# Patient Record
Sex: MALE | Race: BLACK OR AFRICAN AMERICAN | NOT HISPANIC OR LATINO | Employment: FULL TIME | URBAN - METROPOLITAN AREA
[De-identification: names, ages, dates, MRNs, and addresses within clinical notes are randomized per-mention and may not be internally consistent; named-entity substitution may affect disease eponyms.]

---

## 2017-01-12 RX ORDER — CARISOPRODOL 350 MG/1
TABLET ORAL
Qty: 60 TABLET | Refills: 4 | OUTPATIENT
Start: 2017-01-12

## 2017-01-30 ENCOUNTER — TELEPHONE (OUTPATIENT)
Dept: FAMILY MEDICINE CLINIC | Facility: CLINIC | Age: 57
End: 2017-01-30

## 2017-01-30 RX ORDER — DEXTROMETHORPHAN HYDROBROMIDE AND PROMETHAZINE HYDROCHLORIDE 15; 6.25 MG/5ML; MG/5ML
5 SYRUP ORAL 4 TIMES DAILY PRN
Qty: 120 ML | Refills: 0 | Status: SHIPPED | OUTPATIENT
Start: 2017-01-30 | End: 2017-02-28

## 2017-01-30 RX ORDER — CEFDINIR 300 MG/1
300 CAPSULE ORAL 2 TIMES DAILY
Qty: 20 CAPSULE | Refills: 0 | Status: SHIPPED | OUTPATIENT
Start: 2017-01-30 | End: 2017-02-28

## 2017-01-30 NOTE — TELEPHONE ENCOUNTER
----- Message from Lavern Vasquez sent at 1/30/2017  8:31 AM EST -----  Contact: PATIENT  CAN HE GET SOMETHING CALLED IN FOR SINUS INFECTION, HE IS COUGHING, CONGESTED, COUGHING UP YELLOW MUCUS, SINUS PRESSURE. CAN HE GET SOMETHING FOR THE COUGH AND ANTIBIOTIC, ALSO REFILL ON IT. RAIN ON [facility]

## 2017-02-28 ENCOUNTER — TELEPHONE (OUTPATIENT)
Dept: FAMILY MEDICINE CLINIC | Facility: CLINIC | Age: 57
End: 2017-02-28

## 2017-02-28 ENCOUNTER — OFFICE VISIT (OUTPATIENT)
Dept: FAMILY MEDICINE CLINIC | Facility: CLINIC | Age: 57
End: 2017-02-28

## 2017-02-28 VITALS
SYSTOLIC BLOOD PRESSURE: 140 MMHG | BODY MASS INDEX: 39.99 KG/M2 | OXYGEN SATURATION: 97 % | TEMPERATURE: 98 F | HEIGHT: 65 IN | WEIGHT: 240 LBS | DIASTOLIC BLOOD PRESSURE: 80 MMHG | HEART RATE: 79 BPM

## 2017-02-28 DIAGNOSIS — J40 BRONCHITIS: Primary | ICD-10-CM

## 2017-02-28 DIAGNOSIS — K21.9 GASTROESOPHAGEAL REFLUX DISEASE WITHOUT ESOPHAGITIS: ICD-10-CM

## 2017-02-28 DIAGNOSIS — M50.30 DDD (DEGENERATIVE DISC DISEASE), CERVICAL: ICD-10-CM

## 2017-02-28 DIAGNOSIS — R79.89 LOW TESTOSTERONE: ICD-10-CM

## 2017-02-28 PROCEDURE — 96372 THER/PROPH/DIAG INJ SC/IM: CPT | Performed by: PHYSICIAN ASSISTANT

## 2017-02-28 PROCEDURE — 99214 OFFICE O/P EST MOD 30 MIN: CPT | Performed by: PHYSICIAN ASSISTANT

## 2017-02-28 RX ORDER — TESTOSTERONE CYPIONATE 200 MG/ML
200 INJECTION, SOLUTION INTRAMUSCULAR ONCE
Status: COMPLETED | OUTPATIENT
Start: 2017-02-28 | End: 2017-02-28

## 2017-02-28 RX ORDER — OMEPRAZOLE 40 MG/1
40 CAPSULE, DELAYED RELEASE ORAL DAILY
Qty: 30 CAPSULE | Refills: 11 | Status: SHIPPED | OUTPATIENT
Start: 2017-02-28 | End: 2017-11-17 | Stop reason: SDUPTHER

## 2017-02-28 RX ADMIN — TESTOSTERONE CYPIONATE 200 MG: 200 INJECTION, SOLUTION INTRAMUSCULAR at 10:24

## 2017-02-28 NOTE — PROGRESS NOTES
Subjective   Sebastian Liao is a 56 y.o. male    Cough   This is a new problem. The current episode started in the past 7 days. The cough is productive of brown sputum and productive of purulent sputum. Associated symptoms include ear pain, a fever, headaches, nasal congestion, postnasal drip, rhinorrhea, a sore throat, sweats and wheezing. Pertinent negatives include no chest pain, chills, rash or shortness of breath. Associated symptoms comments: Cough is described as hacking worse at night, worse when laying down. The symptoms are aggravated by lying down and cold air. He has tried a beta-agonist inhaler (Has been taking some over-the-counter Mucinex relief) for the symptoms. The treatment provided no relief.    patient also comes in for follow-up of hypogonadism, currently taking testosterone injections every 2 weeks 200 mg per mL 1 cc every 2 weeks, states he feels occasionally tired sex drive is normal comes in for blood work and refills.  no problems with medication no side effects patient is stable.    Follow-up for heartburn GERD, takes Prilosec 40 mg everyday, still has occasional breakthrough symptoms when he does not take medication, he has not been watching his diet is lost no weight.  He has late night heartburn when not taking medication or not eating smaller meals.  Medication when used properly has no symptoms.    Patient comes in follow-up chronic neck pain he has history of degenerative disc disease in his cervical spine.,  Takes Norco 3-4 times a day on occasion pain is controlled controlled with medication does not take everyday.  Patient's pain is 7 out of controlled with medication stable.        The following portions of the patient's history were reviewed and updated as appropriate: allergies, current medications, past social history and problem list    Review of Systems   Constitutional: Positive for fever. Negative for chills, fatigue and unexpected weight change.   HENT: Positive for  congestion, ear pain, postnasal drip, rhinorrhea, sinus pressure and sore throat.    Eyes: Positive for pain.   Respiratory: Positive for wheezing. Negative for cough, chest tightness and shortness of breath.    Cardiovascular: Negative for chest pain, palpitations and leg swelling.   Gastrointestinal: Negative for nausea.        Heartburn   Genitourinary: Negative.    Musculoskeletal: Positive for back pain, neck pain and neck stiffness.   Skin: Negative for color change and rash.   Neurological: Positive for headaches. Negative for dizziness, syncope and weakness.   Hematological: Negative for adenopathy.       Objective     Vitals:    02/28/17 0812   BP: 140/80   Pulse: 79   Temp: 98 °F (36.7 °C)   SpO2: 97%       Physical Exam   Constitutional: He appears well-developed and well-nourished.   HENT:   Head: Normocephalic and atraumatic.   Right Ear: Tympanic membrane and ear canal normal.   Left Ear: Tympanic membrane and ear canal normal.   Nose: Right sinus exhibits maxillary sinus tenderness. Left sinus exhibits maxillary sinus tenderness.   Mouth/Throat: Oropharynx is clear and moist. No oropharyngeal exudate.   Eyes: Pupils are equal, round, and reactive to light.   Neck: Neck supple. No JVD present.   Cardiovascular: Normal rate, regular rhythm, normal heart sounds, intact distal pulses and normal pulses.    No murmur heard.  Pulmonary/Chest: Effort normal. No stridor. No respiratory distress. He has wheezes. He has rales.   Abdominal: Soft. Bowel sounds are normal. There is no hepatosplenomegaly. There is no tenderness.   Musculoskeletal: He exhibits no edema.   Lymphadenopathy:     He has no cervical adenopathy.   Skin: Skin is warm and dry.   Nursing note and vitals reviewed.      Assessment/Plan     Diagnoses and all orders for this visit:    Bronchitis    Low testosterone  -     Testosterone Cypionate (DEPOTESTOTERONE CYPIONATE) injection 200 mg; Inject 1 mL into the shoulder, thigh, or buttocks 1 (One)  Time.  -     Testosterone, Free, Total    Gastroesophageal reflux disease without esophagitis  -     omeprazole (PRILOSEC) 40 MG capsule; Take 1 capsule by mouth Daily.    DDD (degenerative disc disease), cervical     #1 Levaquin 500 milligrams 1 by mouth everyday dispense 10    Tussionex 1 teaspoon every 12 hours dispense 120 ML's    OTC Mucinex    #2 refill testosterone 200 mg/per mL 1 cc every 2 weeks IM    Check total testosterone, free testosterone    #3 refill Prilosec 40 mg 1 by mouth everyday dispense 30 with 11 refills    Patient education discussed, smaller meals not eating late at night patient encouraged to lose weight    #4 refill [place] 10/325 take when necessary pain for neck up to 4 times a day, patient was encouraged not to take Norco walk taking the cough syrup he understands risk and will not.  Spoke with Dr. Doan  also agreed to treatment plan

## 2017-02-28 NOTE — TELEPHONE ENCOUNTER
Clarification with the pharmacy. Strength given for testosterone injections as 200 mg and to separate the patient Norco and cough medicine due to him being sick.

## 2017-03-20 ENCOUNTER — TELEPHONE (OUTPATIENT)
Dept: FAMILY MEDICINE CLINIC | Facility: CLINIC | Age: 57
End: 2017-03-20

## 2017-03-20 NOTE — TELEPHONE ENCOUNTER
----- Message from Vilma Lora sent at 3/20/2017 10:08 AM EDT -----  Contact: PT.  PT. SAW MIGUEL LAST.  MEDICATION WAS NEEDING TO BE CALLED IN FOR: SOMA (GENERIC WOULD BE OKED PER PT.), 350 MG., TAKEN 2/DAY.  #60 W/REFILLS. RX=RAIN/[facility].  PT. CAN BE REACHED @ [facility] #.

## 2017-03-23 DIAGNOSIS — K58.9 IRRITABLE BOWEL SYNDROME WITHOUT DIARRHEA: ICD-10-CM

## 2017-03-23 RX ORDER — DICYCLOMINE HCL 20 MG
TABLET ORAL
Qty: 90 TABLET | Refills: 2 | Status: SHIPPED | OUTPATIENT
Start: 2017-03-23 | End: 2017-12-15 | Stop reason: SDUPTHER

## 2017-04-25 ENCOUNTER — OFFICE VISIT (OUTPATIENT)
Dept: ORTHOPEDIC SURGERY | Facility: CLINIC | Age: 57
End: 2017-04-25

## 2017-04-25 VITALS
SYSTOLIC BLOOD PRESSURE: 174 MMHG | HEIGHT: 64 IN | WEIGHT: 228 LBS | DIASTOLIC BLOOD PRESSURE: 98 MMHG | HEART RATE: 84 BPM | BODY MASS INDEX: 38.93 KG/M2

## 2017-04-25 DIAGNOSIS — Z96.641 STATUS POST TOTAL REPLACEMENT OF RIGHT HIP: Primary | ICD-10-CM

## 2017-04-25 DIAGNOSIS — E66.9 OBESITY (BMI 30-39.9): ICD-10-CM

## 2017-04-25 DIAGNOSIS — M16.12 PRIMARY OSTEOARTHRITIS OF LEFT HIP: ICD-10-CM

## 2017-04-25 PROCEDURE — 99203 OFFICE O/P NEW LOW 30 MIN: CPT | Performed by: ORTHOPAEDIC SURGERY

## 2017-04-25 NOTE — PROGRESS NOTES
Orthopaedic Clinic Note: Hip New Patient    Chief Complaint   Patient presents with   • Right Hip - Follow-up     Right TKA 1-26-16 Dr. Schwartz        HPI    Sebastian Liao is a 57 y.o. male who presents with left hip pain for 5 year(s). He originally had bilateral hip arthritis but underwent right total hip arthroplasty in January 2016.  He is done extremely well from that hip replacement and has no complaints.  In regards to his left hip, his pain is localized to groin and is a 5/10 on the pain scale. The pain is worse with walking, climbing stairs and standing for long periods; resting and sitting make it better. Previous treatments have included: NSAIDS.  Despite his ongoing left groin pain, he states he has not yet were ready to undergo left total hip replacement.  He does have an extremely high pain tolerance and continues to work through the pain.  His right groin pain is completely gone since the hip replacement.  He denies any fevers, chills, constitutional symptoms.     Past Medical History:   Diagnosis Date   • Acute bronchitis    • Cervicalgia    • Colon cancer     pt reported on intake forms   • Edema    • HTN (hypertension)    • Hyperlipidemia    • IBS (irritable bowel syndrome)    • Low testosterone    • Lung cancer     pt reported on intake forms      Past Surgical History:   Procedure Laterality Date   • CERVICAL FUSION     • HIP SURGERY Right 01/26/2016    Dr. Schwartz   • NECK SURGERY      cervical spine fusion      Social History     Social History   • Marital status:      Spouse name: N/A   • Number of children: N/A   • Years of education: N/A     Occupational History   • Not on file.     Social History Main Topics   • Smoking status: Former Smoker     Packs/day: 1.00     Years: 10.00     Types: Cigarettes   • Smokeless tobacco: Never Used   • Alcohol use Yes      Comment: occasional   • Drug use: No   • Sexual activity: Defer     Other Topics Concern   • Not on file     Social History  "Narrative    Left hand dominant, , Exercises daily.       Current Outpatient Prescriptions on File Prior to Visit   Medication Sig Dispense Refill   • albuterol (PROVENTIL HFA;VENTOLIN HFA) 108 (90 BASE) MCG/ACT inhaler Inhale 2 puffs Every 4 (Four) Hours As Needed for wheezing. 3.7 g 1   • amLODIPine (NORVASC) 5 MG tablet Take 1 tablet by mouth daily. 30 tablet 11   • carisoprodol (SOMA) 350 MG tablet TAKE ONE TABLET BY MOUTH TWICE A DAY 60 tablet 4   • dicyclomine (BENTYL) 20 MG tablet TAKE ONE TABLET BY MOUTH EVERY 6 HOURS 90 tablet 2   • omeprazole (PRILOSEC) 40 MG capsule Take 1 capsule by mouth Daily. 30 capsule 11   • rosuvastatin (CRESTOR) 10 MG tablet Take 1 tablet by mouth daily. 30 tablet 11   • hydrochlorothiazide (HYDRODIURIL) 25 MG tablet TAKE ONE TABLET BY MOUTH DAILY 30 tablet 4     No current facility-administered medications on file prior to visit.       No Known Allergies     Review of Systems   Constitutional: Negative.    HENT: Negative.    Eyes: Negative.    Respiratory: Negative.    Cardiovascular: Negative.    Gastrointestinal: Positive for abdominal pain and nausea.        Reflux/heartburn   Endocrine: Negative.    Genitourinary: Positive for urgency.   Musculoskeletal: Negative.    Skin: Positive for color change.   Allergic/Immunologic: Negative.    Neurological: Positive for numbness (tingling, burning).   Hematological: Negative.    Psychiatric/Behavioral: Negative.         The following portions of the patient's history were reviewed and updated as appropriate: allergies, current medications, past family history, past medical history, past social history, past surgical history and problem list.    Physical Exam  Blood pressure 174/98, pulse 84, height 64\" (162.6 cm), weight 228 lb (103 kg).    Body mass index is 39.14 kg/(m^2).    GENERAL APPEARANCE: awake, alert & oriented x 3, in no acute distress, well developed, well nourished, normal affect and obese  LUNGS:  breathing " nonlabored  EYES: sclera anicteric  CARDIOVASCULAR: palpable dorsalis pedis, palpable posterior tibial bilaterally. Capillary refill less than 2 seconds  EXTREMITIES: no clubbing, cyanosis  GAIT:  Trendelenberg           Right Hip Exam:  RANGE OF MOTION:   FLEXION CONTRACTURE: None   FLEXION: 110 degrees   INTERNAL ROTATION: 5 degrees at 90 degrees of flexion   EXTERNAL ROTATION: 30 degrees at 90 degrees of flexion    PAIN WITH HIP MOTION: no  PAIN WITH LOGROLL: no  STINCHFIELD TEST: negative    KNEE EXAM: full knee ROM (0-130), stable to varus/valgus stress at 0 and 30 degrees     STRENGTH:  5/5 hip adduction, abduction, flexion. 5/5 strength knee flexion, extension. 5/5 strength ankle dorsiflexion and plantarflexion.     GREATER TROCHANTER BURSAL PAIN:  no     REFLEXES:   PATELLAR 2+/4   ACHILLES 2+/4    CLONUS: negative  STRAIGHT LEG TEST:   negative    SENSATION TO LIGHT TOUCH:  DEEP PERONEAL/SUPERFICIAL PERONEAL/SURAL/SAPHENOUS/TIBIAL:  intact    EDEMA:   no  ERYTHEMA:  no  WOUNDS/INCISIONS: none, no overlying skin problems.  Surgical incisions well-healed      Left Hip Exam:   RANGE OF MOTION:   FLEXION CONTRACTURE: None  FLEXION: 110 degrees  INTERNAL ROTATION: neutral degrees at 90 degrees of flexion   EXTERNAL ROTATION: 20 degrees at 90 degrees of flexion    PAIN WITH HIP MOTION: yes  PAIN WITH LOGROLL: no  STINCHFIELD TEST: positive    KNEE EXAM: full knee ROM (0-130), stable to varus/valgus stress at 0 and 30 degrees     STRENGTH:  4/5 hip adduction, abduction, flexion. 5/5 knee flexion, extension. 5/5 ankle dorsiflexion and plantarflexion.     GREATER TROCHANTER BURSAL PAIN:  yes     REFLEXES:   PATELLAR 2+/4   ACHILLES 2+/4    CLONUS: no  STRAIGHT LEG TEST:   negative    SENSATION TO LIGHT TOUCH:  DEEP PERONEAL/SUPERFICIAL PERONEAL/SURAL/SAPHENOUS/TIBIAL:  intact    EDEMA:   no  ERYTHEMA:  no  WOUNDS/INCISIONS: no        ------------------------------------------------------------------    LEG LENGTHS:   equal    RADIOGRAPHIC FINDINGS:   AP pelvis: left: advanced, end-stage osteoarthritis with bone on bone articulation, subchondral sclerosis, and subchondral cysts, there are marginal osteophytes visualized at the femoral head-neck junction and the margins of the acetabulum:  Right: a well positioned total hip without evidence of wear, loosening, or osteoarthritis, femoral head is concentrically reduced within the acetabulum  Comparison: Todays xrays were compared to previous xrays from Feb 2016     Assessment/Plan:   Diagnosis Plan   1. Status post total replacement of right hip  XR Pelvis 1 or 2 View   2. Primary osteoarthritis of left hip     3. Obesity (BMI 30-39.9)       Patient has a well-functioning right total hip arthroplasty without complication.  I recommended continued activity as tolerated.  In regards to his left hip arthritis, I discussed with him the need for likely surgical intervention in the near future given the severity of his arthritis.  Patient states that his pain level is currently not to the point that he feels surgical intervention is required.  He stated that he would like to follow up in a year to discuss possible total hip arthroplasty at that time.  Therefore, I'll see him back in 1 year for repeat evaluation.  In the interval, I also recommended weight loss to decrease joint reaction forces.    Dao Espana MD  04/25/17  8:23 AM

## 2017-07-07 ENCOUNTER — OFFICE VISIT (OUTPATIENT)
Dept: FAMILY MEDICINE CLINIC | Facility: CLINIC | Age: 57
End: 2017-07-07

## 2017-07-07 VITALS
TEMPERATURE: 97.8 F | DIASTOLIC BLOOD PRESSURE: 88 MMHG | RESPIRATION RATE: 16 BRPM | WEIGHT: 239.4 LBS | BODY MASS INDEX: 40.87 KG/M2 | HEIGHT: 64 IN | HEART RATE: 78 BPM | OXYGEN SATURATION: 98 % | SYSTOLIC BLOOD PRESSURE: 148 MMHG

## 2017-07-07 DIAGNOSIS — J30.2 SEASONAL ALLERGIC RHINITIS, UNSPECIFIED ALLERGIC RHINITIS TRIGGER: ICD-10-CM

## 2017-07-07 DIAGNOSIS — I10 ESSENTIAL HYPERTENSION, BENIGN: ICD-10-CM

## 2017-07-07 DIAGNOSIS — M51.36 DDD (DEGENERATIVE DISC DISEASE), LUMBAR: ICD-10-CM

## 2017-07-07 DIAGNOSIS — E29.1 TESTICULAR HYPOFUNCTION: ICD-10-CM

## 2017-07-07 DIAGNOSIS — E78.49 OTHER HYPERLIPIDEMIA: Primary | ICD-10-CM

## 2017-07-07 PROCEDURE — 99214 OFFICE O/P EST MOD 30 MIN: CPT | Performed by: PHYSICIAN ASSISTANT

## 2017-07-07 PROCEDURE — 96372 THER/PROPH/DIAG INJ SC/IM: CPT | Performed by: PHYSICIAN ASSISTANT

## 2017-07-07 RX ORDER — AMLODIPINE BESYLATE 5 MG/1
5 TABLET ORAL DAILY
Qty: 30 TABLET | Refills: 11 | Status: SHIPPED | OUTPATIENT
Start: 2017-07-07 | End: 2017-11-17 | Stop reason: SDUPTHER

## 2017-07-07 RX ORDER — TESTOSTERONE CYPIONATE 200 MG/ML
200 INJECTION, SOLUTION INTRAMUSCULAR ONCE
Status: COMPLETED | OUTPATIENT
Start: 2017-07-07 | End: 2017-07-07

## 2017-07-07 RX ORDER — ROSUVASTATIN CALCIUM 10 MG/1
10 TABLET, COATED ORAL DAILY
Qty: 90 TABLET | Refills: 4 | Status: SHIPPED | OUTPATIENT
Start: 2017-07-07 | End: 2017-11-17 | Stop reason: SDUPTHER

## 2017-07-07 RX ORDER — TESTOSTERONE CYPIONATE 200 MG/ML
200 INJECTION, SOLUTION INTRAMUSCULAR
COMMUNITY
End: 2021-06-08

## 2017-07-07 RX ORDER — LORATADINE 10 MG/1
10 TABLET ORAL DAILY
Qty: 30 TABLET | Refills: 11 | Status: SHIPPED | OUTPATIENT
Start: 2017-07-07 | End: 2020-01-20 | Stop reason: SDUPTHER

## 2017-07-07 RX ADMIN — TESTOSTERONE CYPIONATE 200 MG: 200 INJECTION, SOLUTION INTRAMUSCULAR at 08:30

## 2017-07-07 NOTE — PROGRESS NOTES
Subjective   Sebastian Liao is a 57 y.o. male    History of Present Illness  Patient is 57-year-old black male comes in for follow-up of hyperlipidemia, doing well no problems or complaints takes Crestor everyday has no muscle aches no body aches    Patient comes in follow-up hypertension takes Norvasc that difficulty meds working well no problems or complaints blood pressures controlled    Patient comes in follow-up of hypogonadism needs his testosterone injection energy is good sexual drive is good no ADD    Patient complains of Raynaud's watery eyes chronic allergy symptoms needs refill on medication patient symptoms are stable    Patient has history of degenerative disc disease has problems with low back pain chronic pain in nature patient's pain radiates to both right and left lower extremities patient's pain is chronic in nature has history of degenerative disc disease, he has had joint replacement in the past to chart needs refill of his Soma and Norco.      The following portions of the patient's history were reviewed and updated as appropriate: allergies, current medications, past social history and problem list    Review of Systems   Constitutional: Negative for appetite change, diaphoresis, fatigue and unexpected weight change.   Eyes: Negative for visual disturbance.   Respiratory: Negative for cough, chest tightness and shortness of breath.    Cardiovascular: Negative for chest pain, palpitations and leg swelling.   Gastrointestinal: Negative for diarrhea, nausea and vomiting.   Endocrine: Negative for polydipsia, polyphagia and polyuria.   Musculoskeletal: Positive for arthralgias, back pain, joint swelling and neck pain.   Skin: Negative for color change and rash.   Neurological: Negative for dizziness, syncope, weakness, light-headedness, numbness and headaches.       Objective     Vitals:    07/07/17 0804   BP: 148/88   Pulse: 78   Resp: 16   Temp: 97.8 °F (36.6 °C)   SpO2: 98%       Physical Exam    Constitutional: He appears well-developed and well-nourished.   HENT:   Nose: Rhinorrhea and sinus tenderness present.   Neck: Neck supple. No JVD present. No thyromegaly present.   Cardiovascular: Normal rate, regular rhythm, normal heart sounds, intact distal pulses and normal pulses.    No murmur heard.  Pulmonary/Chest: Effort normal and breath sounds normal. No respiratory distress.   Abdominal: Soft. Bowel sounds are normal. There is no hepatosplenomegaly. There is no tenderness.   Musculoskeletal: He exhibits no edema.        Lumbar back: He exhibits decreased range of motion, tenderness and bony tenderness.   Lymphadenopathy:     He has no cervical adenopathy.   Neurological: No sensory deficit.   Skin: Skin is warm and dry. He is not diaphoretic.   Nursing note and vitals reviewed.      Assessment/Plan     Diagnoses and all orders for this visit:    Other hyperlipidemia  -     rosuvastatin (CRESTOR) 10 MG tablet; Take 1 tablet by mouth Daily.    Essential hypertension, benign  -     amLODIPine (NORVASC) 5 MG tablet; Take 1 tablet by mouth Daily.    Testicular hypofunction  -     Testosterone Cypionate (DEPOTESTOTERONE CYPIONATE) 200 MG/ML injection; Inject 200 mg into the shoulder, thigh, or buttocks Every 14 (Fourteen) Days.    Seasonal allergic rhinitis, unspecified allergic rhinitis trigger  -     loratadine (CLARITIN) 10 MG tablet; Take 1 tablet by mouth Daily.    DDD (degenerative disc disease), lumbar    #1 refill Crestor 10 mg 1 by mouth everyday dispense 90×4 refills    #2 refill Norvasc 5 mg 1 by mouth everyday dispense 90×4 refills    #3 testosterone 200 mg IM given in office    #4 refill Claritin 10 mg 1 by mouth everyday dispense 30×11 refills    Tussionex cough syrup 1 teaspoon every 12 hours 120 ML's    #5 Soma 350 mg 1 by mouth 3 times a day dispense 90 when necessary back spasm    Norco 10/325 one by mouth 3 times a day to 4 times a day spelled 120 when necessary pain

## 2017-11-17 ENCOUNTER — OFFICE VISIT (OUTPATIENT)
Dept: FAMILY MEDICINE CLINIC | Facility: CLINIC | Age: 57
End: 2017-11-17

## 2017-11-17 ENCOUNTER — APPOINTMENT (OUTPATIENT)
Dept: LAB | Facility: HOSPITAL | Age: 57
End: 2017-11-17

## 2017-11-17 VITALS
HEIGHT: 64 IN | HEART RATE: 78 BPM | SYSTOLIC BLOOD PRESSURE: 142 MMHG | DIASTOLIC BLOOD PRESSURE: 82 MMHG | WEIGHT: 243.2 LBS | OXYGEN SATURATION: 97 % | BODY MASS INDEX: 41.52 KG/M2 | RESPIRATION RATE: 16 BRPM | TEMPERATURE: 97.5 F

## 2017-11-17 DIAGNOSIS — Z12.5 SPECIAL SCREENING FOR MALIGNANT NEOPLASM OF PROSTATE: ICD-10-CM

## 2017-11-17 DIAGNOSIS — Z00.00 ROUTINE GENERAL MEDICAL EXAMINATION AT HEALTH CARE FACILITY: Primary | ICD-10-CM

## 2017-11-17 LAB
ALBUMIN SERPL-MCNC: 4.3 G/DL (ref 3.2–4.8)
ALBUMIN/GLOB SERPL: 1.3 G/DL (ref 1.5–2.5)
ALP SERPL-CCNC: 68 U/L (ref 25–100)
ALT SERPL W P-5'-P-CCNC: 23 U/L (ref 7–40)
ANION GAP SERPL CALCULATED.3IONS-SCNC: 8 MMOL/L (ref 3–11)
ARTICHOKE IGE QN: 101 MG/DL (ref 0–130)
AST SERPL-CCNC: 46 U/L (ref 0–33)
BASOPHILS # BLD AUTO: 0.05 10*3/MM3 (ref 0–0.2)
BASOPHILS NFR BLD AUTO: 0.7 % (ref 0–1)
BILIRUB BLD-MCNC: NEGATIVE MG/DL
BILIRUB SERPL-MCNC: 0.5 MG/DL (ref 0.3–1.2)
BUN BLD-MCNC: 12 MG/DL (ref 9–23)
BUN/CREAT SERPL: 10.9 (ref 7–25)
CALCIUM SPEC-SCNC: 9.2 MG/DL (ref 8.7–10.4)
CHLORIDE SERPL-SCNC: 99 MMOL/L (ref 99–109)
CHOLEST SERPL-MCNC: 149 MG/DL (ref 0–200)
CLARITY, POC: CLEAR
CO2 SERPL-SCNC: 32 MMOL/L (ref 20–31)
COLOR UR: YELLOW
CREAT BLD-MCNC: 1.1 MG/DL (ref 0.6–1.3)
DEPRECATED RDW RBC AUTO: 45.5 FL (ref 37–54)
EOSINOPHIL # BLD AUTO: 0.45 10*3/MM3 (ref 0–0.3)
EOSINOPHIL NFR BLD AUTO: 6.2 % (ref 0–3)
ERYTHROCYTE [DISTWIDTH] IN BLOOD BY AUTOMATED COUNT: 13.8 % (ref 11.3–14.5)
GFR SERPL CREATININE-BSD FRML MDRD: 84 ML/MIN/1.73
GLOBULIN UR ELPH-MCNC: 3.2 GM/DL
GLUCOSE BLD-MCNC: 92 MG/DL (ref 70–100)
GLUCOSE UR STRIP-MCNC: NEGATIVE MG/DL
HCT VFR BLD AUTO: 50.3 % (ref 38.9–50.9)
HDLC SERPL-MCNC: 42 MG/DL (ref 40–60)
HGB BLD-MCNC: 16.7 G/DL (ref 13.1–17.5)
IMM GRANULOCYTES # BLD: 0.02 10*3/MM3 (ref 0–0.03)
IMM GRANULOCYTES NFR BLD: 0.3 % (ref 0–0.6)
KETONES UR QL: NEGATIVE
LEUKOCYTE EST, POC: NEGATIVE
LYMPHOCYTES # BLD AUTO: 2.44 10*3/MM3 (ref 0.6–4.8)
LYMPHOCYTES NFR BLD AUTO: 33.4 % (ref 24–44)
MCH RBC QN AUTO: 29.6 PG (ref 27–31)
MCHC RBC AUTO-ENTMCNC: 33.2 G/DL (ref 32–36)
MCV RBC AUTO: 89 FL (ref 80–99)
MONOCYTES # BLD AUTO: 0.87 10*3/MM3 (ref 0–1)
MONOCYTES NFR BLD AUTO: 11.9 % (ref 0–12)
NEUTROPHILS # BLD AUTO: 3.47 10*3/MM3 (ref 1.5–8.3)
NEUTROPHILS NFR BLD AUTO: 47.5 % (ref 41–71)
NITRITE UR-MCNC: NEGATIVE MG/ML
PH UR: 7 [PH] (ref 5–8)
PLATELET # BLD AUTO: 327 10*3/MM3 (ref 150–450)
PMV BLD AUTO: 10.5 FL (ref 6–12)
POTASSIUM BLD-SCNC: 4.3 MMOL/L (ref 3.5–5.5)
PROT SERPL-MCNC: 7.5 G/DL (ref 5.7–8.2)
PROT UR STRIP-MCNC: ABNORMAL MG/DL
PSA SERPL-MCNC: 0.71 NG/ML (ref 0–4)
RBC # BLD AUTO: 5.65 10*6/MM3 (ref 4.2–5.76)
RBC # UR STRIP: NEGATIVE /UL
SODIUM BLD-SCNC: 139 MMOL/L (ref 132–146)
SP GR UR: 1.01 (ref 1–1.03)
TRIGL SERPL-MCNC: 75 MG/DL (ref 0–150)
TSH SERPL DL<=0.05 MIU/L-ACNC: 2.37 MIU/ML (ref 0.35–5.35)
UROBILINOGEN UR QL: NORMAL
WBC NRBC COR # BLD: 7.3 10*3/MM3 (ref 3.5–10.8)

## 2017-11-17 PROCEDURE — 93000 ELECTROCARDIOGRAM COMPLETE: CPT | Performed by: PHYSICIAN ASSISTANT

## 2017-11-17 PROCEDURE — 80061 LIPID PANEL: CPT | Performed by: PHYSICIAN ASSISTANT

## 2017-11-17 PROCEDURE — 99396 PREV VISIT EST AGE 40-64: CPT | Performed by: PHYSICIAN ASSISTANT

## 2017-11-17 PROCEDURE — 84443 ASSAY THYROID STIM HORMONE: CPT | Performed by: PHYSICIAN ASSISTANT

## 2017-11-17 PROCEDURE — 84402 ASSAY OF FREE TESTOSTERONE: CPT | Performed by: PHYSICIAN ASSISTANT

## 2017-11-17 PROCEDURE — 85025 COMPLETE CBC W/AUTO DIFF WBC: CPT | Performed by: PHYSICIAN ASSISTANT

## 2017-11-17 PROCEDURE — 81003 URINALYSIS AUTO W/O SCOPE: CPT | Performed by: PHYSICIAN ASSISTANT

## 2017-11-17 PROCEDURE — 80053 COMPREHEN METABOLIC PANEL: CPT | Performed by: PHYSICIAN ASSISTANT

## 2017-11-17 PROCEDURE — 84153 ASSAY OF PSA TOTAL: CPT | Performed by: PHYSICIAN ASSISTANT

## 2017-11-17 PROCEDURE — 36415 COLL VENOUS BLD VENIPUNCTURE: CPT | Performed by: PHYSICIAN ASSISTANT

## 2017-11-17 PROCEDURE — 84403 ASSAY OF TOTAL TESTOSTERONE: CPT | Performed by: PHYSICIAN ASSISTANT

## 2017-11-17 RX ORDER — ROSUVASTATIN CALCIUM 10 MG/1
10 TABLET, COATED ORAL DAILY
Qty: 90 TABLET | Refills: 4 | Status: SHIPPED | OUTPATIENT
Start: 2017-11-17 | End: 2018-11-19 | Stop reason: SDUPTHER

## 2017-11-17 RX ORDER — AMLODIPINE BESYLATE 5 MG/1
5 TABLET ORAL DAILY
Qty: 30 TABLET | Refills: 11 | Status: SHIPPED | OUTPATIENT
Start: 2017-11-17 | End: 2018-03-06 | Stop reason: SDUPTHER

## 2017-11-17 RX ORDER — OMEPRAZOLE 40 MG/1
40 CAPSULE, DELAYED RELEASE ORAL DAILY
Qty: 30 CAPSULE | Refills: 11 | Status: SHIPPED | OUTPATIENT
Start: 2017-11-17 | End: 2018-03-06 | Stop reason: SDUPTHER

## 2017-11-17 NOTE — PROGRESS NOTES
Subjective   Sebastian Liao is a 57 y.o. male    History of Present Illness  Patient 57-year-old black male who comes in preventive medical examination.  No problems or complaints      The following portions of the patient's history were reviewed and updated as appropriate: allergies, current medications, past social history and problem list    Review of Systems   Constitutional: Negative for fatigue and unexpected weight change.   Respiratory: Negative for cough, chest tightness and shortness of breath.    Cardiovascular: Negative for chest pain, palpitations and leg swelling.   Gastrointestinal: Negative for nausea.   Skin: Negative for color change and rash.   Neurological: Negative for dizziness, syncope, weakness and headaches.       Objective     Vitals:    11/17/17 0931   BP: 142/82   Pulse: 78   Resp: 16   Temp: 97.5 °F (36.4 °C)   SpO2: 97%       Physical Exam   Constitutional: He is oriented to person, place, and time. He appears well-developed and well-nourished.   HENT:   Head: Normocephalic and atraumatic.   Right Ear: External ear normal.   Left Ear: External ear normal.   Nose: Nose normal.   Mouth/Throat: Oropharynx is clear and moist.   Eyes: Conjunctivae and EOM are normal. Pupils are equal, round, and reactive to light.   Neck: Normal range of motion. Neck supple. No JVD present. No thyromegaly present.   Cardiovascular: Normal rate, regular rhythm, normal heart sounds and intact distal pulses.    No murmur heard.  Pulmonary/Chest: Effort normal and breath sounds normal.   Abdominal: Soft. Bowel sounds are normal. He exhibits no mass. There is no tenderness.   Genitourinary: Rectum normal, prostate normal and penis normal.   Musculoskeletal: Normal range of motion. He exhibits no edema.   Lymphadenopathy:     He has no cervical adenopathy.   Neurological: He is alert and oriented to person, place, and time. He has normal reflexes. No cranial nerve deficit or sensory deficit.   Skin: Skin is warm  and dry. He is not diaphoretic.   Psychiatric: He has a normal mood and affect.   Nursing note and vitals reviewed.    ECG 12 Lead  Date/Time: 11/17/2017 10:19 AM  Performed by: JOHANN ONEAL  Authorized by: JOHANN ONEAL   Rhythm: sinus rhythm  Rate: normal  ST Segments: ST segments normal  T Waves: T waves normal  QRS axis: normal  Clinical impression: normal ECG  Comments: NSR            Assessment/Plan     Diagnoses and all orders for this visit:    Routine general medical examination at health care facility  -     POCT urinalysis dipstick, automated  -     Comprehensive Metabolic Panel  -     CBC & Differential  -     TSH  -     Lipid Panel  -     rosuvastatin (CRESTOR) 10 MG tablet; Take 1 tablet by mouth Daily.  -     amLODIPine (NORVASC) 5 MG tablet; Take 1 tablet by mouth Daily.  -     omeprazole (PRILOSEC) 40 MG capsule; Take 1 capsule by mouth Daily.    Special screening for malignant neoplasm of prostate  -     PSA    Preventive medicine discussed, diet, exercise healthy living discussed.  discussed low-fat low calorie diet, exercise 3-5 times per week for 30 minutes

## 2017-11-19 LAB
TESTOST FREE SERPL-MCNC: 4.3 PG/ML (ref 7.2–24)
TESTOST SERPL-MCNC: 149 NG/DL (ref 264–916)

## 2017-12-15 DIAGNOSIS — K58.9 IRRITABLE BOWEL SYNDROME WITHOUT DIARRHEA: ICD-10-CM

## 2017-12-15 RX ORDER — DICYCLOMINE HCL 20 MG
TABLET ORAL
Qty: 90 TABLET | Refills: 1 | Status: SHIPPED | OUTPATIENT
Start: 2017-12-15 | End: 2018-08-12 | Stop reason: SDUPTHER

## 2018-03-06 ENCOUNTER — OFFICE VISIT (OUTPATIENT)
Dept: FAMILY MEDICINE CLINIC | Facility: CLINIC | Age: 58
End: 2018-03-06

## 2018-03-06 VITALS
BODY MASS INDEX: 41.86 KG/M2 | OXYGEN SATURATION: 98 % | DIASTOLIC BLOOD PRESSURE: 84 MMHG | HEIGHT: 64 IN | TEMPERATURE: 97.7 F | RESPIRATION RATE: 16 BRPM | HEART RATE: 82 BPM | WEIGHT: 245.2 LBS | SYSTOLIC BLOOD PRESSURE: 138 MMHG

## 2018-03-06 DIAGNOSIS — M25.552 HIP PAIN, BILATERAL: ICD-10-CM

## 2018-03-06 DIAGNOSIS — R21 RASH: ICD-10-CM

## 2018-03-06 DIAGNOSIS — K21.9 GASTROESOPHAGEAL REFLUX DISEASE WITHOUT ESOPHAGITIS: ICD-10-CM

## 2018-03-06 DIAGNOSIS — I10 ESSENTIAL HYPERTENSION, BENIGN: Primary | ICD-10-CM

## 2018-03-06 DIAGNOSIS — M25.551 HIP PAIN, BILATERAL: ICD-10-CM

## 2018-03-06 DIAGNOSIS — R79.89 LOW TESTOSTERONE: ICD-10-CM

## 2018-03-06 PROCEDURE — 99214 OFFICE O/P EST MOD 30 MIN: CPT | Performed by: PHYSICIAN ASSISTANT

## 2018-03-06 PROCEDURE — 96372 THER/PROPH/DIAG INJ SC/IM: CPT | Performed by: PHYSICIAN ASSISTANT

## 2018-03-06 RX ORDER — PREDNISONE 20 MG/1
20 TABLET ORAL 2 TIMES DAILY
Qty: 28 TABLET | Refills: 0 | Status: SHIPPED | OUTPATIENT
Start: 2018-03-06 | End: 2018-11-19

## 2018-03-06 RX ORDER — CYCLOBENZAPRINE HCL 10 MG
10 TABLET ORAL 2 TIMES DAILY PRN
Qty: 60 TABLET | Refills: 1 | Status: SHIPPED | OUTPATIENT
Start: 2018-03-06 | End: 2019-11-20 | Stop reason: SDUPTHER

## 2018-03-06 RX ORDER — AMLODIPINE BESYLATE 5 MG/1
5 TABLET ORAL DAILY
Qty: 90 TABLET | Refills: 4 | Status: SHIPPED | OUTPATIENT
Start: 2018-03-06 | End: 2018-11-19 | Stop reason: SDUPTHER

## 2018-03-06 RX ORDER — OMEPRAZOLE 40 MG/1
CAPSULE, DELAYED RELEASE ORAL
Qty: 30 CAPSULE | Refills: 11 | Status: SHIPPED | OUTPATIENT
Start: 2018-03-06 | End: 2018-03-06 | Stop reason: SDUPTHER

## 2018-03-06 RX ORDER — TESTOSTERONE CYPIONATE 200 MG/ML
200 INJECTION, SOLUTION INTRAMUSCULAR ONCE
Status: COMPLETED | OUTPATIENT
Start: 2018-03-06 | End: 2018-03-06

## 2018-03-06 RX ORDER — OMEPRAZOLE 40 MG/1
40 CAPSULE, DELAYED RELEASE ORAL DAILY
Qty: 90 CAPSULE | Refills: 3 | Status: SHIPPED | OUTPATIENT
Start: 2018-03-06 | End: 2019-03-29 | Stop reason: SDUPTHER

## 2018-03-06 RX ADMIN — TESTOSTERONE CYPIONATE 200 MG: 200 INJECTION, SOLUTION INTRAMUSCULAR at 09:33

## 2018-03-06 NOTE — PROGRESS NOTES
Subjective   Sebastian Liao is a 57 y.o. male  Hypertension (RF amlodipine #90) and Rash (Located on chest for several weeks. No itching/pain. Also requesting prescription to treat jock itch. )      History of Present Illness  She is a 57-year-old white male comes in complaining of hypertension needs refill of Norvasc doing well no problems or complaints medication    Patient comes in for follow-up of GERD symptoms are controlled needs refill on medication.  No breakthrough symptoms at night.  Meds working well    Patient plans rash on chest arms rash about 3 days ago does not itch but is not red raised.  Rash is not painful    Patient claims of bilateral hip pain patient's pain with sitting standing walking patient's pain is severe 9 out of he has a history of degenerative joint disease with replacement.      The following portions of the patient's history were reviewed and updated as appropriate: allergies, current medications, past social history and problem list    Review of Systems   Constitutional: Negative for appetite change, diaphoresis, fatigue and unexpected weight change.   Eyes: Negative for visual disturbance.   Respiratory: Negative for cough, chest tightness and shortness of breath.    Cardiovascular: Negative for chest pain, palpitations and leg swelling.   Gastrointestinal: Negative for diarrhea, nausea and vomiting.   Endocrine: Negative for polydipsia, polyphagia and polyuria.   Skin: Negative for color change and rash.   Neurological: Negative for dizziness, syncope, weakness, light-headedness, numbness and headaches.       Objective     Vitals:    03/06/18 0857   BP: 138/84   Pulse: 82   Resp: 16   Temp: 97.7 °F (36.5 °C)   SpO2: 98%       Physical Exam   Constitutional: He appears well-developed and well-nourished.   Neck: Neck supple. No JVD present. No thyromegaly present.   Cardiovascular: Normal rate, regular rhythm, normal heart sounds, intact distal pulses and normal pulses.    No  murmur heard.  Pulmonary/Chest: Effort normal and breath sounds normal. No respiratory distress.   Abdominal: Soft. Bowel sounds are normal. There is no hepatosplenomegaly. There is no tenderness.   Musculoskeletal: He exhibits no edema.   Lymphadenopathy:     He has no cervical adenopathy.   Neurological: No sensory deficit.   Skin: Skin is warm and dry. He is not diaphoretic.   Nursing note and vitals reviewed.      Assessment/Plan     Diagnoses and all orders for this visit:    Essential hypertension, benign  -     amLODIPine (NORVASC) 5 MG tablet; Take 1 tablet by mouth Daily.    Gastroesophageal reflux disease without esophagitis  -     omeprazole (priLOSEC) 40 MG capsule; Take 1 capsule by mouth Daily.    Rash  -     predniSONE (DELTASONE) 20 MG tablet; Take 1 tablet by mouth 2 (Two) Times a Day.  -     cyclobenzaprine (FLEXERIL) 10 MG tablet; Take 1 tablet by mouth 2 (Two) Times a Day As Needed for Muscle Spasms.    Hip pain, bilateral     Norco 10/325 1 by mouth 4 times a day dispense 120

## 2018-08-12 DIAGNOSIS — K58.9 IRRITABLE BOWEL SYNDROME WITHOUT DIARRHEA: ICD-10-CM

## 2018-08-14 RX ORDER — DICYCLOMINE HCL 20 MG
TABLET ORAL
Qty: 90 TABLET | Refills: 0 | Status: SHIPPED | OUTPATIENT
Start: 2018-08-14 | End: 2019-02-25 | Stop reason: SDUPTHER

## 2018-11-19 ENCOUNTER — OFFICE VISIT (OUTPATIENT)
Dept: FAMILY MEDICINE CLINIC | Facility: CLINIC | Age: 58
End: 2018-11-19

## 2018-11-19 ENCOUNTER — LAB (OUTPATIENT)
Dept: LAB | Facility: HOSPITAL | Age: 58
End: 2018-11-19

## 2018-11-19 VITALS
TEMPERATURE: 97.9 F | WEIGHT: 243.6 LBS | HEART RATE: 78 BPM | HEIGHT: 64 IN | RESPIRATION RATE: 18 BRPM | OXYGEN SATURATION: 98 % | DIASTOLIC BLOOD PRESSURE: 88 MMHG | BODY MASS INDEX: 41.59 KG/M2 | SYSTOLIC BLOOD PRESSURE: 142 MMHG

## 2018-11-19 DIAGNOSIS — Z00.00 ROUTINE GENERAL MEDICAL EXAMINATION AT A HEALTH CARE FACILITY: ICD-10-CM

## 2018-11-19 DIAGNOSIS — Z12.5 SPECIAL SCREENING FOR MALIGNANT NEOPLASM OF PROSTATE: ICD-10-CM

## 2018-11-19 DIAGNOSIS — Z23 IMMUNIZATION DUE: Primary | ICD-10-CM

## 2018-11-19 LAB
ALBUMIN SERPL-MCNC: 4.5 G/DL (ref 3.2–4.8)
ALBUMIN/GLOB SERPL: 1.6 G/DL (ref 1.5–2.5)
ALP SERPL-CCNC: 74 U/L (ref 25–100)
ALT SERPL W P-5'-P-CCNC: 26 U/L (ref 7–40)
ANION GAP SERPL CALCULATED.3IONS-SCNC: 5 MMOL/L (ref 3–11)
ARTICHOKE IGE QN: 112 MG/DL (ref 0–130)
AST SERPL-CCNC: 34 U/L (ref 0–33)
BASOPHILS # BLD AUTO: 0.03 10*3/MM3 (ref 0–0.2)
BASOPHILS NFR BLD AUTO: 0.4 % (ref 0–1)
BILIRUB BLD-MCNC: NEGATIVE MG/DL
BILIRUB SERPL-MCNC: 0.5 MG/DL (ref 0.3–1.2)
BUN BLD-MCNC: 11 MG/DL (ref 9–23)
BUN/CREAT SERPL: 9.7 (ref 7–25)
CALCIUM SPEC-SCNC: 9.5 MG/DL (ref 8.7–10.4)
CHLORIDE SERPL-SCNC: 102 MMOL/L (ref 99–109)
CHOLEST SERPL-MCNC: 164 MG/DL (ref 0–200)
CLARITY, POC: CLEAR
CO2 SERPL-SCNC: 31 MMOL/L (ref 20–31)
COLOR UR: YELLOW
CREAT BLD-MCNC: 1.13 MG/DL (ref 0.6–1.3)
DEPRECATED RDW RBC AUTO: 44.9 FL (ref 37–54)
EOSINOPHIL # BLD AUTO: 0.53 10*3/MM3 (ref 0–0.3)
EOSINOPHIL NFR BLD AUTO: 6.6 % (ref 0–3)
ERYTHROCYTE [DISTWIDTH] IN BLOOD BY AUTOMATED COUNT: 13.8 % (ref 11.3–14.5)
GFR SERPL CREATININE-BSD FRML MDRD: 81 ML/MIN/1.73
GLOBULIN UR ELPH-MCNC: 2.9 GM/DL
GLUCOSE BLD-MCNC: 131 MG/DL (ref 70–100)
GLUCOSE UR STRIP-MCNC: NEGATIVE MG/DL
HBA1C MFR BLD: 9.4 % (ref 4.8–5.6)
HCT VFR BLD AUTO: 49.1 % (ref 38.9–50.9)
HDLC SERPL-MCNC: 42 MG/DL (ref 40–60)
HGB BLD-MCNC: 16 G/DL (ref 13.1–17.5)
IMM GRANULOCYTES # BLD: 0.01 10*3/MM3 (ref 0–0.03)
IMM GRANULOCYTES NFR BLD: 0.1 % (ref 0–0.6)
KETONES UR QL: NEGATIVE
LEUKOCYTE EST, POC: NEGATIVE
LYMPHOCYTES # BLD AUTO: 2.59 10*3/MM3 (ref 0.6–4.8)
LYMPHOCYTES NFR BLD AUTO: 32.4 % (ref 24–44)
MCH RBC QN AUTO: 29 PG (ref 27–31)
MCHC RBC AUTO-ENTMCNC: 32.6 G/DL (ref 32–36)
MCV RBC AUTO: 88.9 FL (ref 80–99)
MONOCYTES # BLD AUTO: 0.72 10*3/MM3 (ref 0–1)
MONOCYTES NFR BLD AUTO: 9 % (ref 0–12)
NEUTROPHILS # BLD AUTO: 4.13 10*3/MM3 (ref 1.5–8.3)
NEUTROPHILS NFR BLD AUTO: 51.6 % (ref 41–71)
NITRITE UR-MCNC: NEGATIVE MG/ML
PH UR: 6.5 [PH] (ref 5–8)
PLATELET # BLD AUTO: 325 10*3/MM3 (ref 150–450)
PMV BLD AUTO: 10.5 FL (ref 6–12)
POTASSIUM BLD-SCNC: 4.1 MMOL/L (ref 3.5–5.5)
PROT SERPL-MCNC: 7.4 G/DL (ref 5.7–8.2)
PROT UR STRIP-MCNC: ABNORMAL MG/DL
PSA SERPL-MCNC: 0.82 NG/ML (ref 0–4)
RBC # BLD AUTO: 5.52 10*6/MM3 (ref 4.2–5.76)
RBC # UR STRIP: NEGATIVE /UL
SODIUM BLD-SCNC: 138 MMOL/L (ref 132–146)
SP GR UR: 1.01 (ref 1–1.03)
TRIGL SERPL-MCNC: 79 MG/DL (ref 0–150)
TSH SERPL DL<=0.05 MIU/L-ACNC: 1.4 MIU/ML (ref 0.35–5.35)
UROBILINOGEN UR QL: NORMAL
WBC NRBC COR # BLD: 8 10*3/MM3 (ref 3.5–10.8)

## 2018-11-19 PROCEDURE — 82043 UR ALBUMIN QUANTITATIVE: CPT

## 2018-11-19 PROCEDURE — 81003 URINALYSIS AUTO W/O SCOPE: CPT | Performed by: PHYSICIAN ASSISTANT

## 2018-11-19 PROCEDURE — 80061 LIPID PANEL: CPT

## 2018-11-19 PROCEDURE — 83036 HEMOGLOBIN GLYCOSYLATED A1C: CPT

## 2018-11-19 PROCEDURE — 90471 IMMUNIZATION ADMIN: CPT | Performed by: PHYSICIAN ASSISTANT

## 2018-11-19 PROCEDURE — 36415 COLL VENOUS BLD VENIPUNCTURE: CPT

## 2018-11-19 PROCEDURE — 90632 HEPA VACCINE ADULT IM: CPT | Performed by: PHYSICIAN ASSISTANT

## 2018-11-19 PROCEDURE — 85025 COMPLETE CBC W/AUTO DIFF WBC: CPT

## 2018-11-19 PROCEDURE — 84443 ASSAY THYROID STIM HORMONE: CPT

## 2018-11-19 PROCEDURE — 93000 ELECTROCARDIOGRAM COMPLETE: CPT | Performed by: PHYSICIAN ASSISTANT

## 2018-11-19 PROCEDURE — 80053 COMPREHEN METABOLIC PANEL: CPT

## 2018-11-19 PROCEDURE — G0103 PSA SCREENING: HCPCS

## 2018-11-19 PROCEDURE — 99396 PREV VISIT EST AGE 40-64: CPT | Performed by: PHYSICIAN ASSISTANT

## 2018-11-19 RX ORDER — OLOPATADINE HYDROCHLORIDE 1 MG/ML
1 SOLUTION/ DROPS OPHTHALMIC 2 TIMES DAILY
COMMUNITY
Start: 2018-09-23

## 2018-11-19 RX ORDER — ROSUVASTATIN CALCIUM 10 MG/1
10 TABLET, COATED ORAL DAILY
Qty: 90 TABLET | Refills: 4 | Status: SHIPPED | OUTPATIENT
Start: 2018-11-19 | End: 2019-11-21 | Stop reason: SDUPTHER

## 2018-11-19 RX ORDER — AMLODIPINE BESYLATE 5 MG/1
5 TABLET ORAL DAILY
Qty: 90 TABLET | Refills: 4 | Status: SHIPPED | OUTPATIENT
Start: 2018-11-19 | End: 2019-05-20 | Stop reason: SDUPTHER

## 2018-11-19 NOTE — PROGRESS NOTES
Subjective   Sebastian Liao is a 58 y.o. male  Annual Exam (Pt is fasting. Already had flu/pneumonia vaccine but req HepA. RF Crestor, dicyclomine, cyclobenazaprine and amlodipine 90-day supplies on all. )      History of Present Illness  Patient is a pleasant 58-year-old black male comes in for preventive medical examination doing well no new problems or complaints he would like referral to urology for erectile dysfunction        The following portions of the patient's history were reviewed and updated as appropriate: allergies, current medications, past social history and problem list    Review of Systems   Constitutional: Negative.    HENT: Negative.    Eyes: Negative.    Respiratory: Negative.    Cardiovascular: Negative.    Gastrointestinal: Negative.    Endocrine: Negative.    Genitourinary: Negative.    Musculoskeletal: Negative.    Skin: Negative.    Allergic/Immunologic: Negative.    Neurological: Negative.    Hematological: Negative.    Psychiatric/Behavioral: Negative.    All other systems reviewed and are negative.      Objective     Vitals:    11/19/18 0928   BP: 142/88   Pulse: 78   Resp: 18   Temp: 97.9 °F (36.6 °C)   SpO2: 98%       Physical Exam   Constitutional: He is oriented to person, place, and time. He appears well-developed and well-nourished.   HENT:   Head: Normocephalic and atraumatic.   Right Ear: External ear normal.   Left Ear: External ear normal.   Nose: Nose normal.   Mouth/Throat: Oropharynx is clear and moist.   Eyes: Conjunctivae and EOM are normal. Pupils are equal, round, and reactive to light.   Neck: Normal range of motion. Neck supple. No thyromegaly present.   Cardiovascular: Normal rate, regular rhythm, normal heart sounds and intact distal pulses.   No murmur heard.  Pulmonary/Chest: Effort normal and breath sounds normal.   Abdominal: Soft. Bowel sounds are normal. He exhibits no mass. There is no tenderness.   Genitourinary: Rectum normal, prostate normal and penis  normal.   Musculoskeletal: Normal range of motion. He exhibits no edema.   Neurological: He is alert and oriented to person, place, and time. He has normal reflexes. No cranial nerve deficit.   Skin: Skin is warm and dry.   Psychiatric: He has a normal mood and affect.     ECG 12 Lead  Date/Time: 11/19/2018 10:12 AM  Performed by: Fransico Ngo PA  Authorized by: Fransico Ngo PA   Comparison: not compared with previous ECG   Rate: normal  Conduction: conduction normal  ST Segments: ST segments normal  QRS axis: normal  Clinical impression: normal ECG  Comments: Normal EKG            Assessment/Plan     Diagnoses and all orders for this visit:    Routine general medical examination at a health care facility  -     olopatadine (PATANOL) 0.1 % ophthalmic solution;   -     POCT urinalysis dipstick, automated  -     CBC & Differential; Future  -     Cancel: Comprehensive Metabolic Panel; Future  -     TSH; Future  -     Cancel: Lipid Panel; Future  -     amLODIPine (NORVASC) 5 MG tablet; Take 1 tablet by mouth Daily.  -     rosuvastatin (CRESTOR) 10 MG tablet; Take 1 tablet by mouth Daily.  -     Comprehensive Metabolic Panel; Future  -     Hemoglobin A1c; Future  -     Lipid Panel; Future  -     MicroAlbumin, Urine, Random - Urine, Clean Catch; Future    Special screening for malignant neoplasm of prostate  -     PSA Screen; Future    Other orders  -     ECG 12 Lead    Medicine discussed, diet, exercise, healthy living discussed importance of losing weight, importance of following a low-carb low calorie diet was started on a strict low-carb diet give him a weight loss goal 10% of his body weight.

## 2018-11-20 LAB — MICROALBUMIN UR-MCNC: 87.2 UG/ML

## 2018-12-17 ENCOUNTER — TELEPHONE (OUTPATIENT)
Dept: FAMILY MEDICINE CLINIC | Facility: CLINIC | Age: 58
End: 2018-12-17

## 2018-12-17 NOTE — TELEPHONE ENCOUNTER
----- Message from Vilma Lora sent at 12/17/2018  8:23 AM EST -----  Contact: PT.  PT. SEE'S MIGUEL.  PT. IS WANTING THE RESULTS OF HIS COMPLETE PHY & LAB WORK; MAILED TO HOME ADDRESS, FROM DATE OF November 19TH, 2018.  PT. CAN BE REACHED @ [facility] #.

## 2019-02-25 DIAGNOSIS — K58.9 IRRITABLE BOWEL SYNDROME WITHOUT DIARRHEA: ICD-10-CM

## 2019-02-25 RX ORDER — DICYCLOMINE HCL 20 MG
TABLET ORAL
Qty: 28 TABLET | Refills: 0 | Status: SHIPPED | OUTPATIENT
Start: 2019-02-25 | End: 2019-03-01 | Stop reason: SDUPTHER

## 2019-03-01 DIAGNOSIS — K58.9 IRRITABLE BOWEL SYNDROME WITHOUT DIARRHEA: ICD-10-CM

## 2019-03-01 RX ORDER — DICYCLOMINE HCL 20 MG
TABLET ORAL
Qty: 90 TABLET | Refills: 0 | Status: SHIPPED | OUTPATIENT
Start: 2019-03-01 | End: 2019-11-20 | Stop reason: SDUPTHER

## 2019-03-29 DIAGNOSIS — K21.9 GASTROESOPHAGEAL REFLUX DISEASE WITHOUT ESOPHAGITIS: ICD-10-CM

## 2019-03-29 RX ORDER — OMEPRAZOLE 40 MG/1
CAPSULE, DELAYED RELEASE ORAL
Qty: 90 CAPSULE | Refills: 3 | Status: SHIPPED | OUTPATIENT
Start: 2019-03-29 | End: 2020-06-22

## 2019-04-07 DIAGNOSIS — I10 ESSENTIAL HYPERTENSION, BENIGN: ICD-10-CM

## 2019-04-08 RX ORDER — AMLODIPINE BESYLATE 5 MG/1
TABLET ORAL
Qty: 30 TABLET | Refills: 10 | Status: SHIPPED | OUTPATIENT
Start: 2019-04-08 | End: 2020-03-16

## 2019-05-20 ENCOUNTER — OFFICE VISIT (OUTPATIENT)
Dept: FAMILY MEDICINE CLINIC | Facility: CLINIC | Age: 59
End: 2019-05-20

## 2019-05-20 VITALS
DIASTOLIC BLOOD PRESSURE: 84 MMHG | HEIGHT: 64 IN | RESPIRATION RATE: 16 BRPM | BODY MASS INDEX: 41.96 KG/M2 | SYSTOLIC BLOOD PRESSURE: 136 MMHG | WEIGHT: 245.8 LBS | HEART RATE: 74 BPM | OXYGEN SATURATION: 98 %

## 2019-05-20 DIAGNOSIS — E29.1 TESTICULAR HYPOFUNCTION: ICD-10-CM

## 2019-05-20 DIAGNOSIS — Z23 IMMUNIZATION DUE: ICD-10-CM

## 2019-05-20 DIAGNOSIS — K58.9 IRRITABLE BOWEL SYNDROME WITHOUT DIARRHEA: Primary | ICD-10-CM

## 2019-05-20 DIAGNOSIS — M50.30 DDD (DEGENERATIVE DISC DISEASE), CERVICAL: ICD-10-CM

## 2019-05-20 PROCEDURE — 90632 HEPA VACCINE ADULT IM: CPT | Performed by: PHYSICIAN ASSISTANT

## 2019-05-20 PROCEDURE — 99213 OFFICE O/P EST LOW 20 MIN: CPT | Performed by: PHYSICIAN ASSISTANT

## 2019-05-20 PROCEDURE — 90471 IMMUNIZATION ADMIN: CPT | Performed by: PHYSICIAN ASSISTANT

## 2019-05-21 NOTE — PROGRESS NOTES
Subjective   Sebastian Liao is a 59 y.o. male  Hypogonadism (RF testosterone-here for injection today); Irritable Bowel Syndrome (RF Bentyl 90-day supply); and Immunizations      History of Present Illness  Patient is a pleasant 59-year-old white male who comes in with multiple complaints needs refills of testosterone and get his injection today also has a history of irritable bowel syndrome needs refill Bentyl which is working well is currently stable and needs immunizations updated he is also has chronic degenerative disease needs refill of Norco 10/325  The following portions of the patient's history were reviewed and updated as appropriate: allergies, current medications, past social history and problem list    Review of Systems   Constitutional: Negative for appetite change, diaphoresis, fatigue and unexpected weight change.   Eyes: Negative for visual disturbance.   Respiratory: Negative for cough, chest tightness and shortness of breath.    Cardiovascular: Negative for chest pain, palpitations and leg swelling.   Gastrointestinal: Negative for diarrhea, nausea and vomiting.   Endocrine: Negative for polydipsia, polyphagia and polyuria.   Musculoskeletal: Positive for back pain and neck pain.   Skin: Negative for color change and rash.   Neurological: Negative for dizziness, syncope, weakness, light-headedness, numbness and headaches.       Objective     Vitals:    05/20/19 0854   BP: 136/84   Pulse: 74   Resp: 16   SpO2: 98%       Physical Exam   Constitutional: He appears well-developed and well-nourished.   Neck: Neck supple. No JVD present. No thyromegaly present.   Cardiovascular: Normal rate, regular rhythm, normal heart sounds, intact distal pulses and normal pulses.   No murmur heard.  Pulmonary/Chest: Effort normal and breath sounds normal. No respiratory distress.   Abdominal: Soft. Bowel sounds are normal. There is no hepatosplenomegaly. There is no tenderness.   Musculoskeletal: He exhibits no  edema.        Cervical back: He exhibits decreased range of motion, tenderness and bony tenderness.   Lymphadenopathy:     He has no cervical adenopathy.   Neurological: No sensory deficit.   Skin: Skin is warm and dry. He is not diaphoretic.   Nursing note and vitals reviewed.      Assessment/Plan     Diagnoses and all orders for this visit:    Irritable bowel syndrome without diarrhea  -     dicyclomine (BENTYL) 20 MG tablet; Take 1 tablet by mouth Every 6 (Six) Hours.    Immunization due  -     Hepatitis A Vaccine Adult IM    DDD (degenerative disc disease), cervical    Testicular hypofunction    Norco 10/325 1 p.o. 3 times daily spent 120      Refill testosterone injections 200 mg/cc 1 cc every 2 weeks     follow-up as needed

## 2019-05-22 RX ORDER — DICYCLOMINE HCL 20 MG
20 TABLET ORAL EVERY 6 HOURS
Qty: 90 TABLET | Refills: 3 | Status: SHIPPED | OUTPATIENT
Start: 2019-05-22 | End: 2020-06-12 | Stop reason: SDUPTHER

## 2019-05-23 NOTE — PROGRESS NOTES
I have reviewed the notes, assessments, and/or procedures performed by Farshad Ngo PA-C, I concur with his documentation of Sebastian Liao.

## 2019-06-28 RX ORDER — CARISOPRODOL 350 MG/1
350 TABLET ORAL 2 TIMES DAILY
Qty: 60 TABLET | Refills: 4 | Status: SHIPPED | OUTPATIENT
Start: 2019-06-28 | End: 2020-10-12 | Stop reason: HOSPADM

## 2019-06-28 RX ORDER — CARISOPRODOL 350 MG/1
TABLET ORAL
Qty: 60 TABLET | Refills: 4 | Status: SHIPPED | OUTPATIENT
Start: 2019-06-28 | End: 2019-06-28 | Stop reason: SDUPTHER

## 2019-11-20 ENCOUNTER — LAB (OUTPATIENT)
Dept: LAB | Facility: HOSPITAL | Age: 59
End: 2019-11-20

## 2019-11-20 ENCOUNTER — OFFICE VISIT (OUTPATIENT)
Dept: FAMILY MEDICINE CLINIC | Facility: CLINIC | Age: 59
End: 2019-11-20

## 2019-11-20 VITALS
RESPIRATION RATE: 16 BRPM | OXYGEN SATURATION: 98 % | TEMPERATURE: 97.5 F | WEIGHT: 237.8 LBS | HEIGHT: 64 IN | HEART RATE: 86 BPM | DIASTOLIC BLOOD PRESSURE: 100 MMHG | BODY MASS INDEX: 40.6 KG/M2 | SYSTOLIC BLOOD PRESSURE: 148 MMHG

## 2019-11-20 DIAGNOSIS — Z12.5 SPECIAL SCREENING FOR MALIGNANT NEOPLASM OF PROSTATE: ICD-10-CM

## 2019-11-20 DIAGNOSIS — Z00.00 ROUTINE GENERAL MEDICAL EXAMINATION AT A HEALTH CARE FACILITY: Primary | ICD-10-CM

## 2019-11-20 DIAGNOSIS — Z00.00 ROUTINE GENERAL MEDICAL EXAMINATION AT A HEALTH CARE FACILITY: ICD-10-CM

## 2019-11-20 LAB
ALBUMIN SERPL-MCNC: 4.5 G/DL (ref 3.5–5.2)
ALBUMIN UR-MCNC: 19.2 MG/DL
ALBUMIN/GLOB SERPL: 1.2 G/DL
ALP SERPL-CCNC: 90 U/L (ref 39–117)
ALT SERPL W P-5'-P-CCNC: 24 U/L (ref 1–41)
ANION GAP SERPL CALCULATED.3IONS-SCNC: 13.2 MMOL/L (ref 5–15)
AST SERPL-CCNC: 36 U/L (ref 1–40)
BASOPHILS # BLD AUTO: 0.06 10*3/MM3 (ref 0–0.2)
BASOPHILS NFR BLD AUTO: 0.9 % (ref 0–1.5)
BILIRUB BLD-MCNC: NEGATIVE MG/DL
BILIRUB SERPL-MCNC: 0.4 MG/DL (ref 0.2–1.2)
BUN BLD-MCNC: 12 MG/DL (ref 6–20)
BUN/CREAT SERPL: 11.1 (ref 7–25)
CALCIUM SPEC-SCNC: 9.7 MG/DL (ref 8.6–10.5)
CHLORIDE SERPL-SCNC: 101 MMOL/L (ref 98–107)
CHOLEST SERPL-MCNC: 159 MG/DL (ref 0–200)
CLARITY, POC: CLEAR
CO2 SERPL-SCNC: 30.8 MMOL/L (ref 22–29)
COLOR UR: YELLOW
CREAT BLD-MCNC: 1.08 MG/DL (ref 0.76–1.27)
DEPRECATED RDW RBC AUTO: 40.6 FL (ref 37–54)
EOSINOPHIL # BLD AUTO: 0.47 10*3/MM3 (ref 0–0.4)
EOSINOPHIL NFR BLD AUTO: 6.7 % (ref 0.3–6.2)
ERYTHROCYTE [DISTWIDTH] IN BLOOD BY AUTOMATED COUNT: 13.1 % (ref 12.3–15.4)
GFR SERPL CREATININE-BSD FRML MDRD: 85 ML/MIN/1.73
GLOBULIN UR ELPH-MCNC: 3.9 GM/DL
GLUCOSE BLD-MCNC: 276 MG/DL (ref 65–99)
GLUCOSE UR STRIP-MCNC: ABNORMAL MG/DL
HBA1C MFR BLD: 13.62 % (ref 4.8–5.6)
HCT VFR BLD AUTO: 46.9 % (ref 37.5–51)
HDLC SERPL-MCNC: 44 MG/DL (ref 40–60)
HGB BLD-MCNC: 16 G/DL (ref 13–17.7)
IMM GRANULOCYTES # BLD AUTO: 0.03 10*3/MM3 (ref 0–0.05)
IMM GRANULOCYTES NFR BLD AUTO: 0.4 % (ref 0–0.5)
KETONES UR QL: NEGATIVE
LDLC SERPL CALC-MCNC: 95 MG/DL (ref 0–100)
LDLC/HDLC SERPL: 2.16 {RATIO}
LEUKOCYTE EST, POC: NEGATIVE
LYMPHOCYTES # BLD AUTO: 2.2 10*3/MM3 (ref 0.7–3.1)
LYMPHOCYTES NFR BLD AUTO: 31.5 % (ref 19.6–45.3)
MCH RBC QN AUTO: 29.4 PG (ref 26.6–33)
MCHC RBC AUTO-ENTMCNC: 34.1 G/DL (ref 31.5–35.7)
MCV RBC AUTO: 86.2 FL (ref 79–97)
MONOCYTES # BLD AUTO: 0.8 10*3/MM3 (ref 0.1–0.9)
MONOCYTES NFR BLD AUTO: 11.4 % (ref 5–12)
NEUTROPHILS # BLD AUTO: 3.43 10*3/MM3 (ref 1.7–7)
NEUTROPHILS NFR BLD AUTO: 49.1 % (ref 42.7–76)
NITRITE UR-MCNC: NEGATIVE MG/ML
NRBC BLD AUTO-RTO: 0 /100 WBC (ref 0–0.2)
PH UR: 6 [PH] (ref 5–8)
PLATELET # BLD AUTO: 334 10*3/MM3 (ref 140–450)
PMV BLD AUTO: 10.5 FL (ref 6–12)
POTASSIUM BLD-SCNC: 4.3 MMOL/L (ref 3.5–5.2)
PROT SERPL-MCNC: 8.4 G/DL (ref 6–8.5)
PROT UR STRIP-MCNC: ABNORMAL MG/DL
PSA SERPL-MCNC: 0.94 NG/ML (ref 0–4)
RBC # BLD AUTO: 5.44 10*6/MM3 (ref 4.14–5.8)
RBC # UR STRIP: NEGATIVE /UL
SODIUM BLD-SCNC: 145 MMOL/L (ref 136–145)
SP GR UR: 1.03 (ref 1–1.03)
TRIGL SERPL-MCNC: 99 MG/DL (ref 0–150)
TSH SERPL DL<=0.05 MIU/L-ACNC: 1.23 UIU/ML (ref 0.27–4.2)
UROBILINOGEN UR QL: NORMAL
VLDLC SERPL-MCNC: 19.8 MG/DL (ref 5–40)
WBC NRBC COR # BLD: 6.99 10*3/MM3 (ref 3.4–10.8)

## 2019-11-20 PROCEDURE — 80053 COMPREHEN METABOLIC PANEL: CPT

## 2019-11-20 PROCEDURE — 84443 ASSAY THYROID STIM HORMONE: CPT

## 2019-11-20 PROCEDURE — G0103 PSA SCREENING: HCPCS

## 2019-11-20 PROCEDURE — 81003 URINALYSIS AUTO W/O SCOPE: CPT | Performed by: PHYSICIAN ASSISTANT

## 2019-11-20 PROCEDURE — 83036 HEMOGLOBIN GLYCOSYLATED A1C: CPT

## 2019-11-20 PROCEDURE — 85025 COMPLETE CBC W/AUTO DIFF WBC: CPT

## 2019-11-20 PROCEDURE — 80061 LIPID PANEL: CPT

## 2019-11-20 PROCEDURE — 82043 UR ALBUMIN QUANTITATIVE: CPT

## 2019-11-20 PROCEDURE — 93000 ELECTROCARDIOGRAM COMPLETE: CPT | Performed by: PHYSICIAN ASSISTANT

## 2019-11-20 PROCEDURE — 99396 PREV VISIT EST AGE 40-64: CPT | Performed by: PHYSICIAN ASSISTANT

## 2019-11-20 PROCEDURE — 36415 COLL VENOUS BLD VENIPUNCTURE: CPT

## 2019-11-20 RX ORDER — CYCLOBENZAPRINE HCL 10 MG
10 TABLET ORAL 2 TIMES DAILY PRN
Qty: 90 TABLET | Refills: 3 | Status: SHIPPED | OUTPATIENT
Start: 2019-11-20 | End: 2022-12-08 | Stop reason: SDUPTHER

## 2019-11-20 RX ORDER — DICYCLOMINE HCL 20 MG
20 TABLET ORAL EVERY 6 HOURS
Qty: 90 TABLET | Refills: 0 | Status: SHIPPED | OUTPATIENT
Start: 2019-11-20 | End: 2020-03-23 | Stop reason: SDUPTHER

## 2019-11-20 RX ORDER — PROMETHAZINE HYDROCHLORIDE 25 MG/1
25 TABLET ORAL EVERY 6 HOURS PRN
Qty: 60 TABLET | Refills: 1 | Status: SHIPPED | OUTPATIENT
Start: 2019-11-20 | End: 2020-05-11

## 2019-11-20 NOTE — PROGRESS NOTES
Subjective   Sebastian Liao is a 59 y.o. male  Annual Exam (Fasting for labs. UTD on colonoscopy. )      History of Present Illness  Patient is a pleasant 59-year-old black male who comes in for preventive medical examination denies any problems or complaints he does have a history of chronic low back pain along with hypogonadism needs refill of all pain medication and testosterone he is been off his testosterone for over 3 months  The following portions of the patient's history were reviewed and updated as appropriate: allergies, current medications, past social history and problem list    Review of Systems   Constitutional: Negative.    HENT: Negative.    Eyes: Negative.    Respiratory: Negative.    Cardiovascular: Negative.    Gastrointestinal: Negative.    Endocrine: Negative.    Genitourinary: Negative.    Musculoskeletal: Positive for back pain and gait problem.   Skin: Negative.    Allergic/Immunologic: Negative.    Hematological: Negative.    Psychiatric/Behavioral: Negative.    All other systems reviewed and are negative.      Objective     Vitals:    11/20/19 1006   BP: 148/100   Pulse: 86   Resp: 16   Temp: 97.5 °F (36.4 °C)   SpO2: 98%       Physical Exam   Constitutional: He is oriented to person, place, and time. He appears well-developed and well-nourished.   HENT:   Head: Normocephalic and atraumatic.   Right Ear: External ear normal.   Left Ear: External ear normal.   Nose: Nose normal.   Mouth/Throat: Oropharynx is clear and moist.   Eyes: Conjunctivae and EOM are normal. Pupils are equal, round, and reactive to light.   Neck: Normal range of motion. Neck supple. No thyromegaly present.   Cardiovascular: Normal rate, regular rhythm, normal heart sounds and intact distal pulses.   No murmur heard.  Pulmonary/Chest: Effort normal and breath sounds normal.   Abdominal: Soft. Bowel sounds are normal. He exhibits no mass. There is no tenderness.   Genitourinary: Rectum normal, prostate normal and  penis normal.   Musculoskeletal: He exhibits no edema.        Lumbar back: He exhibits decreased range of motion and tenderness.   Neurological: He is alert and oriented to person, place, and time. He has normal reflexes. No cranial nerve deficit.   Skin: Skin is warm and dry.   Psychiatric: He has a normal mood and affect.       ECG 12 Lead  Date/Time: 11/20/2019 1:12 PM  Performed by: Fransico Ngo PA  Authorized by: Fransico Ngo PA   Comparison: not compared with previous ECG   Previous ECG: no previous ECG available  Rhythm: sinus rhythm  ST Segments: ST segments normal  QRS axis: normal  Other findings: non-specific ST-T wave changes    Clinical impression: non-specific ECG          Assessment/Plan     Diagnoses and all orders for this visit:    Routine general medical examination at a health care facility  -     POCT urinalysis dipstick, automated  -     cyclobenzaprine (FLEXERIL) 10 MG tablet; Take 1 tablet by mouth 2 (Two) Times a Day As Needed for Muscle Spasms.  -     dicyclomine (BENTYL) 20 MG tablet; Take 1 tablet by mouth Every 6 (Six) Hours.  -     promethazine (PHENERGAN) 25 MG tablet; Take 1 tablet by mouth Every 6 (Six) Hours As Needed for Nausea or Vomiting.  -     Lipid Panel; Future  -     CBC & Differential; Future  -     Comprehensive Metabolic Panel; Future  -     TSH; Future  -     Hemoglobin A1c; Future  -     MicroAlbumin, Urine, Random - Urine, Clean Catch; Future  -     ECG 12 Lead    Special screening for malignant neoplasm of prostate  -     PSA Screen; Future    Other orders  -     Cancel: Incision & Drainage    Preventive medicine discussed, diet, exercise, healthy living discussed discussed importance of losing weight exercise refill Norco 10/325 4 times a day dispense 120, refill testosterone 2 mg every 2 weeks recheck testosterone levels in 4 to 6 weeks

## 2019-11-21 DIAGNOSIS — Z00.00 ROUTINE GENERAL MEDICAL EXAMINATION AT A HEALTH CARE FACILITY: ICD-10-CM

## 2019-11-21 RX ORDER — ROSUVASTATIN CALCIUM 10 MG/1
TABLET, COATED ORAL
Qty: 90 TABLET | Refills: 3 | Status: SHIPPED | OUTPATIENT
Start: 2019-11-21 | End: 2020-03-23 | Stop reason: SDUPTHER

## 2019-12-04 ENCOUNTER — OFFICE VISIT (OUTPATIENT)
Dept: FAMILY MEDICINE CLINIC | Facility: CLINIC | Age: 59
End: 2019-12-04

## 2019-12-04 VITALS
RESPIRATION RATE: 18 BRPM | BODY MASS INDEX: 40.26 KG/M2 | OXYGEN SATURATION: 96 % | DIASTOLIC BLOOD PRESSURE: 86 MMHG | SYSTOLIC BLOOD PRESSURE: 136 MMHG | HEART RATE: 91 BPM | TEMPERATURE: 98.4 F | WEIGHT: 235.8 LBS | HEIGHT: 64 IN

## 2019-12-04 DIAGNOSIS — J02.9 PHARYNGITIS, UNSPECIFIED ETIOLOGY: ICD-10-CM

## 2019-12-04 DIAGNOSIS — E11.65 TYPE 2 DIABETES MELLITUS WITH HYPERGLYCEMIA, WITHOUT LONG-TERM CURRENT USE OF INSULIN (HCC): Primary | ICD-10-CM

## 2019-12-04 PROCEDURE — 99214 OFFICE O/P EST MOD 30 MIN: CPT | Performed by: PHYSICIAN ASSISTANT

## 2019-12-04 RX ORDER — GLYBURIDE-METFORMIN HYDROCHLORIDE 2.5; 5 MG/1; MG/1
1 TABLET ORAL
Qty: 60 TABLET | Refills: 11 | Status: SHIPPED | OUTPATIENT
Start: 2019-12-04 | End: 2020-03-23

## 2019-12-04 RX ORDER — CEFDINIR 300 MG/1
300 CAPSULE ORAL 2 TIMES DAILY
Qty: 20 CAPSULE | Refills: 1 | Status: SHIPPED | OUTPATIENT
Start: 2019-12-04 | End: 2020-03-23

## 2019-12-04 NOTE — PROGRESS NOTES
Subjective   Sebastian Liao is a 59 y.o. male  Diabetes (F/U on labs) and Cough (Sore throat, Lt ear pain x8 days. No current tx.)      History of Present Illness  Patient is a pleasant 59-year-old white male who comes in complaining of type 2 diabetes uncontrolled also has a mild sore throat nonproductive cough.    Patient's A1c is over 12 patient has been having some fatigue no energy being urinating a lot blood sugar is elevated over 200 patient is no shortness of breath no chest pain    Patient complains of 8-day onset of sore throat pain with swallowing no fever no chills pain with swelling pain in both ears nasal congestion nasal pressure  The following portions of the patient's history were reviewed and updated as appropriate: allergies, current medications, past social history and problem list    Review of Systems   Constitutional: Negative for appetite change, diaphoresis, fatigue and unexpected weight change.   Eyes: Negative for visual disturbance.   Respiratory: Negative for cough, chest tightness and shortness of breath.    Cardiovascular: Negative for chest pain, palpitations and leg swelling.   Gastrointestinal: Negative for diarrhea, nausea and vomiting.   Endocrine: Negative for polydipsia, polyphagia and polyuria.   Skin: Negative for color change and rash.   Neurological: Negative for dizziness, syncope, weakness, light-headedness, numbness and headaches.       Objective     Vitals:    12/04/19 1006   BP: 136/86   Pulse: 91   Resp: 18   Temp: 98.4 °F (36.9 °C)   SpO2: 96%       Physical Exam   Constitutional: He appears well-developed and well-nourished.   Neck: Neck supple. No JVD present. No thyromegaly present.   Cardiovascular: Normal rate, regular rhythm, normal heart sounds, intact distal pulses and normal pulses.   No murmur heard.  Pulmonary/Chest: Effort normal and breath sounds normal. No respiratory distress.   Abdominal: Soft. Bowel sounds are normal. There is no hepatosplenomegaly.  There is no tenderness.   Musculoskeletal: He exhibits no edema.   Lymphadenopathy:     He has no cervical adenopathy.   Neurological: No sensory deficit.   Skin: Skin is warm and dry. He is not diaphoretic.   Nursing note and vitals reviewed.      Assessment/Plan     Diagnoses and all orders for this visit:    Type 2 diabetes mellitus with hyperglycemia, without long-term current use of insulin (CMS/[facility])  -     glyBURIDE-metFORMIN (GLUCOVANCE) 2.5-500 MG per tablet; Take 1 tablet by mouth Daily With Breakfast.    Pharyngitis, unspecified etiology  -     cefdinir (OMNICEF) 300 MG capsule; Take 1 capsule by mouth 2 (Two) Times a Day.    #1 start Glucovance 2.5 mg/501 p.o. twice daily dispense 60    2.  Start Omnicef 3 mg 2 p.o. every day dispense 20    Long discussion with patient about exercise recheck in 3 weeks

## 2020-01-20 ENCOUNTER — OFFICE VISIT (OUTPATIENT)
Dept: FAMILY MEDICINE CLINIC | Facility: CLINIC | Age: 60
End: 2020-01-20

## 2020-01-20 VITALS
DIASTOLIC BLOOD PRESSURE: 88 MMHG | HEIGHT: 64 IN | BODY MASS INDEX: 40.87 KG/M2 | WEIGHT: 239.4 LBS | OXYGEN SATURATION: 98 % | RESPIRATION RATE: 16 BRPM | HEART RATE: 75 BPM | SYSTOLIC BLOOD PRESSURE: 142 MMHG

## 2020-01-20 DIAGNOSIS — J30.2 SEASONAL ALLERGIC RHINITIS: ICD-10-CM

## 2020-01-20 DIAGNOSIS — E11.9 TYPE 2 DIABETES MELLITUS WITHOUT COMPLICATION, WITHOUT LONG-TERM CURRENT USE OF INSULIN (HCC): Primary | ICD-10-CM

## 2020-01-20 LAB — GLUCOSE BLDC GLUCOMTR-MCNC: 146 MG/DL (ref 70–130)

## 2020-01-20 PROCEDURE — 82962 GLUCOSE BLOOD TEST: CPT | Performed by: PHYSICIAN ASSISTANT

## 2020-01-20 PROCEDURE — 99213 OFFICE O/P EST LOW 20 MIN: CPT | Performed by: PHYSICIAN ASSISTANT

## 2020-01-20 RX ORDER — LORATADINE 10 MG/1
10 TABLET ORAL DAILY
Qty: 30 TABLET | Refills: 11 | Status: SHIPPED | OUTPATIENT
Start: 2020-01-20 | End: 2020-12-30

## 2020-01-20 NOTE — PROGRESS NOTES
Subjective   Sebastian Liao is a 59 y.o. male  Diabetes (F/U. . Started Glucovance six wks ago.) and Allergies (RF Claritin)      History of Present Illness  Patient is a pleasant 59-year-old black male who comes in for follow-up type 2 diabetes random blood sugar 146 patient doing better feeling better less pain he states he feels overall better patient also comes in for follow-up of allergic rhinitis needs refill of Claritin  The following portions of the patient's history were reviewed and updated as appropriate: allergies, current medications, past social history and problem list    Review of Systems   Constitutional: Negative for appetite change, diaphoresis, fatigue and unexpected weight change.   Eyes: Negative for visual disturbance.   Respiratory: Negative for cough, chest tightness and shortness of breath.    Cardiovascular: Negative for chest pain, palpitations and leg swelling.   Gastrointestinal: Negative for diarrhea, nausea and vomiting.   Endocrine: Negative for polydipsia, polyphagia and polyuria.   Skin: Negative for color change and rash.   Neurological: Negative for dizziness, syncope, weakness, light-headedness, numbness and headaches.       Objective     Vitals:    01/20/20 0903   BP: 142/88   Pulse: 75   Resp: 16   SpO2: 98%       Physical Exam   Constitutional: He appears well-developed and well-nourished.   Neck: Neck supple. No JVD present. No thyromegaly present.   Cardiovascular: Normal rate, regular rhythm, normal heart sounds, intact distal pulses and normal pulses.   No murmur heard.  Pulmonary/Chest: Effort normal and breath sounds normal. No respiratory distress.   Abdominal: Soft. Bowel sounds are normal. There is no hepatosplenomegaly. There is no tenderness.   Musculoskeletal: He exhibits no edema.   Lymphadenopathy:     He has no cervical adenopathy.   Neurological: No sensory deficit.   Skin: Skin is warm and dry. He is not diaphoretic.   Nursing note and vitals  reviewed.      Assessment/Plan     Diagnoses and all orders for this visit:    Type 2 diabetes mellitus without complication, without long-term current use of insulin (CMS/[facility])  -     POCT Glucose  -     Hemoglobin A1c; Future  -     Comprehensive Metabolic Panel; Future    Seasonal allergic rhinitis  -     loratadine (CLARITIN) 10 MG tablet; Take 1 tablet by mouth Daily.    Follow-up as needed

## 2020-03-15 DIAGNOSIS — I10 ESSENTIAL HYPERTENSION, BENIGN: ICD-10-CM

## 2020-03-16 RX ORDER — AMLODIPINE BESYLATE 5 MG/1
TABLET ORAL
Qty: 30 TABLET | Refills: 9 | Status: ON HOLD | OUTPATIENT
Start: 2020-03-16 | End: 2020-10-12 | Stop reason: SDUPTHER

## 2020-03-18 ENCOUNTER — LAB (OUTPATIENT)
Dept: LAB | Facility: HOSPITAL | Age: 60
End: 2020-03-18

## 2020-03-18 DIAGNOSIS — E11.9 TYPE 2 DIABETES MELLITUS WITHOUT COMPLICATION, WITHOUT LONG-TERM CURRENT USE OF INSULIN (HCC): ICD-10-CM

## 2020-03-18 LAB
ALBUMIN SERPL-MCNC: 4.3 G/DL (ref 3.5–5.2)
ALBUMIN/GLOB SERPL: 1.2 G/DL
ALP SERPL-CCNC: 64 U/L (ref 39–117)
ALT SERPL W P-5'-P-CCNC: 20 U/L (ref 1–41)
ANION GAP SERPL CALCULATED.3IONS-SCNC: 10.1 MMOL/L (ref 5–15)
AST SERPL-CCNC: 29 U/L (ref 1–40)
BILIRUB SERPL-MCNC: 0.3 MG/DL (ref 0.2–1.2)
BUN BLD-MCNC: 13 MG/DL (ref 6–20)
BUN/CREAT SERPL: 10.7 (ref 7–25)
CALCIUM SPEC-SCNC: 9.3 MG/DL (ref 8.6–10.5)
CHLORIDE SERPL-SCNC: 98 MMOL/L (ref 98–107)
CO2 SERPL-SCNC: 31.9 MMOL/L (ref 22–29)
CREAT BLD-MCNC: 1.21 MG/DL (ref 0.76–1.27)
GFR SERPL CREATININE-BSD FRML MDRD: 75 ML/MIN/1.73
GLOBULIN UR ELPH-MCNC: 3.6 GM/DL
GLUCOSE BLD-MCNC: 114 MG/DL (ref 65–99)
HBA1C MFR BLD: 9.3 % (ref 4.8–5.6)
POTASSIUM BLD-SCNC: 4.1 MMOL/L (ref 3.5–5.2)
PROT SERPL-MCNC: 7.9 G/DL (ref 6–8.5)
SODIUM BLD-SCNC: 140 MMOL/L (ref 136–145)

## 2020-03-18 PROCEDURE — 83036 HEMOGLOBIN GLYCOSYLATED A1C: CPT

## 2020-03-18 PROCEDURE — 36415 COLL VENOUS BLD VENIPUNCTURE: CPT

## 2020-03-18 PROCEDURE — 80053 COMPREHEN METABOLIC PANEL: CPT

## 2020-03-23 ENCOUNTER — OFFICE VISIT (OUTPATIENT)
Dept: FAMILY MEDICINE CLINIC | Facility: CLINIC | Age: 60
End: 2020-03-23

## 2020-03-23 VITALS
WEIGHT: 246.4 LBS | HEIGHT: 64 IN | TEMPERATURE: 97.7 F | BODY MASS INDEX: 42.07 KG/M2 | DIASTOLIC BLOOD PRESSURE: 90 MMHG | RESPIRATION RATE: 16 BRPM | OXYGEN SATURATION: 98 % | SYSTOLIC BLOOD PRESSURE: 138 MMHG | HEART RATE: 86 BPM

## 2020-03-23 DIAGNOSIS — E11.9 TYPE 2 DIABETES MELLITUS WITHOUT COMPLICATION, WITHOUT LONG-TERM CURRENT USE OF INSULIN (HCC): Primary | ICD-10-CM

## 2020-03-23 DIAGNOSIS — J01.00 ACUTE NON-RECURRENT MAXILLARY SINUSITIS: ICD-10-CM

## 2020-03-23 DIAGNOSIS — K58.9 IRRITABLE BOWEL SYNDROME WITHOUT DIARRHEA: ICD-10-CM

## 2020-03-23 LAB — GLUCOSE BLDC GLUCOMTR-MCNC: 147 MG/DL (ref 70–130)

## 2020-03-23 PROCEDURE — 99214 OFFICE O/P EST MOD 30 MIN: CPT | Performed by: PHYSICIAN ASSISTANT

## 2020-03-23 PROCEDURE — 82962 GLUCOSE BLOOD TEST: CPT | Performed by: PHYSICIAN ASSISTANT

## 2020-03-23 RX ORDER — CEFDINIR 300 MG/1
300 CAPSULE ORAL 2 TIMES DAILY
Qty: 20 CAPSULE | Refills: 0 | Status: SHIPPED | OUTPATIENT
Start: 2020-03-23 | End: 2020-05-21

## 2020-03-23 RX ORDER — GLYBURIDE-METFORMIN HYDROCHLORIDE 5; 500 MG/1; MG/1
1 TABLET ORAL
Qty: 90 TABLET | Refills: 3 | Status: SHIPPED | OUTPATIENT
Start: 2020-03-23 | End: 2020-07-21 | Stop reason: SDUPTHER

## 2020-03-23 RX ORDER — ROSUVASTATIN CALCIUM 10 MG/1
10 TABLET, COATED ORAL DAILY
Qty: 90 TABLET | Refills: 3 | Status: SHIPPED | OUTPATIENT
Start: 2020-03-23 | End: 2021-03-26 | Stop reason: SDUPTHER

## 2020-03-23 RX ORDER — DICYCLOMINE HCL 20 MG
20 TABLET ORAL EVERY 6 HOURS
Qty: 90 TABLET | Refills: 0 | Status: SHIPPED | OUTPATIENT
Start: 2020-03-23 | End: 2020-06-12

## 2020-03-23 NOTE — PROGRESS NOTES
Subjective   Sebastian Liao is a 59 y.o. male  Diabetes (F/U on labs. .)      History of Present Illness  Patient is a 57-year-old black male who comes in complaining of multiple complaints first follow-up on diabetes his blood sugar is 147 is been watching his diet and exercise lost a couple pounds he states he is trying to keep his blood sugar down but exercise but still slightly elevated he is taking Glucovance 2.5 500 mg 1 a day    Patient claims sinus pressure sinus congestion blowing green-yellow drainage from nose mild sore throat cough symptoms been present x3 days no fever no chills    Patient needs refill IBS medication symptoms are currently controlled no problems or complaints  The following portions of the patient's history were reviewed and updated as appropriate: allergies, current medications, past social history and problem list    Review of Systems   Constitutional: Negative for appetite change, chills, diaphoresis, fatigue, fever and unexpected weight change.   HENT: Positive for congestion, ear pain, postnasal drip, rhinorrhea and sinus pressure. Negative for sore throat.    Eyes: Positive for discharge and itching. Negative for pain and visual disturbance.   Respiratory: Negative for cough, chest tightness and shortness of breath.    Cardiovascular: Negative for chest pain, palpitations and leg swelling.   Gastrointestinal: Negative.  Negative for diarrhea, nausea and vomiting.   Endocrine: Negative for polydipsia, polyphagia and polyuria.   Genitourinary: Negative.    Musculoskeletal: Negative for myalgias.   Skin: Negative for color change and rash.   Neurological: Negative for dizziness, syncope, weakness, light-headedness, numbness and headaches.   Hematological: Negative for adenopathy.   Psychiatric/Behavioral: Positive for sleep disturbance.       Objective     Vitals:    03/23/20 0856   BP: 138/90   Pulse: 86   Resp: 16   Temp: 97.7 °F (36.5 °C)   SpO2: 98%       Physical Exam    Constitutional: He appears well-developed and well-nourished.   HENT:   Head: Normocephalic and atraumatic.   Right Ear: Tympanic membrane and ear canal normal.   Left Ear: Tympanic membrane and ear canal normal.   Nose: Mucosal edema, rhinorrhea and sinus tenderness present. Right sinus exhibits maxillary sinus tenderness and frontal sinus tenderness. Left sinus exhibits maxillary sinus tenderness and frontal sinus tenderness.   Mouth/Throat: Oropharynx is clear and moist. No oropharyngeal exudate.   Eyes: Pupils are equal, round, and reactive to light.   Neck: Neck supple. No JVD present. No thyromegaly present.   Cardiovascular: Normal rate, regular rhythm, normal heart sounds, intact distal pulses and normal pulses.   No murmur heard.  Pulmonary/Chest: Effort normal and breath sounds normal. No respiratory distress.   Abdominal: Soft. Bowel sounds are normal. There is no hepatosplenomegaly. There is no tenderness.   Musculoskeletal: He exhibits no edema.   Lymphadenopathy:     He has no cervical adenopathy.   Neurological: No sensory deficit.   Skin: Skin is warm and dry. He is not diaphoretic.   Nursing note and vitals reviewed.      Assessment/Plan     Sebastian was seen today for diabetes.    Diagnoses and all orders for this visit:    Type 2 diabetes mellitus without complication, without long-term current use of insulin (CMS/[facility])  -     POCT Glucose  -     glyBURIDE-metFORMIN (Glucovance) 5-500 MG per tablet; Take 1 tablet by mouth Daily With Breakfast.  -     cefdinir (OMNICEF) 300 MG capsule; Take 1 capsule by mouth 2 (Two) Times a Day.  -     rosuvastatin (CRESTOR) 10 MG tablet; Take 1 tablet by mouth Daily.    Acute non-recurrent maxillary sinusitis  -     cefdinir (OMNICEF) 300 MG capsule; Take 1 capsule by mouth 2 (Two) Times a Day.    Irritable bowel syndrome without diarrhea  -     dicyclomine (BENTYL) 20 MG tablet; Take 1 tablet by mouth Every 6 (Six) Hours.    Follow-up as needed

## 2020-05-11 DIAGNOSIS — Z00.00 ROUTINE GENERAL MEDICAL EXAMINATION AT A HEALTH CARE FACILITY: ICD-10-CM

## 2020-05-11 RX ORDER — PROMETHAZINE HYDROCHLORIDE 25 MG/1
TABLET ORAL
Qty: 60 TABLET | Refills: 0 | Status: SHIPPED | OUTPATIENT
Start: 2020-05-11 | End: 2020-12-21

## 2020-05-21 ENCOUNTER — OFFICE VISIT (OUTPATIENT)
Dept: FAMILY MEDICINE CLINIC | Facility: CLINIC | Age: 60
End: 2020-05-21

## 2020-05-21 VITALS
TEMPERATURE: 98 F | RESPIRATION RATE: 16 BRPM | HEIGHT: 64 IN | BODY MASS INDEX: 41.86 KG/M2 | SYSTOLIC BLOOD PRESSURE: 136 MMHG | OXYGEN SATURATION: 98 % | DIASTOLIC BLOOD PRESSURE: 84 MMHG | HEART RATE: 86 BPM | WEIGHT: 245.2 LBS

## 2020-05-21 DIAGNOSIS — M50.30 DDD (DEGENERATIVE DISC DISEASE), CERVICAL: ICD-10-CM

## 2020-05-21 DIAGNOSIS — B07.9 VIRAL WARTS, UNSPECIFIED TYPE: ICD-10-CM

## 2020-05-21 DIAGNOSIS — E11.65 CONTROLLED TYPE 2 DIABETES MELLITUS WITH HYPERGLYCEMIA, WITHOUT LONG-TERM CURRENT USE OF INSULIN (HCC): Primary | ICD-10-CM

## 2020-05-21 LAB
GLUCOSE BLDC GLUCOMTR-MCNC: 127 MG/DL (ref 70–130)
HBA1C MFR BLD: 7.8 %

## 2020-05-21 PROCEDURE — 82962 GLUCOSE BLOOD TEST: CPT | Performed by: PHYSICIAN ASSISTANT

## 2020-05-21 PROCEDURE — 99213 OFFICE O/P EST LOW 20 MIN: CPT | Performed by: PHYSICIAN ASSISTANT

## 2020-05-21 PROCEDURE — 17110 DESTRUCTION B9 LES UP TO 14: CPT | Performed by: PHYSICIAN ASSISTANT

## 2020-05-21 PROCEDURE — 83036 HEMOGLOBIN GLYCOSYLATED A1C: CPT | Performed by: PHYSICIAN ASSISTANT

## 2020-05-21 NOTE — PROGRESS NOTES
Subjective   Sebastian Liao is a 60 y.o. male  Wart (Located on Lt ring finger x6 months, previously frozen off but returned) and Diabetes      History of Present Illness     Patient is a pleasant 60-year-old black male who comes in complaining of multiple problems first problem is a new wart on his left ring finger x6 months wart is gotten bigger in size shape    Patient comes in for follow-up of type 2 diabetes his A1c today is 7.8 that is down from 9.8 in January patient feels better watch his diet and exercise meds are working no shortness of breath no chest pain meds working    Patient has chronic low back pain neck pain consistent with degenerative disc disease needs refill of pain medicine takes as needed takes Norco 10 as needed describes pain sharp stabbing 9 out of 10  The following portions of the patient's history were reviewed and updated as appropriate: allergies, current medications, past social history and problem list    Review of Systems   Constitutional: Negative for appetite change, diaphoresis, fatigue and unexpected weight change.   Eyes: Negative for visual disturbance.   Respiratory: Negative for cough, chest tightness and shortness of breath.    Cardiovascular: Negative for chest pain, palpitations and leg swelling.   Gastrointestinal: Negative for diarrhea, nausea and vomiting.   Endocrine: Negative for polydipsia, polyphagia and polyuria.   Musculoskeletal: Positive for arthralgias, back pain and neck pain.   Skin: Negative for color change and rash.   Neurological: Negative for dizziness, syncope, weakness, light-headedness, numbness and headaches.       Objective     Vitals:    05/21/20 0850   BP: 136/84   Pulse: 86   Resp: 16   Temp: 98 °F (36.7 °C)   SpO2: 98%       Physical Exam   Constitutional: He appears well-developed and well-nourished.   Neck: Neck supple. No JVD present. No thyromegaly present.   Cardiovascular: Normal rate, regular rhythm, normal heart sounds, intact distal  pulses and normal pulses.   No murmur heard.  Pulmonary/Chest: Effort normal and breath sounds normal. No respiratory distress.   Abdominal: Soft. Bowel sounds are normal. There is no hepatosplenomegaly. There is no tenderness.   Musculoskeletal: He exhibits no edema.        Lumbar back: He exhibits decreased range of motion and tenderness.   Lymphadenopathy:     He has no cervical adenopathy.   Neurological: No sensory deficit.   Skin: Skin is warm and dry. He is not diaphoretic.   Nursing note and vitals reviewed.      Assessment/Plan     Sebastian was seen today for wart and diabetes.    Diagnoses and all orders for this visit:    Controlled type 2 diabetes mellitus with hyperglycemia, without long-term current use of insulin (CMS/[facility])  -     POC Glycosylated Hemoglobin (Hb A1C)  -     POCT Glucose    Viral warts, unspecified type    DDD (degenerative disc disease), cervical    #1 check labs continue current dose    2.  Wart treated with liquid nitrogen patient tolerated procedure well    3.  Norco 10/325 1 p.o. 4 times daily dispense 90    Range of motion exercises given

## 2020-06-12 DIAGNOSIS — K58.9 IRRITABLE BOWEL SYNDROME WITHOUT DIARRHEA: ICD-10-CM

## 2020-06-12 RX ORDER — DICYCLOMINE HCL 20 MG
TABLET ORAL
Qty: 90 TABLET | Refills: 1 | Status: ON HOLD | OUTPATIENT
Start: 2020-06-12 | End: 2020-10-12 | Stop reason: SDUPTHER

## 2020-06-21 DIAGNOSIS — K21.9 GASTROESOPHAGEAL REFLUX DISEASE WITHOUT ESOPHAGITIS: ICD-10-CM

## 2020-06-22 RX ORDER — OMEPRAZOLE 40 MG/1
CAPSULE, DELAYED RELEASE ORAL
Qty: 90 CAPSULE | Refills: 2 | Status: SHIPPED | OUTPATIENT
Start: 2020-06-22 | End: 2020-06-29

## 2020-06-27 DIAGNOSIS — K21.9 GASTROESOPHAGEAL REFLUX DISEASE WITHOUT ESOPHAGITIS: ICD-10-CM

## 2020-06-29 RX ORDER — OMEPRAZOLE 40 MG/1
40 CAPSULE, DELAYED RELEASE ORAL DAILY
Qty: 90 CAPSULE | Refills: 3 | Status: SHIPPED | OUTPATIENT
Start: 2020-06-29 | End: 2021-07-06

## 2020-07-21 ENCOUNTER — OFFICE VISIT (OUTPATIENT)
Dept: FAMILY MEDICINE CLINIC | Facility: CLINIC | Age: 60
End: 2020-07-21

## 2020-07-21 VITALS
BODY MASS INDEX: 42.14 KG/M2 | RESPIRATION RATE: 16 BRPM | HEART RATE: 79 BPM | SYSTOLIC BLOOD PRESSURE: 138 MMHG | TEMPERATURE: 97.1 F | DIASTOLIC BLOOD PRESSURE: 82 MMHG | OXYGEN SATURATION: 98 % | WEIGHT: 246.8 LBS | HEIGHT: 64 IN

## 2020-07-21 DIAGNOSIS — M25.551 CHRONIC RIGHT HIP PAIN: ICD-10-CM

## 2020-07-21 DIAGNOSIS — E11.65 UNCONTROLLED TYPE 2 DIABETES MELLITUS WITH HYPERGLYCEMIA (HCC): Primary | ICD-10-CM

## 2020-07-21 DIAGNOSIS — G89.29 CHRONIC RIGHT HIP PAIN: ICD-10-CM

## 2020-07-21 DIAGNOSIS — E29.1 HYPOGONADISM IN MALE: ICD-10-CM

## 2020-07-21 LAB — GLUCOSE BLDC GLUCOMTR-MCNC: 173 MG/DL (ref 70–130)

## 2020-07-21 PROCEDURE — 99214 OFFICE O/P EST MOD 30 MIN: CPT | Performed by: PHYSICIAN ASSISTANT

## 2020-07-21 PROCEDURE — 82962 GLUCOSE BLOOD TEST: CPT | Performed by: PHYSICIAN ASSISTANT

## 2020-07-21 RX ORDER — GLYBURIDE-METFORMIN HYDROCHLORIDE 5; 500 MG/1; MG/1
1 TABLET ORAL 2 TIMES DAILY WITH MEALS
Qty: 180 TABLET | Refills: 3 | Status: SHIPPED | OUTPATIENT
Start: 2020-07-21 | End: 2021-01-26

## 2020-07-21 NOTE — PROGRESS NOTES
Subjective   Sebastian Liao is a 60 y.o. male  Diabetes (FU. ) and Hypogonadism (Req urology referral)    Hip pain right, history of osteoarthritis  History of Present Illness  Patient is a pleasant 60-year-old white male who comes in for follow-up of type II uncontrolled diabetes his A1c today is 9.2 that is up from 7.8 patient states he is been eating more is gained about 5 pounds he has been trying to exercise but not much relief.    Patient comes in for follow-up of hypogonadism and erectile dysfunction would like a referral to urology states symptoms are getting worse like to see a specialist having trouble getting keeping her erection even with testosterone replacement    Patient complains of chronic right hip pain has history of degenerative joint disease in his right hip is having quite a bit of pain sitting standing he is not been told that he needs hip replacements will have cleared off during the COVID outbreak  The following portions of the patient's history were reviewed and updated as appropriate: allergies, current medications, past social history and problem list    Review of Systems   Constitutional: Negative for appetite change, diaphoresis, fatigue and unexpected weight change.   Eyes: Negative for visual disturbance.   Respiratory: Negative for cough, chest tightness and shortness of breath.    Cardiovascular: Negative for chest pain, palpitations and leg swelling.   Gastrointestinal: Negative for diarrhea, nausea and vomiting.   Endocrine: Negative for polydipsia, polyphagia and polyuria.   Musculoskeletal:        Right hip pain   Skin: Negative for color change and rash.   Neurological: Negative for dizziness, syncope, weakness, light-headedness, numbness and headaches.       Objective     Vitals:    07/21/20 0859   BP: 138/82   Pulse: 79   Resp: 16   Temp: 97.1 °F (36.2 °C)   SpO2: 98%       Physical Exam   Constitutional: He appears well-developed and well-nourished.   Neck: No JVD  present.   Cardiovascular: Normal rate, regular rhythm, normal heart sounds, intact distal pulses and normal pulses.   No murmur heard.  Pulmonary/Chest: Effort normal and breath sounds normal. No respiratory distress.   Abdominal: Soft. Bowel sounds are normal. There is no hepatosplenomegaly. There is no tenderness.   Musculoskeletal: He exhibits no edema.        Right hip: He exhibits decreased range of motion, decreased strength and tenderness.   Skin: Skin is warm and dry.   Nursing note and vitals reviewed.      Assessment/Plan     Sebastian was seen today for diabetes and hypogonadism.    Diagnoses and all orders for this visit:    Uncontrolled type 2 diabetes mellitus with hyperglycemia (CMS/[facility])  -     POCT Glucose  -     glyBURIDE-metFORMIN (Glucovance) 5-500 MG per tablet; Take 1 tablet by mouth 2 (Two) Times a Day With Meals.    Hypogonadism in male  -     Ambulatory Referral to Urology    Chronic right hip pain    #1 increase Glucovance 5/500 twice daily dispense 180 with 3 refills  Discussed importance of low-carb low calorie diet  2.  Refer to urology    3.  [place] 10/325 1 p.o. 4 times daily dispense 120    Follow-up in 3 months    Follow-up if no better

## 2020-08-20 ENCOUNTER — TELEPHONE (OUTPATIENT)
Dept: FAMILY MEDICINE CLINIC | Facility: CLINIC | Age: 60
End: 2020-08-20

## 2020-08-20 RX ORDER — AMOXICILLIN AND CLAVULANATE POTASSIUM 875; 125 MG/1; MG/1
1 TABLET, FILM COATED ORAL 2 TIMES DAILY
Qty: 20 TABLET | Refills: 0 | Status: SHIPPED | OUTPATIENT
Start: 2020-08-20 | End: 2020-09-21

## 2020-08-20 NOTE — TELEPHONE ENCOUNTER
Provider: Farshad  Caller: Self  Relationship to Patient: Self  Pharm[facility]: [facility]  Phone Number: [phone]  Reason for Call: Headache, pressure  When was the patient last seen: 7/21/20  When did it start: 7-10 days ago  Where is it located: Head  Characteristics of symptom/severity:   Timing- Is it constant or intermittent: Constant  What makes it worse:   What makes it better:   What therapies/medications have you tried: Claritin, Mucinex DM    He wanted to see if he could get something called in.

## 2020-09-03 RX ORDER — AMOXICILLIN AND CLAVULANATE POTASSIUM 875; 125 MG/1; MG/1
TABLET, FILM COATED ORAL
Qty: 20 TABLET | Refills: 0 | OUTPATIENT
Start: 2020-09-03

## 2020-09-18 RX ORDER — CARISOPRODOL 350 MG/1
TABLET ORAL
Qty: 60 TABLET | Refills: 3 | OUTPATIENT
Start: 2020-09-18

## 2020-09-21 ENCOUNTER — OFFICE VISIT (OUTPATIENT)
Dept: FAMILY MEDICINE CLINIC | Facility: CLINIC | Age: 60
End: 2020-09-21

## 2020-09-21 VITALS
RESPIRATION RATE: 14 BRPM | DIASTOLIC BLOOD PRESSURE: 82 MMHG | HEIGHT: 64 IN | WEIGHT: 252.6 LBS | OXYGEN SATURATION: 98 % | HEART RATE: 77 BPM | TEMPERATURE: 97.5 F | SYSTOLIC BLOOD PRESSURE: 144 MMHG | BODY MASS INDEX: 43.13 KG/M2

## 2020-09-21 DIAGNOSIS — E11.9 TYPE 2 DIABETES MELLITUS WITHOUT COMPLICATION, WITHOUT LONG-TERM CURRENT USE OF INSULIN (HCC): Primary | ICD-10-CM

## 2020-09-21 DIAGNOSIS — M25.551 HIP PAIN, RIGHT: ICD-10-CM

## 2020-09-21 LAB
GLUCOSE BLDC GLUCOMTR-MCNC: 103 MG/DL (ref 70–130)
HBA1C MFR BLD: 8.1 %

## 2020-09-21 PROCEDURE — 83036 HEMOGLOBIN GLYCOSYLATED A1C: CPT | Performed by: PHYSICIAN ASSISTANT

## 2020-09-21 PROCEDURE — 99213 OFFICE O/P EST LOW 20 MIN: CPT | Performed by: PHYSICIAN ASSISTANT

## 2020-09-21 PROCEDURE — 82962 GLUCOSE BLOOD TEST: CPT | Performed by: PHYSICIAN ASSISTANT

## 2020-09-21 RX ORDER — HYDROCODONE BITARTRATE AND ACETAMINOPHEN 10; 325 MG/1; MG/1
1 TABLET ORAL 4 TIMES DAILY
COMMUNITY
Start: 2020-07-21 | End: 2021-02-19 | Stop reason: SDUPTHER

## 2020-09-21 RX ORDER — CEFDINIR 300 MG/1
300 CAPSULE ORAL 2 TIMES DAILY
Qty: 20 CAPSULE | Refills: 0 | Status: SHIPPED | OUTPATIENT
Start: 2020-09-21 | End: 2020-10-12 | Stop reason: HOSPADM

## 2020-09-21 RX ORDER — SILDENAFIL 100 MG/1
TABLET, FILM COATED ORAL
COMMUNITY
Start: 2020-09-08 | End: 2021-03-26 | Stop reason: SDUPTHER

## 2020-09-21 NOTE — PROGRESS NOTES
Subjective   Sebastian Liao is a 60 y.o. male  Diabetes ( A1C 8.1%) and Hip Pain (Lt hip. Req RF for [place] 10/325 QID)      History of Present Illness  60-year-old black male who comes in for chronic back and hip pain needs refill of [place] 10/325 4 times a day patient needs refill doing well    Patient comes in for type 2 diabetes A1c was 8.1 today and was 7.8 on his last visit he states is not been exercise much as he usually does be due to covid  restrictions has been try to watch diet and exercise last couple couple weeks he returns in November for full physical at that time he states he should have his blood sugar down  The following portions of the patient's history were reviewed and updated as appropriate: allergies, current medications, past social history and problem list    Review of Systems   Constitutional: Negative for appetite change, diaphoresis, fatigue and unexpected weight change.   Eyes: Negative for visual disturbance.   Respiratory: Negative for chest tightness and shortness of breath.    Cardiovascular: Negative for chest pain, palpitations and leg swelling.   Gastrointestinal: Negative for diarrhea, nausea and vomiting.   Endocrine: Negative for polydipsia, polyphagia and polyuria.   Musculoskeletal: Positive for back pain.   Skin: Negative for color change.   Neurological: Negative for dizziness, weakness, light-headedness and numbness.       Objective     Vitals:    09/21/20 0933   BP: 144/82   Pulse: 77   Resp: 14   Temp: 97.5 °F (36.4 °C)   SpO2: 98%       Physical Exam  Vitals signs and nursing note reviewed.   Constitutional:       Appearance: He is well-developed. He is not diaphoretic.   Neck:      Musculoskeletal: Neck supple.      Thyroid: No thyromegaly.      Vascular: No JVD.   Cardiovascular:      Rate and Rhythm: Normal rate and regular rhythm.      Pulses: Normal pulses.      Heart sounds: Normal heart sounds. No murmur.   Pulmonary:      Effort: Pulmonary effort is  normal. No respiratory distress.      Breath sounds: Normal breath sounds.   Abdominal:      General: Bowel sounds are normal.      Palpations: Abdomen is soft.      Tenderness: There is no abdominal tenderness.   Lymphadenopathy:      Cervical: No cervical adenopathy.   Skin:     General: Skin is warm and dry.   Neurological:      Sensory: No sensory deficit.         Assessment/Plan     Sebastian was seen today for diabetes and hip pain.    Diagnoses and all orders for this visit:    Type 2 diabetes mellitus without complication, without long-term current use of insulin (CMS/[facility])  -     POC Glycosylated Hemoglobin (Hb A1C)  -     POCT Glucose    Hip pain, right    Other orders  -     cefdinir (OMNICEF) 300 MG capsule; Take 1 capsule by mouth 2 (Two) Times a Day.    [place] 10/325 1 p.o. 4 times daily dispense 120    Discussed diabetes control discussed with patient diet and exercise and will recheck his blood sugar in 3- 6 months

## 2020-10-08 ENCOUNTER — APPOINTMENT (OUTPATIENT)
Dept: CT IMAGING | Facility: HOSPITAL | Age: 60
End: 2020-10-08

## 2020-10-08 ENCOUNTER — HOSPITAL ENCOUNTER (INPATIENT)
Facility: HOSPITAL | Age: 60
LOS: 4 days | Discharge: HOME | End: 2020-10-12
Attending: EMERGENCY MEDICINE | Admitting: INTERNAL MEDICINE

## 2020-10-08 ENCOUNTER — TELEPHONE (OUTPATIENT)
Dept: FAMILY MEDICINE CLINIC | Facility: CLINIC | Age: 60
End: 2020-10-08

## 2020-10-08 ENCOUNTER — APPOINTMENT (OUTPATIENT)
Dept: GENERAL RADIOLOGY | Facility: HOSPITAL | Age: 60
End: 2020-10-08

## 2020-10-08 DIAGNOSIS — R42 LIGHTHEADEDNESS: ICD-10-CM

## 2020-10-08 DIAGNOSIS — R10.84 GENERALIZED ABDOMINAL PAIN: ICD-10-CM

## 2020-10-08 DIAGNOSIS — U07.1 PNEUMONIA DUE TO COVID-19 VIRUS: Primary | ICD-10-CM

## 2020-10-08 DIAGNOSIS — I10 ESSENTIAL HYPERTENSION, BENIGN: ICD-10-CM

## 2020-10-08 DIAGNOSIS — J12.82 PNEUMONIA DUE TO COVID-19 VIRUS: Primary | ICD-10-CM

## 2020-10-08 DIAGNOSIS — R55 NEAR SYNCOPE: ICD-10-CM

## 2020-10-08 DIAGNOSIS — K58.9 IRRITABLE BOWEL SYNDROME WITHOUT DIARRHEA: ICD-10-CM

## 2020-10-08 PROBLEM — J18.9 BILATERAL PNEUMONIA: Status: ACTIVE | Noted: 2020-10-08

## 2020-10-08 LAB
ALBUMIN SERPL-MCNC: 4.3 G/DL (ref 3.5–5.2)
ALBUMIN/GLOB SERPL: 1.3 G/DL
ALP SERPL-CCNC: 57 U/L (ref 39–117)
ALT SERPL W P-5'-P-CCNC: 20 U/L (ref 1–41)
ANION GAP SERPL CALCULATED.3IONS-SCNC: 9 MMOL/L (ref 5–15)
AST SERPL-CCNC: 49 U/L (ref 1–40)
B PARAPERT DNA SPEC QL NAA+PROBE: NOT DETECTED
B PERT DNA SPEC QL NAA+PROBE: NOT DETECTED
BACTERIA UR QL AUTO: NORMAL /HPF
BASOPHILS # BLD AUTO: 0.02 10*3/MM3 (ref 0–0.2)
BASOPHILS NFR BLD AUTO: 0.4 % (ref 0–1.5)
BILIRUB SERPL-MCNC: 0.4 MG/DL (ref 0–1.2)
BILIRUB UR QL STRIP: NEGATIVE
BUN SERPL-MCNC: 13 MG/DL (ref 6–20)
BUN/CREAT SERPL: 10.2 (ref 7–25)
C PNEUM DNA NPH QL NAA+NON-PROBE: NOT DETECTED
CALCIUM SPEC-SCNC: 8.9 MG/DL (ref 8.6–10.5)
CHLORIDE SERPL-SCNC: 97 MMOL/L (ref 98–107)
CLARITY UR: CLEAR
CO2 SERPL-SCNC: 31 MMOL/L (ref 22–29)
COLOR UR: YELLOW
CREAT SERPL-MCNC: 1.27 MG/DL (ref 0.76–1.27)
CRP SERPL-MCNC: 3.44 MG/DL (ref 0–0.5)
DEPRECATED RDW RBC AUTO: 44.5 FL (ref 37–54)
EOSINOPHIL # BLD AUTO: 0.02 10*3/MM3 (ref 0–0.4)
EOSINOPHIL NFR BLD AUTO: 0.4 % (ref 0.3–6.2)
ERYTHROCYTE [DISTWIDTH] IN BLOOD BY AUTOMATED COUNT: 13.6 % (ref 12.3–15.4)
FERRITIN SERPL-MCNC: 494.6 NG/ML (ref 30–400)
FLUAV H1 2009 PAND RNA NPH QL NAA+PROBE: NOT DETECTED
FLUAV H1 HA GENE NPH QL NAA+PROBE: NOT DETECTED
FLUAV H3 RNA NPH QL NAA+PROBE: NOT DETECTED
FLUAV SUBTYP SPEC NAA+PROBE: NOT DETECTED
FLUBV RNA ISLT QL NAA+PROBE: NOT DETECTED
GFR SERPL CREATININE-BSD FRML MDRD: 71 ML/MIN/1.73
GLOBULIN UR ELPH-MCNC: 3.4 GM/DL
GLUCOSE SERPL-MCNC: 116 MG/DL (ref 65–99)
GLUCOSE UR STRIP-MCNC: NEGATIVE MG/DL
HADV DNA SPEC NAA+PROBE: NOT DETECTED
HCOV 229E RNA SPEC QL NAA+PROBE: NOT DETECTED
HCOV HKU1 RNA SPEC QL NAA+PROBE: NOT DETECTED
HCOV NL63 RNA SPEC QL NAA+PROBE: NOT DETECTED
HCOV OC43 RNA SPEC QL NAA+PROBE: NOT DETECTED
HCT VFR BLD AUTO: 46.4 % (ref 37.5–51)
HGB BLD-MCNC: 14.7 G/DL (ref 13–17.7)
HGB UR QL STRIP.AUTO: NEGATIVE
HMPV RNA NPH QL NAA+NON-PROBE: NOT DETECTED
HOLD SPECIMEN: NORMAL
HOLD SPECIMEN: NORMAL
HPIV1 RNA SPEC QL NAA+PROBE: NOT DETECTED
HPIV2 RNA SPEC QL NAA+PROBE: NOT DETECTED
HPIV3 RNA NPH QL NAA+PROBE: NOT DETECTED
HPIV4 P GENE NPH QL NAA+PROBE: NOT DETECTED
HYALINE CASTS UR QL AUTO: NORMAL /LPF
IMM GRANULOCYTES # BLD AUTO: 0.01 10*3/MM3 (ref 0–0.05)
IMM GRANULOCYTES NFR BLD AUTO: 0.2 % (ref 0–0.5)
KETONES UR QL STRIP: NEGATIVE
LDH SERPL-CCNC: 365 U/L (ref 135–225)
LEUKOCYTE ESTERASE UR QL STRIP.AUTO: NEGATIVE
LYMPHOCYTES # BLD AUTO: 1.43 10*3/MM3 (ref 0.7–3.1)
LYMPHOCYTES NFR BLD AUTO: 27.7 % (ref 19.6–45.3)
M PNEUMO IGG SER IA-ACNC: NOT DETECTED
MAGNESIUM SERPL-MCNC: 2.2 MG/DL (ref 1.6–2.6)
MCH RBC QN AUTO: 28.3 PG (ref 26.6–33)
MCHC RBC AUTO-ENTMCNC: 31.7 G/DL (ref 31.5–35.7)
MCV RBC AUTO: 89.4 FL (ref 79–97)
MONOCYTES # BLD AUTO: 0.57 10*3/MM3 (ref 0.1–0.9)
MONOCYTES NFR BLD AUTO: 11 % (ref 5–12)
NEUTROPHILS NFR BLD AUTO: 3.12 10*3/MM3 (ref 1.7–7)
NEUTROPHILS NFR BLD AUTO: 60.3 % (ref 42.7–76)
NITRITE UR QL STRIP: NEGATIVE
NRBC BLD AUTO-RTO: 0 /100 WBC (ref 0–0.2)
PH UR STRIP.AUTO: 6 [PH] (ref 5–8)
PLATELET # BLD AUTO: 259 10*3/MM3 (ref 140–450)
PMV BLD AUTO: 10.1 FL (ref 6–12)
POTASSIUM SERPL-SCNC: 4.1 MMOL/L (ref 3.5–5.2)
PROT SERPL-MCNC: 7.7 G/DL (ref 6–8.5)
PROT UR QL STRIP: ABNORMAL
RBC # BLD AUTO: 5.19 10*6/MM3 (ref 4.14–5.8)
RBC # UR: NORMAL /HPF
REF LAB TEST METHOD: NORMAL
RHINOVIRUS RNA SPEC NAA+PROBE: NOT DETECTED
RSV RNA NPH QL NAA+NON-PROBE: NOT DETECTED
SARS-COV-2 RNA NPH QL NAA+NON-PROBE: DETECTED
SODIUM SERPL-SCNC: 137 MMOL/L (ref 136–145)
SP GR UR STRIP: 1.02 (ref 1–1.03)
SQUAMOUS #/AREA URNS HPF: NORMAL /HPF
TROPONIN T SERPL-MCNC: <0.01 NG/ML (ref 0–0.03)
UROBILINOGEN UR QL STRIP: ABNORMAL
WBC # BLD AUTO: 5.17 10*3/MM3 (ref 3.4–10.8)
WBC UR QL AUTO: NORMAL /HPF
WHOLE BLOOD HOLD SPECIMEN: NORMAL
WHOLE BLOOD HOLD SPECIMEN: NORMAL

## 2020-10-08 PROCEDURE — 86140 C-REACTIVE PROTEIN: CPT | Performed by: INTERNAL MEDICINE

## 2020-10-08 PROCEDURE — 25010000002 AZITHROMYCIN PER 500 MG: Performed by: EMERGENCY MEDICINE

## 2020-10-08 PROCEDURE — 85025 COMPLETE CBC W/AUTO DIFF WBC: CPT

## 2020-10-08 PROCEDURE — 83615 LACTATE (LD) (LDH) ENZYME: CPT | Performed by: INTERNAL MEDICINE

## 2020-10-08 PROCEDURE — 71045 X-RAY EXAM CHEST 1 VIEW: CPT

## 2020-10-08 PROCEDURE — 25010000002 ONDANSETRON PER 1 MG: Performed by: EMERGENCY MEDICINE

## 2020-10-08 PROCEDURE — 0 IOPAMIDOL PER 1 ML: Performed by: EMERGENCY MEDICINE

## 2020-10-08 PROCEDURE — 80053 COMPREHEN METABOLIC PANEL: CPT

## 2020-10-08 PROCEDURE — 93005 ELECTROCARDIOGRAM TRACING: CPT | Performed by: EMERGENCY MEDICINE

## 2020-10-08 PROCEDURE — 86900 BLOOD TYPING SEROLOGIC ABO: CPT

## 2020-10-08 PROCEDURE — 0202U NFCT DS 22 TRGT SARS-COV-2: CPT | Performed by: INTERNAL MEDICINE

## 2020-10-08 PROCEDURE — 25010000002 MORPHINE PER 10 MG: Performed by: EMERGENCY MEDICINE

## 2020-10-08 PROCEDURE — 83735 ASSAY OF MAGNESIUM: CPT

## 2020-10-08 PROCEDURE — 99223 1ST HOSP IP/OBS HIGH 75: CPT | Performed by: INTERNAL MEDICINE

## 2020-10-08 PROCEDURE — 81001 URINALYSIS AUTO W/SCOPE: CPT | Performed by: EMERGENCY MEDICINE

## 2020-10-08 PROCEDURE — 71275 CT ANGIOGRAPHY CHEST: CPT

## 2020-10-08 PROCEDURE — 74174 CTA ABD&PLVS W/CONTRAST: CPT

## 2020-10-08 PROCEDURE — 82728 ASSAY OF FERRITIN: CPT | Performed by: INTERNAL MEDICINE

## 2020-10-08 PROCEDURE — 86901 BLOOD TYPING SEROLOGIC RH(D): CPT

## 2020-10-08 PROCEDURE — 99284 EMERGENCY DEPT VISIT MOD MDM: CPT

## 2020-10-08 PROCEDURE — 25010000002 CEFTRIAXONE PER 250 MG: Performed by: EMERGENCY MEDICINE

## 2020-10-08 PROCEDURE — 84484 ASSAY OF TROPONIN QUANT: CPT

## 2020-10-08 PROCEDURE — 93005 ELECTROCARDIOGRAM TRACING: CPT

## 2020-10-08 RX ORDER — SODIUM CHLORIDE 0.9 % (FLUSH) 0.9 %
10 SYRINGE (ML) INJECTION AS NEEDED
Status: DISCONTINUED | OUTPATIENT
Start: 2020-10-08 | End: 2020-10-12 | Stop reason: HOSPADM

## 2020-10-08 RX ORDER — MORPHINE SULFATE 2 MG/ML
2 INJECTION, SOLUTION INTRAMUSCULAR; INTRAVENOUS
Status: DISCONTINUED | OUTPATIENT
Start: 2020-10-08 | End: 2020-10-12 | Stop reason: HOSPADM

## 2020-10-08 RX ORDER — ONDANSETRON 2 MG/ML
4 INJECTION INTRAMUSCULAR; INTRAVENOUS ONCE
Status: COMPLETED | OUTPATIENT
Start: 2020-10-08 | End: 2020-10-08

## 2020-10-08 RX ORDER — DEXAMETHASONE SODIUM PHOSPHATE 4 MG/ML
6 INJECTION, SOLUTION INTRA-ARTICULAR; INTRALESIONAL; INTRAMUSCULAR; INTRAVENOUS; SOFT TISSUE DAILY
Status: DISCONTINUED | OUTPATIENT
Start: 2020-10-08 | End: 2020-10-09

## 2020-10-08 RX ADMIN — CEFTRIAXONE SODIUM 2 G: 2 INJECTION, POWDER, FOR SOLUTION INTRAMUSCULAR; INTRAVENOUS at 19:54

## 2020-10-08 RX ADMIN — MORPHINE SULFATE 2 MG: 2 INJECTION, SOLUTION INTRAMUSCULAR; INTRAVENOUS at 22:36

## 2020-10-08 RX ADMIN — AZITHROMYCIN 500 MG: 500 INJECTION, POWDER, LYOPHILIZED, FOR SOLUTION INTRAVENOUS at 20:58

## 2020-10-08 RX ADMIN — MORPHINE SULFATE 2 MG: 2 INJECTION, SOLUTION INTRAMUSCULAR; INTRAVENOUS at 16:18

## 2020-10-08 RX ADMIN — IOPAMIDOL 100 ML: 755 INJECTION, SOLUTION INTRAVENOUS at 16:44

## 2020-10-08 RX ADMIN — ONDANSETRON 4 MG: 2 INJECTION INTRAMUSCULAR; INTRAVENOUS at 17:32

## 2020-10-08 NOTE — ED PROVIDER NOTES
Subjective   Sebastian Liao is a 60 y.o. male who presents to the ED with c/o weakness and abdominal pain. The patient reports he has been experiencing worsening generalized weakness with severe abdominal pain beginning 3 days ago. His pain is diffuse and it is exacerbated by standing up. He complains of decreased appetite and fatigue but denies hematochezia, shortness of breath, cough, fever, and diarrhea. The patient reports his wife has tested negative for COVID-19 numerous times. He has a past medical history of lung cancer, colon cancer, hyperlipidemia, IBS, hypertension, and cervicalgia. His surgical history includes cervical fusion and colonoscopy. There are no other acute complaints at this time.      History provided by:  Patient  Abdominal Pain  Pain location:  Generalized  Pain quality: aching    Pain radiates to:  Does not radiate  Pain severity:  Severe  Onset quality:  Sudden  Duration:  3 days  Timing:  Intermittent  Progression:  Unchanged  Chronicity:  New  Context: not alcohol use, not eating, not sick contacts, not suspicious food intake and not trauma    Relieved by:  Nothing  Worsened by:  Position changes (standing up)  Ineffective treatments:  Palpation and eating  Associated symptoms: fatigue    Associated symptoms: no cough, no diarrhea, no fever, no nausea, no shortness of breath and no vomiting    Risk factors: no alcohol abuse, not elderly, has not had multiple surgeries and not obese        Review of Systems   Constitutional: Positive for appetite change and fatigue. Negative for fever.        He complains of decreased appetite.   Respiratory: Negative for cough and shortness of breath.    Gastrointestinal: Positive for abdominal distention and abdominal pain. Negative for blood in stool, diarrhea, nausea and vomiting.   Neurological: Positive for weakness and light-headedness.   All other systems reviewed and are negative.      Past Medical History:   Diagnosis Date   • Acute  bronchitis    • Cervicalgia    • Colon cancer (CMS/HCC)     pt reported on intake forms   • Edema    • HTN (hypertension)    • Hyperlipidemia    • IBS (irritable bowel syndrome)    • Low testosterone    • Lung cancer (CMS/HCC)     pt reported on intake forms       No Known Allergies    Past Surgical History:   Procedure Laterality Date   • CERVICAL FUSION     • COLONOSCOPY  07/2012    repeat in ten yrs   • HIP SURGERY Right 01/26/2016    Dr. Schwartz   • NECK SURGERY      cervical spine fusion       Family History   Problem Relation Age of Onset   • Cancer Mother         Ovarian,    • Hypertension Father    • Hypertension Sister    • Hypertension Maternal Grandmother    • Diabetes Maternal Grandmother        Social History     Socioeconomic History   • Marital status:      Spouse name: Not on file   • Number of children: Not on file   • Years of education: Not on file   • Highest education level: Not on file   Tobacco Use   • Smoking status: Former Smoker     Packs/day: 1.00     Years: 10.00     Pack years: 10.00     Types: Cigarettes   • Smokeless tobacco: Never Used   Substance and Sexual Activity   • Alcohol use: Yes     Comment: occasional   • Drug use: No   • Sexual activity: Defer   Social History Narrative    Left hand dominant, , Exercises daily.          Objective   Physical Exam  Vitals signs and nursing note reviewed.   Constitutional:       General: He is not in acute distress.     Appearance: He is well-developed.   HENT:      Head: Normocephalic and atraumatic.   Eyes:      General: No scleral icterus.     Conjunctiva/sclera: Conjunctivae normal.   Neck:      Musculoskeletal: Normal range of motion and neck supple.   Cardiovascular:      Rate and Rhythm: Normal rate and regular rhythm.      Heart sounds: Normal heart sounds. No murmur.   Pulmonary:      Effort: Pulmonary effort is normal. No respiratory distress.      Breath sounds: Normal breath sounds.   Abdominal:      General: Bowel  sounds are normal. There is distension.      Palpations: Abdomen is soft.      Tenderness: There is generalized abdominal tenderness.      Comments: Mild diffuse abdominal tenderness to palpation.  The patient's pain was much more severe upon standing up. He states he became lightheaded during this episode but his blood pressure remained elevated.   Musculoskeletal: Normal range of motion.   Skin:     General: Skin is warm and dry.   Neurological:      General: No focal deficit present.      Mental Status: He is alert and oriented to person, place, and time.      Comments: No focal neurological deficits.   Psychiatric:         Behavior: Behavior normal.         Procedures         ED Course     Recent Results (from the past 24 hour(s))   Urinalysis With Microscopic If Indicated (No Culture) - Urine, Clean Catch    Collection Time: 10/08/20  4:47 PM    Specimen: Urine, Clean Catch   Result Value Ref Range    Color, UA Yellow Yellow, Straw    Appearance, UA Clear Clear    pH, UA 6.0 5.0 - 8.0    Specific Gravity, UA 1.020 1.001 - 1.030    Glucose, UA Negative Negative    Ketones, UA Negative Negative    Bilirubin, UA Negative Negative    Blood, UA Negative Negative    Protein,  mg/dL (2+) (A) Negative    Leuk Esterase, UA Negative Negative    Nitrite, UA Negative Negative    Urobilinogen, UA 1.0 E.U./dL 0.2 - 1.0 E.U./dL   Urinalysis, Microscopic Only - Urine, Clean Catch    Collection Time: 10/08/20  4:47 PM    Specimen: Urine, Clean Catch   Result Value Ref Range    RBC, UA None Seen None Seen, 0-2 /HPF    WBC, UA 0-2 None Seen, 0-2 /HPF    Bacteria, UA None Seen None Seen, Trace /HPF    Squamous Epithelial Cells, UA None Seen None Seen, 0-2 /HPF    Hyaline Casts, UA None Seen 0 - 6 /LPF    Methodology Automated Microscopy    Respiratory Panel PCR w/COVID-19(SARS-CoV-2) [facility] In-House, NP Swab in UTM/VTM, 3-4 HR TAT - Swab, Nasopharynx    Collection Time: 10/08/20  7:02 PM    Specimen:  Nasopharynx; Swab   Result Value Ref Range    ADENOVIRUS, PCR Not Detected Not Detected    Coronavirus 229E Not Detected Not Detected    Coronavirus HKU1 Not Detected Not Detected    Coronavirus NL63 Not Detected Not Detected    Coronavirus OC43 Not Detected Not Detected    COVID19 Detected (C) Not Detected - Ref. Range    Human Metapneumovirus Not Detected Not Detected    Human Rhinovirus/Enterovirus Not Detected Not Detected    Influenza A PCR Not Detected Not Detected    Influenza A H1 Not Detected Not Detected    Influenza A H1 2009 PCR Not Detected Not Detected    Influenza A H3 Not Detected Not Detected    Influenza B PCR Not Detected Not Detected    Parainfluenza Virus 1 Not Detected Not Detected    Parainfluenza Virus 2 Not Detected Not Detected    Parainfluenza Virus 3 Not Detected Not Detected    Parainfluenza Virus 4 Not Detected Not Detected    RSV, PCR Not Detected Not Detected    Bordetella pertussis pcr Not Detected Not Detected    Bordetella parapertussis PCR Not Detected Not Detected    Chlamydophila pneumoniae PCR Not Detected Not Detected    Mycoplasma pneumo by PCR Not Detected Not Detected   POC Glucose Once    Collection Time: 10/09/20  6:54 AM    Specimen: Blood   Result Value Ref Range    Glucose 77 70 - 130 mg/dL   Comprehensive Metabolic Panel    Collection Time: 10/09/20  7:39 AM    Specimen: Blood   Result Value Ref Range    Glucose 70 65 - 99 mg/dL    BUN 13 6 - 20 mg/dL    Creatinine 1.14 0.76 - 1.27 mg/dL    Sodium 136 136 - 145 mmol/L    Potassium 4.1 3.5 - 5.2 mmol/L    Chloride 98 98 - 107 mmol/L    CO2 29.0 22.0 - 29.0 mmol/L    Calcium 8.5 (L) 8.6 - 10.5 mg/dL    Total Protein 7.3 6.0 - 8.5 g/dL    Albumin 4.00 3.50 - 5.20 g/dL    ALT (SGPT) 22 1 - 41 U/L    AST (SGOT) 51 (H) 1 - 40 U/L    Alkaline Phosphatase 51 39 - 117 U/L    Total Bilirubin 0.3 0.0 - 1.2 mg/dL    eGFR  African Amer 80 >60 mL/min/1.73    Globulin 3.3 gm/dL    A/G Ratio 1.2 g/dL    BUN/Creatinine Ratio 11.4 7.0  - 25.0    Anion Gap 9.0 5.0 - 15.0 mmol/L   Ferritin    Collection Time: 10/09/20  7:39 AM    Specimen: Blood   Result Value Ref Range    Ferritin 519.40 (H) 30.00 - 400.00 ng/mL   Lactate Dehydrogenase    Collection Time: 10/09/20  7:39 AM    Specimen: Blood   Result Value Ref Range     (H) 135 - 225 U/L   C-reactive Protein    Collection Time: 10/09/20  7:39 AM    Specimen: Blood   Result Value Ref Range    C-Reactive Protein 3.82 (H) 0.00 - 0.50 mg/dL   CBC Auto Differential    Collection Time: 10/09/20  7:39 AM    Specimen: Blood   Result Value Ref Range    WBC 4.73 3.40 - 10.80 10*3/mm3    RBC 4.93 4.14 - 5.80 10*6/mm3    Hemoglobin 14.2 13.0 - 17.7 g/dL    Hematocrit 45.1 37.5 - 51.0 %    MCV 91.5 79.0 - 97.0 fL    MCH 28.8 26.6 - 33.0 pg    MCHC 31.5 31.5 - 35.7 g/dL    RDW 13.6 12.3 - 15.4 %    RDW-SD 46.0 37.0 - 54.0 fl    MPV 10.5 6.0 - 12.0 fL    Platelets 260 140 - 450 10*3/mm3    Neutrophil % 56.1 42.7 - 76.0 %    Lymphocyte % 29.0 19.6 - 45.3 %    Monocyte % 13.1 (H) 5.0 - 12.0 %    Eosinophil % 0.8 0.3 - 6.2 %    Basophil % 0.4 0.0 - 1.5 %    Immature Grans % 0.6 (H) 0.0 - 0.5 %    Neutrophils, Absolute 2.65 1.70 - 7.00 10*3/mm3    Lymphocytes, Absolute 1.37 0.70 - 3.10 10*3/mm3    Monocytes, Absolute 0.62 0.10 - 0.90 10*3/mm3    Eosinophils, Absolute 0.04 0.00 - 0.40 10*3/mm3    Basophils, Absolute 0.02 0.00 - 0.20 10*3/mm3    Immature Grans, Absolute 0.03 0.00 - 0.05 10*3/mm3    nRBC 0.0 0.0 - 0.2 /100 WBC   Type & Screen    Collection Time: 10/09/20  7:39 AM    Specimen: Blood   Result Value Ref Range    ABO Type A     RH type Positive     Antibody Screen Negative     T&S Expiration Date 10/12/2020 11:59:59 PM    Hepatic Function Panel    Collection Time: 10/09/20  7:39 AM    Specimen: Blood   Result Value Ref Range    Total Protein 7.3 6.0 - 8.5 g/dL    Albumin 4.00 3.50 - 5.20 g/dL    ALT (SGPT) 22 1 - 41 U/L    AST (SGOT) 51 (H) 1 - 40 U/L    Alkaline Phosphatase 51 39 - 117 U/L    Total  Bilirubin 0.3 0.0 - 1.2 mg/dL    Bilirubin, Direct <0.2 0.0 - 0.3 mg/dL    Bilirubin, Indirect     POC Glucose Once    Collection Time: 10/09/20 12:04 PM    Specimen: Blood   Result Value Ref Range    Glucose 163 (H) 70 - 130 mg/dL     Note: In addition to lab results from this visit, the labs listed above may include labs taken at another facility or during a different encounter within the last 24 hours. Please correlate lab times with ED admission and discharge times for further clarification of the services performed during this visit.    CT Angiogram Chest   Final Result   1. Multifocal groundglass disease in the lungs, suspicious for Covid 19   pneumonia or other similar atypical pneumonia. Separate pattern of   peribronchial thickening and interstitial disease in the left lower lobe   resembles a bronchitis.       2. Trace linear filling defect in a left lower lobe pulmonary artery,   thought to either be a 1 mm chronic embolus or simply venous mixing   artifact. No evidence of significant pulmonary embolic disease.               ANGIOGRAPHIC CT SCAN OF THE ABDOMEN AND PELVIS: There is no evidence of   significant aortic or iliac stenosis. No aneurysm is seen. There is   extensive fatty liver change approaching the appearance of FELIX. No   hepatic lesions are identified. No significant abnormalities are seen of   the gallbladder, spleen, pancreas, adrenal glands, or kidneys except for   small right lower pole renal cyst. No upper abdominal free air, ascites,   adenopathy or acute inflammatory change is seen. Large and small bowel   loops are normal in caliber and normal in appearance.       Regarding the lower abdomen and pelvis, there are scattered sigmoid   diverticula without evidence of diverticulitis. No mass or inflammatory   change is seen. There is a right inguinal hernia containing fat and also   an edge of the bladder, which is elongated and extends into the hernia   defect for several centimeters.  There is minimal if any associated   edema. Incidental note is made of a right hip prosthesis. Bony   structures appear grossly intact.       IMPRESSION:    1. Right inguinal hernia containing a portion of the bladder. Minimal if   any associated edema.       2. Extensive fatty liver change.       3. Sigmoid diverticulosis without evidence of diverticulitis.       DICTATED:   10/08/2020   EDITED/ls :   10/08/2020            This report was finalized on 10/9/2020 7:26 AM by Dr. Conner Coon MD.          CT Angiogram Abdomen Pelvis   Final Result   1. Multifocal groundglass disease in the lungs, suspicious for Covid 19   pneumonia or other similar atypical pneumonia. Separate pattern of   peribronchial thickening and interstitial disease in the left lower lobe   resembles a bronchitis.       2. Trace linear filling defect in a left lower lobe pulmonary artery,   thought to either be a 1 mm chronic embolus or simply venous mixing   artifact. No evidence of significant pulmonary embolic disease.               ANGIOGRAPHIC CT SCAN OF THE ABDOMEN AND PELVIS: There is no evidence of   significant aortic or iliac stenosis. No aneurysm is seen. There is   extensive fatty liver change approaching the appearance of FELIX. No   hepatic lesions are identified. No significant abnormalities are seen of   the gallbladder, spleen, pancreas, adrenal glands, or kidneys except for   small right lower pole renal cyst. No upper abdominal free air, ascites,   adenopathy or acute inflammatory change is seen. Large and small bowel   loops are normal in caliber and normal in appearance.       Regarding the lower abdomen and pelvis, there are scattered sigmoid   diverticula without evidence of diverticulitis. No mass or inflammatory   change is seen. There is a right inguinal hernia containing fat and also   an edge of the bladder, which is elongated and extends into the hernia   defect for several centimeters. There is minimal if any associated    edema. Incidental note is made of a right hip prosthesis. Bony   structures appear grossly intact.       IMPRESSION:    1. Right inguinal hernia containing a portion of the bladder. Minimal if   any associated edema.       2. Extensive fatty liver change.       3. Sigmoid diverticulosis without evidence of diverticulitis.       DICTATED:   10/08/2020   EDITED/ls :   10/08/2020            This report was finalized on 10/9/2020 7:26 AM by Dr. Conner Coon MD.          XR Chest 1 View   Final Result   Increased central pulmonary vascularity without focal   consolidation or effusion.       D:  10/08/2020   E:  10/08/2020       This report was finalized on 10/8/2020 4:57 PM by Dr. Alexey Moscoso.            Vitals:    10/09/20 0900 10/09/20 0942 10/09/20 1100 10/09/20 1300   BP:  143/88     BP Location:       Patient Position:       Pulse: 94 97 103 97   Resp:       Temp:       TempSrc:       SpO2:       Weight:       Height:         Medications   sodium chloride 0.9 % flush 10 mL (has no administration in time range)   morphine injection 2 mg (2 mg Intravenous Given 10/8/20 2236)   albuterol sulfate HFA (PROVENTIL HFA;VENTOLIN HFA;PROAIR HFA) inhaler 2 puff (has no administration in time range)   amLODIPine (NORVASC) tablet 5 mg (5 mg Oral Given 10/9/20 0942)   cyclobenzaprine (FLEXERIL) tablet 10 mg (has no administration in time range)   HYDROcodone-acetaminophen (NORCO)  MG per tablet 1 tablet (has no administration in time range)   pantoprazole (PROTONIX) EC tablet 40 mg (40 mg Oral Given 10/9/20 0534)   rosuvastatin (CRESTOR) tablet 10 mg (10 mg Oral Given 10/9/20 0942)   dextrose (GLUTOSE) oral gel 15 g (has no administration in time range)   dextrose (D50W) 25 g/ 50mL Intravenous Solution 25 g (has no administration in time range)   glucagon (human recombinant) (GLUCAGEN DIAGNOSTIC) injection 1 mg (has no administration in time range)   sodium chloride 0.9 % flush 10 mL (10 mL Intravenous Given 10/9/20 0123)    sodium chloride 0.9 % flush 10 mL (has no administration in time range)   insulin lispro (humaLOG) injection 0-7 Units (2 Units Subcutaneous Given 10/9/20 1205)   acetaminophen (TYLENOL) tablet 650 mg (has no administration in time range)   ondansetron (ZOFRAN) injection 4 mg (4 mg Intravenous Given 10/9/20 0123)   famotidine (PEPCID) tablet 20 mg (20 mg Oral Given 10/9/20 1205)   enoxaparin (LOVENOX) syringe 40 mg (40 mg Subcutaneous Given 10/9/20 0942)   dexamethasone (DECADRON) tablet 6 mg (6 mg Oral Given 10/9/20 0942)   INV GS-5734 remdesivir 200 mg in sodium chloride 0.9 % 250 mL IVPB (powder vial) (200 mg Intravenous New Bag 10/9/20 1206)     Followed by   INV GS-5734 remdesivir 100 mg in sodium chloride 0.9 % 250 mL IVPB (powder vial) (has no administration in time range)   Pharmacy Consult - Remdesivir (has no administration in time range)   iopamidol (ISOVUE-370) 76 % injection 100 mL (100 mL Intravenous Given 10/8/20 1644)   ondansetron (ZOFRAN) injection 4 mg (4 mg Intravenous Given 10/8/20 1732)   AZITHROMYCIN 500 MG/250 ML 0.9% NS IVPB (vial-mate) (0 mg Intravenous Stopped 10/8/20 2230)   cefTRIAXone (ROCEPHIN) 2 g/100 mL 0.9% NS IVPB (MBP) (0 g Intravenous Stopped 10/8/20 2058)   calcium carbonate EX (TUMS EX) chewable tablet 750 mg (750 mg Oral Given 10/9/20 0123)     ECG/EMG Results (last 24 hours)     Procedure Component Value Units Date/Time    ECG 12 Lead [[id number]] Collected: 10/08/20 1432     Updated: 10/08/20 1433        ECG 12 Lead                                                    MDM    Final diagnoses:   Pneumonia due to COVID-19 virus   Generalized abdominal pain   Lightheadedness   Near syncope       Documentation assistance provided by kandace Mascorro.  Information recorded by the scribe was done at my direction and has been verified and validated by me.     Luis Mascorro  10/08/20 [id number]       Parmjit Hartley DO  10/09/20 1523

## 2020-10-08 NOTE — TELEPHONE ENCOUNTER
Caller: Sebastian Liao    Relationship: Self    Best call back number: [phone]    What medication are you requesting: ANTI-BIOTIC FOR SINUS INFECTION    What are your current symptoms: CONGESTION, GREEN COLORED MUCUS, PRESSURE    How long have you been experiencing symptoms: A FEW DAYS    Have you had these symptoms before:    [x] Yes  [] No    Have you been treated for these symptoms before:   [x] Yes  [] No    If a prescription is needed, what is your preferred pharmacy and phone number: [facility] [address] [facility] [phone] [facility] [phone]      Additional notes:

## 2020-10-09 ENCOUNTER — EPISODE CHANGES (OUTPATIENT)
Dept: CASE MANAGEMENT | Facility: OTHER | Age: 60
End: 2020-10-09

## 2020-10-09 LAB
ABO GROUP BLD: NORMAL
ABO GROUP BLD: NORMAL
ALBUMIN SERPL-MCNC: 4 G/DL (ref 3.5–5.2)
ALBUMIN SERPL-MCNC: 4 G/DL (ref 3.5–5.2)
ALBUMIN/GLOB SERPL: 1.2 G/DL
ALP SERPL-CCNC: 51 U/L (ref 39–117)
ALP SERPL-CCNC: 51 U/L (ref 39–117)
ALT SERPL W P-5'-P-CCNC: 22 U/L (ref 1–41)
ALT SERPL W P-5'-P-CCNC: 22 U/L (ref 1–41)
ANION GAP SERPL CALCULATED.3IONS-SCNC: 9 MMOL/L (ref 5–15)
AST SERPL-CCNC: 51 U/L (ref 1–40)
AST SERPL-CCNC: 51 U/L (ref 1–40)
BASOPHILS # BLD AUTO: 0.02 10*3/MM3 (ref 0–0.2)
BASOPHILS NFR BLD AUTO: 0.4 % (ref 0–1.5)
BILIRUB CONJ SERPL-MCNC: <0.2 MG/DL (ref 0–0.3)
BILIRUB INDIRECT SERPL-MCNC: ABNORMAL MG/DL
BILIRUB SERPL-MCNC: 0.3 MG/DL (ref 0–1.2)
BILIRUB SERPL-MCNC: 0.3 MG/DL (ref 0–1.2)
BLD GP AB SCN SERPL QL: NEGATIVE
BUN SERPL-MCNC: 13 MG/DL (ref 6–20)
BUN/CREAT SERPL: 11.4 (ref 7–25)
CALCIUM SPEC-SCNC: 8.5 MG/DL (ref 8.6–10.5)
CHLORIDE SERPL-SCNC: 98 MMOL/L (ref 98–107)
CO2 SERPL-SCNC: 29 MMOL/L (ref 22–29)
CREAT SERPL-MCNC: 1.14 MG/DL (ref 0.76–1.27)
CRP SERPL-MCNC: 3.82 MG/DL (ref 0–0.5)
DEPRECATED RDW RBC AUTO: 46 FL (ref 37–54)
EOSINOPHIL # BLD AUTO: 0.04 10*3/MM3 (ref 0–0.4)
EOSINOPHIL NFR BLD AUTO: 0.8 % (ref 0.3–6.2)
ERYTHROCYTE [DISTWIDTH] IN BLOOD BY AUTOMATED COUNT: 13.6 % (ref 12.3–15.4)
FERRITIN SERPL-MCNC: 519.4 NG/ML (ref 30–400)
GFR SERPL CREATININE-BSD FRML MDRD: 80 ML/MIN/1.73
GLOBULIN UR ELPH-MCNC: 3.3 GM/DL
GLUCOSE BLDC GLUCOMTR-MCNC: 163 MG/DL (ref 70–130)
GLUCOSE BLDC GLUCOMTR-MCNC: 175 MG/DL (ref 70–130)
GLUCOSE BLDC GLUCOMTR-MCNC: 182 MG/DL (ref 70–130)
GLUCOSE BLDC GLUCOMTR-MCNC: 77 MG/DL (ref 70–130)
GLUCOSE SERPL-MCNC: 70 MG/DL (ref 65–99)
HCT VFR BLD AUTO: 45.1 % (ref 37.5–51)
HGB BLD-MCNC: 14.2 G/DL (ref 13–17.7)
IMM GRANULOCYTES # BLD AUTO: 0.03 10*3/MM3 (ref 0–0.05)
IMM GRANULOCYTES NFR BLD AUTO: 0.6 % (ref 0–0.5)
LDH SERPL-CCNC: 316 U/L (ref 135–225)
LYMPHOCYTES # BLD AUTO: 1.37 10*3/MM3 (ref 0.7–3.1)
LYMPHOCYTES NFR BLD AUTO: 29 % (ref 19.6–45.3)
MCH RBC QN AUTO: 28.8 PG (ref 26.6–33)
MCHC RBC AUTO-ENTMCNC: 31.5 G/DL (ref 31.5–35.7)
MCV RBC AUTO: 91.5 FL (ref 79–97)
MONOCYTES # BLD AUTO: 0.62 10*3/MM3 (ref 0.1–0.9)
MONOCYTES NFR BLD AUTO: 13.1 % (ref 5–12)
NEUTROPHILS NFR BLD AUTO: 2.65 10*3/MM3 (ref 1.7–7)
NEUTROPHILS NFR BLD AUTO: 56.1 % (ref 42.7–76)
NRBC BLD AUTO-RTO: 0 /100 WBC (ref 0–0.2)
PLATELET # BLD AUTO: 260 10*3/MM3 (ref 140–450)
PMV BLD AUTO: 10.5 FL (ref 6–12)
POTASSIUM SERPL-SCNC: 4.1 MMOL/L (ref 3.5–5.2)
PROT SERPL-MCNC: 7.3 G/DL (ref 6–8.5)
PROT SERPL-MCNC: 7.3 G/DL (ref 6–8.5)
RBC # BLD AUTO: 4.93 10*6/MM3 (ref 4.14–5.8)
RH BLD: POSITIVE
RH BLD: POSITIVE
SODIUM SERPL-SCNC: 136 MMOL/L (ref 136–145)
T&S EXPIRATION DATE: NORMAL
WBC # BLD AUTO: 4.73 10*3/MM3 (ref 3.4–10.8)

## 2020-10-09 PROCEDURE — 82728 ASSAY OF FERRITIN: CPT | Performed by: INTERNAL MEDICINE

## 2020-10-09 PROCEDURE — 80076 HEPATIC FUNCTION PANEL: CPT | Performed by: INTERNAL MEDICINE

## 2020-10-09 PROCEDURE — 83615 LACTATE (LD) (LDH) ENZYME: CPT | Performed by: INTERNAL MEDICINE

## 2020-10-09 PROCEDURE — 63710000001 DEXAMETHASONE PER 0.25 MG: Performed by: INTERNAL MEDICINE

## 2020-10-09 PROCEDURE — 82962 GLUCOSE BLOOD TEST: CPT

## 2020-10-09 PROCEDURE — 25010000002 ONDANSETRON PER 1 MG: Performed by: INTERNAL MEDICINE

## 2020-10-09 PROCEDURE — XW033E5 INTRODUCTION OF REMDESIVIR ANTI-INFECTIVE INTO PERIPHERAL VEIN, PERCUTANEOUS APPROACH, NEW TECHNOLOGY GROUP 5: ICD-10-PCS | Performed by: INTERNAL MEDICINE

## 2020-10-09 PROCEDURE — 86900 BLOOD TYPING SEROLOGIC ABO: CPT | Performed by: INTERNAL MEDICINE

## 2020-10-09 PROCEDURE — 86140 C-REACTIVE PROTEIN: CPT | Performed by: INTERNAL MEDICINE

## 2020-10-09 PROCEDURE — 86927 PLASMA FRESH FROZEN: CPT

## 2020-10-09 PROCEDURE — 63710000001 INSULIN LISPRO (HUMAN) PER 5 UNITS: Performed by: INTERNAL MEDICINE

## 2020-10-09 PROCEDURE — 85025 COMPLETE CBC W/AUTO DIFF WBC: CPT | Performed by: INTERNAL MEDICINE

## 2020-10-09 PROCEDURE — 86901 BLOOD TYPING SEROLOGIC RH(D): CPT | Performed by: INTERNAL MEDICINE

## 2020-10-09 PROCEDURE — 86850 RBC ANTIBODY SCREEN: CPT | Performed by: INTERNAL MEDICINE

## 2020-10-09 PROCEDURE — 25010000002 ENOXAPARIN PER 10 MG: Performed by: INTERNAL MEDICINE

## 2020-10-09 PROCEDURE — 99233 SBSQ HOSP IP/OBS HIGH 50: CPT | Performed by: INTERNAL MEDICINE

## 2020-10-09 PROCEDURE — 80053 COMPREHEN METABOLIC PANEL: CPT | Performed by: INTERNAL MEDICINE

## 2020-10-09 PROCEDURE — XW13325 TRANSFUSION OF CONVALESCENT PLASMA (NONAUTOLOGOUS) INTO PERIPHERAL VEIN, PERCUTANEOUS APPROACH, NEW TECHNOLOGY GROUP 5: ICD-10-PCS | Performed by: INTERNAL MEDICINE

## 2020-10-09 RX ORDER — ACETAMINOPHEN 325 MG/1
650 TABLET ORAL EVERY 4 HOURS PRN
Status: DISCONTINUED | OUTPATIENT
Start: 2020-10-09 | End: 2020-10-12 | Stop reason: HOSPADM

## 2020-10-09 RX ORDER — PANTOPRAZOLE SODIUM 40 MG/1
40 TABLET, DELAYED RELEASE ORAL EVERY MORNING
Status: DISCONTINUED | OUTPATIENT
Start: 2020-10-09 | End: 2020-10-12 | Stop reason: HOSPADM

## 2020-10-09 RX ORDER — AMLODIPINE BESYLATE 5 MG/1
5 TABLET ORAL DAILY
Status: DISCONTINUED | OUTPATIENT
Start: 2020-10-09 | End: 2020-10-11

## 2020-10-09 RX ORDER — FAMOTIDINE 20 MG/1
20 TABLET, FILM COATED ORAL 2 TIMES DAILY
Status: DISCONTINUED | OUTPATIENT
Start: 2020-10-09 | End: 2020-10-12 | Stop reason: HOSPADM

## 2020-10-09 RX ORDER — ONDANSETRON 2 MG/ML
4 INJECTION INTRAMUSCULAR; INTRAVENOUS EVERY 6 HOURS PRN
Status: DISCONTINUED | OUTPATIENT
Start: 2020-10-09 | End: 2020-10-12 | Stop reason: HOSPADM

## 2020-10-09 RX ORDER — NICOTINE POLACRILEX 4 MG
15 LOZENGE BUCCAL
Status: DISCONTINUED | OUTPATIENT
Start: 2020-10-09 | End: 2020-10-12 | Stop reason: HOSPADM

## 2020-10-09 RX ORDER — HYDROCODONE BITARTRATE AND ACETAMINOPHEN 10; 325 MG/1; MG/1
1 TABLET ORAL EVERY 4 HOURS PRN
Status: DISCONTINUED | OUTPATIENT
Start: 2020-10-09 | End: 2020-10-12 | Stop reason: HOSPADM

## 2020-10-09 RX ORDER — ROSUVASTATIN CALCIUM 10 MG/1
10 TABLET, COATED ORAL DAILY
Status: DISCONTINUED | OUTPATIENT
Start: 2020-10-09 | End: 2020-10-12 | Stop reason: HOSPADM

## 2020-10-09 RX ORDER — DEXAMETHASONE 4 MG/1
6 TABLET ORAL
Status: DISCONTINUED | OUTPATIENT
Start: 2020-10-09 | End: 2020-10-12 | Stop reason: HOSPADM

## 2020-10-09 RX ORDER — DEXTROSE MONOHYDRATE 25 G/50ML
25 INJECTION, SOLUTION INTRAVENOUS
Status: DISCONTINUED | OUTPATIENT
Start: 2020-10-09 | End: 2020-10-12 | Stop reason: HOSPADM

## 2020-10-09 RX ORDER — CALCIUM CARBONATE 750 MG/1
750 TABLET, CHEWABLE ORAL ONCE
Status: COMPLETED | OUTPATIENT
Start: 2020-10-09 | End: 2020-10-09

## 2020-10-09 RX ORDER — SODIUM CHLORIDE 0.9 % (FLUSH) 0.9 %
10 SYRINGE (ML) INJECTION AS NEEDED
Status: DISCONTINUED | OUTPATIENT
Start: 2020-10-09 | End: 2020-10-12 | Stop reason: HOSPADM

## 2020-10-09 RX ORDER — SODIUM CHLORIDE 0.9 % (FLUSH) 0.9 %
10 SYRINGE (ML) INJECTION EVERY 12 HOURS SCHEDULED
Status: DISCONTINUED | OUTPATIENT
Start: 2020-10-09 | End: 2020-10-12 | Stop reason: HOSPADM

## 2020-10-09 RX ORDER — ALBUTEROL SULFATE 90 UG/1
2 AEROSOL, METERED RESPIRATORY (INHALATION) EVERY 4 HOURS PRN
Status: DISCONTINUED | OUTPATIENT
Start: 2020-10-09 | End: 2020-10-12 | Stop reason: HOSPADM

## 2020-10-09 RX ORDER — CYCLOBENZAPRINE HCL 10 MG
10 TABLET ORAL 2 TIMES DAILY PRN
Status: DISCONTINUED | OUTPATIENT
Start: 2020-10-09 | End: 2020-10-12 | Stop reason: HOSPADM

## 2020-10-09 RX ADMIN — REMDESIVIR 200 MG: 100 INJECTION, POWDER, LYOPHILIZED, FOR SOLUTION INTRAVENOUS at 12:06

## 2020-10-09 RX ADMIN — AMLODIPINE BESYLATE 5 MG: 5 TABLET ORAL at 09:42

## 2020-10-09 RX ADMIN — FAMOTIDINE 20 MG: 20 TABLET, FILM COATED ORAL at 19:42

## 2020-10-09 RX ADMIN — SODIUM CHLORIDE, PRESERVATIVE FREE 10 ML: 5 INJECTION INTRAVENOUS at 01:23

## 2020-10-09 RX ADMIN — ROSUVASTATIN CALCIUM 10 MG: 10 TABLET, COATED ORAL at 09:42

## 2020-10-09 RX ADMIN — ENOXAPARIN SODIUM 40 MG: 40 INJECTION SUBCUTANEOUS at 09:42

## 2020-10-09 RX ADMIN — ENOXAPARIN SODIUM 40 MG: 40 INJECTION SUBCUTANEOUS at 19:42

## 2020-10-09 RX ADMIN — ONDANSETRON 4 MG: 2 INJECTION INTRAMUSCULAR; INTRAVENOUS at 01:23

## 2020-10-09 RX ADMIN — PANTOPRAZOLE SODIUM 40 MG: 40 TABLET, DELAYED RELEASE ORAL at 05:34

## 2020-10-09 RX ADMIN — CALCIUM CARBONATE 750 MG: 750 TABLET ORAL at 01:23

## 2020-10-09 RX ADMIN — SODIUM CHLORIDE, PRESERVATIVE FREE 10 ML: 5 INJECTION INTRAVENOUS at 19:45

## 2020-10-09 RX ADMIN — INSULIN LISPRO 2 UNITS: 100 INJECTION, SOLUTION INTRAVENOUS; SUBCUTANEOUS at 12:05

## 2020-10-09 RX ADMIN — INSULIN LISPRO 2 UNITS: 100 INJECTION, SOLUTION INTRAVENOUS; SUBCUTANEOUS at 17:12

## 2020-10-09 RX ADMIN — DEXAMETHASONE 6 MG: 4 TABLET ORAL at 09:42

## 2020-10-09 RX ADMIN — FAMOTIDINE 20 MG: 20 TABLET, FILM COATED ORAL at 12:05

## 2020-10-09 NOTE — PLAN OF CARE
Goal Outcome Evaluation:      Patient admitted from ED. Patient originally on room air but required 2LNC while sleeping. VS remained stable and patient reports he was feeling much better in the AM. Will continue with plan of care.

## 2020-10-09 NOTE — PROGRESS NOTES
[facility] Medicine Services  PROGRESS NOTE    Patient Name: Sebastian Liao  : 1960  MRN: [id number]    Date of Admission: 10/8/2020  Primary Care Physician: Fransico Ngo PA    Subjective   Subjective     CC: f/u covid pneumonia    HPI: Sitting up in bed eating. Having some abdominal cramping, nausea. Also noted to be dizzy with exertion this am. As he is talking to me his O2 sat is dropping into mid 80's.    Review of Systems  Gen- No fevers, chills  CV- No chest pain, palpitations    Objective   Objective     Vital Signs:   Temp:  [97.3 °F (36.3 °C)-99 °F (37.2 °C)] 99 °F (37.2 °C)  Heart Rate:  [] 97  Resp:  [17-20] 17  BP: (114-179)/() 143/88        Physical Exam:  With patient's consent, physical exam was conducted via visual telemedicine encounter with bedside portion accommodated by nursing staff due to patient's current isolation requirements in the interest of PPE conservation.    Constitutional: No acute distress, awake, alert, nontoxic, obese  HENT: NCAT, MMM, no conjunctival injection  Respiratory: Good effort, nonlabored respirations   Cardiovascular: Tachy, regular on tele  Musculoskeletal: No edema, normal muscle tone and mass for age  GI: Soft, no grimace, nondistended, obese  Psychiatric: Appropriate affect, good insight and judgement, cooperative  Neurologic: Oriented x 3, movements symmetric BUE and BLE, speech clear and fluent  Skin: No visible rashes, no jaundice seen on exposed skin    Results Reviewed:  Results from last 7 days   Lab Units 10/09/20  0739 10/08/20  1436   WBC 10*3/mm3 4.73 5.17   HEMOGLOBIN g/dL 14.2 14.7   HEMATOCRIT % 45.1 46.4   PLATELETS 10*3/mm3 260 259     Results from last 7 days   Lab Units 10/09/20  0739 10/08/20  1436   SODIUM mmol/L 136 137   POTASSIUM mmol/L 4.1 4.1   CHLORIDE mmol/L 98 97*   CO2 mmol/L 29.0 31.0*   BUN mg/dL 13 13   CREATININE mg/dL 1.14 1.27   GLUCOSE mg/dL 70 116*   CALCIUM mg/dL 8.5* 8.9    ALT (SGPT) U/L 22  22 20   AST (SGOT) U/L 51*  51* 49*   TROPONIN T ng/mL  --  <0.010     Estimated Creatinine Clearance: 81.2 mL/min (by C-G formula based on SCr of 1.14 mg/dL).    Microbiology Results Abnormal     Procedure Component Value - Date/Time    Respiratory Panel PCR w/COVID-19(SARS-CoV-2) [facility]/LASHAUN/HERSON/[facility] In-House, NP Swab in UTM/VTM, 3-4 HR TAT - Swab, Nasopharynx [[id number]]  (Abnormal) Collected: 10/08/20 1902    Lab Status: Final result Specimen: Swab from Nasopharynx Updated: 10/08/20 2028     ADENOVIRUS, PCR Not Detected     Coronavirus 229E Not Detected     Coronavirus HKU1 Not Detected     Coronavirus NL63 Not Detected     Coronavirus OC43 Not Detected     COVID19 Detected     Human Metapneumovirus Not Detected     Human Rhinovirus/Enterovirus Not Detected     Influenza A PCR Not Detected     Influenza A H1 Not Detected     Influenza A H1 2009 PCR Not Detected     Influenza A H3 Not Detected     Influenza B PCR Not Detected     Parainfluenza Virus 1 Not Detected     Parainfluenza Virus 2 Not Detected     Parainfluenza Virus 3 Not Detected     Parainfluenza Virus 4 Not Detected     RSV, PCR Not Detected     Bordetella pertussis pcr Not Detected     Bordetella parapertussis PCR Not Detected     Chlamydophila pneumoniae PCR Not Detected     Mycoplasma pneumo by PCR Not Detected    Narrative:      Fact sheet for providers: https://docs.[employer]/wp-content/uploads/OKA3588-7252-QS0.1-EUA-Provider-Fact-Sheet-3.pdf    Fact sheet for patients: https://docs.[employer]/wp-content/uploads/VYC9345-9233-VX2.1-EUA-Patient-Fact-Sheet-1.pdf        Personally reviewed CTA chest showing bilateral airspace disease. Agree with interpretation.  Imaging Results (Last 24 Hours)     Procedure Component Value Units Date/Time    CT Angiogram Chest [[id number]] Collected: 10/08/20 1739     Updated: 10/09/20 0729    Narrative:      EXAMINATION: CT ANGIOGRAM CHEST, CT ANGIOGRAM ABDOMEN/PELVIS -  10/08/2020         INDICATION: Abdominal pain.     TECHNIQUE: Spiral acquisition 3 mm post IV contrast images through the  chest, abdomen and pelvis with sagittal and coronal 2D reconstructions  and 3D VRT and MIP angiographic reconstructions.     The radiation dose reduction device was turned on for each scan per the  ALARA (As Low as Reasonably Achievable) protocol.     COMPARISON: None.     FINDINGS: History indicates generalized weakness and abdominal pain x 3  days, dyspnea, cough, fever, and diarrhea. Prior history of lung cancer  and colon cancer.     CHEST CT SCAN: There is good contrast opacification of the pulmonary  arteries. No definite pulmonary embolic disease is seen. A faint  suggestion of a tiny embolus in a left lower lobe pulmonary artery,  axial image #59 of series 5, is thin, linear and indistinct and may  represent a tiny chronic embolus, or may actually represent venous  mixing artifact. No similar findings are seen elsewhere. There is no  evidence of thoracic aortic aneurysm or dissection. No pericardial or  pleural effusion is seen.     Lung window images show multifocal groundglass disease bilaterally,  particularly in the upper lobes with fairly characteristic findings of  Covid 19 pneumonia. There is some diffuse peribronchial thickening in  the left lower lobe, which may represent a related or superimposed  bronchitis in the setting of bile bronchiectasis. No densely  consolidated lung is seen.       Impression:      1. Multifocal groundglass disease in the lungs, suspicious for Covid 19  pneumonia or other similar atypical pneumonia. Separate pattern of  peribronchial thickening and interstitial disease in the left lower lobe  resembles a bronchitis.     2. Trace linear filling defect in a left lower lobe pulmonary artery,  thought to either be a 1 mm chronic embolus or simply venous mixing  artifact. No evidence of significant pulmonary embolic disease.           ANGIOGRAPHIC CT SCAN OF THE ABDOMEN  AND PELVIS: There is no evidence of  significant aortic or iliac stenosis. No aneurysm is seen. There is  extensive fatty liver change approaching the appearance of FELIX. No  hepatic lesions are identified. No significant abnormalities are seen of  the gallbladder, spleen, pancreas, adrenal glands, or kidneys except for  small right lower pole renal cyst. No upper abdominal free air, ascites,  adenopathy or acute inflammatory change is seen. Large and small bowel  loops are normal in caliber and normal in appearance.     Regarding the lower abdomen and pelvis, there are scattered sigmoid  diverticula without evidence of diverticulitis. No mass or inflammatory  change is seen. There is a right inguinal hernia containing fat and also  an edge of the bladder, which is elongated and extends into the hernia  defect for several centimeters. There is minimal if any associated  edema. Incidental note is made of a right hip prosthesis. Bony  structures appear grossly intact.     IMPRESSION:   1. Right inguinal hernia containing a portion of the bladder. Minimal if  any associated edema.     2. Extensive fatty liver change.     3. Sigmoid diverticulosis without evidence of diverticulitis.     DICTATED:   10/08/2020  EDITED/ls :   10/08/2020         This report was finalized on 10/9/2020 7:26 AM by Dr. Conner Coon MD.       CT Angiogram Abdomen Pelvis [[id number]] Collected: 10/08/20 1739     Updated: 10/09/20 0729    Narrative:      EXAMINATION: CT ANGIOGRAM CHEST, CT ANGIOGRAM ABDOMEN/PELVIS -  10/08/2020      INDICATION: Abdominal pain.     TECHNIQUE: Spiral acquisition 3 mm post IV contrast images through the  chest, abdomen and pelvis with sagittal and coronal 2D reconstructions  and 3D VRT and MIP angiographic reconstructions.     The radiation dose reduction device was turned on for each scan per the  ALARA (As Low as Reasonably Achievable) protocol.     COMPARISON: None.     FINDINGS: History indicates generalized  weakness and abdominal pain x 3  days, dyspnea, cough, fever, and diarrhea. Prior history of lung cancer  and colon cancer.     CHEST CT SCAN: There is good contrast opacification of the pulmonary  arteries. No definite pulmonary embolic disease is seen. A faint  suggestion of a tiny embolus in a left lower lobe pulmonary artery,  axial image #59 of series 5, is thin, linear and indistinct and may  represent a tiny chronic embolus, or may actually represent venous  mixing artifact. No similar findings are seen elsewhere. There is no  evidence of thoracic aortic aneurysm or dissection. No pericardial or  pleural effusion is seen.     Lung window images show multifocal groundglass disease bilaterally,  particularly in the upper lobes with fairly characteristic findings of  Covid 19 pneumonia. There is some diffuse peribronchial thickening in  the left lower lobe, which may represent a related or superimposed  bronchitis in the setting of bile bronchiectasis. No densely  consolidated lung is seen.       Impression:      1. Multifocal groundglass disease in the lungs, suspicious for Covid 19  pneumonia or other similar atypical pneumonia. Separate pattern of  peribronchial thickening and interstitial disease in the left lower lobe  resembles a bronchitis.     2. Trace linear filling defect in a left lower lobe pulmonary artery,  thought to either be a 1 mm chronic embolus or simply venous mixing  artifact. No evidence of significant pulmonary embolic disease.           ANGIOGRAPHIC CT SCAN OF THE ABDOMEN AND PELVIS: There is no evidence of  significant aortic or iliac stenosis. No aneurysm is seen. There is  extensive fatty liver change approaching the appearance of FELIX. No  hepatic lesions are identified. No significant abnormalities are seen of  the gallbladder, spleen, pancreas, adrenal glands, or kidneys except for  small right lower pole renal cyst. No upper abdominal free air, ascites,  adenopathy or acute  inflammatory change is seen. Large and small bowel  loops are normal in caliber and normal in appearance.     Regarding the lower abdomen and pelvis, there are scattered sigmoid  diverticula without evidence of diverticulitis. No mass or inflammatory  change is seen. There is a right inguinal hernia containing fat and also  an edge of the bladder, which is elongated and extends into the hernia  defect for several centimeters. There is minimal if any associated  edema. Incidental note is made of a right hip prosthesis. Bony  structures appear grossly intact.     IMPRESSION:   1. Right inguinal hernia containing a portion of the bladder. Minimal if  any associated edema.     2. Extensive fatty liver change.     3. Sigmoid diverticulosis without evidence of diverticulitis.     DICTATED:   10/08/2020  EDITED/ls :   10/08/2020         This report was finalized on 10/9/2020 7:26 AM by Dr. Conner Coon MD.       XR Chest 1 View [[id number]] Collected: 10/08/20 1606     Updated: 10/08/20 1701    Narrative:      EXAMINATION: XR CHEST 1 VW-10/08/2020:      INDICATION: Weak/Dizzy/AMS, triage protocol.      COMPARISON: Chest x-ray 03/09/2014.     FINDINGS: Cardiac size borderline enlarged. Increased central pulmonary  vascularity and perihilar lung markings without consolidation. No  pneumothorax or pleural effusion. Degenerative changes of the spine.           Impression:      Increased central pulmonary vascularity without focal  consolidation or effusion.     D:  10/08/2020  E:  10/08/2020     This report was finalized on 10/8/2020 4:57 PM by Dr. Alexey Moscoso.                  I have reviewed the medications:  Scheduled Meds:amLODIPine, 5 mg, Oral, Daily  dexamethasone, 6 mg, Oral, Daily With Breakfast  enoxaparin, 40 mg, Subcutaneous, Q12H  famotidine, 20 mg, Oral, BID  insulin lispro, 0-7 Units, Subcutaneous, TID AC  INV [id number] remdesivir in NS IVPB, 200 mg, Intravenous, Q24H    Followed by  [START ON 10/10/2020] INV [id number]  remdesivir in NS IVPB, 100 mg, Intravenous, Q24H  pantoprazole, 40 mg, Oral, QAM  rosuvastatin, 10 mg, Oral, Daily  sodium chloride, 10 mL, Intravenous, Q12H      Continuous Infusions:Pharmacy Consult - Remdesivir,       PRN Meds:.•  acetaminophen  •  albuterol sulfate HFA  •  cyclobenzaprine  •  dextrose  •  dextrose  •  glucagon (human recombinant)  •  HYDROcodone-acetaminophen  •  Morphine  •  ondansetron  •  Pharmacy Consult - Remdesivir  •  sodium chloride  •  sodium chloride    Assessment/Plan   Assessment & Plan     Active Hospital Problems    Diagnosis  POA   • **Pneumonia due to COVID-19 virus [U07.1, J12.89]  Yes   • Bilateral pneumonia [J18.9]  Yes   • Diabetes type 2, controlled (CMS/HCC) [E11.9]  Yes   • Essential hypertension, benign [I10]  Yes   • Hyperlipidemia [E78.5]  Yes      Resolved Hospital Problems   No resolved problems to display.        Brief Hospital Course to date:  Sebastian Liao is a 60 y.o. male with PMHx significant for HTN, HLD, DM II, hx of colon cancer and lung cancer, IBS. He presented to the ED with complaints of debilitating fatigue and abdominal pain. In the ED CTA of his chest was negative for PE, however showed multifocal ground glass opacities. His respiratory panel returned positive for COVID-19. Due to being high risk and severe weakness he will be admitted for further treatment. This is my first day assessing patient's active medical issues.     Multifocal Pneumonia secondary to COVID-19  Hypoxia  - Start decadron 6mg daily upon arrival. He is hypoxic during conversation and with exertion. Start remdesivir today.  - Have also d/w Dr. Archibald, he will see patient in regard to possible need for convalescent plasma  - Continue supportive care, PRN albuterol inhaler     A/C Abdominal Pain  Inguinal Hernia:  - As demonstrated on CT abd/pelvis, right inguinal hernia with small portion of the bladder, would consider outpatient surgical referral once he has recovered from  COVID  - Tells me he has chronic abdomina pain but his nausea and pain are now worse, suspect as manifestation of COVID infection as above.  - Narcotics prn, IV zofran.     DMII:  - Glucose is well controlled at this time. Continue LDSSI.     HTN:  - Fair control this am. Continue amlodipine     GERD:  - Continue protonix     Obesity     Weakness    Remdesivir is an investigational drug, and there is no Prescribing Information. However, the FDA has authorized distribution of this investigational drug with Fact Sheets for healthcare providers, and patients and caregivers.  I have discussed the risks, benefits, side effects and experimental use of remdesivir for treatment of COVID-19 with family and he/she has agreed to proceed with therapy with remdesivir.  Remdesivir Fact Sheet has been provided to  patient.     This patient's problems and plans were partially entered by my partner and updated as appropriate by me 10/09/20.     DVT prophylaxis:  lovenox 30mg BID    CODE STATUS:   Code Status and Medical Interventions:   Ordered at: 10/08/20 8554     Level Of Support Discussed With:    Patient     Code Status:    CPR     Medical Interventions (Level of Support Prior to Arrest):    Full       Diana Strange II, DO  10/09/20

## 2020-10-09 NOTE — PLAN OF CARE
Goal Outcome Evaluation:  Plan of Care Reviewed With: patient  Progress: improving  Outcome Summary: VSS. room air. no soa reported. remdisivir and plasma given today. no s/s of reaction. will cont to monitor.

## 2020-10-09 NOTE — PROGRESS NOTES
Discharge Planning Assessment  [facility]     Patient Name: Sebastian Liao  MRN: [id number]  Today's Date: 10/9/2020    Admit Date: 10/8/2020    Discharge Needs Assessment     Row Name 10/09/20 1115       Living Environment    Lives With  spouse    Name(s) of Who Lives With Patient  Shani Liao    Current Living Arrangements  home/apartment/condo    Primary Care Provided by  self    Provides Primary Care For  no one    Family Caregiver if Needed  spouse    Family Caregiver Names  Shani Liao    Quality of Family Relationships  unable to assess    Able to Return to Prior Arrangements  yes       Resource/Environmental Concerns    Resource/Environmental Concerns  none       Transition Planning    Patient/Family Anticipates Transition to  home with family    Patient/Family Anticipated Services at Transition  none    Transportation Anticipated  family or friend will provide       Discharge Needs Assessment    Readmission Within the Last 30 Days  no previous admission in last 30 days    Equipment Currently Used at Home  none    Concerns to be Addressed  no discharge needs identified;denies needs/concerns at this time    Anticipated Changes Related to Illness  none    Equipment Needed After Discharge  none    Provided Post Acute Provider List?  N/A    N/A Provider List Comment  denies need    Patient's Choice of Community Agency(s)  [facility]    Discharge Coordination/Progress  Home        Discharge Plan     Row Name 10/09/20 1120       Plan    Plan  Home    Patient/Family in Agreement with Plan  yes    Plan Comments  Spoke with patient via telephone regarding discharge planning.  Patient is COVID positive.  Patient denies use or need for HH or DME and indicates that he had a Hip replacement 4 years ago but used [facility].  He has prescription coverage and reports he medications are affordable to him.  He inidcates that he and his wife life together in a single level house with 2 steps to access and denies concerns regarding  home safety.  No discharge needs verbalized.  CM following.  Patient plan is to discharge home via car with family to transport.    Final Discharge Disposition Code  01 - home or self-care        Continued Care and Services - Admitted Since 10/8/2020    Coordination has not been started for this encounter.       Expected Discharge Date and Time     Expected Discharge Date Expected Discharge Time    Oct 14, 2020         Demographic Summary     Row Name 10/09/20 1106       General Information    Admission Type  inpatient    Arrived From  home    Referral Source  admission list    Reason for Consult  discharge planning    Preferred Language  English     Used During This Interaction  no    General Information Comments  Fransico Ngo PA-C       Contact Information    Permission Granted to Share Info With      Contact Information Obtained for      Contact Information Comments  Shani Liao, spouse  [phone]        Functional Status     Row Name 10/09/20 1108       Functional Status    Usual Activity Tolerance  good    Current Activity Tolerance  good       Functional Status, IADL    Medications  independent    Meal Preparation  independent    Housekeeping  independent    Laundry  independent    Shopping  independent       Employment/    Employment/ Comments  [employer]        Psychosocial    No documentation.       Abuse/Neglect    No documentation.       Legal    No documentation.       Substance Abuse    No documentation.       Patient Forms    No documentation.           Venecia Catalan, RN

## 2020-10-09 NOTE — CONSULTS
INFECTIOUS DISEASE CONSULT/INITIAL HOSPITAL VISIT    Sebastian Liao  1960  [id number]    Date of Consult: 10/9/2020    Admission Date: 10/8/2020      Requesting Provider: Smita Sharp DO  Evaluating Physician: Omega Archibald MD    Reason for Consultation: COVID-19 pneumonia    History of present illness:    Patient is a 60 y.o. male employee of [employer] who is seen today for evaluation of COVID-19 pneumonia.  He initially developed weakness and fatigue approximately 2 weeks ago.  This was followed by nausea, anorexia, and malaise.  He also noted a change in his sense of taste.  He began experiencing a cough with minimal sputum production yesterday.  He presented to the emergency room and was found to have a positive COVID-19 PCR with a chest CTA revealing multifocal groundglass opacities consistent with COVID-19 pneumonia.  He was also found to have acute hypoxic respiratory failure and is requiring 2 L of oxygen.  He was started on Decadron and Remdesivir last p.m.  He has underlying type 2 diabetes mellitus along with obesity.  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  His wife is employed in a nursing home.  He indicates that his wife is being tested twice weekly for COVID-19 and that her tests have returned negative.  He states that they do not go out to eat or go to any other gatherings outside of the home.    Past Medical History:   Diagnosis Date   • Acute bronchitis    • Cervicalgia    • Colon cancer (CMS/HCC)     pt reported on intake forms   • Edema    • HTN (hypertension)    • Hyperlipidemia    • IBS (irritable bowel syndrome)    • Low testosterone    • Lung cancer (CMS/HCC)     pt reported on intake forms       Past Surgical History:   Procedure Laterality Date   • CERVICAL FUSION     • COLONOSCOPY  07/2012    repeat in ten yrs   • HIP SURGERY Right 01/26/2016    Dr. Schwartz   • NECK SURGERY      cervical spine fusion       Family History    Problem Relation Age of Onset   • Cancer Mother         Ovarian,    • Hypertension Father    • Hypertension Sister    • Hypertension Maternal Grandmother    • Diabetes Maternal Grandmother        Social History     Socioeconomic History   • Marital status:      Spouse name: Not on file   • Number of children: Not on file   • Years of education: Not on file   • Highest education level: Not on file   Tobacco Use   • Smoking status: Former Smoker     Packs/day: 1.00     Years: 10.00     Pack years: 10.00     Types: Cigarettes   • Smokeless tobacco: Never Used   Substance and Sexual Activity   • Alcohol use: Yes     Comment: occasional   • Drug use: No   • Sexual activity: Defer   Social History Narrative    Left hand dominant, , Exercises daily.        No Known Allergies      Medication:    Current Facility-Administered Medications:   •  acetaminophen (TYLENOL) tablet 650 mg, 650 mg, Oral, Q4H PRN, Smita Sharp R, DO  •  albuterol sulfate HFA (PROVENTIL HFA;VENTOLIN HFA;PROAIR HFA) inhaler 2 puff, 2 puff, Inhalation, Q4H PRN, Smita Sharp R, DO  •  amLODIPine (NORVASC) tablet 5 mg, 5 mg, Oral, Daily, Smita Sharp R, DO, 5 mg at 10/09/20 0942  •  cyclobenzaprine (FLEXERIL) tablet 10 mg, 10 mg, Oral, BID PRN, Smita Sharp R, DO  •  dexamethasone (DECADRON) tablet 6 mg, 6 mg, Oral, Daily With Breakfast, Diana Strange M II, DO, 6 mg at 10/09/20 0942  •  dextrose (D50W) 25 g/ 50mL Intravenous Solution 25 g, 25 g, Intravenous, Q15 Min PRN, Smita Sharp R, DO  •  dextrose (GLUTOSE) oral gel 15 g, 15 g, Oral, Q15 Min PRN, Smita Sharp R, DO  •  enoxaparin (LOVENOX) syringe 40 mg, 40 mg, Subcutaneous, Q12H, Diana Strange M II, DO, 40 mg at 10/09/20 0942  •  famotidine (PEPCID) tablet 20 mg, 20 mg, Oral, BID, Cristiane Day, DO, 20 mg at 10/09/20 1205  •  glucagon (human recombinant) (GLUCAGEN DIAGNOSTIC) injection 1 mg, 1 mg, Subcutaneous, Q15 Min PRN, Smita Sharp, DO  •   HYDROcodone-acetaminophen (NORCO)  MG per tablet 1 tablet, 1 tablet, Oral, Q4H PRN, Smita Sharp R, DO  •  insulin lispro (humaLOG) injection 0-7 Units, 0-7 Units, Subcutaneous, TID AC, Smita Sharp R, DO, 2 Units at 10/09/20 1205  •  INV GS-5734 remdesivir 200 mg in sodium chloride 0.9 % 250 mL IVPB (powder vial), 200 mg, Intravenous, Q24H, 200 mg at 10/09/20 1206 **FOLLOWED BY** [START ON 10/10/2020] INV [id number] remdesivir 100 mg in sodium chloride 0.9 % 250 mL IVPB (powder vial), 100 mg, Intravenous, Q24H, Diana Strange M II, DO  •  morphine injection 2 mg, 2 mg, Intravenous, Q15 Min PRN, Smita Sharp R, DO, 2 mg at 10/08/20 2236  •  ondansetron (ZOFRAN) injection 4 mg, 4 mg, Intravenous, Q6H PRN, Smita Sharp R, DO, 4 mg at 10/09/20 0123  •  pantoprazole (PROTONIX) EC tablet 40 mg, 40 mg, Oral, QAM, Smita Sharp R, DO, 40 mg at 10/09/20 0534  •  Pharmacy Consult - Remdesivir, , Does not apply, Continuous PRN, Diana Strange M II, DO  •  rosuvastatin (CRESTOR) tablet 10 mg, 10 mg, Oral, Daily, Smita Sharp R, DO, 10 mg at 10/09/20 0942  •  sodium chloride 0.9 % flush 10 mL, 10 mL, Intravenous, PRN, Smita Sharp R, DO  •  sodium chloride 0.9 % flush 10 mL, 10 mL, Intravenous, Q12H, LilaSmita masters R, DO, 10 mL at 10/09/20 0123  •  sodium chloride 0.9 % flush 10 mL, 10 mL, Intravenous, PRN, LilaTyler mastersley R, DO    Antibiotics:  Anti-Infectives (From admission, onward)    Ordered     Dose/Rate Route Frequency Start Stop    10/09/20 0953  INV GS-5734 remdesivir 100 mg in sodium chloride 0.9 % 250 mL IVPB (powder vial)     Ordering Provider: Diana Strange II, DO    100 mg  over 60 Minutes Intravenous Every 24 Hours 10/10/20 0900 10/14/20 0859    10/09/20 0953  [facility] [id number] remdesivir 200 mg in sodium chloride 0.9 % 250 mL IVPB (powder vial)     Ordering Provider: Diana Strange II, DO    200 mg  over 60 Minutes Intravenous Every 24 Hours 10/09/20 1045 10/10/20 1044    10/08/20 1953   cefTRIAXone (ROCEPHIN) 2 g/100 mL 0.9% NS IVPB (MBP)     Ordering Provider: Parmjit Hartley DO    2 g  over 30 Minutes Intravenous Once 10/08/20 1955 10/08/20 2058    10/08/20 184  AZITHROMYCIN 500 MG/250 ML 0.9% NS IVPB (vial-mate)     Ordering Provider: Parmjit Hartley DO    500 mg  over 60 Minutes Intravenous Once 10/08/20 1851 10/08/20 223            Review of Systems:  Constitutional-- He low grade subjective fevers.   HEENT-- No headache, earache or sore throat  CV-- No chest pain.  No history of valvular heart disease  Resp--he has a cough with minimal sputum production and some dyspnea.  GI-he has some nausea and anorexia but denies vomiting.  He did have some diarrhea.  He denies abdominal pain.  -- No dysuria, hematuria, or flank pain.  Denies hesitancy, urgency or flank pain.     Heme- No active bruising or bleeding;  MS-- no swelling or pain in the bones or joints of arms/legs. .  Neuro-- No acute focal weakness or numbness in the arms or legs.    Skin--No rashes or lesions      Physical Exam:   Vital Signs  Temp (24hrs), Av °F (36.7 °C), Min:97.3 °F (36.3 °C), Max:99 °F (37.2 °C)    Temp  Min: 97.3 °F (36.3 °C)  Max: 99 °F (37.2 °C)  BP  Min: 114/85  Max: 179/108  Pulse  Min: 81  Max: 105  Resp  Min: 17  Max: 20  SpO2  Min: 90 %  Max: 97 %     His exam was performed with direct visualization from outside of the room in the setting of the COVID-19 pandemic with a critical shortage of PPE    GENERAL: Awake and alert, in no acute distress.   HEENT: NO LABIAL ULCERS   NECK: Supple   LYMPH: No visible cervical adenopathy  HEART: RRR;   LUNGS: Nonlabored respiration  ABDOMEN: OBESITY WITH NO ABDOMINAL DISTENTION   EXT:  No cyanosis, clubbing or edema.   :  Without Stock catheter.  MSK: No focal joint swelling or erythema  SKIN: Warm and dry without cutaneous eruptions on Inspection/palpation.    NEURO: Oriented to PPT.  motor 5/5 bilaterally.  PSYCHIATRIC: Normal insight and judgement. Cooperative with  PE    Laboratory Data    Results from last 7 days   Lab Units 10/09/20  0739 10/08/20  1436   WBC 10*3/mm3 4.73 5.17   HEMOGLOBIN g/dL 14.2 14.7   HEMATOCRIT % 45.1 46.4   PLATELETS 10*3/mm3 260 259     Results from last 7 days   Lab Units 10/09/20  0739   SODIUM mmol/L 136   POTASSIUM mmol/L 4.1   CHLORIDE mmol/L 98   CO2 mmol/L 29.0   BUN mg/dL 13   CREATININE mg/dL 1.14   GLUCOSE mg/dL 70   CALCIUM mg/dL 8.5*     Results from last 7 days   Lab Units 10/09/20  0739   ALK PHOS U/L 51  51   BILIRUBIN mg/dL 0.3  0.3   BILIRUBIN DIRECT mg/dL <0.2   ALT (SGPT) U/L 22  22   AST (SGOT) U/L 51*  51*         Results from last 7 days   Lab Units 10/09/20  0739   CRP mg/dL 3.82*                 Estimated Creatinine Clearance: 81.2 mL/min (by C-G formula based on SCr of 1.14 mg/dL).      Microbiology:  No results found for: ACANTHNAEG, AFBCX, BPERTUSSISCX, BLOODCX  No results found for: BCIDPCR, CXREFLEX, CSFCX, CULTURETIS  No results found for: CULTURES, HSVCX, URCX  No results found for: EYECULTURE, GCCX, LABHSV  No results found for: LEGIONELLA, MRSACX, MUMPSCX, MYCOPLASCX  No results found for: NOCARDIACX, STOOLCX  No results found for: THROATCX, UNSTIMCULT, URINECX, CULTURE, VZVCULTUR  No results found for: VIRALCULTU, WOUNDCX        Radiology:  Imaging Results (Last 72 Hours)     Procedure Component Value Units Date/Time    CT Angiogram Chest [[id number]] Collected: 10/08/20 1739     Updated: 10/09/20 0729    Narrative:      EXAMINATION: CT ANGIOGRAM CHEST, CT ANGIOGRAM ABDOMEN/PELVIS -  10/08/2020      INDICATION: Abdominal pain.     TECHNIQUE: Spiral acquisition 3 mm post IV contrast images through the  chest, abdomen and pelvis with sagittal and coronal 2D reconstructions  and 3D VRT and MIP angiographic reconstructions.     The radiation dose reduction device was turned on for each scan per the  ALARA (As Low as Reasonably Achievable) protocol.     COMPARISON: None.     FINDINGS: History indicates generalized  weakness and abdominal pain x 3  days, dyspnea, cough, fever, and diarrhea. Prior history of lung cancer  and colon cancer.     CHEST CT SCAN: There is good contrast opacification of the pulmonary  arteries. No definite pulmonary embolic disease is seen. A faint  suggestion of a tiny embolus in a left lower lobe pulmonary artery,  axial image #59 of series 5, is thin, linear and indistinct and may  represent a tiny chronic embolus, or may actually represent venous  mixing artifact. No similar findings are seen elsewhere. There is no  evidence of thoracic aortic aneurysm or dissection. No pericardial or  pleural effusion is seen.     Lung window images show multifocal groundglass disease bilaterally,  particularly in the upper lobes with fairly characteristic findings of  Covid 19 pneumonia. There is some diffuse peribronchial thickening in  the left lower lobe, which may represent a related or superimposed  bronchitis in the setting of bile bronchiectasis. No densely  consolidated lung is seen.       Impression:      1. Multifocal groundglass disease in the lungs, suspicious for Covid 19  pneumonia or other similar atypical pneumonia. Separate pattern of  peribronchial thickening and interstitial disease in the left lower lobe  resembles a bronchitis.     2. Trace linear filling defect in a left lower lobe pulmonary artery,  thought to either be a 1 mm chronic embolus or simply venous mixing  artifact. No evidence of significant pulmonary embolic disease.           ANGIOGRAPHIC CT SCAN OF THE ABDOMEN AND PELVIS: There is no evidence of  significant aortic or iliac stenosis. No aneurysm is seen. There is  extensive fatty liver change approaching the appearance of FELIX. No  hepatic lesions are identified. No significant abnormalities are seen of  the gallbladder, spleen, pancreas, adrenal glands, or kidneys except for  small right lower pole renal cyst. No upper abdominal free air, ascites,  adenopathy or acute  inflammatory change is seen. Large and small bowel  loops are normal in caliber and normal in appearance.     Regarding the lower abdomen and pelvis, there are scattered sigmoid  diverticula without evidence of diverticulitis. No mass or inflammatory  change is seen. There is a right inguinal hernia containing fat and also  an edge of the bladder, which is elongated and extends into the hernia  defect for several centimeters. There is minimal if any associated  edema. Incidental note is made of a right hip prosthesis. Bony  structures appear grossly intact.     IMPRESSION:   1. Right inguinal hernia containing a portion of the bladder. Minimal if  any associated edema.     2. Extensive fatty liver change.     3. Sigmoid diverticulosis without evidence of diverticulitis.     DICTATED:   10/08/2020  EDITED/ls :   10/08/2020         This report was finalized on 10/9/2020 7:26 AM by Dr. Conner Coon MD.       CT Angiogram Abdomen Pelvis [[id number]] Collected: 10/08/20 1739     Updated: 10/09/20 0729    Narrative:      EXAMINATION: CT ANGIOGRAM CHEST, CT ANGIOGRAM ABDOMEN/PELVIS -  10/08/2020      INDICATION: Abdominal pain.     TECHNIQUE: Spiral acquisition 3 mm post IV contrast images through the  chest, abdomen and pelvis with sagittal and coronal 2D reconstructions  and 3D VRT and MIP angiographic reconstructions.     The radiation dose reduction device was turned on for each scan per the  ALARA (As Low as Reasonably Achievable) protocol.     COMPARISON: None.     FINDINGS: History indicates generalized weakness and abdominal pain x 3  days, dyspnea, cough, fever, and diarrhea. Prior history of lung cancer  and colon cancer.     CHEST CT SCAN: There is good contrast opacification of the pulmonary  arteries. No definite pulmonary embolic disease is seen. A faint  suggestion of a tiny embolus in a left lower lobe pulmonary artery,  axial image #59 of series 5, is thin, linear and indistinct and may  represent a tiny  chronic embolus, or may actually represent venous  mixing artifact. No similar findings are seen elsewhere. There is no  evidence of thoracic aortic aneurysm or dissection. No pericardial or  pleural effusion is seen.     Lung window images show multifocal groundglass disease bilaterally,  particularly in the upper lobes with fairly characteristic findings of  Covid 19 pneumonia. There is some diffuse peribronchial thickening in  the left lower lobe, which may represent a related or superimposed  bronchitis in the setting of bile bronchiectasis. No densely  consolidated lung is seen.       Impression:      1. Multifocal groundglass disease in the lungs, suspicious for Covid 19  pneumonia or other similar atypical pneumonia. Separate pattern of  peribronchial thickening and interstitial disease in the left lower lobe  resembles a bronchitis.     2. Trace linear filling defect in a left lower lobe pulmonary artery,  thought to either be a 1 mm chronic embolus or simply venous mixing  artifact. No evidence of significant pulmonary embolic disease.           ANGIOGRAPHIC CT SCAN OF THE ABDOMEN AND PELVIS: There is no evidence of  significant aortic or iliac stenosis. No aneurysm is seen. There is  extensive fatty liver change approaching the appearance of FELIX. No  hepatic lesions are identified. No significant abnormalities are seen of  the gallbladder, spleen, pancreas, adrenal glands, or kidneys except for  small right lower pole renal cyst. No upper abdominal free air, ascites,  adenopathy or acute inflammatory change is seen. Large and small bowel  loops are normal in caliber and normal in appearance.     Regarding the lower abdomen and pelvis, there are scattered sigmoid  diverticula without evidence of diverticulitis. No mass or inflammatory  change is seen. There is a right inguinal hernia containing fat and also  an edge of the bladder, which is elongated and extends into the hernia  defect for several  centimeters. There is minimal if any associated  edema. Incidental note is made of a right hip prosthesis. Bony  structures appear grossly intact.     IMPRESSION:   1. Right inguinal hernia containing a portion of the bladder. Minimal if  any associated edema.     2. Extensive fatty liver change.     3. Sigmoid diverticulosis without evidence of diverticulitis.     DICTATED:   10/08/2020  EDITED/ls :   10/08/2020         This report was finalized on 10/9/2020 7:26 AM by Dr. Conner Coon MD.       XR Chest 1 View [[id number]] Collected: 10/08/20 1606     Updated: 10/08/20 1701    Narrative:      EXAMINATION: XR CHEST 1 VW-10/08/2020:      INDICATION: Weak/Dizzy/AMS, triage protocol.      COMPARISON: Chest x-ray 03/09/2014.     FINDINGS: Cardiac size borderline enlarged. Increased central pulmonary  vascularity and perihilar lung markings without consolidation. No  pneumothorax or pleural effusion. Degenerative changes of the spine.           Impression:      Increased central pulmonary vascularity without focal  consolidation or effusion.     D:  10/08/2020  E:  10/08/2020     This report was finalized on 10/8/2020 4:57 PM by Dr. Alexey Moscoso.           I read the radiographic studies.      Impression:  1. Covid19 Pneumonia-with acute hypoxic respiratory failure.  This is an indication for intervention with COVID-19 convalescent plasma, remdesivir, and Decadron.  We discussed the potential risks and benefits of remdesivir therapy including the fact that this has been released on an emergency basis by the FDA.  We discussed the potential for hepatotoxicity related to remdesivir appear therapy.  We discussed the potential risks and benefits of COVID-19 convalescent plasma and he agrees to proceed with COVID-19 plasma therapy.  He has multiple risk factors for an adverse outcome and we will be aggressive with his therapy.  2. Acute Hypoxic Respiratory Failure  3. Type 2 diabetes mellitus-this is a risk factor for an  adverse outcome  4.  Morbid obesity-this is a risk factor for an adverse outcome    PLAN/RECOMMENDATIONS:   Thank you for asking us to see Sebastian Liao, I recommend the followin.  COVID-19 convalescent plasma-I will give him 2 units  2.  Remdesivir IV daily x5 days  3.  Decadron 6 mg IV/p.o. daily x10 days  4.  Close monitoring of inflammatory markers  5.  Supplemental oxygen        1) The FDA has authorized emergency use of COVID-19 convalescent plasma, which is not an FDA approved biologic product.  2) We discussed that the patient has the option to accept or refuse administration of COVID-19 convalescent plasma.  3) We discussed the risks and benefits of COVID-19 convalescent plasma including risk of transfusion reaction or transfusion related infection.  Patient agreed to proceed.  4) We discussed alternative treatments and the risks and benefits of these alternative treatments.    I discussed his situation with Dr. Strange.       Omega Archibald MD  10/9/2020  12:32 EDT

## 2020-10-09 NOTE — H&P
[facility] Medicine Services  HISTORY AND PHYSICAL    Patient Name: Sebastian Liao  : 1960  MRN: [id number]  Primary Care Physician: Fransico Ngo PA  Date of admission: 10/8/2020      Subjective   Subjective     Chief Complaint:  fatigue    HPI:  Sebastian Liao is a 60 y.o. male with PMHx significant for HTN, HLD, DM II, hx of colon cancer and lung cancer, IBS. He currently works at [employer], he works in maintenance. He came to [facility] today due to severe debilitating fatigue and abdominal pain. Patient reports onset of symptoms appox 3 weeks ago. He has felt increasingly fatigued and weak. Feels like he is unable to perform his ADLs. He denies chest pain, fever, chills, nausea, vomiting, hemoptysis, loss of taste or smell. He reports his wife works at a nursing home and is tested twice weekly for COVID, she had a negative test yesterday. He has no known exposure to COVID that he is aware of. In the ED CTA of his chest was negative for PE, however showed multifocal ground glass opacities. His respiratory panel returned positive for COVID-19. Due to being high risk and severe weakness he will be admitted for further treatment.    Review of Systems   Gen- No fevers, chills  CV- No chest pain, palpitations  Resp- No cough, dyspnea  GI- No N/V/D, abd pain    All other systems reviewed and are negative.     Personal History     Past Medical History:   Diagnosis Date   • Acute bronchitis    • Cervicalgia    • Colon cancer (CMS/HCC)     pt reported on intake forms   • Edema    • HTN (hypertension)    • Hyperlipidemia    • IBS (irritable bowel syndrome)    • Low testosterone    • Lung cancer (CMS/HCC)     pt reported on intake forms       Past Surgical History:   Procedure Laterality Date   • CERVICAL FUSION     • COLONOSCOPY  2012    repeat in ten yrs   • HIP SURGERY Right 2016    Dr. Schwartz   • NECK SURGERY      cervical spine fusion              Family History: family history includes Cancer in his mother; Diabetes in his maternal grandmother; Hypertension in his father, maternal grandmother, and sister. Otherwise pertinent FHx was reviewed and unremarkable.     Social History:  reports that he has quit smoking. His smoking use included cigarettes. He has a 10.00 pack-year smoking history. He has never used smokeless tobacco. He reports current alcohol use. He reports that he does not use drugs.  Social History     Social History Narrative    Left hand dominant, , Exercises daily.        Medications:  Available home medication information reviewed.  (Not in a hospital admission)      No Known Allergies    Objective   Objective     Vital Signs:   Temp:  [97.3 °F (36.3 °C)] 97.3 °F (36.3 °C)  Heart Rate:  [] 97  Resp:  [20] 20  BP: (114-179)/() 137/85        Physical Exam   Constitutional: Awake, alert, pleasant male, obese  Eyes: PERRLA, sclerae anicteric, no conjunctival injection  HENT: NCAT, mucous membranes moist  Neck: Supple, no thyromegaly, no lymphadenopathy, trachea midline  Respiratory: some crackles bilaterally, nonlabored respirations on room air  Cardiovascular: RRR, no murmurs, rubs, or gallops, palpable pedal pulses bilaterally  Gastrointestinal: Positive bowel sounds, soft, nontender, nondistended  Musculoskeletal: No bilateral ankle edema, no clubbing or cyanosis to extremities  Psychiatric: Appropriate affect, cooperative  Neurologic: Oriented x 3, strength symmetric in all extremities, Cranial Nerves grossly intact to confrontation, speech clear  Skin: No rashes      Results Reviewed:  I have personally reviewed current lab and radiology data.    Results from last 7 days   Lab Units 10/08/20  1436   WBC 10*3/mm3 5.17   HEMOGLOBIN g/dL 14.7   HEMATOCRIT % 46.4   PLATELETS 10*3/mm3 259     Results from last 7 days   Lab Units 10/08/20  1436   SODIUM mmol/L 137   POTASSIUM mmol/L 4.1   CHLORIDE mmol/L 97*   CO2 mmol/L  31.0*   BUN mg/dL 13   CREATININE mg/dL 1.27   GLUCOSE mg/dL 116*   CALCIUM mg/dL 8.9   ALT (SGPT) U/L 20   AST (SGOT) U/L 49*   TROPONIN T ng/mL <0.010     Estimated Creatinine Clearance: 72.9 mL/min (by C-G formula based on SCr of 1.27 mg/dL).  Brief Urine Lab Results  (Last result in the past 365 days)      Color   Clarity   Blood   Leuk Est   Nitrite   Protein   CREAT   Urine HCG        10/08/20 1647 Yellow Clear Negative Negative Negative 100 mg/dL (2+)             Imaging Results (Last 24 Hours)     Procedure Component Value Units Date/Time    CT Angiogram Chest [[id number]] Collected: 10/08/20 1739     Updated: 10/08/20 1815    Narrative:      EXAMINATION: CT ANGIOGRAM CHEST, CT ANGIOGRAM ABDOMEN/PELVIS -  10/08/2020      INDICATION: Abdominal pain.     TECHNIQUE: Spiral acquisition 3 mm post IV contrast images through the  chest, abdomen and pelvis with sagittal and coronal 2D reconstructions  and 3D VRT and MIP angiographic reconstructions.     The radiation dose reduction device was turned on for each scan per the  ALARA (As Low as Reasonably Achievable) protocol.     COMPARISON: None.     FINDINGS: History indicates generalized weakness and abdominal pain x 3  days, dyspnea, cough, fever, and diarrhea. Prior history of lung cancer  and colon cancer.     CHEST CT SCAN: There is good contrast opacification of the pulmonary  arteries. No definite pulmonary embolic disease is seen. A faint  suggestion of a tiny embolus in a left lower lobe pulmonary artery,  axial image #59 of series 5, is thin, linear and indistinct and may  represent a tiny chronic embolus, or may actually represent venous  mixing artifact. No similar findings are seen elsewhere. There is no  evidence of thoracic aortic aneurysm or dissection. No pericardial or  pleural effusion is seen.     Lung window images show multifocal groundglass disease bilaterally,  particularly in the upper lobes with fairly characteristic findings of  Covid 19  pneumonia. There is some diffuse peribronchial thickening in  the left lower lobe, which may represent a related or superimposed  bronchitis in the setting of bile bronchiectasis. No densely  consolidated lung is seen.       Impression:      1. Multifocal groundglass disease in the lungs, suspicious for Covid 19  pneumonia or other similar atypical pneumonia. Separate pattern of  peribronchial thickening and interstitial disease in the left lower lobe  resembles a bronchitis.     2. Trace linear filling defect in a left lower lobe pulmonary artery,  thought to either be a 1 mm chronic embolus or simply venous mixing  artifact. No evidence of significant pulmonary embolic disease.     ANGIOGRAPHIC CT SCAN OF THE ABDOMEN AND PELVIS: There is no evidence of  significant aortic or iliac stenosis. No aneurysm is seen. There is  extensive fatty liver change approaching the appearance of FELIX. No  hepatic lesions are identified. No significant abnormalities are seen of  the gallbladder, spleen, pancreas, adrenal glands, or kidneys except for  small right lower pole renal cyst. No upper abdominal free air, ascites,  adenopathy or acute inflammatory change is seen. Large and small bowel  loops are normal in caliber and normal in appearance.     Regarding the lower abdomen and pelvis, there are scattered sigmoid  diverticula without evidence of diverticulitis. No mass or inflammatory  change is seen. There is a right inguinal hernia containing fat and also  an edge of the bladder, which is elongated and extends into the hernia  defect for several centimeters. There is minimal if any associated  edema. Incidental note is made of a right hip prosthesis. Bony  structures appear grossly intact.     IMPRESSION:   1. Right inguinal hernia containing a portion of the bladder. Minimal if  any associated edema.     2. Extensive fatty liver change.     3. Sigmoid diverticulosis without evidence of diverticulitis.     DICTATED:    10/08/2020  EDITED/ls :   10/08/2020           CT Angiogram Abdomen Pelvis [[id number]] Collected: 10/08/20 1739     Updated: 10/08/20 1815    Narrative:      EXAMINATION: CT ANGIOGRAM CHEST, CT ANGIOGRAM ABDOMEN/PELVIS -  10/08/2020      INDICATION: Abdominal pain.     TECHNIQUE: Spiral acquisition 3 mm post IV contrast images through the  chest, abdomen and pelvis with sagittal and coronal 2D reconstructions  and 3D VRT and MIP angiographic reconstructions.     The radiation dose reduction device was turned on for each scan per the  ALARA (As Low as Reasonably Achievable) protocol.     COMPARISON: None.     FINDINGS: History indicates generalized weakness and abdominal pain x 3  days, dyspnea, cough, fever, and diarrhea. Prior history of lung cancer  and colon cancer.     CHEST CT SCAN: There is good contrast opacification of the pulmonary  arteries. No definite pulmonary embolic disease is seen. A faint  suggestion of a tiny embolus in a left lower lobe pulmonary artery,  axial image #59 of series 5, is thin, linear and indistinct and may  represent a tiny chronic embolus, or may actually represent venous  mixing artifact. No similar findings are seen elsewhere. There is no  evidence of thoracic aortic aneurysm or dissection. No pericardial or  pleural effusion is seen.     Lung window images show multifocal groundglass disease bilaterally,  particularly in the upper lobes with fairly characteristic findings of  Covid 19 pneumonia. There is some diffuse peribronchial thickening in  the left lower lobe, which may represent a related or superimposed  bronchitis in the setting of bile bronchiectasis. No densely  consolidated lung is seen.       Impression:      1. Multifocal groundglass disease in the lungs, suspicious for Covid 19  pneumonia or other similar atypical pneumonia. Separate pattern of  peribronchial thickening and interstitial disease in the left lower lobe  resembles a bronchitis.     2. Trace linear  filling defect in a left lower lobe pulmonary artery,  thought to either be a 1 mm chronic embolus or simply venous mixing  artifact. No evidence of significant pulmonary embolic disease.     ANGIOGRAPHIC CT SCAN OF THE ABDOMEN AND PELVIS: There is no evidence of  significant aortic or iliac stenosis. No aneurysm is seen. There is  extensive fatty liver change approaching the appearance of FELIX. No  hepatic lesions are identified. No significant abnormalities are seen of  the gallbladder, spleen, pancreas, adrenal glands, or kidneys except for  small right lower pole renal cyst. No upper abdominal free air, ascites,  adenopathy or acute inflammatory change is seen. Large and small bowel  loops are normal in caliber and normal in appearance.     Regarding the lower abdomen and pelvis, there are scattered sigmoid  diverticula without evidence of diverticulitis. No mass or inflammatory  change is seen. There is a right inguinal hernia containing fat and also  an edge of the bladder, which is elongated and extends into the hernia  defect for several centimeters. There is minimal if any associated  edema. Incidental note is made of a right hip prosthesis. Bony  structures appear grossly intact.     IMPRESSION:   1. Right inguinal hernia containing a portion of the bladder. Minimal if  any associated edema.     2. Extensive fatty liver change.     3. Sigmoid diverticulosis without evidence of diverticulitis.     DICTATED:   10/08/2020  EDITED/ls :   10/08/2020           XR Chest 1 View [[id number]] Collected: 10/08/20 1606     Updated: 10/08/20 1701    Narrative:      EXAMINATION: XR CHEST 1 VW-10/08/2020:      INDICATION: Weak/Dizzy/AMS, triage protocol.      COMPARISON: Chest x-ray 03/09/2014.     FINDINGS: Cardiac size borderline enlarged. Increased central pulmonary  vascularity and perihilar lung markings without consolidation. No  pneumothorax or pleural effusion. Degenerative changes of the spine.            Impression:      Increased central pulmonary vascularity without focal  consolidation or effusion.     D:  10/08/2020  E:  10/08/2020     This report was finalized on 10/8/2020 4:57 PM by Dr. Alexey Moscoso.                Assessment/Plan   Assessment & Plan     Active Hospital Problems    Diagnosis POA   • **Pneumonia due to COVID-19 virus [U07.1, J12.89] Yes   • Bilateral pneumonia [J18.9] Yes   • Diabetes type 2, controlled (CMS/HCC) [E11.9] Yes   • Essential hypertension, benign [I10] Yes   • Hyperlipidemia [E78.5] Yes     Sebastian Liao is a 60 y.o. male with PMHx significant for HTN, HLD, DM II, hx of colon cancer and lung cancer, IBS. He presented to the ED with complaints of debilitating fatigue and abdominal pain. In the ED CTA of his chest was negative for PE, however showed multifocal ground glass opacities. His respiratory panel returned positive for COVID-19. Due to being high risk and severe weakness he will be admitted for further treatment.    Multifocal Pneumonia secondary to COVID-19:  - confirmed COVID on respiratory PCR, currently he is not hypoxic, however he is high risk for deterioration, start decadron 6mg daily. Hold on remdesivir unless he becomes hypoxic  - type and screen for possible convalescent plasma  - AM ferriting, LDH, CRP, CMP  - given Azithro/CTX in the ED, will hold for now as his PNA is likely viral in etiology  - continue supportive care, PRN albuterol inhaler    Abdominal Pain  Inguinal Hernia:  - as demonstrated on CT abd/pelvis, right inguinal hernia with small portion of the bladder, would consider outpatient surgical referral once he has recovered from COVID    DMII:  - hold oral medications, continue LDSSI, may need basal insulin with steroid administration, monitor    HTN:  - continue amlodipine    GERD:  - continue protonix    Obesity    Weakness    DVT prophylaxis:  lovenox 30mg BID    CODE STATUS:    Code Status and Medical Interventions:   Ordered at: 10/08/20 7481       Level Of Support Discussed With:    Patient     Code Status:    CPR     Medical Interventions (Level of Support Prior to Arrest):    Full       Admission Status:  I believe this patient meets INPATIENT status due to COVID-19 Pneumonia.  I feel patient’s risk for adverse outcomes and need for care warrant INPATIENT evaluation and I predict the patient’s care encounter to likely last beyond 2 midnights.      Smita Sharp, DO  10/08/20

## 2020-10-10 LAB
ALBUMIN SERPL-MCNC: 4.1 G/DL (ref 3.5–5.2)
ALBUMIN/GLOB SERPL: 1 G/DL
ALP SERPL-CCNC: 62 U/L (ref 39–117)
ALT SERPL W P-5'-P-CCNC: 29 U/L (ref 1–41)
ANION GAP SERPL CALCULATED.3IONS-SCNC: 11 MMOL/L (ref 5–15)
AST SERPL-CCNC: 73 U/L (ref 1–40)
BILIRUB SERPL-MCNC: 0.3 MG/DL (ref 0–1.2)
BUN SERPL-MCNC: 16 MG/DL (ref 6–20)
BUN/CREAT SERPL: 12.7 (ref 7–25)
CALCIUM SPEC-SCNC: 9.1 MG/DL (ref 8.6–10.5)
CHLORIDE SERPL-SCNC: 98 MMOL/L (ref 98–107)
CO2 SERPL-SCNC: 28 MMOL/L (ref 22–29)
CREAT SERPL-MCNC: 1.26 MG/DL (ref 0.76–1.27)
CRP SERPL-MCNC: 4.21 MG/DL (ref 0–0.5)
D DIMER PPP FEU-MCNC: 0.57 MCGFEU/ML (ref 0–0.56)
DEPRECATED RDW RBC AUTO: 43.7 FL (ref 37–54)
ERYTHROCYTE [DISTWIDTH] IN BLOOD BY AUTOMATED COUNT: 13.2 % (ref 12.3–15.4)
FERRITIN SERPL-MCNC: 549.5 NG/ML (ref 30–400)
GFR SERPL CREATININE-BSD FRML MDRD: 71 ML/MIN/1.73
GLOBULIN UR ELPH-MCNC: 4.2 GM/DL
GLUCOSE BLDC GLUCOMTR-MCNC: 128 MG/DL (ref 70–130)
GLUCOSE BLDC GLUCOMTR-MCNC: 166 MG/DL (ref 70–130)
GLUCOSE BLDC GLUCOMTR-MCNC: 199 MG/DL (ref 70–130)
GLUCOSE BLDC GLUCOMTR-MCNC: 90 MG/DL (ref 70–130)
GLUCOSE SERPL-MCNC: 88 MG/DL (ref 65–99)
HCT VFR BLD AUTO: 46 % (ref 37.5–51)
HGB BLD-MCNC: 14.7 G/DL (ref 13–17.7)
LDH SERPL-CCNC: 358 U/L (ref 135–225)
MCH RBC QN AUTO: 28.9 PG (ref 26.6–33)
MCHC RBC AUTO-ENTMCNC: 32 G/DL (ref 31.5–35.7)
MCV RBC AUTO: 90.6 FL (ref 79–97)
PLATELET # BLD AUTO: 339 10*3/MM3 (ref 140–450)
PMV BLD AUTO: 10.6 FL (ref 6–12)
POTASSIUM SERPL-SCNC: 3.9 MMOL/L (ref 3.5–5.2)
PROT SERPL-MCNC: 8.3 G/DL (ref 6–8.5)
RBC # BLD AUTO: 5.08 10*6/MM3 (ref 4.14–5.8)
SODIUM SERPL-SCNC: 137 MMOL/L (ref 136–145)
WBC # BLD AUTO: 5.17 10*3/MM3 (ref 3.4–10.8)

## 2020-10-10 PROCEDURE — 80053 COMPREHEN METABOLIC PANEL: CPT | Performed by: INTERNAL MEDICINE

## 2020-10-10 PROCEDURE — 85379 FIBRIN DEGRADATION QUANT: CPT | Performed by: INTERNAL MEDICINE

## 2020-10-10 PROCEDURE — 63710000001 INSULIN LISPRO (HUMAN) PER 5 UNITS: Performed by: INTERNAL MEDICINE

## 2020-10-10 PROCEDURE — 82962 GLUCOSE BLOOD TEST: CPT

## 2020-10-10 PROCEDURE — 86140 C-REACTIVE PROTEIN: CPT | Performed by: INTERNAL MEDICINE

## 2020-10-10 PROCEDURE — 63710000001 DEXAMETHASONE PER 0.25 MG: Performed by: INTERNAL MEDICINE

## 2020-10-10 PROCEDURE — 25010000002 ENOXAPARIN PER 10 MG: Performed by: INTERNAL MEDICINE

## 2020-10-10 PROCEDURE — 83615 LACTATE (LD) (LDH) ENZYME: CPT | Performed by: INTERNAL MEDICINE

## 2020-10-10 PROCEDURE — 85027 COMPLETE CBC AUTOMATED: CPT | Performed by: INTERNAL MEDICINE

## 2020-10-10 PROCEDURE — 99232 SBSQ HOSP IP/OBS MODERATE 35: CPT | Performed by: INTERNAL MEDICINE

## 2020-10-10 PROCEDURE — 82728 ASSAY OF FERRITIN: CPT | Performed by: INTERNAL MEDICINE

## 2020-10-10 PROCEDURE — 86927 PLASMA FRESH FROZEN: CPT

## 2020-10-10 RX ORDER — ALUMINA, MAGNESIA, AND SIMETHICONE 2400; 2400; 240 MG/30ML; MG/30ML; MG/30ML
15 SUSPENSION ORAL EVERY 6 HOURS PRN
Status: DISCONTINUED | OUTPATIENT
Start: 2020-10-10 | End: 2020-10-12 | Stop reason: HOSPADM

## 2020-10-10 RX ORDER — SILDENAFIL CITRATE 20 MG/1
10 TABLET ORAL DAILY
Status: DISCONTINUED | OUTPATIENT
Start: 2020-10-11 | End: 2020-10-12 | Stop reason: HOSPADM

## 2020-10-10 RX ORDER — BISACODYL 5 MG/1
10 TABLET, DELAYED RELEASE ORAL DAILY PRN
Status: DISCONTINUED | OUTPATIENT
Start: 2020-10-10 | End: 2020-10-12 | Stop reason: HOSPADM

## 2020-10-10 RX ADMIN — FAMOTIDINE 20 MG: 20 TABLET, FILM COATED ORAL at 21:00

## 2020-10-10 RX ADMIN — ENOXAPARIN SODIUM 40 MG: 40 INJECTION SUBCUTANEOUS at 21:00

## 2020-10-10 RX ADMIN — FAMOTIDINE 20 MG: 20 TABLET, FILM COATED ORAL at 10:14

## 2020-10-10 RX ADMIN — BISACODYL 10 MG: 5 TABLET, COATED ORAL at 06:00

## 2020-10-10 RX ADMIN — ROSUVASTATIN CALCIUM 10 MG: 10 TABLET, COATED ORAL at 10:05

## 2020-10-10 RX ADMIN — AMLODIPINE BESYLATE 5 MG: 5 TABLET ORAL at 10:04

## 2020-10-10 RX ADMIN — SODIUM CHLORIDE, PRESERVATIVE FREE 10 ML: 5 INJECTION INTRAVENOUS at 21:01

## 2020-10-10 RX ADMIN — REMDESIVIR 100 MG: 100 INJECTION, POWDER, LYOPHILIZED, FOR SOLUTION INTRAVENOUS at 10:04

## 2020-10-10 RX ADMIN — SODIUM CHLORIDE, PRESERVATIVE FREE 10 ML: 5 INJECTION INTRAVENOUS at 10:05

## 2020-10-10 RX ADMIN — ENOXAPARIN SODIUM 40 MG: 40 INJECTION SUBCUTANEOUS at 10:05

## 2020-10-10 RX ADMIN — PANTOPRAZOLE SODIUM 40 MG: 40 TABLET, DELAYED RELEASE ORAL at 06:00

## 2020-10-10 RX ADMIN — INSULIN LISPRO 2 UNITS: 100 INJECTION, SOLUTION INTRAVENOUS; SUBCUTANEOUS at 17:38

## 2020-10-10 RX ADMIN — DEXAMETHASONE 6 MG: 4 TABLET ORAL at 10:04

## 2020-10-10 NOTE — PROGRESS NOTES
[facility] Medicine Services  PROGRESS NOTE    Patient Name: Sebastian Liao  : 1960  MRN: [id number]    Date of Admission: 10/8/2020  Primary Care Physician: Fransico Ngo PA    Subjective   Subjective     CC: f/u covid pneumonia    HPI: Up sitting on edge of bed. Says he is breathing very comfortably this morning. Main complaint is ongoing abdominal cramping, no bm.    Review of Systems  Gen- No fevers, chills  Resp- No cough, dyspnea    Objective   Objective     Vital Signs:   Temp:  [97.2 °F (36.2 °C)-99.1 °F (37.3 °C)] 97.8 °F (36.6 °C)  Heart Rate:  [] 80  Resp:  [14-18] 18  BP: (133-152)/(85-98) 144/94        Physical Exam:  With patient's consent, physical exam was conducted via visual telemedicine encounter with bedside portion accommodated by nursing staff due to patient's current isolation requirements in the interest of PPE conservation.    Constitutional: No acute distress, awake, alert, nontoxic  HENT: NCAT, MMM, no conjunctival injection  Respiratory: Good effort, nonlabored respirations. O2 sat 90% when speaking.  Cardiovascular: RRR on tele  Musculoskeletal: No edema, normal muscle tone and mass for age  GI: Soft, no grimace, nondistended, obese  Psychiatric: Appropriate affect, good insight and judgement, cooperative  Neurologic: Oriented x 3, movements symmetric BUE and BLE, speech clear and fluent  Skin: No visible rashes, no jaundice seen on exposed skin      Results Reviewed:  Results from last 7 days   Lab Units 10/09/20  0739 10/08/20  1436   WBC 10*3/mm3 4.73 5.17   HEMOGLOBIN g/dL 14.2 14.7   HEMATOCRIT % 45.1 46.4   PLATELETS 10*3/mm3 260 259     Results from last 7 days   Lab Units 10/09/20  0739 10/08/20  1436   SODIUM mmol/L 136 137   POTASSIUM mmol/L 4.1 4.1   CHLORIDE mmol/L 98 97*   CO2 mmol/L 29.0 31.0*   BUN mg/dL 13 13   CREATININE mg/dL 1.14 1.27   GLUCOSE mg/dL 70 116*   CALCIUM mg/dL 8.5* 8.9   ALT (SGPT) U/L 22  22 20   AST  (SGOT) U/L 51*  51* 49*   TROPONIN T ng/mL  --  <0.010     Estimated Creatinine Clearance: 81.2 mL/min (by C-G formula based on SCr of 1.14 mg/dL).    Microbiology Results Abnormal     Procedure Component Value - Date/Time    Respiratory Panel PCR w/COVID-19(SARS-CoV-2) [facility] In-House, NP Swab in UTM/VTM, 3-4 HR TAT - Swab, Nasopharynx [[id number]]  (Abnormal) Collected: 10/08/20 1902    Lab Status: Final result Specimen: Swab from Nasopharynx Updated: 10/08/20 2028     ADENOVIRUS, PCR Not Detected     Coronavirus 229E Not Detected     Coronavirus HKU1 Not Detected     Coronavirus NL63 Not Detected     Coronavirus OC43 Not Detected     COVID19 Detected     Human Metapneumovirus Not Detected     Human Rhinovirus/Enterovirus Not Detected     Influenza A PCR Not Detected     Influenza A H1 Not Detected     Influenza A H1 2009 PCR Not Detected     Influenza A H3 Not Detected     Influenza B PCR Not Detected     Parainfluenza Virus 1 Not Detected     Parainfluenza Virus 2 Not Detected     Parainfluenza Virus 3 Not Detected     Parainfluenza Virus 4 Not Detected     RSV, PCR Not Detected     Bordetella pertussis pcr Not Detected     Bordetella parapertussis PCR Not Detected     Chlamydophila pneumoniae PCR Not Detected     Mycoplasma pneumo by PCR Not Detected    Narrative:      Fact sheet for providers: https://docs.[employer]/wp-content/uploads/MJP0059-8741-AA2.1-EUA-Provider-Fact-Sheet-3.pdf    Fact sheet for patients: https://docs.[employer]/wp-content/uploads/QTX5045-5521-CD1.1-EUA-Patient-Fact-Sheet-1.pdf          Imaging Results (Last 24 Hours)     ** No results found for the last 24 hours. **               I have reviewed the medications:  Scheduled Meds:amLODIPine, 5 mg, Oral, Daily  dexamethasone, 6 mg, Oral, Daily With Breakfast  enoxaparin, 40 mg, Subcutaneous, Q12H  famotidine, 20 mg, Oral, BID  insulin lispro, 0-7 Units, Subcutaneous, TID AC  INV [id number] remdesivir in NS IVPB, 100 mg,  Intravenous, Q24H  pantoprazole, 40 mg, Oral, QAM  rosuvastatin, 10 mg, Oral, Daily  sodium chloride, 10 mL, Intravenous, Q12H      Continuous Infusions:Pharmacy Consult - Remdesivir,       PRN Meds:.•  acetaminophen  •  albuterol sulfate HFA  •  aluminum-magnesium hydroxide-simethicone  •  bisacodyl  •  cyclobenzaprine  •  dextrose  •  dextrose  •  glucagon (human recombinant)  •  HYDROcodone-acetaminophen  •  Morphine  •  ondansetron  •  Pharmacy Consult - Remdesivir  •  sodium chloride  •  sodium chloride    Assessment/Plan   Assessment & Plan     Active Hospital Problems    Diagnosis  POA   • **Pneumonia due to COVID-19 virus [U07.1, J12.89]  Yes   • Bilateral pneumonia [J18.9]  Yes   • Diabetes type 2, controlled (CMS/HCC) [E11.9]  Yes   • Essential hypertension, benign [I10]  Yes   • Hyperlipidemia [E78.5]  Yes      Resolved Hospital Problems   No resolved problems to display.        Brief Hospital Course to date:  Sebastian Liao is a 60 y.o. male with PMHx significant for HTN, HLD, DM II, hx of colon cancer and lung cancer, IBS. He presented to the ED with complaints of debilitating fatigue and abdominal pain. In the ED CTA of his chest was negative for PE, however showed multifocal ground glass opacities. His respiratory panel returned positive for COVID-19. Due to being high risk and severe weakness he will be admitted for further treatment. He received 2 units of plasma 10/9 which he tolerated well. Has also been started on IV remdesevir + steroids and is doing well.     Multifocal Pneumonia secondary to COVID-19  Hypoxia  - Doing well this am. Is s/p 2 units plasma 10/9. Continue decadron 6mg for 10 days, Remdesevir for 5.  - ID following. Have d/w Dr. Archibald, has give plasma as above.  - Continue supportive care, PRN albuterol inhaler  - Labs in am.     A/C Abdominal Pain  Inguinal Hernia:  - As demonstrated on CT abd/pelvis, right inguinal hernia with small portion of the bladder, would consider  outpatient surgical referral once he has recovered from COVID  - Tells me he has chronic abdomina pain but his nausea and pain are now worse, suspect as manifestation of COVID infection as above.  - Encouraged use of his narcotics prn, IV zofran.  - Increase bowel regimen.     DMII:  - Glucose is well controlled at this time. Continue LDSSI.     HTN:  - Fair control this am. Continue amlodipine     GERD:  - Continue protonix     Obesity     Weakness     This patient's problems and plans were partially entered by my partner and updated as appropriate by me 10/10/20.    I discussed care with his wife Shani via phone @ 1050 and explained he is doing well. Plan to be here to complete course of his remdesevir. Unfortunately she has also tested  + for COVID though she is without symptoms at this time.     DVT prophylaxis:  lovenox 30mg BID    CODE STATUS:   Code Status and Medical Interventions:   Ordered at: 10/08/20 5538     Level Of Support Discussed With:    Patient     Code Status:    CPR     Medical Interventions (Level of Support Prior to Arrest):    Full       Diana Strange II, DO  10/10/20

## 2020-10-10 NOTE — PROGRESS NOTES
INFECTIOUS DISEASE Progress Note    Sebastian Liao  1960  [id number]      Admission Date: 10/8/2020      Requesting Provider: Smita Sharp DO  Evaluating Physician: Omega Archibald MD    Reason for Consultation: COVID-19 pneumonia    History of present illness:    10/9/20:Patient is a 60 y.o. male employee of [employer] who is seen today for evaluation of COVID-19 pneumonia.  He initially developed weakness and fatigue approximately 2 weeks ago.  This was followed by nausea, anorexia, and malaise.  He also noted a change in his sense of taste.  He began experiencing a cough with minimal sputum production yesterday.  He presented to the emergency room and was found to have a positive COVID-19 PCR with a chest CTA revealing multifocal groundglass opacities consistent with COVID-19 pneumonia.  He was also found to have acute hypoxic respiratory failure and is requiring 2 L of oxygen.  He was started on Decadron and Remdesivir last p.m.  He has underlying type 2 diabetes mellitus along with obesity.  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  His wife is employed in a nursing home.  He indicates that his wife is being tested twice weekly for COVID-19 and that her tests have returned negative.  He states that they do not go out to eat or go to any other gatherings outside of the home.  10/10/20:He received 2 units of convalescent plasma overnight which he tolerated well per the nursing staff. He has remained afebrile. He is currently on room air with a 91% oxygen saturation.  He feels better today.  He has decreased dyspnea.    Past Medical History:   Diagnosis Date   • Acute bronchitis    • Cervicalgia    • Colon cancer (CMS/HCC)     pt reported on intake forms   • Edema    • HTN (hypertension)    • Hyperlipidemia    • IBS (irritable bowel syndrome)    • Low testosterone    • Lung cancer (CMS/HCC)     pt reported on intake forms       Past Surgical History:    Procedure Laterality Date   • CERVICAL FUSION     • COLONOSCOPY  07/2012    repeat in ten yrs   • HIP SURGERY Right 01/26/2016    Dr. Schwartz   • NECK SURGERY      cervical spine fusion       Family History   Problem Relation Age of Onset   • Cancer Mother         Ovarian,    • Hypertension Father    • Hypertension Sister    • Hypertension Maternal Grandmother    • Diabetes Maternal Grandmother        Social History     Socioeconomic History   • Marital status:      Spouse name: Not on file   • Number of children: Not on file   • Years of education: Not on file   • Highest education level: Not on file   Tobacco Use   • Smoking status: Former Smoker     Packs/day: 1.00     Years: 10.00     Pack years: 10.00     Types: Cigarettes   • Smokeless tobacco: Never Used   Substance and Sexual Activity   • Alcohol use: Yes     Comment: occasional   • Drug use: No   • Sexual activity: Defer   Social History Narrative    Left hand dominant, , Exercises daily.        No Known Allergies      Medication:    Current Facility-Administered Medications:   •  acetaminophen (TYLENOL) tablet 650 mg, 650 mg, Oral, Q4H PRN, Smita Sharp, DO  •  albuterol sulfate HFA (PROVENTIL HFA;VENTOLIN HFA;PROAIR HFA) inhaler 2 puff, 2 puff, Inhalation, Q4H PRN, Smita Sharp, DO  •  aluminum-magnesium hydroxide-simethicone (MAALOX MAX) 400-400-40 MG/5ML suspension 15 mL, 15 mL, Oral, Q6H PRN, Valentine Acosta PA  •  amLODIPine (NORVASC) tablet 5 mg, 5 mg, Oral, Daily, Smita Sharp, DO, 5 mg at 10/09/20 0942  •  bisacodyl (DULCOLAX) EC tablet 10 mg, 10 mg, Oral, Daily PRN, Valentine Acosta, PA, 10 mg at 10/10/20 0600  •  cyclobenzaprine (FLEXERIL) tablet 10 mg, 10 mg, Oral, BID PRN, Smita Sharp, DO  •  dexamethasone (DECADRON) tablet 6 mg, 6 mg, Oral, Daily With Breakfast, Diana Strange II, DO, 6 mg at 10/09/20 0942  •  dextrose (D50W) 25 g/ 50mL Intravenous Solution 25 g, 25 g, Intravenous, Q15 Min PRN, Lila  Smita BUSTOS, DO  •  dextrose (GLUTOSE) oral gel 15 g, 15 g, Oral, Q15 Min PRN, Smita Sharp R, DO  •  enoxaparin (LOVENOX) syringe 40 mg, 40 mg, Subcutaneous, Q12H, Diana Strange M II, DO, 40 mg at 10/09/20 1942  •  famotidine (PEPCID) tablet 20 mg, 20 mg, Oral, BID, Cristiane Day, DO, 20 mg at 10/09/20 1942  •  glucagon (human recombinant) (GLUCAGEN DIAGNOSTIC) injection 1 mg, 1 mg, Subcutaneous, Q15 Min PRN, Smita Sharp R, DO  •  HYDROcodone-acetaminophen (NORCO)  MG per tablet 1 tablet, 1 tablet, Oral, Q4H PRN, Smita Sharp R, DO  •  insulin lispro (humaLOG) injection 0-7 Units, 0-7 Units, Subcutaneous, TID AC, Smita Sharp, DO, 2 Units at 10/09/20 1712  •  [COMPLETED] INV GS-5734 remdesivir 200 mg in sodium chloride 0.9 % 250 mL IVPB (powder vial), 200 mg, Intravenous, Q24H, 200 mg at 10/09/20 1206 **FOLLOWED BY** INV GS-5734 remdesivir 100 mg in sodium chloride 0.9 % 250 mL IVPB (powder vial), 100 mg, Intravenous, Q24H, Diana Strange II, DO  •  morphine injection 2 mg, 2 mg, Intravenous, Q15 Min PRN, Smita Sharp, DO, 2 mg at 10/08/20 2236  •  ondansetron (ZOFRAN) injection 4 mg, 4 mg, Intravenous, Q6H PRN, Smita Sharp, DO, 4 mg at 10/09/20 0123  •  pantoprazole (PROTONIX) EC tablet 40 mg, 40 mg, Oral, QAM, Smita Sharp, DO, 40 mg at 10/10/20 0600  •  Pharmacy Consult - Remdesivir, , Does not apply, Continuous PRN, Diana Strange M II, DO  •  rosuvastatin (CRESTOR) tablet 10 mg, 10 mg, Oral, Daily, Smita Sharp, DO, 10 mg at 10/09/20 0942  •  sodium chloride 0.9 % flush 10 mL, 10 mL, Intravenous, PRN, Smita Sharp,   •  sodium chloride 0.9 % flush 10 mL, 10 mL, Intravenous, Q12H, Smita Sharp DO, 10 mL at 10/09/20 1945  •  sodium chloride 0.9 % flush 10 mL, 10 mL, Intravenous, PRN, Smita Sharp,     Antibiotics:  Anti-Infectives (From admission, onward)    Ordered     Dose/Rate Route Frequency Start Stop    10/09/20 0953  INV GS-5734 remdesivir 100  mg in sodium chloride 0.9 % 250 mL IVPB (powder vial)     Ordering Provider: Diana Strange II, DO    100 mg  over 60 Minutes Intravenous Every 24 Hours 10/10/20 0900 10/14/20 0859    10/09/20 0953  [facility] GS-5734 remdesivir 200 mg in sodium chloride 0.9 % 250 mL IVPB (powder vial)     Ordering Provider: Diana Strange II, DO    200 mg  over 60 Minutes Intravenous Every 24 Hours 10/09/20 1045 10/09/20 1306    10/08/20 1953  cefTRIAXone (ROCEPHIN) 2 g/100 mL 0.9% NS IVPB (MBP)     Ordering Provider: Parmjit Hartley, DO    2 g  over 30 Minutes Intravenous Once 10/08/20 1955 10/08/20 2058    10/08/20 184  AZITHROMYCIN 500 MG/250 ML 0.9% NS IVPB (vial-mate)     Ordering Provider: Parmjit Hartley DO    500 mg  over 60 Minutes Intravenous Once 10/08/20 1851 10/08/20 2230            Review of Systems:  Constitutional-- He low grade subjective fevers.   HEENT-- No headache, earache or sore throat  CV-- No chest pain.  No history of valvular heart disease  Resp--he has a cough with minimal sputum production and some dyspnea.  GI-he has some nausea and anorexia but denies vomiting.  He did have some diarrhea.  He denies abdominal pain.  -- No dysuria, hematuria, or flank pain.  Denies hesitancy, urgency or flank pain.     Heme- No active bruising or bleeding;  MS-- no swelling or pain in the bones or joints of arms/legs. .  Neuro-- No acute focal weakness or numbness in the arms or legs.    Skin--No rashes or lesions      Physical Exam:   Vital Signs  Temp (24hrs), Av.2 °F (36.8 °C), Min:97.2 °F (36.2 °C), Max:99.1 °F (37.3 °C)    Temp  Min: 97.2 °F (36.2 °C)  Max: 99.1 °F (37.3 °C)  BP  Min: 133/88  Max: 152/96  Pulse  Min: 68  Max: 103  Resp  Min: 14  Max: 18  SpO2  Min: 91 %  Max: 97 %     His exam was performed with direct visualization from outside of the room in the setting of the COVID-19 pandemic with a critical shortage of PPE    GENERAL: Awake and alert, in no acute distress.   HEENT: NO LABIAL ULCERS   NECK:  Supple   LYMPH: No visible cervical adenopathy  HEART: RRR;   LUNGS: Nonlabored respiration  ABDOMEN: OBESITY WITH NO ABDOMINAL DISTENTION   EXT:  No cyanosis, clubbing or edema.   :  Without Stock catheter.  MSK: No focal joint swelling or erythema  SKIN: Warm and dry without cutaneous eruptions on Inspection/palpation.    NEURO: Oriented to PPT.  motor 5/5 bilaterally.  PSYCHIATRIC: Normal insight and judgement. Cooperative with PE    Laboratory Data    Results from last 7 days   Lab Units 10/09/20  0739 10/08/20  1436   WBC 10*3/mm3 4.73 5.17   HEMOGLOBIN g/dL 14.2 14.7   HEMATOCRIT % 45.1 46.4   PLATELETS 10*3/mm3 260 259     Results from last 7 days   Lab Units 10/09/20  0739   SODIUM mmol/L 136   POTASSIUM mmol/L 4.1   CHLORIDE mmol/L 98   CO2 mmol/L 29.0   BUN mg/dL 13   CREATININE mg/dL 1.14   GLUCOSE mg/dL 70   CALCIUM mg/dL 8.5*     Results from last 7 days   Lab Units 10/09/20  0739   ALK PHOS U/L 51  51   BILIRUBIN mg/dL 0.3  0.3   BILIRUBIN DIRECT mg/dL <0.2   ALT (SGPT) U/L 22  22   AST (SGOT) U/L 51*  51*         Results from last 7 days   Lab Units 10/09/20  0739   CRP mg/dL 3.82*                 Estimated Creatinine Clearance: 81.2 mL/min (by C-G formula based on SCr of 1.14 mg/dL).      Microbiology:  No results found for: ACANTHNAEG, AFBCX, BPERTUSSISCX, BLOODCX  No results found for: BCIDPCR, CXREFLEX, CSFCX, CULTURETIS  No results found for: CULTURES, HSVCX, URCX  No results found for: EYECULTURE, GCCX, LABHSV  No results found for: LEGIONELLA, MRSACX, MUMPSCX, MYCOPLASCX  No results found for: NOCARDIACX, STOOLCX  No results found for: THROATCX, UNSTIMCULT, URINECX, CULTURE, VZVCULTUR  No results found for: VIRALCULTU, WOUNDCX        Radiology:  Imaging Results (Last 72 Hours)     Procedure Component Value Units Date/Time    CT Angiogram Chest [[id number]] Collected: 10/08/20 8840     Updated: 10/09/20 0729    Narrative:      EXAMINATION: CT ANGIOGRAM CHEST, CT ANGIOGRAM ABDOMEN/PELVIS  -  10/08/2020      INDICATION: Abdominal pain.     TECHNIQUE: Spiral acquisition 3 mm post IV contrast images through the  chest, abdomen and pelvis with sagittal and coronal 2D reconstructions  and 3D VRT and MIP angiographic reconstructions.     The radiation dose reduction device was turned on for each scan per the  ALARA (As Low as Reasonably Achievable) protocol.     COMPARISON: None.     FINDINGS: History indicates generalized weakness and abdominal pain x 3  days, dyspnea, cough, fever, and diarrhea. Prior history of lung cancer  and colon cancer.     CHEST CT SCAN: There is good contrast opacification of the pulmonary  arteries. No definite pulmonary embolic disease is seen. A faint  suggestion of a tiny embolus in a left lower lobe pulmonary artery,  axial image #59 of series 5, is thin, linear and indistinct and may  represent a tiny chronic embolus, or may actually represent venous  mixing artifact. No similar findings are seen elsewhere. There is no  evidence of thoracic aortic aneurysm or dissection. No pericardial or  pleural effusion is seen.     Lung window images show multifocal groundglass disease bilaterally,  particularly in the upper lobes with fairly characteristic findings of  Covid 19 pneumonia. There is some diffuse peribronchial thickening in  the left lower lobe, which may represent a related or superimposed  bronchitis in the setting of bile bronchiectasis. No densely  consolidated lung is seen.       Impression:      1. Multifocal groundglass disease in the lungs, suspicious for Covid 19  pneumonia or other similar atypical pneumonia. Separate pattern of  peribronchial thickening and interstitial disease in the left lower lobe  resembles a bronchitis.     2. Trace linear filling defect in a left lower lobe pulmonary artery,  thought to either be a 1 mm chronic embolus or simply venous mixing  artifact. No evidence of significant pulmonary embolic disease.           ANGIOGRAPHIC CT SCAN  OF THE ABDOMEN AND PELVIS: There is no evidence of  significant aortic or iliac stenosis. No aneurysm is seen. There is  extensive fatty liver change approaching the appearance of FELIX. No  hepatic lesions are identified. No significant abnormalities are seen of  the gallbladder, spleen, pancreas, adrenal glands, or kidneys except for  small right lower pole renal cyst. No upper abdominal free air, ascites,  adenopathy or acute inflammatory change is seen. Large and small bowel  loops are normal in caliber and normal in appearance.     Regarding the lower abdomen and pelvis, there are scattered sigmoid  diverticula without evidence of diverticulitis. No mass or inflammatory  change is seen. There is a right inguinal hernia containing fat and also  an edge of the bladder, which is elongated and extends into the hernia  defect for several centimeters. There is minimal if any associated  edema. Incidental note is made of a right hip prosthesis. Bony  structures appear grossly intact.     IMPRESSION:   1. Right inguinal hernia containing a portion of the bladder. Minimal if  any associated edema.     2. Extensive fatty liver change.     3. Sigmoid diverticulosis without evidence of diverticulitis.     DICTATED:   10/08/2020  EDITED/ls :   10/08/2020         This report was finalized on 10/9/2020 7:26 AM by Dr. Conner Coon MD.       CT Angiogram Abdomen Pelvis [[id number]] Collected: 10/08/20 1739     Updated: 10/09/20 0729    Narrative:      EXAMINATION: CT ANGIOGRAM CHEST, CT ANGIOGRAM ABDOMEN/PELVIS -  10/08/2020      INDICATION: Abdominal pain.     TECHNIQUE: Spiral acquisition 3 mm post IV contrast images through the  chest, abdomen and pelvis with sagittal and coronal 2D reconstructions  and 3D VRT and MIP angiographic reconstructions.     The radiation dose reduction device was turned on for each scan per the  ALARA (As Low as Reasonably Achievable) protocol.     COMPARISON: None.     FINDINGS: History indicates  generalized weakness and abdominal pain x 3  days, dyspnea, cough, fever, and diarrhea. Prior history of lung cancer  and colon cancer.     CHEST CT SCAN: There is good contrast opacification of the pulmonary  arteries. No definite pulmonary embolic disease is seen. A faint  suggestion of a tiny embolus in a left lower lobe pulmonary artery,  axial image #59 of series 5, is thin, linear and indistinct and may  represent a tiny chronic embolus, or may actually represent venous  mixing artifact. No similar findings are seen elsewhere. There is no  evidence of thoracic aortic aneurysm or dissection. No pericardial or  pleural effusion is seen.     Lung window images show multifocal groundglass disease bilaterally,  particularly in the upper lobes with fairly characteristic findings of  Covid 19 pneumonia. There is some diffuse peribronchial thickening in  the left lower lobe, which may represent a related or superimposed  bronchitis in the setting of bile bronchiectasis. No densely  consolidated lung is seen.       Impression:      1. Multifocal groundglass disease in the lungs, suspicious for Covid 19  pneumonia or other similar atypical pneumonia. Separate pattern of  peribronchial thickening and interstitial disease in the left lower lobe  resembles a bronchitis.     2. Trace linear filling defect in a left lower lobe pulmonary artery,  thought to either be a 1 mm chronic embolus or simply venous mixing  artifact. No evidence of significant pulmonary embolic disease.           ANGIOGRAPHIC CT SCAN OF THE ABDOMEN AND PELVIS: There is no evidence of  significant aortic or iliac stenosis. No aneurysm is seen. There is  extensive fatty liver change approaching the appearance of FELIX. No  hepatic lesions are identified. No significant abnormalities are seen of  the gallbladder, spleen, pancreas, adrenal glands, or kidneys except for  small right lower pole renal cyst. No upper abdominal free air, ascites,  adenopathy or  acute inflammatory change is seen. Large and small bowel  loops are normal in caliber and normal in appearance.     Regarding the lower abdomen and pelvis, there are scattered sigmoid  diverticula without evidence of diverticulitis. No mass or inflammatory  change is seen. There is a right inguinal hernia containing fat and also  an edge of the bladder, which is elongated and extends into the hernia  defect for several centimeters. There is minimal if any associated  edema. Incidental note is made of a right hip prosthesis. Bony  structures appear grossly intact.     IMPRESSION:   1. Right inguinal hernia containing a portion of the bladder. Minimal if  any associated edema.     2. Extensive fatty liver change.     3. Sigmoid diverticulosis without evidence of diverticulitis.     DICTATED:   10/08/2020  EDITED/ls :   10/08/2020         This report was finalized on 10/9/2020 7:26 AM by Dr. oCnner Coon MD.       XR Chest 1 View [[id number]] Collected: 10/08/20 1606     Updated: 10/08/20 1701    Narrative:      EXAMINATION: XR CHEST 1 VW-10/08/2020:      INDICATION: Weak/Dizzy/AMS, triage protocol.      COMPARISON: Chest x-ray 03/09/2014.     FINDINGS: Cardiac size borderline enlarged. Increased central pulmonary  vascularity and perihilar lung markings without consolidation. No  pneumothorax or pleural effusion. Degenerative changes of the spine.           Impression:      Increased central pulmonary vascularity without focal  consolidation or effusion.     D:  10/08/2020  E:  10/08/2020     This report was finalized on 10/8/2020 4:57 PM by Dr. Alexey Moscoso.           I read the radiographic studies.      Impression:  1. Covid19 Pneumonia-with acute hypoxic respiratory failure.  This is an indication for intervention with COVID-19 convalescent plasma, remdesivir, and Decadron.  We discussed the potential risks and benefits of remdesivir therapy including the fact that this has been released on an emergency basis by the  FDA.  We discussed the potential for hepatotoxicity related to remdesivir appear therapy.  We discussed the potential risks and benefits of COVID-19 convalescent plasma and he agrees to proceed with COVID-19 plasma therapy.  He has multiple risk factors for an adverse outcome and we will be aggressive with his therapy.  2. Acute Hypoxic Respiratory Failure  3. Type 2 diabetes mellitus-this is a risk factor for an adverse outcome  4.  Morbid obesity-this is a risk factor for an adverse outcome    PLAN/RECOMMENDATIONS:  1.  Status post COVID-19 convalescent plasma- 10/9  2.  Continue Decadron 6 mg daily x10 days  3.  Continue remdesivir IV daily x5 days            Omega Archibald MD  10/10/2020  08:19 EDT

## 2020-10-10 NOTE — PLAN OF CARE
Problem: Adult Inpatient Plan of Care  Goal: Plan of Care Review  Flowsheets (Taken 10/10/2020 0339)  Outcome Summary: 2nd unit of plasma given with no suspected reaction. VS stable and patient remained on room air. Patient complained of feeling constipated, Valentine Karla, PA called and dulcolax and maa-lax ordered. No other changes noted. Will continue to monitor and provide care.   Goal Outcome Evaluation:  Plan of Care Reviewed With: patient  Progress: improving  Outcome Summary: 2nd unit of plasma given with no suspected reaction. VS stable and patient remained on room air. Patient complained of feeling constipated, Valentine Karla, PA called and dulcolax and maa-lax ordered. No other changes noted. Will continue to monitor and provide care.

## 2020-10-11 LAB
ALBUMIN SERPL-MCNC: 3.9 G/DL (ref 3.5–5.2)
ALBUMIN/GLOB SERPL: 1.4 G/DL
ALP SERPL-CCNC: 60 U/L (ref 39–117)
ALT SERPL W P-5'-P-CCNC: 31 U/L (ref 1–41)
ANION GAP SERPL CALCULATED.3IONS-SCNC: 11 MMOL/L (ref 5–15)
AST SERPL-CCNC: 53 U/L (ref 1–40)
BASOPHILS # BLD AUTO: 0.01 10*3/MM3 (ref 0–0.2)
BASOPHILS NFR BLD AUTO: 0.2 % (ref 0–1.5)
BH BB BLOOD EXPIRATION DATE: NORMAL
BH BB BLOOD EXPIRATION DATE: NORMAL
BH BB BLOOD TYPE BARCODE: 600
BH BB BLOOD TYPE BARCODE: 6200
BH BB DISPENSE STATUS: NORMAL
BH BB DISPENSE STATUS: NORMAL
BH BB PRODUCT CODE: NORMAL
BH BB PRODUCT CODE: NORMAL
BH BB UNIT NUMBER: NORMAL
BH BB UNIT NUMBER: NORMAL
BILIRUB SERPL-MCNC: 0.2 MG/DL (ref 0–1.2)
BUN SERPL-MCNC: 18 MG/DL (ref 6–20)
BUN/CREAT SERPL: 16.8 (ref 7–25)
CALCIUM SPEC-SCNC: 8.6 MG/DL (ref 8.6–10.5)
CHLORIDE SERPL-SCNC: 99 MMOL/L (ref 98–107)
CK SERPL-CCNC: 1101 U/L (ref 20–200)
CO2 SERPL-SCNC: 27 MMOL/L (ref 22–29)
CREAT SERPL-MCNC: 1.07 MG/DL (ref 0.76–1.27)
CRP SERPL-MCNC: 2 MG/DL (ref 0–0.5)
D DIMER PPP FEU-MCNC: 0.31 MCGFEU/ML (ref 0–0.56)
DEPRECATED RDW RBC AUTO: 44 FL (ref 37–54)
EOSINOPHIL # BLD AUTO: 0.01 10*3/MM3 (ref 0–0.4)
EOSINOPHIL NFR BLD AUTO: 0.2 % (ref 0.3–6.2)
ERYTHROCYTE [DISTWIDTH] IN BLOOD BY AUTOMATED COUNT: 13.4 % (ref 12.3–15.4)
FERRITIN SERPL-MCNC: 555.8 NG/ML (ref 30–400)
FIBRINOGEN PPP-MCNC: 539 MG/DL (ref 215–430)
GFR SERPL CREATININE-BSD FRML MDRD: 86 ML/MIN/1.73
GLOBULIN UR ELPH-MCNC: 2.8 GM/DL
GLUCOSE BLDC GLUCOMTR-MCNC: 115 MG/DL (ref 70–130)
GLUCOSE BLDC GLUCOMTR-MCNC: 198 MG/DL (ref 70–130)
GLUCOSE BLDC GLUCOMTR-MCNC: 272 MG/DL (ref 70–130)
GLUCOSE BLDC GLUCOMTR-MCNC: 282 MG/DL (ref 70–130)
GLUCOSE SERPL-MCNC: 156 MG/DL (ref 65–99)
HCT VFR BLD AUTO: 44 % (ref 37.5–51)
HGB BLD-MCNC: 14 G/DL (ref 13–17.7)
IMM GRANULOCYTES # BLD AUTO: 0.03 10*3/MM3 (ref 0–0.05)
IMM GRANULOCYTES NFR BLD AUTO: 0.6 % (ref 0–0.5)
LDH SERPL-CCNC: 339 U/L (ref 135–225)
LYMPHOCYTES # BLD AUTO: 1.34 10*3/MM3 (ref 0.7–3.1)
LYMPHOCYTES NFR BLD AUTO: 26 % (ref 19.6–45.3)
MCH RBC QN AUTO: 28.9 PG (ref 26.6–33)
MCHC RBC AUTO-ENTMCNC: 31.8 G/DL (ref 31.5–35.7)
MCV RBC AUTO: 90.7 FL (ref 79–97)
MONOCYTES # BLD AUTO: 0.62 10*3/MM3 (ref 0.1–0.9)
MONOCYTES NFR BLD AUTO: 12 % (ref 5–12)
NEUTROPHILS NFR BLD AUTO: 3.14 10*3/MM3 (ref 1.7–7)
NEUTROPHILS NFR BLD AUTO: 61 % (ref 42.7–76)
NRBC BLD AUTO-RTO: 0 /100 WBC (ref 0–0.2)
PLAT MORPH BLD: NORMAL
PLATELET # BLD AUTO: 373 10*3/MM3 (ref 140–450)
PMV BLD AUTO: 10.4 FL (ref 6–12)
POTASSIUM SERPL-SCNC: 4.5 MMOL/L (ref 3.5–5.2)
PROT SERPL-MCNC: 6.7 G/DL (ref 6–8.5)
RBC # BLD AUTO: 4.85 10*6/MM3 (ref 4.14–5.8)
RBC MORPH BLD: NORMAL
SODIUM SERPL-SCNC: 137 MMOL/L (ref 136–145)
UNIT  ABO: NORMAL
UNIT  ABO: NORMAL
UNIT  RH: NORMAL
UNIT  RH: NORMAL
WBC # BLD AUTO: 5.15 10*3/MM3 (ref 3.4–10.8)
WBC MORPH BLD: NORMAL

## 2020-10-11 PROCEDURE — 85379 FIBRIN DEGRADATION QUANT: CPT | Performed by: INTERNAL MEDICINE

## 2020-10-11 PROCEDURE — 82728 ASSAY OF FERRITIN: CPT | Performed by: INTERNAL MEDICINE

## 2020-10-11 PROCEDURE — 25010000002 ENOXAPARIN PER 10 MG: Performed by: INTERNAL MEDICINE

## 2020-10-11 PROCEDURE — 85007 BL SMEAR W/DIFF WBC COUNT: CPT | Performed by: INTERNAL MEDICINE

## 2020-10-11 PROCEDURE — 94640 AIRWAY INHALATION TREATMENT: CPT

## 2020-10-11 PROCEDURE — 80053 COMPREHEN METABOLIC PANEL: CPT | Performed by: INTERNAL MEDICINE

## 2020-10-11 PROCEDURE — 94799 UNLISTED PULMONARY SVC/PX: CPT

## 2020-10-11 PROCEDURE — 63710000001 INSULIN LISPRO (HUMAN) PER 5 UNITS: Performed by: INTERNAL MEDICINE

## 2020-10-11 PROCEDURE — 82550 ASSAY OF CK (CPK): CPT | Performed by: INTERNAL MEDICINE

## 2020-10-11 PROCEDURE — 25010000002 ONDANSETRON PER 1 MG: Performed by: INTERNAL MEDICINE

## 2020-10-11 PROCEDURE — 85025 COMPLETE CBC W/AUTO DIFF WBC: CPT | Performed by: INTERNAL MEDICINE

## 2020-10-11 PROCEDURE — 82962 GLUCOSE BLOOD TEST: CPT

## 2020-10-11 PROCEDURE — 63710000001 DEXAMETHASONE PER 0.25 MG: Performed by: INTERNAL MEDICINE

## 2020-10-11 PROCEDURE — 83615 LACTATE (LD) (LDH) ENZYME: CPT | Performed by: INTERNAL MEDICINE

## 2020-10-11 PROCEDURE — 99232 SBSQ HOSP IP/OBS MODERATE 35: CPT | Performed by: INTERNAL MEDICINE

## 2020-10-11 PROCEDURE — 85384 FIBRINOGEN ACTIVITY: CPT | Performed by: INTERNAL MEDICINE

## 2020-10-11 PROCEDURE — 86140 C-REACTIVE PROTEIN: CPT | Performed by: INTERNAL MEDICINE

## 2020-10-11 RX ORDER — HYDRALAZINE HYDROCHLORIDE 20 MG/ML
10 INJECTION INTRAMUSCULAR; INTRAVENOUS ONCE
Status: DISCONTINUED | OUTPATIENT
Start: 2020-10-11 | End: 2020-10-11

## 2020-10-11 RX ORDER — AMLODIPINE BESYLATE 10 MG/1
10 TABLET ORAL DAILY
Status: DISCONTINUED | OUTPATIENT
Start: 2020-10-11 | End: 2020-10-12 | Stop reason: HOSPADM

## 2020-10-11 RX ORDER — ENALAPRILAT 2.5 MG/2ML
1.25 INJECTION INTRAVENOUS ONCE
Status: COMPLETED | OUTPATIENT
Start: 2020-10-11 | End: 2020-10-11

## 2020-10-11 RX ORDER — HYDRALAZINE HYDROCHLORIDE 25 MG/1
25 TABLET, FILM COATED ORAL ONCE
Status: COMPLETED | OUTPATIENT
Start: 2020-10-11 | End: 2020-10-11

## 2020-10-11 RX ORDER — LISINOPRIL 5 MG/1
5 TABLET ORAL
Status: DISCONTINUED | OUTPATIENT
Start: 2020-10-11 | End: 2020-10-11

## 2020-10-11 RX ADMIN — ROSUVASTATIN CALCIUM 10 MG: 10 TABLET, COATED ORAL at 09:13

## 2020-10-11 RX ADMIN — FAMOTIDINE 20 MG: 20 TABLET, FILM COATED ORAL at 09:12

## 2020-10-11 RX ADMIN — PANTOPRAZOLE SODIUM 40 MG: 40 TABLET, DELAYED RELEASE ORAL at 03:54

## 2020-10-11 RX ADMIN — ONDANSETRON 4 MG: 2 INJECTION INTRAMUSCULAR; INTRAVENOUS at 23:45

## 2020-10-11 RX ADMIN — INSULIN LISPRO 4 UNITS: 100 INJECTION, SOLUTION INTRAVENOUS; SUBCUTANEOUS at 12:30

## 2020-10-11 RX ADMIN — ALBUTEROL SULFATE 2 PUFF: 90 AEROSOL, METERED RESPIRATORY (INHALATION) at 20:21

## 2020-10-11 RX ADMIN — REMDESIVIR 100 MG: 100 INJECTION, POWDER, LYOPHILIZED, FOR SOLUTION INTRAVENOUS at 09:13

## 2020-10-11 RX ADMIN — DEXAMETHASONE 6 MG: 4 TABLET ORAL at 09:13

## 2020-10-11 RX ADMIN — HYDROCODONE BITARTRATE AND ACETAMINOPHEN 1 TABLET: 10; 325 TABLET ORAL at 00:06

## 2020-10-11 RX ADMIN — FAMOTIDINE 20 MG: 20 TABLET, FILM COATED ORAL at 19:42

## 2020-10-11 RX ADMIN — AMLODIPINE BESYLATE 10 MG: 10 TABLET ORAL at 09:13

## 2020-10-11 RX ADMIN — ENOXAPARIN SODIUM 40 MG: 40 INJECTION SUBCUTANEOUS at 09:13

## 2020-10-11 RX ADMIN — INSULIN LISPRO 4 UNITS: 100 INJECTION, SOLUTION INTRAVENOUS; SUBCUTANEOUS at 17:22

## 2020-10-11 RX ADMIN — SODIUM CHLORIDE, PRESERVATIVE FREE 10 ML: 5 INJECTION INTRAVENOUS at 19:42

## 2020-10-11 RX ADMIN — HYDRALAZINE HYDROCHLORIDE 25 MG: 25 TABLET, FILM COATED ORAL at 03:55

## 2020-10-11 RX ADMIN — ENALAPRILAT 1.25 MG: 1.25 INJECTION INTRAVENOUS at 00:29

## 2020-10-11 RX ADMIN — SILDENAFIL CITRATE 10 MG: 20 TABLET ORAL at 09:12

## 2020-10-11 RX ADMIN — ENOXAPARIN SODIUM 40 MG: 40 INJECTION SUBCUTANEOUS at 19:42

## 2020-10-11 NOTE — PLAN OF CARE
Goal Outcome Evaluation:  Plan of Care Reviewed With: patient  Progress: improving  Outcome Summary: pt received 4th dose of remdesovir today. he remains on ra without resp problems. no other complaints. dc home tmrw after 5th dose of remdesivir.

## 2020-10-11 NOTE — PROGRESS NOTES
INFECTIOUS DISEASE Progress Note    Sebastian Liao  1960  [id number]      Admission Date: 10/8/2020      Requesting Provider: Smita Sharp DO  Evaluating Physician: Omega Archibald MD    Reason for Consultation: COVID-19 pneumonia    History of present illness:    10/9/20:Patient is a 60 y.o. male employee of [employer] who is seen today for evaluation of COVID-19 pneumonia.  He initially developed weakness and fatigue approximately 2 weeks ago.  This was followed by nausea, anorexia, and malaise.  He also noted a change in his sense of taste.  He began experiencing a cough with minimal sputum production yesterday.  He presented to the emergency room and was found to have a positive COVID-19 PCR with a chest CTA revealing multifocal groundglass opacities consistent with COVID-19 pneumonia.  He was also found to have acute hypoxic respiratory failure and is requiring 2 L of oxygen.  He was started on Decadron and Remdesivir last p.m.  He has underlying type 2 diabetes mellitus along with obesity.  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  His wife is employed in a nursing home.  He indicates that his wife is being tested twice weekly for COVID-19 and that her tests have returned negative.  He states that they do not go out to eat or go to any other gatherings outside of the home.  10/10/20:He received 2 units of convalescent plasma overnight which he tolerated well per the nursing staff. He has remained afebrile. He is currently on room air with a 91% oxygen saturation.  He feels better today.  He has decreased dyspnea.  10/11/20: He has been afebrile. He is currently on room air with an O2 saturation of 91%.  He continues to feel better.  He denies increased cough and sputum production.    Past Medical History:   Diagnosis Date   • Acute bronchitis    • Cervicalgia    • Colon cancer (CMS/HCC)     pt reported on intake forms   • Edema    • HTN (hypertension)     • Hyperlipidemia    • IBS (irritable bowel syndrome)    • Low testosterone    • Lung cancer (CMS/HCC)     pt reported on intake forms       Past Surgical History:   Procedure Laterality Date   • CERVICAL FUSION     • COLONOSCOPY  07/2012    repeat in ten yrs   • HIP SURGERY Right 01/26/2016    Dr. Schwartz   • NECK SURGERY      cervical spine fusion       Family History   Problem Relation Age of Onset   • Cancer Mother         Ovarian,    • Hypertension Father    • Hypertension Sister    • Hypertension Maternal Grandmother    • Diabetes Maternal Grandmother        Social History     Socioeconomic History   • Marital status:      Spouse name: Not on file   • Number of children: Not on file   • Years of education: Not on file   • Highest education level: Not on file   Tobacco Use   • Smoking status: Former Smoker     Packs/day: 1.00     Years: 10.00     Pack years: 10.00     Types: Cigarettes   • Smokeless tobacco: Never Used   Substance and Sexual Activity   • Alcohol use: Yes     Comment: occasional   • Drug use: No   • Sexual activity: Defer   Social History Narrative    Left hand dominant, , Exercises daily.        No Known Allergies      Medication:    Current Facility-Administered Medications:   •  acetaminophen (TYLENOL) tablet 650 mg, 650 mg, Oral, Q4H PRN, Smita Sharp R, DO  •  albuterol sulfate HFA (PROVENTIL HFA;VENTOLIN HFA;PROAIR HFA) inhaler 2 puff, 2 puff, Inhalation, Q4H PRN, Smita Sharp, DO  •  aluminum-magnesium hydroxide-simethicone (MAALOX MAX) 400-400-40 MG/5ML suspension 15 mL, 15 mL, Oral, Q6H PRN, Valentine Acosta PA  •  amLODIPine (NORVASC) tablet 10 mg, 10 mg, Oral, Daily, Diana Strange II, DO  •  bisacodyl (DULCOLAX) EC tablet 10 mg, 10 mg, Oral, Daily PRN, Valentine Acosta PA, 10 mg at 10/10/20 0600  •  cyclobenzaprine (FLEXERIL) tablet 10 mg, 10 mg, Oral, BID PRN, Smita Sharp R, DO  •  dexamethasone (DECADRON) tablet 6 mg, 6 mg, Oral, Daily With Breakfast,  Diana Strange M II, DO, 6 mg at 10/10/20 1004  •  dextrose (D50W) 25 g/ 50mL Intravenous Solution 25 g, 25 g, Intravenous, Q15 Min PRN, Smita Sharp R, DO  •  dextrose (GLUTOSE) oral gel 15 g, 15 g, Oral, Q15 Min PRN, Smita Sharp R, DO  •  enoxaparin (LOVENOX) syringe 40 mg, 40 mg, Subcutaneous, Q12H, Diana Strange M II, DO, 40 mg at 10/10/20 2100  •  famotidine (PEPCID) tablet 20 mg, 20 mg, Oral, BID, Cristiane Day J, DO, 20 mg at 10/10/20 2100  •  glucagon (human recombinant) (GLUCAGEN DIAGNOSTIC) injection 1 mg, 1 mg, Subcutaneous, Q15 Min PRN, Smita Sharp R, DO  •  HYDROcodone-acetaminophen (NORCO)  MG per tablet 1 tablet, 1 tablet, Oral, Q4H PRN, Smita Sharp R, DO, 1 tablet at 10/11/20 0006  •  insulin lispro (humaLOG) injection 0-7 Units, 0-7 Units, Subcutaneous, TID AC, mSita Sharp R, DO, 2 Units at 10/10/20 1738  •  [COMPLETED] INV GS-5734 remdesivir 200 mg in sodium chloride 0.9 % 250 mL IVPB (powder vial), 200 mg, Intravenous, Q24H, 200 mg at 10/09/20 1206 **FOLLOWED BY** INV GS-5734 remdesivir 100 mg in sodium chloride 0.9 % 250 mL IVPB (powder vial), 100 mg, Intravenous, Q24H, Diana Strange M II, DO, 100 mg at 10/10/20 1004  •  morphine injection 2 mg, 2 mg, Intravenous, Q15 Min PRN, Smita Sharp R, DO, 2 mg at 10/08/20 2236  •  ondansetron (ZOFRAN) injection 4 mg, 4 mg, Intravenous, Q6H PRN, Smita Sharp R, DO, 4 mg at 10/09/20 0123  •  pantoprazole (PROTONIX) EC tablet 40 mg, 40 mg, Oral, QAM, Smita Sharp, DO, 40 mg at 10/11/20 0354  •  Pharmacy Consult - Remdesivir, , Does not apply, Continuous PRN, Diana Strange II, DO  •  rosuvastatin (CRESTOR) tablet 10 mg, 10 mg, Oral, Daily, Smita Sharp, DO, 10 mg at 10/10/20 1005  •  sildenafil (REVATIO) tablet 10 mg, 10 mg, Oral, Daily, Zeus Rangel PA-C  •  sodium chloride 0.9 % flush 10 mL, 10 mL, Intravenous, PRN, Smita Sharp, DO  •  sodium chloride 0.9 % flush 10 mL, 10 mL, Intravenous, Q12H,  Smita Sharp DO, 10 mL at 10/10/20 2101  •  sodium chloride 0.9 % flush 10 mL, 10 mL, Intravenous, PRN, Smita Sharp DO    Antibiotics:  Anti-Infectives (From admission, onward)    Ordered     Dose/Rate Route Frequency Start Stop    10/09/20 0953  INV GS-5734 remdesivir 100 mg in sodium chloride 0.9 % 250 mL IVPB (powder vial)     Ordering Provider: Diana Strange II, DO    100 mg  over 60 Minutes Intravenous Every 24 Hours 10/10/20 0900 10/14/20 0859    10/09/20 0953  INV GS-5734 remdesivir 200 mg in sodium chloride 0.9 % 250 mL IVPB (powder vial)     Ordering Provider: Diana Strange II, DO    200 mg  over 60 Minutes Intravenous Every 24 Hours 10/09/20 1045 10/09/20 1306    10/08/20 1953  cefTRIAXone (ROCEPHIN) 2 g/100 mL 0.9% NS IVPB (MBP)     Ordering Provider: Parmjit Hartley DO    2 g  over 30 Minutes Intravenous Once 10/08/20 1955 10/08/20 2058    10/08/20 1849  AZITHROMYCIN 500 MG/250 ML 0.9% NS IVPB (vial-mate)     Ordering Provider: Parmjit Hartley DO    500 mg  over 60 Minutes Intravenous Once 10/08/20 1851 10/08/20 2230            Review of Systems:  Constitutional-- He low grade subjective fevers.   HEENT-- No headache, earache or sore throat  CV-- No chest pain.  No history of valvular heart disease  Resp--he has a cough with minimal sputum production and some dyspnea.  GI-he has some nausea and anorexia but denies vomiting.  He did have some diarrhea.  He denies abdominal pain.  -- No dysuria, hematuria, or flank pain.  Denies hesitancy, urgency or flank pain.     Heme- No active bruising or bleeding;  MS-- no swelling or pain in the bones or joints of arms/legs. .  Neuro-- No acute focal weakness or numbness in the arms or legs.    Skin--No rashes or lesions      Physical Exam:   Vital Signs  Temp (24hrs), Av.1 °F (36.2 °C), Min:96.6 °F (35.9 °C), Max:97.7 °F (36.5 °C)    Temp  Min: 96.6 °F (35.9 °C)  Max: 97.7 °F (36.5 °C)  BP  Min: 143/116  Max: 177/106  Pulse  Min: 63  Max:  88  Resp  Min: 18  Max: 18  SpO2  Min: 81 %  Max: 100 %     His exam was performed with direct visualization from outside of the room in the setting of the COVID-19 pandemic with a critical shortage of PPE    GENERAL: Awake and alert, in no acute distress.   HEENT: NO LABIAL ULCERS   NECK: Supple   LYMPH: No visible cervical adenopathy  HEART: RRR;   LUNGS: Nonlabored respiration  ABDOMEN: OBESITY WITH NO ABDOMINAL DISTENTION   EXT:  No cyanosis, clubbing or edema.   :  Without Stock catheter.  MSK: No focal joint swelling or erythema  SKIN: Warm and dry without cutaneous eruptions on Inspection/palpation.    NEURO: Oriented to PPT.  motor 5/5 bilaterally.  PSYCHIATRIC: Normal insight and judgement. Cooperative with PE    Laboratory Data    Results from last 7 days   Lab Units 10/10/20  0903 10/09/20  0739 10/08/20  1436   WBC 10*3/mm3 5.17 4.73 5.17   HEMOGLOBIN g/dL 14.7 14.2 14.7   HEMATOCRIT % 46.0 45.1 46.4   PLATELETS 10*3/mm3 339 260 259     Results from last 7 days   Lab Units 10/11/20  0612   SODIUM mmol/L 137   POTASSIUM mmol/L 4.5   CHLORIDE mmol/L 99   CO2 mmol/L 27.0   BUN mg/dL 18   CREATININE mg/dL 1.07   GLUCOSE mg/dL 156*   CALCIUM mg/dL 8.6     Results from last 7 days   Lab Units 10/11/20  0612  10/09/20  0739   ALK PHOS U/L 60   < > 51  51   BILIRUBIN mg/dL 0.2   < > 0.3  0.3   BILIRUBIN DIRECT mg/dL  --   --  <0.2   ALT (SGPT) U/L 31   < > 22  22   AST (SGOT) U/L 53*   < > 51*  51*    < > = values in this interval not displayed.         Results from last 7 days   Lab Units 10/11/20  0612   CRP mg/dL 2.00*         Results from last 7 days   Lab Units 10/11/20  0612   CK TOTAL U/L 1,101*         Estimated Creatinine Clearance: 86.5 mL/min (by C-G formula based on SCr of 1.07 mg/dL).      Microbiology:  No results found for: ACANTHNAEG, AFBCX, BPERTUSSISCX, BLOODCX  No results found for: BCIDPCR, CXREFLEX, CSFCX, CULTURETIS  No results found for: CULTURES, HSVCX, URCX  No results found for:  EYECULTURE, GCCX, LABHSV  No results found for: LEGIONELLA, MRSACX, MUMPSCX, MYCOPLASCX  No results found for: NOCARDIACX, STOOLCX  No results found for: THROATCX, UNSTIMCULT, URINECX, CULTURE, VZVCULTUR  No results found for: VIRALCULTU, WOUNDCX        Radiology:  Imaging Results (Last 72 Hours)     Procedure Component Value Units Date/Time    CT Angiogram Chest [[id number]] Collected: 10/08/20 1739     Updated: 10/09/20 0729    Narrative:      EXAMINATION: CT ANGIOGRAM CHEST, CT ANGIOGRAM ABDOMEN/PELVIS -  10/08/2020      INDICATION: Abdominal pain.     TECHNIQUE: Spiral acquisition 3 mm post IV contrast images through the  chest, abdomen and pelvis with sagittal and coronal 2D reconstructions  and 3D VRT and MIP angiographic reconstructions.     The radiation dose reduction device was turned on for each scan per the  ALARA (As Low as Reasonably Achievable) protocol.     COMPARISON: None.     FINDINGS: History indicates generalized weakness and abdominal pain x 3  days, dyspnea, cough, fever, and diarrhea. Prior history of lung cancer  and colon cancer.     CHEST CT SCAN: There is good contrast opacification of the pulmonary  arteries. No definite pulmonary embolic disease is seen. A faint  suggestion of a tiny embolus in a left lower lobe pulmonary artery,  axial image #59 of series 5, is thin, linear and indistinct and may  represent a tiny chronic embolus, or may actually represent venous  mixing artifact. No similar findings are seen elsewhere. There is no  evidence of thoracic aortic aneurysm or dissection. No pericardial or  pleural effusion is seen.     Lung window images show multifocal groundglass disease bilaterally,  particularly in the upper lobes with fairly characteristic findings of  Covid 19 pneumonia. There is some diffuse peribronchial thickening in  the left lower lobe, which may represent a related or superimposed  bronchitis in the setting of bile bronchiectasis. No densely  consolidated lung  is seen.       Impression:      1. Multifocal groundglass disease in the lungs, suspicious for Covid 19  pneumonia or other similar atypical pneumonia. Separate pattern of  peribronchial thickening and interstitial disease in the left lower lobe  resembles a bronchitis.     2. Trace linear filling defect in a left lower lobe pulmonary artery,  thought to either be a 1 mm chronic embolus or simply venous mixing  artifact. No evidence of significant pulmonary embolic disease.           ANGIOGRAPHIC CT SCAN OF THE ABDOMEN AND PELVIS: There is no evidence of  significant aortic or iliac stenosis. No aneurysm is seen. There is  extensive fatty liver change approaching the appearance of FELIX. No  hepatic lesions are identified. No significant abnormalities are seen of  the gallbladder, spleen, pancreas, adrenal glands, or kidneys except for  small right lower pole renal cyst. No upper abdominal free air, ascites,  adenopathy or acute inflammatory change is seen. Large and small bowel  loops are normal in caliber and normal in appearance.     Regarding the lower abdomen and pelvis, there are scattered sigmoid  diverticula without evidence of diverticulitis. No mass or inflammatory  change is seen. There is a right inguinal hernia containing fat and also  an edge of the bladder, which is elongated and extends into the hernia  defect for several centimeters. There is minimal if any associated  edema. Incidental note is made of a right hip prosthesis. Bony  structures appear grossly intact.     IMPRESSION:   1. Right inguinal hernia containing a portion of the bladder. Minimal if  any associated edema.     2. Extensive fatty liver change.     3. Sigmoid diverticulosis without evidence of diverticulitis.     DICTATED:   10/08/2020  EDITED/ls :   10/08/2020         This report was finalized on 10/9/2020 7:26 AM by Dr. Conner Coon MD.       CT Angiogram Abdomen Pelvis [[id number]] Collected: 10/08/20 1739     Updated: 10/09/20  0729    Narrative:      EXAMINATION: CT ANGIOGRAM CHEST, CT ANGIOGRAM ABDOMEN/PELVIS -  10/08/2020      INDICATION: Abdominal pain.     TECHNIQUE: Spiral acquisition 3 mm post IV contrast images through the  chest, abdomen and pelvis with sagittal and coronal 2D reconstructions  and 3D VRT and MIP angiographic reconstructions.     The radiation dose reduction device was turned on for each scan per the  ALARA (As Low as Reasonably Achievable) protocol.     COMPARISON: None.     FINDINGS: History indicates generalized weakness and abdominal pain x 3  days, dyspnea, cough, fever, and diarrhea. Prior history of lung cancer  and colon cancer.     CHEST CT SCAN: There is good contrast opacification of the pulmonary  arteries. No definite pulmonary embolic disease is seen. A faint  suggestion of a tiny embolus in a left lower lobe pulmonary artery,  axial image #59 of series 5, is thin, linear and indistinct and may  represent a tiny chronic embolus, or may actually represent venous  mixing artifact. No similar findings are seen elsewhere. There is no  evidence of thoracic aortic aneurysm or dissection. No pericardial or  pleural effusion is seen.     Lung window images show multifocal groundglass disease bilaterally,  particularly in the upper lobes with fairly characteristic findings of  Covid 19 pneumonia. There is some diffuse peribronchial thickening in  the left lower lobe, which may represent a related or superimposed  bronchitis in the setting of bile bronchiectasis. No densely  consolidated lung is seen.       Impression:      1. Multifocal groundglass disease in the lungs, suspicious for Covid 19  pneumonia or other similar atypical pneumonia. Separate pattern of  peribronchial thickening and interstitial disease in the left lower lobe  resembles a bronchitis.     2. Trace linear filling defect in a left lower lobe pulmonary artery,  thought to either be a 1 mm chronic embolus or simply venous mixing  artifact.  No evidence of significant pulmonary embolic disease.           ANGIOGRAPHIC CT SCAN OF THE ABDOMEN AND PELVIS: There is no evidence of  significant aortic or iliac stenosis. No aneurysm is seen. There is  extensive fatty liver change approaching the appearance of FELIX. No  hepatic lesions are identified. No significant abnormalities are seen of  the gallbladder, spleen, pancreas, adrenal glands, or kidneys except for  small right lower pole renal cyst. No upper abdominal free air, ascites,  adenopathy or acute inflammatory change is seen. Large and small bowel  loops are normal in caliber and normal in appearance.     Regarding the lower abdomen and pelvis, there are scattered sigmoid  diverticula without evidence of diverticulitis. No mass or inflammatory  change is seen. There is a right inguinal hernia containing fat and also  an edge of the bladder, which is elongated and extends into the hernia  defect for several centimeters. There is minimal if any associated  edema. Incidental note is made of a right hip prosthesis. Bony  structures appear grossly intact.     IMPRESSION:   1. Right inguinal hernia containing a portion of the bladder. Minimal if  any associated edema.     2. Extensive fatty liver change.     3. Sigmoid diverticulosis without evidence of diverticulitis.     DICTATED:   10/08/2020  EDITED/ls :   10/08/2020         This report was finalized on 10/9/2020 7:26 AM by Dr. Conner Coon MD.       XR Chest 1 View [[id number]] Collected: 10/08/20 1606     Updated: 10/08/20 1701    Narrative:      EXAMINATION: XR CHEST 1 VW-10/08/2020:      INDICATION: Weak/Dizzy/AMS, triage protocol.      COMPARISON: Chest x-ray 03/09/2014.     FINDINGS: Cardiac size borderline enlarged. Increased central pulmonary  vascularity and perihilar lung markings without consolidation. No  pneumothorax or pleural effusion. Degenerative changes of the spine.           Impression:      Increased central pulmonary vascularity  without focal  consolidation or effusion.     D:  10/08/2020  E:  10/08/2020     This report was finalized on 10/8/2020 4:57 PM by Dr. Alexey Moscoso.           I read the radiographic studies.      Impression:  1. Covid19 Pneumonia-with acute hypoxic respiratory failure.  He is improving on his combination therapy.  He should be ready to discharge tomorrow.  2. Acute Hypoxic Respiratory Failure  3. Type 2 diabetes mellitus-this is a risk factor for an adverse outcome  4.  Morbid obesity-this is a risk factor for an adverse outcome    PLAN/RECOMMENDATIONS:  1.  Status post COVID-19 convalescent plasma- 10/9  2.  Continue Decadron 6 mg daily x10 days  3.  Continue remdesivir IV daily x5 days     He should be able to discharge tomorrow after his dose of remdesivir in the morning.  I discussed his situation with Dr. Strange today.  Appointment to see me on Thursday 10/15 by telehealth            Omega Archibald MD  10/11/2020  08:28 EDT

## 2020-10-11 NOTE — PLAN OF CARE
Problem: Adult Inpatient Plan of Care  Goal: Plan of Care Review  Flowsheets (Taken 10/11/2020 0431)  Outcome Summary: Blood pressures have been 160's-170's  over 100. BRANDIN Villarreal notified and blood pressure medication reordered. Vasotec znd hydralazine given. Patient remained on room air with no complaints of SOB. No other changes noted. Will continue with plan of care.   Goal Outcome Evaluation:  Plan of Care Reviewed With: patient  Progress: improving  Outcome Summary: Blood pressures have been 160's-170's  over 100. BRANDIN Villarreal notified and blood pressure medication reordered. Vasotec znd hydralazine given. Patient remained on room air with no complaints of SOB. No other changes noted. Will continue with plan of care.

## 2020-10-11 NOTE — PROGRESS NOTES
[facility] Medicine Services  PROGRESS NOTE    Patient Name: Sebastian Liao  : 1960  MRN: [id number]    Date of Admission: 10/8/2020  Primary Care Physician: Fransico Ngo PA    Subjective   Subjective     CC: f/u covid pneumonia    HPI: Up walking around room. Breathing comfortably. Eating more. Abd pain better.     Review of Systems  Gen- No fevers, chills  CV- No chest pain, palpitations  Resp- No cough, dyspnea  GI- No N/V/D, abd pain    Objective   Objective     Vital Signs:   Temp:  [96.6 °F (35.9 °C)-97.7 °F (36.5 °C)] 97.6 °F (36.4 °C)  Heart Rate:  [63-88] 71  Resp:  [18] 18  BP: (143-177)/() 167/99        Physical Exam:  With patient's consent, physical exam was conducted via visual telemedicine encounter with bedside portion accommodated by nursing staff due to patient's current isolation requirements in the interest of PPE conservation.    Constitutional: No acute distress, awake, alert, nontoxic, obese  HENT: NCAT, MMM, no conjunctival injection  Respiratory: Good effort, nonlabored respirations   Cardiovascular: RRR on tele  Musculoskeletal: No edema, normal muscle tone and mass for age  GI: Soft, no grimace, nondistended, obese  Psychiatric: Appropriate affect, good insight and judgement, cooperative  Neurologic: Oriented x 3, movements symmetric BUE and BLE, speech clear and fluent  Skin: No visible rashes, no jaundice seen on exposed skin      Results Reviewed:  Results from last 7 days   Lab Units 10/11/20  0612 10/10/20  0903 10/09/20  0739   WBC 10*3/mm3 5.15 5.17 4.73   HEMOGLOBIN g/dL 14.0 14.7 14.2   HEMATOCRIT % 44.0 46.0 45.1   PLATELETS 10*3/mm3 373 339 260     Results from last 7 days   Lab Units 10/11/20  0612 10/10/20  0903 10/09/20  0739 10/08/20  1436   SODIUM mmol/L 137 137 136 137   POTASSIUM mmol/L 4.5 3.9 4.1 4.1   CHLORIDE mmol/L 99 98 98 97*   CO2 mmol/L 27.0 28.0 29.0 31.0*   BUN mg/dL 18 16 13 13   CREATININE mg/dL 1.07 1.26  1.14 1.27   GLUCOSE mg/dL 156* 88 70 116*   CALCIUM mg/dL 8.6 9.1 8.5* 8.9   ALT (SGPT) U/L 31 29 22  22 20   AST (SGOT) U/L 53* 73* 51*  51* 49*   TROPONIN T ng/mL  --   --   --  <0.010     Estimated Creatinine Clearance: 86.5 mL/min (by C-G formula based on SCr of 1.07 mg/dL).    Microbiology Results Abnormal     Procedure Component Value - Date/Time    Respiratory Panel PCR w/COVID-19(SARS-CoV-2) [facility] In-House, NP Swab in UTM/VTM, 3-4 HR TAT - Swab, Nasopharynx [[id number]]  (Abnormal) Collected: 10/08/20 1902    Lab Status: Final result Specimen: Swab from Nasopharynx Updated: 10/08/20 2028     ADENOVIRUS, PCR Not Detected     Coronavirus 229E Not Detected     Coronavirus HKU1 Not Detected     Coronavirus NL63 Not Detected     Coronavirus OC43 Not Detected     COVID19 Detected     Human Metapneumovirus Not Detected     Human Rhinovirus/Enterovirus Not Detected     Influenza A PCR Not Detected     Influenza A H1 Not Detected     Influenza A H1 2009 PCR Not Detected     Influenza A H3 Not Detected     Influenza B PCR Not Detected     Parainfluenza Virus 1 Not Detected     Parainfluenza Virus 2 Not Detected     Parainfluenza Virus 3 Not Detected     Parainfluenza Virus 4 Not Detected     RSV, PCR Not Detected     Bordetella pertussis pcr Not Detected     Bordetella parapertussis PCR Not Detected     Chlamydophila pneumoniae PCR Not Detected     Mycoplasma pneumo by PCR Not Detected    Narrative:      Fact sheet for providers: https://docs.[employer]/wp-content/uploads/QQI9002-7805-SL8.1-EUA-Provider-Fact-Sheet-3.pdf    Fact sheet for patients: https://docs.[employer]/wp-content/uploads/QAA5984-5192-UC1.1-EUA-Patient-Fact-Sheet-1.pdf          Imaging Results (Last 24 Hours)     ** No results found for the last 24 hours. **               I have reviewed the medications:  Scheduled Meds:amLODIPine, 10 mg, Oral, Daily  dexamethasone, 6 mg, Oral, Daily With Breakfast  enoxaparin, 40 mg,  Subcutaneous, Q12H  famotidine, 20 mg, Oral, BID  insulin lispro, 0-7 Units, Subcutaneous, TID [facility] [id number] remdesivir in NS IVPB, 100 mg, Intravenous, Q24H  pantoprazole, 40 mg, Oral, QAM  rosuvastatin, 10 mg, Oral, Daily  sildenafil, 10 mg, Oral, Daily  sodium chloride, 10 mL, Intravenous, Q12H      Continuous Infusions:Pharmacy Consult - Remdesivir,       PRN Meds:.•  acetaminophen  •  albuterol sulfate HFA  •  aluminum-magnesium hydroxide-simethicone  •  bisacodyl  •  cyclobenzaprine  •  dextrose  •  dextrose  •  glucagon (human recombinant)  •  HYDROcodone-acetaminophen  •  Morphine  •  ondansetron  •  Pharmacy Consult - Remdesivir  •  sodium chloride  •  sodium chloride    Assessment/Plan   Assessment & Plan     Active Hospital Problems    Diagnosis  POA   • **Pneumonia due to COVID-19 virus [U07.1, J12.89]  Yes   • Bilateral pneumonia [J18.9]  Yes   • Diabetes type 2, controlled (CMS/HCC) [E11.9]  Yes   • Essential hypertension, benign [I10]  Yes   • Hyperlipidemia [E78.5]  Yes      Resolved Hospital Problems   No resolved problems to display.        Brief Hospital Course to date:  Sebastian Liao is a 60 y.o. male with PMHx significant for HTN, HLD, DM II, hx of colon cancer and lung cancer, IBS. He presented to the ED with complaints of debilitating fatigue and abdominal pain. In the ED CTA of his chest was negative for PE, however showed multifocal ground glass opacities. His respiratory panel returned positive for COVID-19. Due to being high risk and severe weakness he will be admitted for further treatment. He received 2 units of plasma 10/9 which he tolerated well. Has also been started on IV remdesevir + steroids and is doing well.     Multifocal Pneumonia secondary to COVID-19  Hypoxia, better  - Doing well this am. Is s/p 2 units plasma 10/9. Continue decadron 6mg for 10 days, Remdesevir for 5.  - ID following. Have d/w Dr. Archibald, hopeful that if patient continues to do well will be able to  d/c tomorrow.  - Continue supportive care, PRN albuterol inhaler  - Labs in am.     A/C Abdominal Pain  Inguinal Hernia:  - As demonstrated on CT abd/pelvis, right inguinal hernia with small portion of the bladder, would consider outpatient surgical referral once he has recovered from COVID  - Pain much improved. Tells me he has chronic abdomina pain but his nausea and pain are now worse, suspect as manifestation of COVID infection as above.  - Encouraged use of his narcotics prn, IV zofran.  - Increased bowel regimen.     DMII:  - Fairly well controlled, a little worse due to steroids but overall remains controlled. Continue LDSSI as needed.     HTN:  - Uncontrolled, mostly due to steroid use. Increased amlodipine. PRN IV hydralazine.     GERD:  - Continue protonix     Obesity     Weakness     This patient's problems and plans were partially entered by my partner and updated as appropriate by me 10/11/20.       I discussed care with his wife Shani via phone @ 1106 and explained he is doing well. Updated her that he is doing well and will likely get to come home tomorrow.     DVT prophylaxis:  lovenox 30mg BID    CODE STATUS:   Code Status and Medical Interventions:   Ordered at: 10/08/20 7152     Level Of Support Discussed With:    Patient     Code Status:    CPR     Medical Interventions (Level of Support Prior to Arrest):    Full       Diana Strange II, DO  10/11/20

## 2020-10-12 ENCOUNTER — READMISSION MANAGEMENT (OUTPATIENT)
Dept: CALL CENTER | Facility: HOSPITAL | Age: 60
End: 2020-10-12

## 2020-10-12 VITALS
RESPIRATION RATE: 16 BRPM | WEIGHT: 245 LBS | HEART RATE: 78 BPM | HEIGHT: 65 IN | DIASTOLIC BLOOD PRESSURE: 96 MMHG | SYSTOLIC BLOOD PRESSURE: 151 MMHG | TEMPERATURE: 97.1 F | BODY MASS INDEX: 40.82 KG/M2 | OXYGEN SATURATION: 96 %

## 2020-10-12 PROBLEM — J12.82 PNEUMONIA DUE TO COVID-19 VIRUS: Status: RESOLVED | Noted: 2020-10-08 | Resolved: 2020-10-12

## 2020-10-12 PROBLEM — J18.9 BILATERAL PNEUMONIA: Status: RESOLVED | Noted: 2020-10-08 | Resolved: 2020-10-12

## 2020-10-12 PROBLEM — U07.1 PNEUMONIA DUE TO COVID-19 VIRUS: Status: RESOLVED | Noted: 2020-10-08 | Resolved: 2020-10-12

## 2020-10-12 LAB
BASOPHILS # BLD AUTO: 0.04 10*3/MM3 (ref 0–0.2)
BASOPHILS NFR BLD AUTO: 0.5 % (ref 0–1.5)
DEPRECATED RDW RBC AUTO: 43.1 FL (ref 37–54)
EOSINOPHIL # BLD AUTO: 0.01 10*3/MM3 (ref 0–0.4)
EOSINOPHIL NFR BLD AUTO: 0.1 % (ref 0.3–6.2)
ERYTHROCYTE [DISTWIDTH] IN BLOOD BY AUTOMATED COUNT: 13.2 % (ref 12.3–15.4)
GLUCOSE BLDC GLUCOMTR-MCNC: 123 MG/DL (ref 70–130)
GLUCOSE BLDC GLUCOMTR-MCNC: 217 MG/DL (ref 70–130)
HCT VFR BLD AUTO: 45.9 % (ref 37.5–51)
HGB BLD-MCNC: 14.7 G/DL (ref 13–17.7)
IMM GRANULOCYTES # BLD AUTO: 0.05 10*3/MM3 (ref 0–0.05)
IMM GRANULOCYTES NFR BLD AUTO: 0.7 % (ref 0–0.5)
LYMPHOCYTES # BLD AUTO: 1.77 10*3/MM3 (ref 0.7–3.1)
LYMPHOCYTES NFR BLD AUTO: 23 % (ref 19.6–45.3)
MCH RBC QN AUTO: 29 PG (ref 26.6–33)
MCHC RBC AUTO-ENTMCNC: 32 G/DL (ref 31.5–35.7)
MCV RBC AUTO: 90.5 FL (ref 79–97)
MONOCYTES # BLD AUTO: 0.73 10*3/MM3 (ref 0.1–0.9)
MONOCYTES NFR BLD AUTO: 9.5 % (ref 5–12)
NEUTROPHILS NFR BLD AUTO: 5.09 10*3/MM3 (ref 1.7–7)
NEUTROPHILS NFR BLD AUTO: 66.2 % (ref 42.7–76)
NRBC BLD AUTO-RTO: 0 /100 WBC (ref 0–0.2)
PLATELET # BLD AUTO: 414 10*3/MM3 (ref 140–450)
PMV BLD AUTO: 10.3 FL (ref 6–12)
RBC # BLD AUTO: 5.07 10*6/MM3 (ref 4.14–5.8)
WBC # BLD AUTO: 7.69 10*3/MM3 (ref 3.4–10.8)

## 2020-10-12 PROCEDURE — 25010000002 ENOXAPARIN PER 10 MG: Performed by: INTERNAL MEDICINE

## 2020-10-12 PROCEDURE — 85025 COMPLETE CBC W/AUTO DIFF WBC: CPT | Performed by: INTERNAL MEDICINE

## 2020-10-12 PROCEDURE — 82962 GLUCOSE BLOOD TEST: CPT

## 2020-10-12 PROCEDURE — 99239 HOSP IP/OBS DSCHRG MGMT >30: CPT | Performed by: INTERNAL MEDICINE

## 2020-10-12 PROCEDURE — 25010000002 INFLUENZA VAC SPLIT QUAD 0.5 ML SUSPENSION PREFILLED SYRINGE: Performed by: INTERNAL MEDICINE

## 2020-10-12 PROCEDURE — 90686 IIV4 VACC NO PRSV 0.5 ML IM: CPT | Performed by: INTERNAL MEDICINE

## 2020-10-12 PROCEDURE — 63710000001 DEXAMETHASONE PER 0.25 MG: Performed by: INTERNAL MEDICINE

## 2020-10-12 PROCEDURE — G0008 ADMIN INFLUENZA VIRUS VAC: HCPCS | Performed by: INTERNAL MEDICINE

## 2020-10-12 RX ORDER — FAMOTIDINE 20 MG/1
20 TABLET, FILM COATED ORAL 2 TIMES DAILY
Qty: 60 TABLET | Refills: 0 | Status: SHIPPED | OUTPATIENT
Start: 2020-10-12 | End: 2020-10-22 | Stop reason: SDUPTHER

## 2020-10-12 RX ORDER — ONDANSETRON 4 MG/1
4 TABLET, FILM COATED ORAL EVERY 8 HOURS PRN
Qty: 30 TABLET | Refills: 0 | Status: SHIPPED | OUTPATIENT
Start: 2020-10-12 | End: 2021-02-23 | Stop reason: ALTCHOICE

## 2020-10-12 RX ORDER — DEXAMETHASONE 2 MG/1
6 TABLET ORAL
Qty: 18 TABLET | Refills: 0 | Status: SHIPPED | OUTPATIENT
Start: 2020-10-13 | End: 2020-10-19

## 2020-10-12 RX ORDER — ACETAMINOPHEN 325 MG/1
650 TABLET ORAL EVERY 4 HOURS PRN
Start: 2020-10-12 | End: 2020-12-30

## 2020-10-12 RX ORDER — DICYCLOMINE HCL 20 MG
20 TABLET ORAL EVERY 6 HOURS
Qty: 30 TABLET | Refills: 0 | Status: SHIPPED | OUTPATIENT
Start: 2020-10-12 | End: 2020-10-22 | Stop reason: SDUPTHER

## 2020-10-12 RX ORDER — AMLODIPINE BESYLATE 10 MG/1
10 TABLET ORAL DAILY
Qty: 30 TABLET | Refills: 0 | Status: SHIPPED | OUTPATIENT
Start: 2020-10-12 | End: 2020-10-22 | Stop reason: SDUPTHER

## 2020-10-12 RX ORDER — FLUTICASONE PROPIONATE 50 MCG
2 SPRAY, SUSPENSION (ML) NASAL DAILY
Qty: 18.2 ML | Refills: 0 | Status: SHIPPED | OUTPATIENT
Start: 2020-10-12 | End: 2021-05-01 | Stop reason: SDUPTHER

## 2020-10-12 RX ADMIN — REMDESIVIR 100 MG: 100 INJECTION, POWDER, LYOPHILIZED, FOR SOLUTION INTRAVENOUS at 09:18

## 2020-10-12 RX ADMIN — INSULIN LISPRO 3 UNITS: 100 INJECTION, SOLUTION INTRAVENOUS; SUBCUTANEOUS at 12:19

## 2020-10-12 RX ADMIN — SILDENAFIL CITRATE 10 MG: 20 TABLET ORAL at 09:19

## 2020-10-12 RX ADMIN — BISACODYL 10 MG: 5 TABLET, COATED ORAL at 09:19

## 2020-10-12 RX ADMIN — ROSUVASTATIN CALCIUM 10 MG: 10 TABLET, COATED ORAL at 09:18

## 2020-10-12 RX ADMIN — PANTOPRAZOLE SODIUM 40 MG: 40 TABLET, DELAYED RELEASE ORAL at 05:57

## 2020-10-12 RX ADMIN — HYDROCODONE BITARTRATE AND ACETAMINOPHEN 1 TABLET: 10; 325 TABLET ORAL at 09:26

## 2020-10-12 RX ADMIN — ACETAMINOPHEN 650 MG: 325 TABLET, FILM COATED ORAL at 05:57

## 2020-10-12 RX ADMIN — AMLODIPINE BESYLATE 10 MG: 10 TABLET ORAL at 09:18

## 2020-10-12 RX ADMIN — SODIUM CHLORIDE, PRESERVATIVE FREE 10 ML: 5 INJECTION INTRAVENOUS at 09:19

## 2020-10-12 RX ADMIN — ENOXAPARIN SODIUM 40 MG: 40 INJECTION SUBCUTANEOUS at 09:18

## 2020-10-12 RX ADMIN — DEXAMETHASONE 6 MG: 4 TABLET ORAL at 09:18

## 2020-10-12 RX ADMIN — INFLUENZA VIRUS VACCINE 0.5 ML: 15; 15; 15; 15 SUSPENSION INTRAMUSCULAR at 12:19

## 2020-10-12 RX ADMIN — FAMOTIDINE 20 MG: 20 TABLET, FILM COATED ORAL at 09:18

## 2020-10-12 NOTE — OUTREACH NOTE
Prep Survey      Responses   [facility] patient discharged from?  [place]   Is LACE score < 7 ?  No   Eligibility  [facility]   Date of Admission  10/08/20   Date of Discharge  10/12/20   Discharge Disposition  Home or Self Care   Discharge diagnosis  Pneumonia due to COVID-19 virus   Does the patient have one of the following disease processes/diagnoses(primary or secondary)?  COVID-19   Does the patient have Home health ordered?  No   Is there a DME ordered?  No   Prep survey completed?  Yes          Maria Guadalupe Donaldson RN

## 2020-10-12 NOTE — DISCHARGE INSTR - APPOINTMENTS
Omega Archibald MD   Infectious Diseases [phone]  [address] [facility] [address] [place] [id number]     Next Steps: Follow up on 10/15/2020      Instructions: Appointment to see me on Thursday 10/15 at 1300 by telehealth-this appointment has been made

## 2020-10-12 NOTE — DISCHARGE SUMMARY
[facility] Medicine Services  DISCHARGE SUMMARY    Patient Name: Sebastian Liao  : 1960  MRN: [id number]    Date of Admission: 10/8/2020  3:33 PM  Date of Discharge:  10/12/2020  Primary Care Physician: Fransico Ngo PA    Consults     Date and Time Order Name Status Description    10/9/2020 1200 Inpatient Infectious Diseases Consult Completed           Hospital Course     Presenting Problem:   Bilateral pneumonia [J18.9]    Active Hospital Problems    Diagnosis  POA   • Diabetes type 2, controlled (CMS/HCC) [E11.9]  Yes   • Essential hypertension, benign [I10]  Yes   • Hyperlipidemia [E78.5]  Yes      Resolved Hospital Problems    Diagnosis Date Resolved POA   • **Pneumonia due to COVID-19 virus [U07.1, J12.89] 10/12/2020 Yes   • Bilateral pneumonia [J18.9] 10/12/2020 Yes          Hospital Course:  Sebastian Liao is a 60 y.o. male with PMHx significant for HTN, HLD, DM II, hx of colon cancer and lung cancer, IBS. He presented to the ED with complaints of debilitating fatigue and abdominal pain. In the ED CTA of his chest was negative for PE, however showed multifocal ground glass opacities. His respiratory panel returned positive for COVID-19. Due to being high risk and severe weakness he will be admitted for further treatment. He received 2 units of plasma 10/9 which he tolerated well. Has also been started on IV remdesevir + steroids and is doing well.     Multifocal Pneumonia secondary to COVID-19  Hypoxia, better  - Patient was started on IV Remdesevir (completed 5 day course) and PO decadron upon arrival. Is s/p 2 units plasma 10/9. He continued to show improvement throughout his stay. Will be discharged home w/ PO decadron for 5 more days, pulse oximeter.  - ID followed throughout stay. Patient can be seen via telehealth by Dr Archibald in 3 days.    Inguinal Hernia  - As demonstrated on CT abd/pelvis, right inguinal hernia with small portion of the bladder. D/W  him that he can consider outpatient surgical referral once he has recovered from COVID if this is causing him discomfort.    Discharge Follow Up Recommendations for outpatient labs/diagnostics:   F/U Dr. Archibald via telehealth in 3 days.   F/U PCP in 2 weeks   Please reschedule your urology appointment for early November.    Day of Discharge     HPI: Overnight had HA and abdominal cramping which has since resolved. Up walking around room. Doing well. Looking forward to going home.    Review of Systems  Gen- No fevers, chills  CV- No chest pain, palpitations  Resp- No cough, dyspnea  GI- No N/V/D, abd pain    Vital Signs:   Temp:  [95.6 °F (35.3 °C)-97.6 °F (36.4 °C)] 97 °F (36.1 °C)  Heart Rate:  [55-85] 70  Resp:  [18] 18  BP: (119-163)/(82-92) 151/92     Physical Exam:  With patient's consent, physical exam was conducted via visual telemedicine encounter with bedside portion accommodated by nursing staff due to patient's current isolation requirements in the interest of PPE conservation.    Constitutional: No acute distress, awake, alert, nontoxic, obese, up walking around room.  HENT: NCAT, MMM, no conjunctival injection  Respiratory: Clear to auscultation bilaterally, good effort, nonlabored respirations   Cardiovascular: RRR on tele  Musculoskeletal: No edema, normal muscle tone and mass for age  GI: Soft, no grimace, nondistended, obese  Psychiatric: Appropriate affect, good insight and judgement, cooperative  Neurologic: Oriented x 3, movements symmetric BUE and BLE, speech clear and fluent  Skin: No visible rashes, no jaundice seen on exposed skin through video camera      Pertinent  and/or Most Recent Results     Results from last 7 days   Lab Units 10/11/20  0612 10/10/20  0903 10/09/20  0739 10/08/20  1436   WBC 10*3/mm3 5.15 5.17 4.73 5.17   HEMOGLOBIN g/dL 14.0 14.7 14.2 14.7   HEMATOCRIT % 44.0 46.0 45.1 46.4   PLATELETS 10*3/mm3 373 339 260 259   SODIUM mmol/L 137 137 136 137   POTASSIUM mmol/L 4.5  3.9 4.1 4.1   CHLORIDE mmol/L 99 98 98 97*   CO2 mmol/L 27.0 28.0 29.0 31.0*   BUN mg/dL 18 16 13 13   CREATININE mg/dL 1.07 1.26 1.14 1.27   GLUCOSE mg/dL 156* 88 70 116*   CALCIUM mg/dL 8.6 9.1 8.5* 8.9     Results from last 7 days   Lab Units 10/11/20  0612 10/10/20  0903 10/09/20  0739 10/08/20  1436   BILIRUBIN mg/dL 0.2 0.3 0.3  0.3 0.4   ALK PHOS U/L 60 62 51  51 57   ALT (SGPT) U/L 31 29 22  22 20   AST (SGOT) U/L 53* 73* 51*  51* 49*           Invalid input(s): TG, LDLCALC, LDLREALC  Results from last 7 days   Lab Units 10/08/20  1436   TROPONIN T ng/mL <0.010       Brief Urine Lab Results  (Last result in the past 365 days)      Color   Clarity   Blood   Leuk Est   Nitrite   Protein   CREAT   Urine HCG        10/08/20 1647 Yellow Clear Negative Negative Negative 100 mg/dL (2+)               Microbiology Results Abnormal     Procedure Component Value - Date/Time    Respiratory Panel PCR w/COVID-19(SARS-CoV-2) [facility] In-House, NP Swab in UTM/VTM, 3-4 HR TAT - Swab, Nasopharynx [[id number]]  (Abnormal) Collected: 10/08/20 1902    Lab Status: Final result Specimen: Swab from Nasopharynx Updated: 10/08/20 2028     ADENOVIRUS, PCR Not Detected     Coronavirus 229E Not Detected     Coronavirus HKU1 Not Detected     Coronavirus NL63 Not Detected     Coronavirus OC43 Not Detected     COVID19 Detected     Human Metapneumovirus Not Detected     Human Rhinovirus/Enterovirus Not Detected     Influenza A PCR Not Detected     Influenza A H1 Not Detected     Influenza A H1 2009 PCR Not Detected     Influenza A H3 Not Detected     Influenza B PCR Not Detected     Parainfluenza Virus 1 Not Detected     Parainfluenza Virus 2 Not Detected     Parainfluenza Virus 3 Not Detected     Parainfluenza Virus 4 Not Detected     RSV, PCR Not Detected     Bordetella pertussis pcr Not Detected     Bordetella parapertussis PCR Not Detected     Chlamydophila pneumoniae PCR Not Detected     Mycoplasma pneumo by PCR Not  Detected    Narrative:      Fact sheet for providers: https://docs.[employer]/wp-content/uploads/IAU5688-8105-HD4.1-EUA-Provider-Fact-Sheet-3.pdf    Fact sheet for patients: https://docs.[employer]/wp-content/uploads/OQJ9499-6021-HM8.1-EUA-Patient-Fact-Sheet-1.pdf          Imaging Results (All)     Procedure Component Value Units Date/Time    CT Angiogram Chest [[id number]] Collected: 10/08/20 1739     Updated: 10/09/20 0729    Narrative:      EXAMINATION: CT ANGIOGRAM CHEST, CT ANGIOGRAM ABDOMEN/PELVIS -  10/08/2020      INDICATION: Abdominal pain.     TECHNIQUE: Spiral acquisition 3 mm post IV contrast images through the  chest, abdomen and pelvis with sagittal and coronal 2D reconstructions  and 3D VRT and MIP angiographic reconstructions.     The radiation dose reduction device was turned on for each scan per the  ALARA (As Low as Reasonably Achievable) protocol.     COMPARISON: None.     FINDINGS: History indicates generalized weakness and abdominal pain x 3  days, dyspnea, cough, fever, and diarrhea. Prior history of lung cancer  and colon cancer.     CHEST CT SCAN: There is good contrast opacification of the pulmonary  arteries. No definite pulmonary embolic disease is seen. A faint  suggestion of a tiny embolus in a left lower lobe pulmonary artery,  axial image #59 of series 5, is thin, linear and indistinct and may  represent a tiny chronic embolus, or may actually represent venous  mixing artifact. No similar findings are seen elsewhere. There is no  evidence of thoracic aortic aneurysm or dissection. No pericardial or  pleural effusion is seen.     Lung window images show multifocal groundglass disease bilaterally,  particularly in the upper lobes with fairly characteristic findings of  Covid 19 pneumonia. There is some diffuse peribronchial thickening in  the left lower lobe, which may represent a related or superimposed  bronchitis in the setting of bile bronchiectasis. No densely  consolidated  lung is seen.       Impression:      1. Multifocal groundglass disease in the lungs, suspicious for Covid 19  pneumonia or other similar atypical pneumonia. Separate pattern of  peribronchial thickening and interstitial disease in the left lower lobe  resembles a bronchitis.     2. Trace linear filling defect in a left lower lobe pulmonary artery,  thought to either be a 1 mm chronic embolus or simply venous mixing  artifact. No evidence of significant pulmonary embolic disease.           ANGIOGRAPHIC CT SCAN OF THE ABDOMEN AND PELVIS: There is no evidence of  significant aortic or iliac stenosis. No aneurysm is seen. There is  extensive fatty liver change approaching the appearance of FELIX. No  hepatic lesions are identified. No significant abnormalities are seen of  the gallbladder, spleen, pancreas, adrenal glands, or kidneys except for  small right lower pole renal cyst. No upper abdominal free air, ascites,  adenopathy or acute inflammatory change is seen. Large and small bowel  loops are normal in caliber and normal in appearance.     Regarding the lower abdomen and pelvis, there are scattered sigmoid  diverticula without evidence of diverticulitis. No mass or inflammatory  change is seen. There is a right inguinal hernia containing fat and also  an edge of the bladder, which is elongated and extends into the hernia  defect for several centimeters. There is minimal if any associated  edema. Incidental note is made of a right hip prosthesis. Bony  structures appear grossly intact.     IMPRESSION:   1. Right inguinal hernia containing a portion of the bladder. Minimal if  any associated edema.     2. Extensive fatty liver change.     3. Sigmoid diverticulosis without evidence of diverticulitis.     DICTATED:   10/08/2020  EDITED/ls :   10/08/2020         This report was finalized on 10/9/2020 7:26 AM by Dr. Conner Coon MD.       CT Angiogram Abdomen Pelvis [[id number]] Collected: 10/08/20 5591     Updated:  10/09/20 0729    Narrative:      EXAMINATION: CT ANGIOGRAM CHEST, CT ANGIOGRAM ABDOMEN/PELVIS -  10/08/2020      INDICATION: Abdominal pain.     TECHNIQUE: Spiral acquisition 3 mm post IV contrast images through the  chest, abdomen and pelvis with sagittal and coronal 2D reconstructions  and 3D VRT and MIP angiographic reconstructions.     The radiation dose reduction device was turned on for each scan per the  ALARA (As Low as Reasonably Achievable) protocol.     COMPARISON: None.     FINDINGS: History indicates generalized weakness and abdominal pain x 3  days, dyspnea, cough, fever, and diarrhea. Prior history of lung cancer  and colon cancer.     CHEST CT SCAN: There is good contrast opacification of the pulmonary  arteries. No definite pulmonary embolic disease is seen. A faint  suggestion of a tiny embolus in a left lower lobe pulmonary artery,  axial image #59 of series 5, is thin, linear and indistinct and may  represent a tiny chronic embolus, or may actually represent venous  mixing artifact. No similar findings are seen elsewhere. There is no  evidence of thoracic aortic aneurysm or dissection. No pericardial or  pleural effusion is seen.     Lung window images show multifocal groundglass disease bilaterally,  particularly in the upper lobes with fairly characteristic findings of  Covid 19 pneumonia. There is some diffuse peribronchial thickening in  the left lower lobe, which may represent a related or superimposed  bronchitis in the setting of bile bronchiectasis. No densely  consolidated lung is seen.       Impression:      1. Multifocal groundglass disease in the lungs, suspicious for Covid 19  pneumonia or other similar atypical pneumonia. Separate pattern of  peribronchial thickening and interstitial disease in the left lower lobe  resembles a bronchitis.     2. Trace linear filling defect in a left lower lobe pulmonary artery,  thought to either be a 1 mm chronic embolus or simply venous  mixing  artifact. No evidence of significant pulmonary embolic disease.           ANGIOGRAPHIC CT SCAN OF THE ABDOMEN AND PELVIS: There is no evidence of  significant aortic or iliac stenosis. No aneurysm is seen. There is  extensive fatty liver change approaching the appearance of FELIX. No  hepatic lesions are identified. No significant abnormalities are seen of  the gallbladder, spleen, pancreas, adrenal glands, or kidneys except for  small right lower pole renal cyst. No upper abdominal free air, ascites,  adenopathy or acute inflammatory change is seen. Large and small bowel  loops are normal in caliber and normal in appearance.     Regarding the lower abdomen and pelvis, there are scattered sigmoid  diverticula without evidence of diverticulitis. No mass or inflammatory  change is seen. There is a right inguinal hernia containing fat and also  an edge of the bladder, which is elongated and extends into the hernia  defect for several centimeters. There is minimal if any associated  edema. Incidental note is made of a right hip prosthesis. Bony  structures appear grossly intact.     IMPRESSION:   1. Right inguinal hernia containing a portion of the bladder. Minimal if  any associated edema.     2. Extensive fatty liver change.     3. Sigmoid diverticulosis without evidence of diverticulitis.     DICTATED:   10/08/2020  EDITED/ls :   10/08/2020         This report was finalized on 10/9/2020 7:26 AM by Dr. Conner Coon MD.       XR Chest 1 View [[id number]] Collected: 10/08/20 1606     Updated: 10/08/20 1701    Narrative:      EXAMINATION: XR CHEST 1 VW-10/08/2020:      INDICATION: Weak/Dizzy/AMS, triage protocol.      COMPARISON: Chest x-ray 03/09/2014.     FINDINGS: Cardiac size borderline enlarged. Increased central pulmonary  vascularity and perihilar lung markings without consolidation. No  pneumothorax or pleural effusion. Degenerative changes of the spine.           Impression:      Increased central pulmonary  vascularity without focal  consolidation or effusion.     D:  10/08/2020  E:  10/08/2020     This report was finalized on 10/8/2020 4:57 PM by Dr. Alexey Moscoso.                              Discharge Details        Discharge Medications      New Medications      Instructions Start Date   acetaminophen 325 MG tablet  Commonly known as: TYLENOL   650 mg, Oral, Every 4 Hours PRN      dexamethasone 6 MG tablet  Commonly known as: DECADRON   6 mg, Oral, Daily With Breakfast   Start Date: October 13, 2020     famotidine 20 MG tablet  Commonly known as: PEPCID   20 mg, Oral, 2 times daily      fluticasone 50 MCG/ACT nasal spray  Commonly known as: Flonase   2 sprays, Nasal, Daily      ondansetron 4 MG tablet  Commonly known as: Zofran   4 mg, Oral, Every 8 Hours PRN         Changes to Medications      Instructions Start Date   amLODIPine 10 MG tablet  Commonly known as: NORVASC  What changed:   · medication strength  · how much to take   10 mg, Oral, Daily         Continue These Medications      Instructions Start Date   albuterol sulfate  (90 Base) MCG/ACT inhaler  Commonly known as: PROVENTIL HFA;VENTOLIN HFA;PROAIR HFA   2 puffs, Inhalation, Every 4 Hours PRN      cyclobenzaprine 10 MG tablet  Commonly known as: FLEXERIL   10 mg, Oral, 2 Times Daily PRN      dicyclomine 20 MG tablet  Commonly known as: BENTYL   20 mg, Oral, Every 6 Hours      glyBURIDE-metFORMIN 5-500 MG per tablet  Commonly known as: Glucovance   1 tablet, Oral, 2 Times Daily With Meals      HYDROcodone-acetaminophen  MG per tablet  Commonly known as: NORCO   1 tablet, Oral, 4 Times Daily      loratadine 10 MG tablet  Commonly known as: Claritin   10 mg, Oral, Daily      olopatadine 0.1 % ophthalmic solution  Commonly known as: PATANOL   No dose, route, or frequency recorded.      omeprazole 40 MG capsule  Commonly known as: priLOSEC   TAKE ONE CAPSULE BY MOUTH DAILY      promethazine 25 MG tablet  Commonly known as: PHENERGAN   TAKE ONE  TABLET BY MOUTH EVERY 6 HOURS AS NEEDED FOR NAUSEA AND VOMITING      rosuvastatin 10 MG tablet  Commonly known as: CRESTOR   10 mg, Oral, Daily      sildenafil 100 MG tablet  Commonly known as: VIAGRA   No dose, route, or frequency recorded.      Testosterone Cypionate 200 MG/ML injection  Commonly known as: DEPOTESTOTERONE CYPIONATE   200 mg, Intramuscular, Every 14 Days         Stop These Medications    carisoprodol 350 MG tablet  Commonly known as: SOMA     cefdinir 300 MG capsule  Commonly known as: OMNICEF            No Known Allergies      Discharge Disposition:  Home or Self Care    Diet:  Hospital:  Diet Order   Procedures   • Diet Regular; Cardiac, Consistent Carbohydrate     Restrictions or Other Recommendations:  • (+) positive COVID-19 test result  • The majority of patients with a positive test result will recover, symptom severity is variable among those infected.  • Certain comorbidities such as COPD/asthma, DM, CAD and others can increase the risk of more severe illness.  • For this reason, Sebastian Liao strongly encouraged to practice the safest standards in protecting their health and others given the endemic concerns.  Patient was advised to:  o  Practice social distancing including limiting contact with family (even in the same home) and friends as much as possible for at least 14 days.  o Implement 14 day home self-quarantining, with the exception of seeking required or essential medical care.  o Continue to wash their hands frequently with soap and hot water, and cover their cough.   • For support of non-emergent symptoms expected with this virus such as increased shortness of breath, fever, cough, or other questions, Sebastian Liao was asked to please call their primary care physician’s office or the [place] hotline at [phone].  • Sebastian Liao was advised to seek care in the Emergency Department should extreme symptoms arise such as new onset confusion, extreme lethargy,  hypoxia, or new hypotension.  • Questions were engaged and answered to the best of my ability, patient expressed verbal understanding of their test results and my advice.         CODE STATUS:    Code Status and Medical Interventions:   Ordered at: 10/08/20 2238     Level Of Support Discussed With:    Patient     Code Status:    CPR     Medical Interventions (Level of Support Prior to Arrest):    Full       Future Appointments   Date Time Provider Department Center   11/23/2020 10:00 AM Fransico Ngo PA E  [facility] None       Additional Instructions for the Follow-ups that You Need to Schedule     Discharge Follow-up with PCP   As directed       Currently Documented PCP:    Fransico Ngo PA    PCP Phone Number:    [phone]     Follow Up Details: 2 week hospital follow up         Discharge Follow-up with Specialty: [facility] - [facility] via telehealth; 3 Days   As directed      Specialty: [facility] - Arch[facility]ald via telehealth    Follow Up: 3 Days         Discharge Follow-up with Specialty: Reschedule your Urology appointment for at least 2 weeks from now   As directed      Specialty: Reschedule your Urology appointment for at least 2 weeks from now                     Diana Strange II, DO  10/12/20      Time Spent on Discharge:  I spent  34 minutes on this discharge activity which included: face-to-face encounter with the patient, reviewing the data in the system, coordination of the care with the nursing staff as well as consultants, documentation, and entering orders.

## 2020-10-12 NOTE — PROGRESS NOTES
Case Management Discharge Note      Final Note: Per MD rounds, patient likely to discharg today.  Spoke with patient via telephone regarding discharge plan.  Patient denies SOA today but did report having SOA last night.  Patient currently on RA with O2 sat of 97%.  CM advised that a Pulse Oximeter will be provided for him to take home to monitor his O2 Sats with edcuation by the RN.  No other discharge needs or concerns verbalized.  Pulse Ox provided to RN.  Patient plan is to discharge home today via car with family to transport.    Provided Post Acute Provider List?: N/A  N/A Provider List Comment: denies need    Selected Continued Care - Admitted Since 10/8/2020     Destination    No services have been selected for the patient.              Durable Medical Equipment    No services have been selected for the patient.              Dialysis/Infusion    No services have been selected for the patient.              Home Medical Care    No services have been selected for the patient.              Therapy    No services have been selected for the patient.              Community Resources    No services have been selected for the patient.                       Final Discharge Disposition Code: 01 - home or self-care

## 2020-10-12 NOTE — PLAN OF CARE
Problem: Fluid Imbalance (Pneumonia)  Goal: Fluid Balance  Outcome: Ongoing, Progressing     Problem: Infection (Pneumonia)  Goal: Resolution of Infection Signs and Symptoms  Outcome: Ongoing, Progressing  Intervention: Prevent Infection Progression  Recent Flowsheet Documentation  Taken 10/12/2020 0400 by Oscar Haider RN  Isolation Precautions:   airborne precautions maintained   contact precautions maintained  Taken 10/12/2020 0200 by Oscar Haider RN  Isolation Precautions:   airborne precautions maintained   contact precautions maintained  Taken 10/12/2020 0000 by Oscar Haider RN  Isolation Precautions:   airborne precautions maintained   contact precautions maintained  Taken 10/11/2020 2200 by Oscar Haider RN  Isolation Precautions:   airborne precautions maintained   contact precautions maintained  Taken 10/11/2020 2000 by Oscar Haider RN  Isolation Precautions: airborne precautions maintained     Problem: Respiratory Compromise (Pneumonia)  Goal: Effective Oxygenation and Ventilation  Outcome: Ongoing, Progressing  Intervention: Promote Airway Secretion Clearance  Recent Flowsheet Documentation  Taken 10/11/2020 2000 by Oscar Haider RN  Cough And Deep Breathing: done independently per patient  Intervention: Optimize Oxygenation and Ventilation  Recent Flowsheet Documentation  Taken 10/12/2020 0400 by Oscar Haider RN  Head of Bed (HOB): HOB at 20-30 degrees  Taken 10/12/2020 0200 by Oscar Haider RN  Head of Bed (HOB): HOB at 20-30 degrees  Taken 10/12/2020 0000 by Oscar Haider RN  Head of Bed (HOB): HOB at 20-30 degrees  Taken 10/11/2020 2200 by Oscar Haider RN  Head of Bed (HOB): HOB at 20-30 degrees  Taken 10/11/2020 2000 by Oscar Haider RN  Head of Bed (HOB): HOB at 30-45 degrees     Problem: Adult Inpatient Plan of Care  Goal: Plan of Care Review  Outcome: Ongoing, Progressing  Flowsheets (Taken 10/12/2020 0500)  Progress: improving  Plan  of Care Reviewed With: patient  Outcome Summary: Pt rested well throughout shift. VSS. RA for most of shift but pt did complain of feeling SOA around MN and 2L nc was placed. Pt for the most part, up walking around in room. Voiding without diffuclty. Denied any pain, discomfort. Plan plan of care discussed with pt. Verbalized understanding. Plan of d/c today.  Goal: Patient-Specific Goal (Individualized)  Outcome: Ongoing, Progressing  Goal: Absence of Hospital-Acquired Illness or Injury  Outcome: Ongoing, Progressing  Intervention: Identify and Manage Fall Risk  Recent Flowsheet Documentation  Taken 10/12/2020 0400 by Oscar Haider RN  Safety Promotion/Fall Prevention:   activity supervised   assistive device/personal items within reach   clutter free environment maintained   nonskid shoes/slippers when out of bed   room organization consistent   safety round/check completed   toileting scheduled  Taken 10/12/2020 0200 by Oscar Haider RN  Safety Promotion/Fall Prevention:   activity supervised   assistive device/personal items within reach   clutter free environment maintained   room organization consistent   safety round/check completed   nonskid shoes/slippers when out of bed   toileting scheduled  Taken 10/12/2020 0000 by Oscar Haider RN  Safety Promotion/Fall Prevention:   activity supervised   assistive device/personal items within reach   clutter free environment maintained   room organization consistent   safety round/check completed   toileting scheduled   nonskid shoes/slippers when out of bed  Taken 10/11/2020 2200 by Oscar Haider RN  Safety Promotion/Fall Prevention:   activity supervised   assistive device/personal items within reach   clutter free environment maintained   nonskid shoes/slippers when out of bed   room organization consistent   safety round/check completed   toileting scheduled  Taken 10/11/2020 2000 by Oscar Haider, RN  Safety Promotion/Fall Prevention:    activity supervised   nonskid shoes/slippers when out of bed   room organization consistent   safety round/check completed   toileting scheduled   assistive device/personal items within reach   clutter free environment maintained  Intervention: Prevent Skin Injury  Recent Flowsheet Documentation  Taken 10/12/2020 0400 by Oscar Haider RN  Body Position:   position changed independently   neutral body alignment  Taken 10/12/2020 0200 by Oscar Haider RN  Body Position:   position changed independently   side-lying, left  Taken 10/12/2020 0000 by Oscar Haider RN  Body Position:   position changed independently   neutral body alignment  Taken 10/11/2020 2200 by Oscar Haider RN  Body Position:   position changed independently   neutral body alignment  Taken 10/11/2020 2000 by Oscar Haider RN  Body Position:   position changed independently   dangle, side of bed  Intervention: Prevent and Manage VTE (venous thromboembolism) Risk  Recent Flowsheet Documentation  Taken 10/11/2020 2000 by Oscar Haider RN  VTE Prevention/Management: (see mar)   bilateral   dorsiflexion/plantar flexion performed   other (see comments)  Intervention: Prevent Infection  Recent Flowsheet Documentation  Taken 10/12/2020 0400 by Oscar Haider RN  Infection Prevention:   environmental surveillance performed   single patient room provided   rest/sleep promoted  Taken 10/12/2020 0200 by Oscar Haider RN  Infection Prevention:   environmental surveillance performed   rest/sleep promoted   single patient room provided  Taken 10/12/2020 0000 by Oscar Haider RN  Infection Prevention:   environmental surveillance performed   single patient room provided   rest/sleep promoted  Taken 10/11/2020 2200 by Oscar Haider RN  Infection Prevention:   environmental surveillance performed   rest/sleep promoted   single patient room provided  Taken 10/11/2020 2000 by Oscar Haider RN  Infection Prevention:    environmental surveillance performed   rest/sleep promoted   single patient room provided  Goal: Optimal Comfort and Wellbeing  Outcome: Ongoing, Progressing  Intervention: Provide Person-Centered Care  Recent Flowsheet Documentation  Taken 10/11/2020 2000 by Oscar Haider RN  Trust Relationship/Rapport:   care explained   choices provided   questions answered   questions encouraged   thoughts/feelings acknowledged  Goal: Readiness for Transition of Care  Outcome: Ongoing, Progressing   Goal Outcome Evaluation:  Plan of Care Reviewed With: patient  Progress: improving  Outcome Summary: Pt rested well throughout shift. VSS. RA for most of shift but pt did complain of feeling SOA around MN and 2L nc was placed. Pt for the most part, up walking around in room. Voiding without diffuclty. Denied any pain, discomfort. Plan plan of care discussed with pt. Verbalized understanding. Plan of d/c today.

## 2020-10-12 NOTE — PROGRESS NOTES
INFECTIOUS DISEASE Progress Note    Sebastian Liao  1960  [id number]      Admission Date: 10/8/2020      Requesting Provider: Smita Sharp DO  Evaluating Physician: Omega Archibald MD    Reason for Consultation: COVID-19 pneumonia    History of present illness:    10/9/20:Patient is a 60 y.o. male employee of [employer] who is seen today for evaluation of COVID-19 pneumonia.  He initially developed weakness and fatigue approximately 2 weeks ago.  This was followed by nausea, anorexia, and malaise.  He also noted a change in his sense of taste.  He began experiencing a cough with minimal sputum production yesterday.  He presented to the emergency room and was found to have a positive COVID-19 PCR with a chest CTA revealing multifocal groundglass opacities consistent with COVID-19 pneumonia.  He was also found to have acute hypoxic respiratory failure and is requiring 2 L of oxygen.  He was started on Decadron and Remdesivir last p.m.  He has underlying type 2 diabetes mellitus along with obesity.  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  His wife is employed in a nursing home.  He indicates that his wife is being tested twice weekly for COVID-19 and that her tests have returned negative.  He states that they do not go out to eat or go to any other gatherings outside of the home.  10/10/20:He received 2 units of convalescent plasma overnight which he tolerated well per the nursing staff. He has remained afebrile. He is currently on room air with a 91% oxygen saturation.  He feels better today.  He has decreased dyspnea.  10/11/20: He has been afebrile. He is currently on room air with an O2 saturation of 91%.  He continues to feel better.  He denies increased cough and sputum production.  10/12/2020: He continues to feel better.  He has decreased dyspnea.  He remains off of supplemental oxygen.  He would like to go home today.    Past Medical History:    Diagnosis Date   • Acute bronchitis    • Cervicalgia    • Colon cancer (CMS/HCC)     pt reported on intake forms   • Edema    • HTN (hypertension)    • Hyperlipidemia    • IBS (irritable bowel syndrome)    • Low testosterone    • Lung cancer (CMS/HCC)     pt reported on intake forms       Past Surgical History:   Procedure Laterality Date   • CERVICAL FUSION     • COLONOSCOPY  07/2012    repeat in ten yrs   • HIP SURGERY Right 01/26/2016    Dr. Schwartz   • NECK SURGERY      cervical spine fusion       Family History   Problem Relation Age of Onset   • Cancer Mother         Ovarian,    • Hypertension Father    • Hypertension Sister    • Hypertension Maternal Grandmother    • Diabetes Maternal Grandmother        Social History     Socioeconomic History   • Marital status:      Spouse name: Not on file   • Number of children: Not on file   • Years of education: Not on file   • Highest education level: Not on file   Tobacco Use   • Smoking status: Former Smoker     Packs/day: 1.00     Years: 10.00     Pack years: 10.00     Types: Cigarettes   • Smokeless tobacco: Never Used   Substance and Sexual Activity   • Alcohol use: Yes     Comment: occasional   • Drug use: No   • Sexual activity: Defer   Social History Narrative    Left hand dominant, , Exercises daily.        No Known Allergies      Medication:    Current Facility-Administered Medications:   •  acetaminophen (TYLENOL) tablet 650 mg, 650 mg, Oral, Q4H PRN, Smita Sharp R, DO, 650 mg at 10/12/20 0557  •  albuterol sulfate HFA (PROVENTIL HFA;VENTOLIN HFA;PROAIR HFA) inhaler 2 puff, 2 puff, Inhalation, Q4H PRN, Smita Sharp R, DO, 2 puff at 10/11/20 2021  •  aluminum-magnesium hydroxide-simethicone (MAALOX MAX) 400-400-40 MG/5ML suspension 15 mL, 15 mL, Oral, Q6H PRN, Valentine Acosta PA  •  amLODIPine (NORVASC) tablet 10 mg, 10 mg, Oral, Daily, Diana Strange II, DO, 10 mg at 10/12/20 0918  •  bisacodyl (DULCOLAX) EC tablet 10 mg, 10 mg,  Oral, Daily PRN, Valentine Acosta PA, 10 mg at 10/12/20 0919  •  cyclobenzaprine (FLEXERIL) tablet 10 mg, 10 mg, Oral, BID PRN, Smita Sharp R, DO  •  dexamethasone (DECADRON) tablet 6 mg, 6 mg, Oral, Daily With Breakfast, Diana Strange M II, DO, 6 mg at 10/12/20 0918  •  dextrose (D50W) 25 g/ 50mL Intravenous Solution 25 g, 25 g, Intravenous, Q15 Min PRN, Smita Sharp R, DO  •  dextrose (GLUTOSE) oral gel 15 g, 15 g, Oral, Q15 Min PRN, Smita Sharp R, DO  •  enoxaparin (LOVENOX) syringe 40 mg, 40 mg, Subcutaneous, Q12H, Diana Strange M II, DO, 40 mg at 10/12/20 0918  •  famotidine (PEPCID) tablet 20 mg, 20 mg, Oral, BID, Cristiane Day, DO, 20 mg at 10/12/20 0918  •  glucagon (human recombinant) (GLUCAGEN DIAGNOSTIC) injection 1 mg, 1 mg, Subcutaneous, Q15 Min PRN, Smita Sharp R, DO  •  HYDROcodone-acetaminophen (NORCO)  MG per tablet 1 tablet, 1 tablet, Oral, Q4H PRN, Smita Sharp R, DO, 1 tablet at 10/12/20 0926  •  insulin lispro (humaLOG) injection 0-7 Units, 0-7 Units, Subcutaneous, TID AC, Smita Sharp R, DO, 4 Units at 10/11/20 1722  •  [COMPLETED] INV GS-5734 remdesivir 200 mg in sodium chloride 0.9 % 250 mL IVPB (powder vial), 200 mg, Intravenous, Q24H, 200 mg at 10/09/20 1206 **FOLLOWED BY** INV [id number] remdesivir 100 mg in sodium chloride 0.9 % 250 mL IVPB (powder vial), 100 mg, Intravenous, Q24H, Diana Strange M II, DO, 100 mg at 10/12/20 0918  •  morphine injection 2 mg, 2 mg, Intravenous, Q15 Min PRN, Smita Sharp DO, 2 mg at 10/08/20 2236  •  ondansetron (ZOFRAN) injection 4 mg, 4 mg, Intravenous, Q6H PRN, Smita Sharp DO, 4 mg at 10/11/20 2345  •  pantoprazole (PROTONIX) EC tablet 40 mg, 40 mg, Oral, QAM, Smita Sharp DO, 40 mg at 10/12/20 0557  •  Pharmacy Consult - Remdesivir, , Does not apply, Continuous PRN, Diana Strange II, DO  •  rosuvastatin (CRESTOR) tablet 10 mg, 10 mg, Oral, Daily, Smita Sharp DO, 10 mg at 10/12/20 0918  •  sildenafil  (REVATIO) tablet 10 mg, 10 mg, Oral, Daily, Zeus Rangel, PA-C, 10 mg at 10/12/20 0919  •  sodium chloride 0.9 % flush 10 mL, 10 mL, Intravenous, PRN, Lila, Smita R, DO  •  sodium chloride 0.9 % flush 10 mL, 10 mL, Intravenous, Q12H, Smita Sharp R, DO, 10 mL at 10/12/20 0919  •  sodium chloride 0.9 % flush 10 mL, 10 mL, Intravenous, PRN, Lila, Smita R, DO    Antibiotics:  Anti-Infectives (From admission, onward)    Ordered     Dose/Rate Route Frequency Start Stop    10/09/20 0953  INV GS-5734 remdesivir 100 mg in sodium chloride 0.9 % 250 mL IVPB (powder vial)     Ordering Provider: Diana Strange II, DO    100 mg  over 60 Minutes Intravenous Every 24 Hours 10/10/20 0900 10/14/20 0859    10/09/20 0953  INV [id number] remdesivir 200 mg in sodium chloride 0.9 % 250 mL IVPB (powder vial)     Ordering Provider: Diana Strange II, DO    200 mg  over 60 Minutes Intravenous Every 24 Hours 10/09/20 1045 10/09/20 1306    10/08/20 1953  cefTRIAXone (ROCEPHIN) 2 g/100 mL 0.9% NS IVPB (MBP)     Ordering Provider: Parmjit Hartley DO    2 g  over 30 Minutes Intravenous Once 10/08/20 1955 10/08/20 2058    10/08/20 1849  AZITHROMYCIN 500 MG/250 ML 0.9% NS IVPB (vial-mate)     Ordering Provider: Parmjit Hartley DO    500 mg  over 60 Minutes Intravenous Once 10/08/20 1851 10/08/20 2230            Review of Systems:  Constitutional-- He low grade subjective fevers.   HEENT-- No headache, earache or sore throat  CV-- No chest pain.  No history of valvular heart disease  Resp--he has a cough with minimal sputum production and some dyspnea.  GI-he has some nausea and anorexia but denies vomiting.  He did have some diarrhea.  He denies abdominal pain.  -- No dysuria, hematuria, or flank pain.  Denies hesitancy, urgency or flank pain.     Heme- No active bruising or bleeding;  MS-- no swelling or pain in the bones or joints of arms/legs. .  Neuro-- No acute focal weakness or numbness in the arms or legs.    Skin--No rashes or  lesions      Physical Exam:   Vital Signs  Temp (24hrs), Av.1 °F (36.2 °C), Min:95.6 °F (35.3 °C), Max:97.6 °F (36.4 °C)    Temp  Min: 95.6 °F (35.3 °C)  Max: 97.6 °F (36.4 °C)  BP  Min: 119/85  Max: 163/82  Pulse  Min: 55  Max: 85  Resp  Min: 18  Max: 18  SpO2  Min: 94 %  Max: 98 %     His exam was performed with direct visualization from outside of the room in the setting of the COVID-19 pandemic with a critical shortage of PPE    GENERAL: Awake and alert, in no acute distress.   HEENT: NO LABIAL ULCERS   NECK: Supple   LYMPH: No visible cervical adenopathy  HEART: RRR;   LUNGS: Nonlabored respiration  ABDOMEN: OBESITY WITH NO ABDOMINAL DISTENTION   EXT:  No cyanosis, clubbing or edema.   :  Without Stock catheter.  MSK: No focal joint swelling or erythema  SKIN: Warm and dry without cutaneous eruptions on Inspection/palpation.    NEURO: Oriented to PPT.  motor 5/5 bilaterally.  PSYCHIATRIC: Normal insight and judgement. Cooperative with PE    Laboratory Data    Results from last 7 days   Lab Units 10/11/20  0612 10/10/20  0903 10/09/20  0739   WBC 10*3/mm3 5.15 5.17 4.73   HEMOGLOBIN g/dL 14.0 14.7 14.2   HEMATOCRIT % 44.0 46.0 45.1   PLATELETS 10*3/mm3 373 339 260     Results from last 7 days   Lab Units 10/11/20  0612   SODIUM mmol/L 137   POTASSIUM mmol/L 4.5   CHLORIDE mmol/L 99   CO2 mmol/L 27.0   BUN mg/dL 18   CREATININE mg/dL 1.07   GLUCOSE mg/dL 156*   CALCIUM mg/dL 8.6     Results from last 7 days   Lab Units 10/11/20  0612  10/09/20  0739   ALK PHOS U/L 60   < > 51  51   BILIRUBIN mg/dL 0.2   < > 0.3  0.3   BILIRUBIN DIRECT mg/dL  --   --  <0.2   ALT (SGPT) U/L 31   < > 22  22   AST (SGOT) U/L 53*   < > 51*  51*    < > = values in this interval not displayed.         Results from last 7 days   Lab Units 10/11/20  0612   CRP mg/dL 2.00*         Results from last 7 days   Lab Units 10/11/20  0612   CK TOTAL U/L 1,101*         Estimated Creatinine Clearance: 86.5 mL/min (by C-G formula based on  SCr of 1.07 mg/dL).      Microbiology:  No results found for: ACANTHNAEG, AFBCX, BPERTUSSISCX, BLOODCX  No results found for: BCIDPCR, CXREFLEX, CSFCX, CULTURETIS  No results found for: CULTURES, HSVCX, URCX  No results found for: EYECULTURE, GCCX, LABHSV  No results found for: LEGIONELLA, MRSACX, MUMPSCX, MYCOPLASCX  No results found for: NOCARDIACX, STOOLCX  No results found for: THROATCX, UNSTIMCULT, URINECX, CULTURE, VZVCULTUR  No results found for: VIRALCULTU, WOUNDCX        Radiology:  Imaging Results (Last 72 Hours)     ** No results found for the last 72 hours. **        I read the radiographic studies.      Impression:  1. Covid19 Pneumonia-with acute hypoxic respiratory failure.  He is improving on his combination therapy.  He should be ready to discharge tomorrow.  2. Acute Hypoxic Respiratory Failure  3. Type 2 diabetes mellitus-this is a risk factor for an adverse outcome  4.  Morbid obesity-this is a risk factor for an adverse outcome    PLAN/RECOMMENDATIONS:  1.  Status post COVID-19 convalescent plasma- 10/9  2.  Continue Decadron 6 mg daily x10 days  3.  Complete remdesivir IV daily this am  4.  Appointment to see me on Thursday 10/15 at 1300 by telehealth-this appointment has been made               Omega Archibald MD  10/12/2020  10:25 EDT

## 2020-10-12 NOTE — TELEPHONE ENCOUNTER
Patient presented to ED the same day he called our office and has been admitted with pneumonia due to COVID-19.

## 2020-10-12 NOTE — NURSING NOTE
Patient received discharge instructions, medications, and pulse oximeter. Verbalized understanding. Wife updated on POC. Flu shot administered and patient received card and instructions on how to notify his workplace he received vaccine. IV and tele removed.

## 2020-10-13 ENCOUNTER — TRANSITIONAL CARE MANAGEMENT TELEPHONE ENCOUNTER (OUTPATIENT)
Dept: CALL CENTER | Facility: HOSPITAL | Age: 60
End: 2020-10-13

## 2020-10-14 ENCOUNTER — READMISSION MANAGEMENT (OUTPATIENT)
Dept: CALL CENTER | Facility: HOSPITAL | Age: 60
End: 2020-10-14

## 2020-10-14 NOTE — OUTREACH NOTE
COVID-19 Week 1 Survey      Responses   [facility] patient discharged from?  [facility]   Does the patient have one of the following disease processes/diagnoses(primary or secondary)?  COVID-19   COVID-19 underlying condition?  None   Call Number  Call 2   Week 1 Call successful?  Yes   Call start time  1515   General alerts for this patient  Pt is employee    Discharge diagnosis  Pneumonia due to COVID-19 virus   Meds reviewed with patient/caregiver?  Yes   Is the patient having any side effects they believe may be caused by any medication additions or changes?  No   Does the patient have all medications ordered at discharge?  Yes   Is the patient taking all medications as directed (includes completed medication regime)?  Yes   Comments regarding appointments  Appt with ID on 10/15/20 Telehealth at 1pm   Does the patient have a primary care provider?   Yes   Does the patient have an appointment with their PCP or specialist within 7 days of discharge?  Yes   Has the patient kept scheduled appointments due by today?  N/A   Comments  PCP f/u 10/22/2020   Psychosocial issues?  No   Psychosocial comments  His wife is +for Covid.    Comments  Still having fatigue but states SOA is better, he has been walking about house. He plans to get BP monitor for home. Gluc staying up to 200's but he knows that is the steroids bc he is following ADA diet he reports.    What is the patient's perception of their health status since discharge?  Improving   Does the patient have any of the following symptoms?  None [fatigue]   Nursing Interventions  Nurse provided patient education   Pulse Ox monitoring  Intermittent   Pulse Ox device source  Patient   O2 Sat comments  93%RA   O2 Sat: education provided  Sat levels   Is the patient/caregiver able to teach back steps to recovery at home?  Set small, achievable goals for return to baseline health, Rest and rebuild strength, gradually increase activity   Is the patient/caregiver able  to teach back the hierarchy of who to call/visit for symptoms/problems? PCP, Specialist, Home health nurse, Urgent Care, ED, 911  Yes   COVID-19 call completed?  Yes   Wrap up additional comments  Pt is disinfecting high touch areas in home, he changed toothbrush. He is concerned about where he is to get another COVID-19 test to be retested in order to return to work after isolation is completed as his employer([employer]) requires negative test to returen. He is unsure if they require one or two negative tests to return. He plans to discuss w/ID at appt tomorrow. He has no further questions currently.           Rosa Garcia RN

## 2020-10-15 ENCOUNTER — READMISSION MANAGEMENT (OUTPATIENT)
Dept: CALL CENTER | Facility: HOSPITAL | Age: 60
End: 2020-10-15

## 2020-10-15 NOTE — OUTREACH NOTE
COVID-19 Week 1 Survey      Responses   [facility] patient discharged from?  [facility]   Does the patient have one of the following disease processes/diagnoses(primary or secondary)?  COVID-19   COVID-19 underlying condition?  None   Call Number  Call 3   Week 1 Call successful?  Yes   Call start time  1620   Call end time  1624   General alerts for this patient  Pt is employee    Discharge diagnosis  Pneumonia due to COVID-19 virus   Meds reviewed with patient/caregiver?  Yes   Is the patient having any side effects they believe may be caused by any medication additions or changes?  No   Does the patient have all medications ordered at discharge?  Yes   Is the patient taking all medications as directed (includes completed medication regime)?  Yes   Comments regarding appointments  Appt with ID on 10/15/20 Telehealth at 1pm   Does the patient have a primary care provider?   Yes   Does the patient have an appointment with their PCP or specialist within 7 days of discharge?  Yes   Has the patient kept scheduled appointments due by today?  N/A   Comments  PCP f/u 10/22/2020   Psychosocial issues?  No   Psychosocial comments  His wife is +for Covid.    Did the patient receive a copy of their discharge instructions?  Yes   Did the patient receive a copy of COVID-19 specific instructions?  No   Nursing interventions  Reviewed instructions with patient   What is the patient's perception of their health status since discharge?  Improving   Does the patient have any of the following symptoms?  None   Nursing Interventions  Nurse provided patient education   Pulse Ox monitoring  Intermittent   O2 Sat comments  93%RA   O2 Sat: education provided  Sat levels   Is the patient/caregiver able to teach back steps to recovery at home?  Set small, achievable goals for return to baseline health, Rest and rebuild strength, gradually increase activity   Is the patient/caregiver able to teach back the hierarchy of who to call/visit  for symptoms/problems? PCP, Specialist, Home health nurse, Urgent Care, ED, 911  Yes   COVID-19 call completed?  Yes   Wrap up additional comments  Patient reports he is in quarantine until the 10/28/2020 per ID.           Mark Anthony Rucker RN

## 2020-10-18 ENCOUNTER — READMISSION MANAGEMENT (OUTPATIENT)
Dept: CALL CENTER | Facility: HOSPITAL | Age: 60
End: 2020-10-18

## 2020-10-18 NOTE — OUTREACH NOTE
COVID-19 Week 1 Survey      Responses   [facility] patient discharged from?  [facility]   Does the patient have one of the following disease processes/diagnoses(primary or secondary)?  COVID-19   COVID-19 underlying condition?  None   Call Number  Call 4   Week 1 Call successful?  No   Discharge diagnosis  Pneumonia due to COVID-19 virus          Marques Pulido RN

## 2020-10-21 ENCOUNTER — READMISSION MANAGEMENT (OUTPATIENT)
Dept: CALL CENTER | Facility: HOSPITAL | Age: 60
End: 2020-10-21

## 2020-10-21 NOTE — OUTREACH NOTE
COVID-19 Week 2 Survey      Responses   [facility] patient discharged from?  [facility]   Does the patient have one of the following disease processes/diagnoses(primary or secondary)?  COVID-19   COVID-19 underlying condition?  None   Call Number  Call 1   COVID-19 Week 2: Call 1 attempt successful?  Yes   Call start time  1042   Call end time  1049   General alerts for this patient  Pt is employee    Discharge diagnosis  Pneumonia due to COVID-19 virus   Meds reviewed with patient/caregiver?  Yes   Is the patient having any side effects they believe may be caused by any medication additions or changes?  No   Does the patient have all medications ordered at discharge?  Yes   Is the patient taking all medications as directed (includes completed medication regime)?  Yes   Does the patient have a primary care provider?   Yes   Comments regarding PCP  Sees Dr Doan tomorrow. Patient is out of quarantine.    Does the patient have an appointment with their PCP or specialist within 7 days of discharge?  Yes   Has the patient kept scheduled appointments due by today?  Yes   Has home health visited the patient within 72 hours of discharge?  N/A   Psychosocial issues?  No   Psychosocial comments  His wife is +for Covid.    Comments  He is doing much better.    Did the patient receive a copy of their discharge instructions?  Yes   Did the patient receive a copy of COVID-19 specific instructions?  Yes   Nursing interventions  Reviewed instructions with patient   What is the patient's perception of their health status since discharge?  Improving   Does the patient have any of the following symptoms?  None   Nursing Interventions  Nurse provided patient education   Pulse Ox monitoring  Intermittent   Pulse Ox device source  Patient   O2 Sat comments  95 %   O2 Sat: education provided  Sat levels, Monitoring frequency, When to seek care   Is the patient/caregiver able to teach back steps to recovery at home?  Set small,  achievable goals for return to baseline health, Rest and rebuild strength, gradually increase activity, Make a list of questions for provider's appointment   If the patient is a current smoker, are they able to teach back resources for cessation?  Not a smoker   Is the patient/caregiver able to teach back the hierarchy of who to call/visit for symptoms/problems? PCP, Specialist, Home health nurse, Urgent Care, ED, 911  Yes   COVID-19 call completed?  Yes   Wrap up additional comments  Pako is doing well and has been cleared by health dept and ID he reports to be out of quarantine. He is hoping to return to work soon.           Mark Anthony Rucker RN

## 2020-10-22 ENCOUNTER — OFFICE VISIT (OUTPATIENT)
Dept: FAMILY MEDICINE CLINIC | Facility: CLINIC | Age: 60
End: 2020-10-22

## 2020-10-22 VITALS
HEIGHT: 65 IN | RESPIRATION RATE: 16 BRPM | SYSTOLIC BLOOD PRESSURE: 142 MMHG | DIASTOLIC BLOOD PRESSURE: 80 MMHG | WEIGHT: 240 LBS | OXYGEN SATURATION: 99 % | TEMPERATURE: 96.9 F | BODY MASS INDEX: 39.99 KG/M2 | HEART RATE: 81 BPM

## 2020-10-22 DIAGNOSIS — M51.36 DDD (DEGENERATIVE DISC DISEASE), LUMBAR: Primary | ICD-10-CM

## 2020-10-22 DIAGNOSIS — I10 ESSENTIAL HYPERTENSION, BENIGN: ICD-10-CM

## 2020-10-22 DIAGNOSIS — K58.9 IRRITABLE BOWEL SYNDROME WITHOUT DIARRHEA: ICD-10-CM

## 2020-10-22 PROCEDURE — 99213 OFFICE O/P EST LOW 20 MIN: CPT | Performed by: PHYSICIAN ASSISTANT

## 2020-10-22 RX ORDER — FAMOTIDINE 20 MG/1
20 TABLET, FILM COATED ORAL 2 TIMES DAILY
Qty: 180 TABLET | Refills: 3 | Status: SHIPPED | OUTPATIENT
Start: 2020-10-22 | End: 2022-06-28

## 2020-10-22 RX ORDER — AMLODIPINE BESYLATE 10 MG/1
10 TABLET ORAL DAILY
Qty: 90 TABLET | Refills: 3 | Status: SHIPPED | OUTPATIENT
Start: 2020-10-22 | End: 2021-10-25 | Stop reason: SDUPTHER

## 2020-10-22 RX ORDER — DICYCLOMINE HCL 20 MG
20 TABLET ORAL EVERY 6 HOURS
Qty: 120 TABLET | Refills: 5 | Status: SHIPPED | OUTPATIENT
Start: 2020-10-22 | End: 2021-06-08

## 2020-10-22 NOTE — PROGRESS NOTES
Subjective   Sebastian Liao is a 60 y.o. male  Hospital Follow Up Visit (Inpt 10/8-10/12 for COVID/pneumonia. Needs work excuse from Oct 8-and return to work Nov 2. Brought quarantine release from health dept.) and Diabetes (-restarted Glucovance three days ago)  low back pain     History of Present Illness  Patient is a pleasant 60-year-old black female who comes in complaining of of chronic low back pain large amount of pain in back need refill of Norco most recently was hospitalized for Covid 19 related pneumonia needs a return to work excuse patient has history of IBS hypertension needs refill of all medications all chronic medical conditions are stable  The following portions of the patient's history were reviewed and updated as appropriate: allergies, current medications, past social history and problem list    Review of Systems   Constitutional: Negative for appetite change, diaphoresis, fatigue and unexpected weight change.   Eyes: Negative for visual disturbance.   Respiratory: Negative for cough, chest tightness and shortness of breath.    Cardiovascular: Negative for chest pain, palpitations and leg swelling.   Gastrointestinal: Negative for diarrhea, nausea and vomiting.   Endocrine: Negative for polydipsia, polyphagia and polyuria.   Skin: Negative for color change and rash.   Neurological: Negative for dizziness, syncope, weakness, light-headedness, numbness and headaches.       Objective     Vitals:    10/22/20 0928   BP: 142/80   Pulse: 81   Resp: 16   Temp: 96.9 °F (36.1 °C)   SpO2: 99%       Physical Exam  Vitals signs and nursing note reviewed.   Constitutional:       Appearance: He is well-developed. He is not diaphoretic.   Neck:      Musculoskeletal: Neck supple.      Thyroid: No thyromegaly.      Vascular: No JVD.   Cardiovascular:      Rate and Rhythm: Normal rate and regular rhythm.      Pulses: Normal pulses.      Heart sounds: Normal heart sounds. No murmur.   Pulmonary:       Effort: Pulmonary effort is normal. No respiratory distress.      Breath sounds: Normal breath sounds.   Abdominal:      General: Bowel sounds are normal.      Palpations: Abdomen is soft.      Tenderness: There is no abdominal tenderness.   Lymphadenopathy:      Cervical: No cervical adenopathy.   Skin:     General: Skin is warm and dry.   Neurological:      Sensory: No sensory deficit.         Assessment/Plan     Diagnoses and all orders for this visit:    1. DDD (degenerative disc disease), lumbar (Primary)    2. Essential hypertension, benign  -     amLODIPine (NORVASC) 10 MG tablet; Take 1 tablet by mouth Daily.  Dispense: 90 tablet; Refill: 3    3. Irritable bowel syndrome without diarrhea  -     dicyclomine (BENTYL) 20 MG tablet; Take 1 tablet by mouth Every 6 (Six) Hours.  Dispense: 120 tablet; Refill: 5    Other orders  -     famotidine (PEPCID) 20 MG tablet; Take 1 tablet by mouth 2 (two) times a day.  Dispense: 180 tablet; Refill: 3    #1  Norco 10/325 1 p.o. 4 times daily dispense 120

## 2020-10-24 ENCOUNTER — READMISSION MANAGEMENT (OUTPATIENT)
Dept: CALL CENTER | Facility: HOSPITAL | Age: 60
End: 2020-10-24

## 2020-10-24 NOTE — OUTREACH NOTE
COVID-19 Week 2 Survey      Responses   [facility] patient discharged from?  [facility]   Does the patient have one of the following disease processes/diagnoses(primary or secondary)?  COVID-19   COVID-19 underlying condition?  None   Call Number  Call 2   COVID-19 Week 2: Call 1 attempt successful?  Yes   Call start time  1323   Call end time  1328   Discharge diagnosis  Pneumonia due to COVID-19 virus   Meds reviewed with patient/caregiver?  Yes   Is the patient having any side effects they believe may be caused by any medication additions or changes?  No   Does the patient have all medications ordered at discharge?  Yes   Is the patient taking all medications as directed (includes completed medication regime)?  Yes   Does the patient have a primary care provider?   Yes   Does the patient have an appointment with their PCP or specialist within 7 days of discharge?  Yes   Has the patient kept scheduled appointments due by today?  Yes   Has home health visited the patient within 72 hours of discharge?  N/A   Psychosocial issues?  No   Psychosocial comments  His wife is +for Covid.    Did the patient receive a copy of their discharge instructions?  Yes   Did the patient receive a copy of COVID-19 specific instructions?  Yes   Nursing interventions  Reviewed instructions with patient   What is the patient's perception of their health status since discharge?  Improving   Does the patient have any of the following symptoms?  None   Nursing Interventions  Nurse provided patient education   Pulse Ox monitoring  Intermittent   Pulse Ox device source  Patient   O2 Sat comments  97% today   O2 Sat: education provided  Sat levels, Monitoring frequency, When to seek care   Is the patient/caregiver able to teach back steps to recovery at home?  Set small, achievable goals for return to baseline health, Rest and rebuild strength, gradually increase activity, Make a list of questions for provider's appointment, Eat a  well-balance diet   If the patient is a current smoker, are they able to teach back resources for cessation?  Not a smoker   Is the patient/caregiver able to teach back the hierarchy of who to call/visit for symptoms/problems? PCP, Specialist, Home health nurse, Urgent Care, ED, 911  Yes   Wrap up additional comments  Says lungs sound more clear now per PCP, RTW is NOV 2nd encouraged to call Employee Health          Carley Chamberlain RN

## 2020-10-26 ENCOUNTER — PATIENT OUTREACH (OUTPATIENT)
Dept: CASE MANAGEMENT | Facility: OTHER | Age: 60
End: 2020-10-26

## 2020-10-26 NOTE — OUTREACH NOTE
Care Coordination Assessment    Documented/Reviewed By: Nely Powers RN Date/time: 10/26/2020  3:14 PM   Assessment completed with: patient  Enrolled in care management program: Yes  Living arrangement: spouse  Support system: family  Type of residence: private residence  Home care services: No  Equipment used at home: none  Communication device: No  Bed or wheelchair confined: No  Inadequate nutrition: No  Medication adherence problem: No  Experiencing side effects from current medications: No  History of fall(s) in last 6 months: No  Difficulty keeping appointments: No       Care Plan Note      Responses   Lifestyle Goals  Eat a healthy diet, Get back to work/school, Lower blood sugar, Maintain blood pressure < 130/80   Self Management  Dietary Changes - Eat More Fruits/Vegetables, Increase Physical Activities, Home BP Monitoring   Specific Disease Process Teaching  Diabetes, Hypertension   Hospitalizations past 12 months  1   Discharge destination:  Home   Medication Adherence  Medications understood        The main concerns and/or symptoms the patient would like to address are: returning to work and monitoring HTN, DM s/s    Education/instruction provided by Care Coordinator: return to work guidelines, monitoring BP at home    Follow Up Outreach Due: 1 month    Call to patient for f/u s/p admission for Covid. Patient was admitted 10/8 for 4 days with pneumonia related to Covid + diagnosis. Patient also has a pmh of HTN, DM type 2. Patient has had f/u with PCP and he has said patient was cleared to return to work. Patient has instructions to call Covid line on Sunday to return to work next Monday Nov 2. Patient states he is not having any s/s at this time. He does have an O2 sat monitor and says sats have been staying in the good range. He was asking about a BP cuff and home and let patient know what to look for at local drug store to monitor BP at home.  Will f/u with more discussion regarding DM and HTN with  follow up calls    Nely Powers RN  Ambulatory     10/26/2020, 15:19 EDT

## 2020-10-31 ENCOUNTER — READMISSION MANAGEMENT (OUTPATIENT)
Dept: CALL CENTER | Facility: HOSPITAL | Age: 60
End: 2020-10-31

## 2020-10-31 NOTE — OUTREACH NOTE
"COVID-19 Week 3 Survey      Responses   [facility] patient discharged from?  [place] [facility]   Does the patient have one of the following disease processes/diagnoses(primary or secondary)?  COVID-19   COVID-19 underlying condition?  None   Call Number  Call 1   COVID-19 Week 3: Call 1 attempt successful?  Yes   Call start time  1532   Call end time  1534   Meds reviewed with patient/caregiver?  Yes   Is the patient having any side effects they believe may be caused by any medication additions or changes?  No   Does the patient have all medications ordered at discharge?  Yes   Is the patient taking all medications as directed (includes completed medication regime)?  Yes   Does the patient have a primary care provider?   Yes   Does the patient have an appointment with their PCP or specialist within 7 days of discharge?  Yes   Has the patient kept scheduled appointments due by today?  Yes   Has home health visited the patient within 72 hours of discharge?  N/A   Psychosocial issues?  No   Did the patient receive a copy of their discharge instructions?  Yes   Did the patient receive a copy of COVID-19 specific instructions?  Yes   Nursing interventions  Reviewed instructions with patient   What is the patient's perception of their health status since discharge?  Improving   Does the patient have any of the following symptoms?  None   Nursing Interventions  Nurse provided patient education   Pulse Ox monitoring  Intermittent   Pulse Ox device source  Patient   O2 Sat comments  97% room air today   Is the patient/caregiver able to teach back the hierarchy of who to call/visit for symptoms/problems? PCP, Specialist, Home health nurse, Urgent Care, ED, 911  Yes   COVID-19 call completed?  Yes   Wrap up additional comments  States \"doing great\". States will return to work 11/02/20. States only slight cough remains-denies any complaints.          Nicolasa Mckeon RN  "

## 2020-11-10 ENCOUNTER — READMISSION MANAGEMENT (OUTPATIENT)
Dept: CALL CENTER | Facility: HOSPITAL | Age: 60
End: 2020-11-10

## 2020-11-10 NOTE — OUTREACH NOTE
COVID-19 Week 4 Survey      Responses   [facility] patient discharged from?  [facility]   Does the patient have one of the following disease processes/diagnoses(primary or secondary)?  COVID-19   COVID-19 underlying condition?  None   Call Number  Call 1   COVID-19 Week 4: Call 1 attempt successful?  Yes   Call start time  1019   Call end time  1023   Discharge diagnosis  Pneumonia due to COVID-19 virus   Is patient permission given to speak with other caregiver?  No   Meds reviewed with patient/caregiver?  Yes   Is the patient taking all medications as directed (includes completed medication regime)?  Yes   Has the patient kept scheduled appointments due by today?  Yes   Is the patient still receiving Home Health Services?  N/A   What is the patient's perception of their health status since discharge?  Improving   Does the patient have any of the following symptoms?  Cough [Occasional cough. ]   Nursing Interventions  Nurse provided patient education   Pulse Ox monitoring  Intermittent   Pulse Ox device source  Patient   O2 Sat comments  97% room air today   Is the patient/caregiver able to teach back steps to recovery at home?  Rest and rebuild strength, gradually increase activity   If the patient is a current smoker, are they able to teach back resources for cessation?  Not a smoker   Is the patient/caregiver able to teach back the hierarchy of who to call/visit for symptoms/problems? PCP, Specialist, Home health nurse, Urgent Care, ED, 911  Yes   Did the patient feel the follow up calls were helpful during their recovery period?  Yes   Was the number of calls appropriate?  Yes   Wrap up additional comments  Patient reports that he is doing well. States that he has returned to work. Denies any new questions or concerns today.           Maria Guadalupe Hunter RN

## 2020-11-30 ENCOUNTER — PATIENT OUTREACH (OUTPATIENT)
Dept: CASE MANAGEMENT | Facility: OTHER | Age: 60
End: 2020-11-30

## 2020-11-30 NOTE — OUTREACH NOTE
Patient Outreach Note    Call to patient for f/u. States he is doing well since admit for Covid last month. States he has been back to work for about a month. Has a f/u appt with PCP next month. Had a question about inhaler that was prescribed and states it is running out with no refills. Asked if he had to wait to see MD to get it refilled. Offered to contact PCP to see if it can be refilled prior to visit. Patient states he still has a little it of a cough at times but no other symptoms. Talked with patient about DM management as well. He states he does not check BS often at all, he takes his medications as prescribed and his last a1c was 7.  Let patient know about [employer] program and sending additional information by email for enrollment.  Patient asked what normal bs should be and had a discussion with patient on best times to test, normal bs and how to treat bs out of range.  No other questions at this time, will f/u as needed     Nely Powers RN  Ambulatory     11/30/2020, 15:59 EST

## 2020-12-01 RX ORDER — ALBUTEROL SULFATE 90 UG/1
2 AEROSOL, METERED RESPIRATORY (INHALATION) EVERY 4 HOURS PRN
Qty: 3.7 G | Refills: 1 | Status: SHIPPED | OUTPATIENT
Start: 2020-12-01 | End: 2021-02-03

## 2020-12-01 NOTE — TELEPHONE ENCOUNTER
----- Message from Nely Powers RN sent at 11/30/2020  4:23 PM EST -----  Regarding: Medication refill  Hello,     My name is Nely and I am one of the employee  with  working with Mr Liao.  I spoke to him today and he mentioned he was still using the albuterol inhaler PRN since his hospital admission, but he was about out.  He was asking if he could get a refill prior to his appt on 12/30/2020. He states he uses the [facility] pharmacy on [facility].  Thank you

## 2020-12-07 ENCOUNTER — PATIENT OUTREACH (OUTPATIENT)
Dept: CASE MANAGEMENT | Facility: OTHER | Age: 60
End: 2020-12-07

## 2020-12-07 NOTE — OUTREACH NOTE
Patient Outreach Note      Call to patient for f/u. States he did get his inhaler and BP meds refilled by PCP. Appt is still scheduled for end of this month. States over all he feels good. Does have a little bit of a lingering cough and fatigue. Discussed it may very well be due to covid infection but to discussed with PCP at f/u to see if increase in meds or other cause may be identified. Patient did state he did enroll in the [employer] program and was waiting on delivery of his supplies.      Nely Powers RN  Ambulatory     12/7/2020, 15:46 EST

## 2020-12-21 DIAGNOSIS — Z00.00 ROUTINE GENERAL MEDICAL EXAMINATION AT A HEALTH CARE FACILITY: ICD-10-CM

## 2020-12-21 RX ORDER — PROMETHAZINE HYDROCHLORIDE 25 MG/1
TABLET ORAL
Qty: 60 TABLET | Refills: 0 | Status: SHIPPED | OUTPATIENT
Start: 2020-12-21 | End: 2021-03-08

## 2020-12-30 ENCOUNTER — OFFICE VISIT (OUTPATIENT)
Dept: FAMILY MEDICINE CLINIC | Facility: CLINIC | Age: 60
End: 2020-12-30

## 2020-12-30 VITALS
OXYGEN SATURATION: 98 % | BODY MASS INDEX: 41.85 KG/M2 | WEIGHT: 251.2 LBS | HEIGHT: 65 IN | SYSTOLIC BLOOD PRESSURE: 140 MMHG | DIASTOLIC BLOOD PRESSURE: 80 MMHG | HEART RATE: 81 BPM | TEMPERATURE: 97.8 F | RESPIRATION RATE: 16 BRPM

## 2020-12-30 DIAGNOSIS — Z00.00 ROUTINE GENERAL MEDICAL EXAMINATION AT A HEALTH CARE FACILITY: Primary | ICD-10-CM

## 2020-12-30 DIAGNOSIS — Z12.5 SPECIAL SCREENING FOR MALIGNANT NEOPLASM OF PROSTATE: ICD-10-CM

## 2020-12-30 PROCEDURE — 99396 PREV VISIT EST AGE 40-64: CPT | Performed by: PHYSICIAN ASSISTANT

## 2020-12-30 NOTE — PROGRESS NOTES
Subjective   Sebastian Liao is a 60 y.o. male  Annual Exam (Fasting for labs. UTD on colonoscopy. )      History of Present Illness  Patient is a pleasant 60-year-old black male who comes in complaining of preventive medical examination no problems or complaints no shortness of breath needs refill of pain medication for chronic hip and back pain  The following portions of the patient's history were reviewed and updated as appropriate: allergies, current medications, past social history and problem list    Review of Systems   Constitutional: Negative.    HENT: Negative.    Eyes: Negative.    Respiratory: Negative.    Cardiovascular: Negative.    Gastrointestinal: Negative.    Endocrine: Negative.    Genitourinary: Negative.    Musculoskeletal: Positive for back pain.   Skin: Negative.    Allergic/Immunologic: Negative.    Neurological: Negative.    Hematological: Negative.    Psychiatric/Behavioral: Negative.    All other systems reviewed and are negative.      Objective     Vitals:    12/30/20 0951   BP: 140/80   Pulse: 81   Resp: 16   Temp: 97.8 °F (36.6 °C)   SpO2: 98%       Physical Exam  Constitutional:       Appearance: He is well-developed.   HENT:      Head: Normocephalic and atraumatic.      Right Ear: External ear normal.      Left Ear: External ear normal.      Nose: Nose normal.   Eyes:      Conjunctiva/sclera: Conjunctivae normal.      Pupils: Pupils are equal, round, and reactive to light.   Neck:      Musculoskeletal: Normal range of motion and neck supple.      Thyroid: No thyromegaly.   Cardiovascular:      Rate and Rhythm: Normal rate and regular rhythm.      Heart sounds: Normal heart sounds. No murmur.   Pulmonary:      Effort: Pulmonary effort is normal.      Breath sounds: Normal breath sounds.   Abdominal:      General: Bowel sounds are normal.      Palpations: Abdomen is soft. There is no mass.      Tenderness: There is no abdominal tenderness.   Genitourinary:     Penis: Normal.        Prostate: Normal.      Rectum: Normal.   Musculoskeletal:      Lumbar back: He exhibits decreased range of motion, tenderness and bony tenderness.   Skin:     General: Skin is warm and dry.   Neurological:      Mental Status: He is alert and oriented to person, place, and time.      Cranial Nerves: No cranial nerve deficit.      Deep Tendon Reflexes: Reflexes are normal and symmetric.         Assessment/Plan     Diagnoses and all orders for this visit:    1. Routine general medical examination at a health care facility (Primary)  -     Comprehensive Metabolic Panel; Future  -     CBC & Differential; Future  -     Lipid Panel; Future  -     TSH; Future    2. Special screening for malignant neoplasm of prostate  -     PSA Screen; Future    Preventive medicine discussed, diet, exercise, healthy living refill [place] 10/325 4 times a day dispense 120

## 2021-01-07 ENCOUNTER — PATIENT OUTREACH (OUTPATIENT)
Dept: CASE MANAGEMENT | Facility: OTHER | Age: 61
End: 2021-01-07

## 2021-01-07 NOTE — OUTREACH NOTE
Patient Outreach Note    Call to patient for f/u. States he just had his yearly physical with PCP. His bp has remained borderline elevated since hospitalization for covid and treatment. Amlodipine was just increased to 10mg and monitoring. Remains on inhalers as needed and sats are 96-98% on room air. Still feels fatigued at times but knows it probably still related to covid and is working through that. Patient plans to reach out to [employer] program to get BP cuff to help with monitoring at home. No other concerns at this time.    Nely Powers RN  Ambulatory     1/7/2021, 13:25 EST

## 2021-01-09 ENCOUNTER — IMMUNIZATION (OUTPATIENT)
Dept: VACCINE CLINIC | Facility: HOSPITAL | Age: 61
End: 2021-01-09

## 2021-01-09 PROCEDURE — 91300 HC SARSCOV02 VAC 30MCG/0.3ML IM: CPT | Performed by: INTERNAL MEDICINE

## 2021-01-09 PROCEDURE — 0001A: CPT | Performed by: INTERNAL MEDICINE

## 2021-01-26 DIAGNOSIS — E11.65 UNCONTROLLED TYPE 2 DIABETES MELLITUS WITH HYPERGLYCEMIA (HCC): ICD-10-CM

## 2021-01-26 RX ORDER — GLYBURIDE-METFORMIN HYDROCHLORIDE 5; 500 MG/1; MG/1
TABLET ORAL
Qty: 90 TABLET | Refills: 2 | Status: SHIPPED | OUTPATIENT
Start: 2021-01-26 | End: 2021-02-03

## 2021-02-01 ENCOUNTER — IMMUNIZATION (OUTPATIENT)
Dept: VACCINE CLINIC | Facility: HOSPITAL | Age: 61
End: 2021-02-01

## 2021-02-01 PROCEDURE — 0002A: CPT | Performed by: INTERNAL MEDICINE

## 2021-02-01 PROCEDURE — 91300 HC SARSCOV02 VAC 30MCG/0.3ML IM: CPT | Performed by: INTERNAL MEDICINE

## 2021-02-03 DIAGNOSIS — E11.65 UNCONTROLLED TYPE 2 DIABETES MELLITUS WITH HYPERGLYCEMIA (HCC): ICD-10-CM

## 2021-02-03 DIAGNOSIS — J30.2 SEASONAL ALLERGIC RHINITIS: ICD-10-CM

## 2021-02-03 DIAGNOSIS — R06.2 WHEEZING: Primary | ICD-10-CM

## 2021-02-03 RX ORDER — LORATADINE 10 MG/1
10 TABLET ORAL DAILY
Qty: 30 TABLET | Refills: 10 | Status: SHIPPED | OUTPATIENT
Start: 2021-02-03 | End: 2022-08-23 | Stop reason: SDUPTHER

## 2021-02-03 RX ORDER — ALBUTEROL SULFATE 90 UG/1
2 AEROSOL, METERED RESPIRATORY (INHALATION) EVERY 4 HOURS PRN
Qty: 8.5 G | Refills: 5 | Status: SHIPPED | OUTPATIENT
Start: 2021-02-03 | End: 2021-10-18 | Stop reason: SDUPTHER

## 2021-02-03 RX ORDER — GLYBURIDE-METFORMIN HYDROCHLORIDE 5; 500 MG/1; MG/1
TABLET ORAL
Qty: 90 TABLET | Refills: 2 | Status: SHIPPED | OUTPATIENT
Start: 2021-02-03 | End: 2021-02-09

## 2021-02-06 DIAGNOSIS — E11.65 UNCONTROLLED TYPE 2 DIABETES MELLITUS WITH HYPERGLYCEMIA (HCC): ICD-10-CM

## 2021-02-09 RX ORDER — GLYBURIDE-METFORMIN HYDROCHLORIDE 5; 500 MG/1; MG/1
TABLET ORAL
Qty: 90 TABLET | Refills: 2 | Status: SHIPPED | OUTPATIENT
Start: 2021-02-09 | End: 2021-02-19 | Stop reason: SDUPTHER

## 2021-02-10 ENCOUNTER — PATIENT OUTREACH (OUTPATIENT)
Dept: CASE MANAGEMENT | Facility: OTHER | Age: 61
End: 2021-02-10

## 2021-02-10 NOTE — OUTREACH NOTE
Patient Outreach Note    Call to patient for f/u. Patient states he continues to have episodes of fatigue and soa since Covid hospitalization. Has had f/u with PCP and confirmed this is normal since dx. He continues to use rescue inhaler about 3 times daily for those times of SOA and is able to take short breaks at work and recover. Says he is monitoring his sats and they remain 92-97% on room air. He feels it is improving slowly.  He did complete the Covid vaccine series also with last done given 2/1/2021.  Ask about the BP cuff he requested from [employer] program and patient has not received. Informed patient that he appears as inactive in the program and confirmed he wished to participate. Provided the [employer] number for patient to call and inquire about participation status and BP cuff.  Patient states he does have the glucometer and supplies at home but was not using them. Encouraged patient start using glucometer to check BS at least daily to monitor BS or as directed. Confirmed patient was taking medications. No other questions at this time and will f/u in 1 month.    Nely Powers RN  Ambulatory     2/10/2021, 14:06 EST

## 2021-02-19 ENCOUNTER — OFFICE VISIT (OUTPATIENT)
Dept: FAMILY MEDICINE CLINIC | Facility: CLINIC | Age: 61
End: 2021-02-19

## 2021-02-19 VITALS
HEIGHT: 65 IN | OXYGEN SATURATION: 98 % | DIASTOLIC BLOOD PRESSURE: 80 MMHG | RESPIRATION RATE: 18 BRPM | HEART RATE: 86 BPM | SYSTOLIC BLOOD PRESSURE: 141 MMHG | BODY MASS INDEX: 42.19 KG/M2 | TEMPERATURE: 97.1 F | WEIGHT: 253.2 LBS

## 2021-02-19 DIAGNOSIS — E11.65 UNCONTROLLED TYPE 2 DIABETES MELLITUS WITH HYPERGLYCEMIA (HCC): Primary | ICD-10-CM

## 2021-02-19 DIAGNOSIS — R06.00 DYSPNEA, UNSPECIFIED TYPE: ICD-10-CM

## 2021-02-19 DIAGNOSIS — M51.36 DDD (DEGENERATIVE DISC DISEASE), LUMBAR: Primary | ICD-10-CM

## 2021-02-19 DIAGNOSIS — E66.01 MORBIDLY OBESE (HCC): ICD-10-CM

## 2021-02-19 LAB
GLUCOSE BLDC GLUCOMTR-MCNC: 90 MG/DL (ref 70–130)
HBA1C MFR BLD: 7.2 %

## 2021-02-19 PROCEDURE — 82962 GLUCOSE BLOOD TEST: CPT | Performed by: PHYSICIAN ASSISTANT

## 2021-02-19 PROCEDURE — 99214 OFFICE O/P EST MOD 30 MIN: CPT | Performed by: PHYSICIAN ASSISTANT

## 2021-02-19 PROCEDURE — 83036 HEMOGLOBIN GLYCOSYLATED A1C: CPT | Performed by: PHYSICIAN ASSISTANT

## 2021-02-19 RX ORDER — GLYBURIDE-METFORMIN HYDROCHLORIDE 5; 500 MG/1; MG/1
1 TABLET ORAL 2 TIMES DAILY WITH MEALS
Qty: 180 TABLET | Refills: 2 | Status: SHIPPED | OUTPATIENT
Start: 2021-02-19 | End: 2021-10-25 | Stop reason: SDUPTHER

## 2021-02-19 RX ORDER — HYDROCODONE BITARTRATE AND ACETAMINOPHEN 10; 325 MG/1; MG/1
1 TABLET ORAL 4 TIMES DAILY
Qty: 120 TABLET | Refills: 0 | Status: SHIPPED | OUTPATIENT
Start: 2021-02-19 | End: 2021-05-19 | Stop reason: SDUPTHER

## 2021-02-19 NOTE — PROGRESS NOTES
Subjective   Sebastian Liao is a 60 y.o. male  Leg Swelling (BLE x3 days last week. Compression socks helped and sx's have resolved), Shortness of Breath (Not improved since COVID, wheezing), and Diabetes (FBS 90, A1C 7.2%)      History of Present Illness  Patient is a pleasant 60-year-old white male who presents for bilateral leg swelling he has been wearing leg compression socks which has solved his leg edema bilaterally for last 3 days patient is having some shortness of breath with exertion this is been present since having Covid pneumonia and wheezing and chest states he is constantly having trouble with breathing since getting Covid patient comes in for follow-up type 2 diabetes fasting blood sugar this morning is 90 A1c is 7.2 improving patient's weight is stable but elevated at 253 pounds BMI 42.13  The following portions of the patient's history were reviewed and updated as appropriate: allergies, current medications, past social history and problem list    Review of Systems    Objective     Vitals:    02/19/21 0935   BP: 141/80   Pulse: 86   Resp: 18   Temp: 97.1 °F (36.2 °C)   SpO2: 98%       Physical Exam    Assessment/Plan     Diagnoses and all orders for this visit:    1. Uncontrolled type 2 diabetes mellitus with hyperglycemia (CMS/[facility]) (Primary)  -     POC Glycosylated Hemoglobin (Hb A1C)  -     POCT Glucose  -     glyBURIDE-metFORMIN (GLUCOVANCE) 5-500 MG per tablet; Take 1 tablet by mouth 2 (Two) Times a Day With Meals.  Dispense: 180 tablet; Refill: 2    2. Dyspnea, unspecified type  -     Ambulatory Referral to Pulmonology    3. Morbidly obese (CMS/[facility])    Discussed diet and exercise blood sugar is better encouraged him to lose weight gain weight loss goal of 15 pounds patient to follow-up in 1 month recheck blood sugar    2.  Refer to pulmonology for evaluation of shortness of breath

## 2021-02-22 PROBLEM — E66.01 MORBIDLY OBESE: Status: ACTIVE | Noted: 2021-02-22

## 2021-02-23 ENCOUNTER — OFFICE VISIT (OUTPATIENT)
Dept: PULMONOLOGY | Facility: CLINIC | Age: 61
End: 2021-02-23

## 2021-02-23 VITALS
HEART RATE: 100 BPM | BODY MASS INDEX: 42.45 KG/M2 | TEMPERATURE: 97.3 F | HEIGHT: 65 IN | SYSTOLIC BLOOD PRESSURE: 138 MMHG | WEIGHT: 254.8 LBS | DIASTOLIC BLOOD PRESSURE: 98 MMHG | OXYGEN SATURATION: 96 %

## 2021-02-23 DIAGNOSIS — E66.01 OBESITY, CLASS III, BMI 40-49.9 (MORBID OBESITY) (HCC): ICD-10-CM

## 2021-02-23 DIAGNOSIS — R06.09 DYSPNEA ON EXERTION: Primary | ICD-10-CM

## 2021-02-23 DIAGNOSIS — Z86.16 HISTORY OF COVID-19: ICD-10-CM

## 2021-02-23 DIAGNOSIS — K21.9 CHRONIC GERD: ICD-10-CM

## 2021-02-23 DIAGNOSIS — Z87.891 FORMER SMOKER: ICD-10-CM

## 2021-02-23 PROBLEM — E66.813 OBESITY, CLASS III, BMI 40-49.9 (MORBID OBESITY): Status: ACTIVE | Noted: 2021-02-23

## 2021-02-23 PROCEDURE — 99204 OFFICE O/P NEW MOD 45 MIN: CPT | Performed by: INTERNAL MEDICINE

## 2021-02-23 NOTE — PROGRESS NOTES
PULMONARY  NOTE    Chief Complaint     Dyspnea, history of Covid, class III morbid obesity, diabetes mellitus, former smoker    History of Present Illness     60-year-old male referred for evaluation of persistent respiratory symptoms    He has a history of tobacco abuse that resolved over 35 years ago  He has no prior history of known chronic lung disease.    However, he has been prescribed an albuterol inhaler in the past which she has used periodically for wheezing    He was in his usual state of health until October 2020 when he developed Covid pneumonia.  He was hospitalized and received remdesivir, Decadron, and plasma.  He improved and was able to be discharged home off of supplemental oxygen    Since that time he has continued to have intermittent dyspnea and wheezing.  At times he gets short of breath just talking or walking up 1 flight of stairs  He is used albuterol regularly for the symptoms and thinks that they help.    He has not had any recent chest imaging    He is never been diagnosed with obstructive sleep apnea.  He denies snoring or daytime hypersomnolence.  He feels rested when he wakes in the morning    He has noted lower extremity edema.  He denies chest pain or palpitations    Patient Active Problem List   Diagnosis   • Testicular hypofunction   • Essential hypertension, benign   • Diabetes type 2, controlled (CMS/[facility])   • Hyperlipidemia   • Obesity, Class III, BMI 40-49.9 (morbid obesity) (CMS/HCC)   • Dyspnea on exertion   • History of COVID-19 pneumonia (10/2020)   • Former smoker (None x 35 years)   • GERD     No Known Allergies    Current Outpatient Medications:   •  albuterol sulfate  (90 Base) MCG/ACT inhaler, INHALE TWO PUFFS BY MOUTH EVERY 4 HOURS AS NEEDED FOR WHEEZING, Disp: 6.7 g, Rfl: 5  •  amLODIPine (NORVASC) 10 MG tablet, Take 1 tablet by mouth Daily., Disp: 90 tablet, Rfl: 3  •  cyclobenzaprine (FLEXERIL) 10 MG tablet, Take 1 tablet by mouth 2 (Two) Times a Day As  Needed for Muscle Spasms., Disp: 90 tablet, Rfl: 3  •  dicyclomine (BENTYL) 20 MG tablet, Take 1 tablet by mouth Every 6 (Six) Hours., Disp: 120 tablet, Rfl: 5  •  famotidine (PEPCID) 20 MG tablet, Take 1 tablet by mouth 2 (two) times a day., Disp: 180 tablet, Rfl: 3  •  fluticasone (Flonase) 50 MCG/ACT nasal spray, 2 sprays into the nostril(s) as directed by provider Daily., Disp: 18.2 mL, Rfl: 0  •  glyBURIDE-metFORMIN (GLUCOVANCE) 5-500 MG per tablet, Take 1 tablet by mouth 2 (Two) Times a Day With Meals., Disp: 180 tablet, Rfl: 2  •  HYDROcodone-acetaminophen (NORCO)  MG per tablet, Take 1 tablet by mouth 4 (Four) Times a Day., Disp: 120 tablet, Rfl: 0  •  loratadine (CLARITIN) 10 MG tablet, TAKE ONE TABLET BY MOUTH DAILY, Disp: 30 tablet, Rfl: 10  •  olopatadine (PATANOL) 0.1 % ophthalmic solution, , Disp: , Rfl:   •  omeprazole (priLOSEC) 40 MG capsule, TAKE ONE CAPSULE BY MOUTH DAILY, Disp: 90 capsule, Rfl: 3  •  promethazine (PHENERGAN) 25 MG tablet, TAKE ONE TABLET BY MOUTH EVERY 6 HOURS AS NEEDED FOR NAUSEA AND VOMITING, Disp: 60 tablet, Rfl: 0  •  rosuvastatin (CRESTOR) 10 MG tablet, Take 1 tablet by mouth Daily., Disp: 90 tablet, Rfl: 3  •  sildenafil (VIAGRA) 100 MG tablet, , Disp: , Rfl:   •  Testosterone Cypionate (DEPOTESTOTERONE CYPIONATE) 200 MG/ML injection, Inject 200 mg into the shoulder, thigh, or buttocks Every 14 (Fourteen) Days., Disp: , Rfl:   MEDICATION LIST AND ALLERGIES REVIEWED.    Family History   Problem Relation Age of Onset   • Cancer Mother         Ovarian,    • Hypertension Father    • Hypertension Sister    • Hypertension Maternal Grandmother    • Diabetes Maternal Grandmother      Social History     Tobacco Use   • Smoking status: Former Smoker     Packs/day: 1.00     Years: 10.00     Pack years: 10.00     Types: Cigarettes     Quit date:      Years since quittin.1   • Smokeless tobacco: Never Used   Substance Use Topics   • Alcohol use: Yes     Comment:  "occasional   • Drug use: No     Social History     Social History Narrative    Left hand dominant, , Exercises daily.     Former smoker, has not smoked for 35 years     FAMILY AND SOCIAL HISTORY REVIEWED.    Review of Systems  ALSO REFER TO SCANNED ROS SHEET FROM SAME DATE.    /98   Pulse 100   Temp 97.3 °F (36.3 °C)   Ht 165.1 cm (65\")   Wt 116 kg (254 lb 12.8 oz)   SpO2 96% Comment: resting, room air  BMI 42.40 kg/m²   Physical Exam  Vitals signs and nursing note reviewed.   Constitutional:       General: He is not in acute distress.     Appearance: He is well-developed. He is not diaphoretic.   HENT:      Head: Normocephalic and atraumatic.   Neck:      Thyroid: No thyromegaly.   Cardiovascular:      Rate and Rhythm: Normal rate and regular rhythm.      Heart sounds: Normal heart sounds. No murmur.   Pulmonary:      Effort: Pulmonary effort is normal.      Breath sounds: Normal breath sounds. No stridor.   Abdominal:      General: Bowel sounds are normal.      Palpations: Abdomen is soft.   Musculoskeletal: Normal range of motion.      Comments: Pitting bilateral lower extremity edema, correction up to his knees bilaterally   Lymphadenopathy:      Cervical: No cervical adenopathy.      Upper Body:      Right upper body: No supraclavicular or epitrochlear adenopathy.      Left upper body: No supraclavicular or epitrochlear adenopathy.   Skin:     General: Skin is warm and dry.   Neurological:      Mental Status: He is alert and oriented to person, place, and time.   Psychiatric:         Behavior: Behavior normal.         Results     CT angiogram from 10/8/2020 reviewed on PACS.  No pulmonary emboli but bilateral groundglass infiltrates    Problem List       ICD-10-CM ICD-9-CM   1. Dyspnea on exertion  R06.00 786.09   2. History of COVID-19 pneumonia (10/2020)  Z86.16 V12.09   3. Obesity, Class III, BMI 40-49.9 (morbid obesity) (CMS/[facility])  E66.01 278.01   4. GERD  K21.9 530.81   5. Former smoker " (None x 35 years)  Z87.891 V15.82       Discussion     He has multiple potential etiologies for dyspnea.  I am sure that class III obesity and deconditioning are playing a role.  There may be residual effects from his Covid 19 pneumonia  He may have underlying struct of airways disease, as well.    Since he has wheezing with his dyspnea I been to put him on a maintenance asthma regimen.  I had samples of Breo 200 which I gave him along with written instructions and a discussion of potential side effects.  He already has albuterol to use up to 4 times a day as needed    On return, we will get PFTs    With his cardiomegaly on prior chest imaging and lower extremity edema I think he needs a echocardiogram.  We will arrange that.    In addition, I would like to follow-up on a CT scan of the chest to document radiographic clearing of his groundglass infiltrates.    He does not have symptoms suggestive of obstructive sleep apnea but I suspect that he desaturates at night.  We will get 2 nights of nocturnal pulse oximetry    I will see him back after the above    Level of service justified based on 50 minutes spent in patient care on this date of service including, but not limited to: preparing to see the patient, obtaining and/or reviewing history, performing medically appropriate examination, ordering tests/medicine/procedures, independently interpreting results, documenting clinical information in EHR, and counseling/education of patient/family/caregiver. (Level 4 45-59 minutes; Level 5 60-74 minutes)    Dontae Manzo MD  Note electronically signed    CC: Fransico Ngo PA

## 2021-02-24 DIAGNOSIS — R06.09 DYSPNEA ON EXERTION: Primary | ICD-10-CM

## 2021-02-26 DIAGNOSIS — G62.9 NEUROPATHY: Primary | ICD-10-CM

## 2021-02-26 RX ORDER — CARISOPRODOL 350 MG/1
350 TABLET ORAL 2 TIMES DAILY
Qty: 60 TABLET | Refills: 1 | Status: ON HOLD | OUTPATIENT
Start: 2021-02-26 | End: 2022-12-12 | Stop reason: SDUPTHER

## 2021-03-08 DIAGNOSIS — Z00.00 ROUTINE GENERAL MEDICAL EXAMINATION AT A HEALTH CARE FACILITY: ICD-10-CM

## 2021-03-08 RX ORDER — PROMETHAZINE HYDROCHLORIDE 25 MG/1
25 TABLET ORAL EVERY 6 HOURS PRN
Qty: 60 TABLET | Refills: 1 | Status: SHIPPED | OUTPATIENT
Start: 2021-03-08 | End: 2021-05-19 | Stop reason: SDUPTHER

## 2021-03-15 ENCOUNTER — HOSPITAL ENCOUNTER (OUTPATIENT)
Dept: CARDIOLOGY | Facility: HOSPITAL | Age: 61
Discharge: HOME | End: 2021-03-15

## 2021-03-15 ENCOUNTER — HOSPITAL ENCOUNTER (OUTPATIENT)
Dept: CT IMAGING | Facility: HOSPITAL | Age: 61
Discharge: HOME | End: 2021-03-15

## 2021-03-15 VITALS — WEIGHT: 254 LBS | BODY MASS INDEX: 39.87 KG/M2 | HEIGHT: 67 IN

## 2021-03-15 DIAGNOSIS — R06.09 DYSPNEA ON EXERTION: ICD-10-CM

## 2021-03-15 DIAGNOSIS — Z01.812 BLOOD TESTS PRIOR TO TREATMENT OR PROCEDURE: Primary | ICD-10-CM

## 2021-03-15 LAB
BH CV ECHO MEAS - AO MAX PG (FULL): 15.8 MMHG
BH CV ECHO MEAS - AO MAX PG: 20.3 MMHG
BH CV ECHO MEAS - AO MEAN PG (FULL): 7.9 MMHG
BH CV ECHO MEAS - AO MEAN PG: 10 MMHG
BH CV ECHO MEAS - AO V2 MAX: 225.3 CM/SEC
BH CV ECHO MEAS - AO V2 MEAN: 149.9 CM/SEC
BH CV ECHO MEAS - AO V2 VTI: 41.7 CM
BH CV ECHO MEAS - AVA(I,A): 1.7 CM^2
BH CV ECHO MEAS - AVA(I,D): 1.7 CM^2
BH CV ECHO MEAS - AVA(V,A): 1.6 CM^2
BH CV ECHO MEAS - AVA(V,D): 1.6 CM^2
BH CV ECHO MEAS - BSA(HAYCOCK): 2.4 M^2
BH CV ECHO MEAS - BSA: 2.2 M^2
BH CV ECHO MEAS - BZI_BMI: 42.3 KILOGRAMS/M^2
BH CV ECHO MEAS - BZI_METRIC_HEIGHT: 165.1 CM
BH CV ECHO MEAS - BZI_METRIC_WEIGHT: 115.2 KG
BH CV ECHO MEAS - EDV(CUBED): 106.7 ML
BH CV ECHO MEAS - EDV(MOD-SP2): 115 ML
BH CV ECHO MEAS - EDV(MOD-SP4): 136 ML
BH CV ECHO MEAS - EDV(TEICH): 104.5 ML
BH CV ECHO MEAS - EF(CUBED): 92.2 %
BH CV ECHO MEAS - EF(MOD-BP): 56 %
BH CV ECHO MEAS - EF(MOD-SP2): 54.8 %
BH CV ECHO MEAS - EF(MOD-SP4): 57.4 %
BH CV ECHO MEAS - EF(TEICH): 87.4 %
BH CV ECHO MEAS - ESV(CUBED): 8.3 ML
BH CV ECHO MEAS - ESV(MOD-SP2): 52 ML
BH CV ECHO MEAS - ESV(MOD-SP4): 58 ML
BH CV ECHO MEAS - ESV(TEICH): 13.2 ML
BH CV ECHO MEAS - FS: 57.3 %
BH CV ECHO MEAS - IVS/LVPW: 1
BH CV ECHO MEAS - IVSD: 1 CM
BH CV ECHO MEAS - LV DIASTOLIC VOL/BSA (35-75): 62.1 ML/M^2
BH CV ECHO MEAS - LV MASS(C)D: 174.3 GRAMS
BH CV ECHO MEAS - LV MASS(C)DI: 79.6 GRAMS/M^2
BH CV ECHO MEAS - LV MAX PG: 4.5 MMHG
BH CV ECHO MEAS - LV MEAN PG: 2.1 MMHG
BH CV ECHO MEAS - LV SYSTOLIC VOL/BSA (12-30): 26.5 ML/M^2
BH CV ECHO MEAS - LV V1 MAX: 106.1 CM/SEC
BH CV ECHO MEAS - LV V1 MEAN: 65.9 CM/SEC
BH CV ECHO MEAS - LV V1 VTI: 20.9 CM
BH CV ECHO MEAS - LVIDD: 4.7 CM
BH CV ECHO MEAS - LVIDS: 2 CM
BH CV ECHO MEAS - LVLD AP2: 8.8 CM
BH CV ECHO MEAS - LVLD AP4: 8.6 CM
BH CV ECHO MEAS - LVLS AP2: 7.5 CM
BH CV ECHO MEAS - LVLS AP4: 7.9 CM
BH CV ECHO MEAS - LVOT AREA (M): 3.5 CM^2
BH CV ECHO MEAS - LVOT AREA: 3.5 CM^2
BH CV ECHO MEAS - LVOT DIAM: 2.1 CM
BH CV ECHO MEAS - LVPWD: 1 CM
BH CV ECHO MEAS - MV A MAX VEL: 130.9 CM/SEC
BH CV ECHO MEAS - MV DEC TIME: 0.25 SEC
BH CV ECHO MEAS - MV E MAX VEL: 137.8 CM/SEC
BH CV ECHO MEAS - MV E/A: 1.1
BH CV ECHO MEAS - PA ACC SLOPE: 958.8 CM/SEC^2
BH CV ECHO MEAS - PA ACC TIME: 0.09 SEC
BH CV ECHO MEAS - PA PR(ACCEL): 39.4 MMHG
BH CV ECHO MEAS - RAP SYSTOLE: 3 MMHG
BH CV ECHO MEAS - RVSP: 22 MMHG
BH CV ECHO MEAS - SI(CUBED): 44.9 ML/M^2
BH CV ECHO MEAS - SI(LVOT): 33.1 ML/M^2
BH CV ECHO MEAS - SI(MOD-SP2): 28.8 ML/M^2
BH CV ECHO MEAS - SI(MOD-SP4): 35.6 ML/M^2
BH CV ECHO MEAS - SI(TEICH): 41.7 ML/M^2
BH CV ECHO MEAS - SV(CUBED): 98.3 ML
BH CV ECHO MEAS - SV(LVOT): 72.4 ML
BH CV ECHO MEAS - SV(MOD-SP2): 63 ML
BH CV ECHO MEAS - SV(MOD-SP4): 78 ML
BH CV ECHO MEAS - SV(TEICH): 91.4 ML
BH CV ECHO MEAS - TR MAX PG: 19 MMHG
BH CV ECHO MEAS - TR MAX VEL: 221 CM/SEC
BH CV VAS BP RIGHT ARM: NORMAL MMHG
BH CV XLRA - RV BASE: 4.2 CM
BH CV XLRA - RV LENGTH: 7.6 CM
BH CV XLRA - RV MID: 2.8 CM
LV EF 2D ECHO EST: 60 %

## 2021-03-15 PROCEDURE — 93308 TTE F-UP OR LMTD: CPT

## 2021-03-15 PROCEDURE — 93321 DOPPLER ECHO F-UP/LMTD STD: CPT

## 2021-03-15 PROCEDURE — 93321 DOPPLER ECHO F-UP/LMTD STD: CPT | Performed by: INTERNAL MEDICINE

## 2021-03-15 PROCEDURE — 93325 DOPPLER ECHO COLOR FLOW MAPG: CPT

## 2021-03-15 PROCEDURE — 93308 TTE F-UP OR LMTD: CPT | Performed by: INTERNAL MEDICINE

## 2021-03-15 PROCEDURE — 71250 CT THORAX DX C-: CPT

## 2021-03-15 PROCEDURE — 93325 DOPPLER ECHO COLOR FLOW MAPG: CPT | Performed by: INTERNAL MEDICINE

## 2021-03-16 ENCOUNTER — CLINICAL SUPPORT NO REQUIREMENTS (OUTPATIENT)
Dept: PULMONOLOGY | Facility: CLINIC | Age: 61
End: 2021-03-16

## 2021-03-16 DIAGNOSIS — Z01.812 BLOOD TESTS PRIOR TO TREATMENT OR PROCEDURE: ICD-10-CM

## 2021-03-16 PROCEDURE — 99211 OFF/OP EST MAY X REQ PHY/QHP: CPT | Performed by: NURSE PRACTITIONER

## 2021-03-16 PROCEDURE — U0004 COV-19 TEST NON-CDC HGH THRU: HCPCS | Performed by: NURSE PRACTITIONER

## 2021-03-17 LAB — SARS-COV-2 RNA NOSE QL NAA+PROBE: NOT DETECTED

## 2021-03-18 ENCOUNTER — OFFICE VISIT (OUTPATIENT)
Dept: PULMONOLOGY | Facility: CLINIC | Age: 61
End: 2021-03-18

## 2021-03-18 VITALS
SYSTOLIC BLOOD PRESSURE: 126 MMHG | HEIGHT: 67 IN | WEIGHT: 248 LBS | BODY MASS INDEX: 38.92 KG/M2 | HEART RATE: 80 BPM | OXYGEN SATURATION: 96 % | TEMPERATURE: 98 F | DIASTOLIC BLOOD PRESSURE: 76 MMHG

## 2021-03-18 DIAGNOSIS — R06.09 DYSPNEA ON EXERTION: Primary | ICD-10-CM

## 2021-03-18 DIAGNOSIS — Z86.16 HISTORY OF COVID-19: ICD-10-CM

## 2021-03-18 DIAGNOSIS — G47.33 OSA (OBSTRUCTIVE SLEEP APNEA): ICD-10-CM

## 2021-03-18 DIAGNOSIS — K21.9 CHRONIC GERD: ICD-10-CM

## 2021-03-18 PROCEDURE — 94729 DIFFUSING CAPACITY: CPT | Performed by: NURSE PRACTITIONER

## 2021-03-18 PROCEDURE — 99214 OFFICE O/P EST MOD 30 MIN: CPT | Performed by: NURSE PRACTITIONER

## 2021-03-18 PROCEDURE — 94375 RESPIRATORY FLOW VOLUME LOOP: CPT | Performed by: NURSE PRACTITIONER

## 2021-03-18 PROCEDURE — 94726 PLETHYSMOGRAPHY LUNG VOLUMES: CPT | Performed by: NURSE PRACTITIONER

## 2021-03-18 NOTE — PROGRESS NOTES
[facility] [place] [facility]    CHIEF COMPLAINT    Dyspnea/Covid pneumonia (10/2020)    HISTORY OF PRESENT ILLNESS    Sebastian Liao is a 60 y.o.male here today for follow-up of his dyspnea.  He was seen 1 month ago by Dr. Manzo.  He has had a CT scan of the chest without contrast and an echocardiogram and is here to review the results.    At his last appointment he was given some Breo 200 samples and has noticed an improvement in his breathing since using this inhaler.  He has to continue to use his albuterol occasionally for shortness of breath.  He does use this at least daily.    He also had an overnight oximetry performed since his last appointment and is here to discuss the results.  He does have some daytime somnolence and fatigue during the day.  He does not feel well rested in the mornings.    He denies fever, chills, sputum production, hemoptysis, night sweats, weight loss, chest pain or palpitations.  He denies any lower extremity edema or calf tenderness.  He denies any sinus or allergy symptoms.  He does have difficulties with reflux.  He does take omeprazole daily.  He is also been having difficulty with nausea since he developed the Covid in October.    He is up-to-date on his current vaccinations.  Patient Active Problem List   Diagnosis   • Testicular hypofunction   • Essential hypertension, benign   • Diabetes type 2, controlled (CMS/[facility])   • Hyperlipidemia   • Obesity, Class III, BMI 40-49.9 (morbid obesity) (CMS/HCC)   • Dyspnea on exertion   • History of COVID-19 pneumonia (10/2020)   • Former smoker (None x 35 years)   • GERD       No Known Allergies    Current Outpatient Medications:   •  albuterol sulfate  (90 Base) MCG/ACT inhaler, INHALE TWO PUFFS BY MOUTH EVERY 4 HOURS AS NEEDED FOR WHEEZING, Disp: 6.7 g, Rfl: 5  •  amLODIPine (NORVASC) 10 MG tablet, Take 1 tablet by mouth Daily., Disp: 90 tablet, Rfl: 3  •  carisoprodol (Soma) 350 MG tablet, Take 1 tablet by mouth 2 (two) times  a day., Disp: 60 tablet, Rfl: 1  •  cyclobenzaprine (FLEXERIL) 10 MG tablet, Take 1 tablet by mouth 2 (Two) Times a Day As Needed for Muscle Spasms., Disp: 90 tablet, Rfl: 3  •  dicyclomine (BENTYL) 20 MG tablet, Take 1 tablet by mouth Every 6 (Six) Hours., Disp: 120 tablet, Rfl: 5  •  famotidine (PEPCID) 20 MG tablet, Take 1 tablet by mouth 2 (two) times a day., Disp: 180 tablet, Rfl: 3  •  fluticasone (Flonase) 50 MCG/ACT nasal spray, 2 sprays into the nostril(s) as directed by provider Daily., Disp: 18.2 mL, Rfl: 0  •  Fluticasone Furoate-Vilanterol (Breo Ellipta) 200-25 MCG/INH inhaler, Inhale 1 puff Daily., Disp: 60 each, Rfl: 3  •  glyBURIDE-metFORMIN (GLUCOVANCE) 5-500 MG per tablet, Take 1 tablet by mouth 2 (Two) Times a Day With Meals., Disp: 180 tablet, Rfl: 2  •  HYDROcodone-acetaminophen (NORCO)  MG per tablet, Take 1 tablet by mouth 4 (Four) Times a Day., Disp: 120 tablet, Rfl: 0  •  loratadine (CLARITIN) 10 MG tablet, TAKE ONE TABLET BY MOUTH DAILY, Disp: 30 tablet, Rfl: 10  •  olopatadine (PATANOL) 0.1 % ophthalmic solution, , Disp: , Rfl:   •  omeprazole (priLOSEC) 40 MG capsule, TAKE ONE CAPSULE BY MOUTH DAILY, Disp: 90 capsule, Rfl: 3  •  promethazine (PHENERGAN) 25 MG tablet, TAKE ONE TABLET BY MOUTH EVERY 6 HOURS AS NEEDED FOR NAUSEA AND VOMITING, Disp: 60 tablet, Rfl: 1  •  rosuvastatin (CRESTOR) 10 MG tablet, Take 1 tablet by mouth Daily., Disp: 90 tablet, Rfl: 3  •  sildenafil (VIAGRA) 100 MG tablet, , Disp: , Rfl:   •  Testosterone Cypionate (DEPOTESTOTERONE CYPIONATE) 200 MG/ML injection, Inject 200 mg into the shoulder, thigh, or buttocks Every 14 (Fourteen) Days., Disp: , Rfl:   MEDICATION LIST AND ALLERGIES REVIEWED.    Social History     Tobacco Use   • Smoking status: Former Smoker     Packs/day: 1.00     Years: 10.00     Pack years: 10.00     Types: Cigarettes     Quit date:      Years since quittin.2   • Smokeless tobacco: Never Used   Substance Use Topics   • Alcohol  "use: Yes     Comment: occasional   • Drug use: No       FAMILY AND SOCIAL HISTORY REVIEWED.    Review of Systems   Constitutional: Negative for activity change, appetite change, fatigue, fever and unexpected weight change.   HENT: Negative for congestion, postnasal drip, rhinorrhea, sinus pressure, sore throat and voice change.    Eyes: Negative for visual disturbance.   Respiratory: Positive for shortness of breath. Negative for cough, chest tightness and wheezing.    Cardiovascular: Negative for chest pain, palpitations and leg swelling.   Gastrointestinal: Negative for abdominal distention, abdominal pain, nausea and vomiting.   Endocrine: Negative for cold intolerance and heat intolerance.   Genitourinary: Negative for difficulty urinating and urgency.   Musculoskeletal: Negative for arthralgias, back pain and neck pain.   Skin: Negative for color change and pallor.   Allergic/Immunologic: Negative for environmental allergies and food allergies.   Neurological: Negative for dizziness, syncope, weakness and light-headedness.   Hematological: Negative for adenopathy. Does not bruise/bleed easily.   Psychiatric/Behavioral: Negative for agitation and behavioral problems.   .    /76   Pulse 80   Temp 98 °F (36.7 °C)   Ht 170 cm (66.93\")   Wt 112 kg (248 lb)   SpO2 96% Comment: resting, room air  BMI 38.92 kg/m²     Immunization History   Administered Date(s) Administered   • COVID-19 (PFIZER) 01/09/2021, 02/01/2021   • Flulaval/Fluarix/Fluzone Quad 10/12/2020   • Hep B, Unspecified 02/05/1996   • Hepatitis A 11/19/2018, 05/20/2019   • Pneumococcal Conjugate 13-Valent (PCV13) 11/07/2016   • Pneumococcal, Unspecified 04/25/2017   • Rabies 12/05/2014, 12/08/2014, 12/19/2014, 12/19/2014   • Tetanus 04/25/2017       Physical Exam  Vitals and nursing note reviewed.   Constitutional:       Appearance: He is well-developed. He is not diaphoretic.   HENT:      Head: Normocephalic and atraumatic.   Eyes:      " Pupils: Pupils are equal, round, and reactive to light.   Neck:      Thyroid: No thyromegaly.   Cardiovascular:      Rate and Rhythm: Normal rate and regular rhythm.      Heart sounds: Murmur heard.   No friction rub. No gallop.    Pulmonary:      Effort: Pulmonary effort is normal. No respiratory distress.      Breath sounds: Normal breath sounds. No wheezing or rales.   Chest:      Chest wall: No tenderness.   Abdominal:      General: Bowel sounds are normal.      Palpations: Abdomen is soft.      Tenderness: There is no abdominal tenderness.   Musculoskeletal:         General: Normal range of motion.      Cervical back: Normal range of motion and neck supple.   Lymphadenopathy:      Cervical: No cervical adenopathy.   Skin:     General: Skin is warm and dry.      Capillary Refill: Capillary refill takes less than 2 seconds.   Neurological:      Mental Status: He is alert and oriented to person, place, and time.   Psychiatric:         Behavior: Behavior normal.           RESULTS    PFTS in the office today, read by me.  FVC 2.30 70% predicted, FEV1 2.04 80% predicted, FEV1/FVC 89% predicted, TLC 3.79 70% predicted, DLCO 84% predicted, no obstruction, mild restriction and slightly reduction in DLCO.    Overnight oximetry report: Patient met qualifications for oxygen on second night of study, his ZULMA was consistently 37-54 each night of the report.    CT Chest Without Contrast Diagnostic    Result Date: 3/16/2021  Complete resolution of the previously noted patchy nodular groundglass airspace disease. There is no superimposed infiltrate. The chest is grossly unremarkable.  D:  03/16/2021 E:  03/16/2021  This report was finalized on 3/16/2021 4:53 PM by Dr. Ada Fajardo MD.      Echocardiogram 3/15/21:    Interpretation Summary    · Left ventricular wall thickness is consistent with borderline concentric hypertrophy.  · Mild aortic valve stenosis is present.  · Aortic valve mean pressure gradient is 10  mmHg.  · Trace mitral valve regurgitation is present with a centrally-directed jet noted.  · Trace tricuspid valve regurgitation is present.  · Calculated right ventricular systolic pressure from tricuspid regurgitation is 22 mmHg.         PROBLEM LIST    Problem List Items Addressed This Visit        Cardiac and Vasculature    Dyspnea on exertion - Primary    Relevant Medications    Fluticasone Furoate-Vilanterol (Breo Ellipta) 200-25 MCG/INH inhaler    Other Relevant Orders    Pulmonary Function Test (Completed)       Gastrointestinal Abdominal     GERD       Other    History of COVID-19 pneumonia (10/2020)      Other Visit Diagnoses     RONIT (obstructive sleep apnea)        Relevant Orders    Home Sleep Study            DISCUSSION    Mr. Liao was here for follow-up of his dyspnea.  He does seem to be slowly improving from his Covid pneumonia back in October.  He states that his breathing has improved since his last appointment.  We did review his CT scan that showed complete resolution of the abnormalities that he had in October.    We did review his PFTs in detail today and he does have a mild restrictive airway pattern.  He seems to be tolerating the Breo 200 daily.  I did call this in for him today and gave him a sample in the office as well.  I did encourage him to continue using the albuterol as needed for shortness of breath.    We did review his overnight oximetry and it does seem to be a marginal study.  At this time he is agreeable to do a home sleep study, I do suspect that he may have some sleep apnea.  He does have daytime somnolence, fatigue and nonrestorative sleep.  I will call him once I receive the home sleep study results.    We did review his echocardiogram in the office today need does have a mild aortic valve stenosis.  We will monitor this for now.  I did discuss referring him to cardiology and he would like to hold off on this for now.    He will follow-up in 3 months or sooner if his  symptoms worsen.  I did advise him to call with any additional concerns or questions.    Level of service justified based on 36 minutes spent in patient care on this date of service including, but not limited to: preparing to see the patient, obtaining and/or reviewing history, performing medically appropriate examination, ordering tests/medicine/procedures, independently interpreting results, documenting clinical information in EHR, and counseling/education of patient/family/caregiver. (Level 4 30-39 minutes; Level 5 40-54 minutes)      Lena Earl, APRN  03/18/202109:24 EDT  Electronically signed     Please note that portions of this note were completed with a voice recognition program. Efforts were made to edit the dictations, but occasionally words are mistranscribed.      CC: Fransico Ngo, PA

## 2021-03-24 ENCOUNTER — PATIENT OUTREACH (OUTPATIENT)
Dept: CASE MANAGEMENT | Facility: OTHER | Age: 61
End: 2021-03-24

## 2021-03-24 NOTE — OUTREACH NOTE
Patient Outreach Note    Call to patient for f/u. States he has had an echo, PFT's and sleep study completed to evaluate his continued soa after Covid. Patient states they did find a heart murmur on the echo that he will discuss in f/u with PCP. His PFT's r/o COPD and sleep study should mild sleep apnea. He is going to  CPAP equipment this Friday to begin treatment to prevent it from getting worse. Patient's a1c has improved and is trying to modify diet more and increase activity with walking as much as schedule allows. At this time he is taking Breo as well to help with the soa he feels at times but has been told that is may just take some time to continue to improve from damage done by Covid infection. No other concerns at this time, will monitor as needed    Nely Powers RN  Ambulatory     3/24/2021, 13:29 EDT

## 2021-03-26 ENCOUNTER — HOSPITAL ENCOUNTER (OUTPATIENT)
Dept: SLEEP MEDICINE | Facility: HOSPITAL | Age: 61
Discharge: HOME | End: 2021-03-26
Admitting: NURSE PRACTITIONER

## 2021-03-26 VITALS — HEIGHT: 67 IN | BODY MASS INDEX: 38.92 KG/M2 | WEIGHT: 248 LBS

## 2021-03-26 DIAGNOSIS — E11.9 TYPE 2 DIABETES MELLITUS WITHOUT COMPLICATION, WITHOUT LONG-TERM CURRENT USE OF INSULIN (HCC): ICD-10-CM

## 2021-03-26 DIAGNOSIS — G47.33 OSA (OBSTRUCTIVE SLEEP APNEA): ICD-10-CM

## 2021-03-26 PROCEDURE — 95800 SLP STDY UNATTENDED: CPT | Performed by: INTERNAL MEDICINE

## 2021-03-26 PROCEDURE — 95800 SLP STDY UNATTENDED: CPT

## 2021-03-29 RX ORDER — ROSUVASTATIN CALCIUM 10 MG/1
10 TABLET, COATED ORAL DAILY
Qty: 90 TABLET | Refills: 3 | Status: SHIPPED | OUTPATIENT
Start: 2021-03-29 | End: 2022-01-17 | Stop reason: SDUPTHER

## 2021-04-12 ENCOUNTER — TELEPHONE (OUTPATIENT)
Dept: PULMONOLOGY | Facility: CLINIC | Age: 61
End: 2021-04-12

## 2021-04-12 DIAGNOSIS — Z86.16 HISTORY OF COVID-19: Primary | ICD-10-CM

## 2021-04-12 NOTE — TELEPHONE ENCOUNTER
I called Mr. Liao to let him know of his sleep study results, he has severe obstructive sleep apnea.  We did discuss CPAP therapy and he wants to think about this for now and will call me if he wants to start CPAP therapy in the future.

## 2021-04-29 RX ORDER — FLUTICASONE PROPIONATE 50 MCG
2 SPRAY, SUSPENSION (ML) NASAL DAILY
Qty: 18.2 ML | Refills: 0 | OUTPATIENT
Start: 2021-04-29

## 2021-05-01 DIAGNOSIS — J30.9 ALLERGIC RHINITIS, UNSPECIFIED SEASONALITY, UNSPECIFIED TRIGGER: Primary | ICD-10-CM

## 2021-05-03 RX ORDER — FLUTICASONE PROPIONATE 50 MCG
2 SPRAY, SUSPENSION (ML) NASAL DAILY
Qty: 16 G | Refills: 3 | Status: SHIPPED | OUTPATIENT
Start: 2021-05-03 | End: 2022-07-23 | Stop reason: SDUPTHER

## 2021-05-19 ENCOUNTER — OFFICE VISIT (OUTPATIENT)
Dept: FAMILY MEDICINE CLINIC | Facility: CLINIC | Age: 61
End: 2021-05-19

## 2021-05-19 ENCOUNTER — LAB (OUTPATIENT)
Dept: LAB | Facility: HOSPITAL | Age: 61
End: 2021-05-19

## 2021-05-19 VITALS
WEIGHT: 252.2 LBS | HEART RATE: 85 BPM | HEIGHT: 67 IN | SYSTOLIC BLOOD PRESSURE: 124 MMHG | DIASTOLIC BLOOD PRESSURE: 82 MMHG | TEMPERATURE: 97.1 F | BODY MASS INDEX: 39.58 KG/M2 | RESPIRATION RATE: 16 BRPM | OXYGEN SATURATION: 98 %

## 2021-05-19 DIAGNOSIS — M51.36 DDD (DEGENERATIVE DISC DISEASE), LUMBAR: ICD-10-CM

## 2021-05-19 DIAGNOSIS — Z00.00 ROUTINE GENERAL MEDICAL EXAMINATION AT A HEALTH CARE FACILITY: ICD-10-CM

## 2021-05-19 DIAGNOSIS — M51.36 DDD (DEGENERATIVE DISC DISEASE), LUMBAR: Primary | ICD-10-CM

## 2021-05-19 DIAGNOSIS — Z12.5 SPECIAL SCREENING FOR MALIGNANT NEOPLASM OF PROSTATE: ICD-10-CM

## 2021-05-19 DIAGNOSIS — E11.65 TYPE 2 DIABETES MELLITUS WITH HYPERGLYCEMIA, WITHOUT LONG-TERM CURRENT USE OF INSULIN (HCC): ICD-10-CM

## 2021-05-19 DIAGNOSIS — R11.0 NAUSEA: ICD-10-CM

## 2021-05-19 LAB
ALBUMIN SERPL-MCNC: 4.5 G/DL (ref 3.5–5.2)
ALBUMIN/GLOB SERPL: 1.4 G/DL
ALP SERPL-CCNC: 62 U/L (ref 39–117)
ALT SERPL W P-5'-P-CCNC: 20 U/L (ref 1–41)
ANION GAP SERPL CALCULATED.3IONS-SCNC: 8.8 MMOL/L (ref 5–15)
AST SERPL-CCNC: 33 U/L (ref 1–40)
BASOPHILS # BLD AUTO: 0.04 10*3/MM3 (ref 0–0.2)
BASOPHILS NFR BLD AUTO: 0.5 % (ref 0–1.5)
BILIRUB SERPL-MCNC: 0.3 MG/DL (ref 0–1.2)
BUN SERPL-MCNC: 11 MG/DL (ref 6–20)
BUN/CREAT SERPL: 9.5 (ref 7–25)
CALCIUM SPEC-SCNC: 9.4 MG/DL (ref 8.6–10.5)
CHLORIDE SERPL-SCNC: 99 MMOL/L (ref 98–107)
CHOLEST SERPL-MCNC: 140 MG/DL (ref 0–200)
CO2 SERPL-SCNC: 31.2 MMOL/L (ref 22–29)
CREAT SERPL-MCNC: 1.16 MG/DL (ref 0.76–1.27)
DEPRECATED RDW RBC AUTO: 44.7 FL (ref 37–54)
EOSINOPHIL # BLD AUTO: 0.67 10*3/MM3 (ref 0–0.4)
EOSINOPHIL NFR BLD AUTO: 7.9 % (ref 0.3–6.2)
ERYTHROCYTE [DISTWIDTH] IN BLOOD BY AUTOMATED COUNT: 14 % (ref 12.3–15.4)
GFR SERPL CREATININE-BSD FRML MDRD: 78 ML/MIN/1.73
GLOBULIN UR ELPH-MCNC: 3.2 GM/DL
GLUCOSE SERPL-MCNC: 64 MG/DL (ref 65–99)
HCT VFR BLD AUTO: 45.2 % (ref 37.5–51)
HDLC SERPL-MCNC: 42 MG/DL (ref 40–60)
HGB BLD-MCNC: 15.1 G/DL (ref 13–17.7)
IMM GRANULOCYTES # BLD AUTO: 0.04 10*3/MM3 (ref 0–0.05)
IMM GRANULOCYTES NFR BLD AUTO: 0.5 % (ref 0–0.5)
LDLC SERPL CALC-MCNC: 81 MG/DL (ref 0–100)
LDLC/HDLC SERPL: 1.92 {RATIO}
LYMPHOCYTES # BLD AUTO: 3.03 10*3/MM3 (ref 0.7–3.1)
LYMPHOCYTES NFR BLD AUTO: 35.9 % (ref 19.6–45.3)
MCH RBC QN AUTO: 28.9 PG (ref 26.6–33)
MCHC RBC AUTO-ENTMCNC: 33.4 G/DL (ref 31.5–35.7)
MCV RBC AUTO: 86.6 FL (ref 79–97)
MONOCYTES # BLD AUTO: 1.12 10*3/MM3 (ref 0.1–0.9)
MONOCYTES NFR BLD AUTO: 13.3 % (ref 5–12)
NEUTROPHILS NFR BLD AUTO: 3.54 10*3/MM3 (ref 1.7–7)
NEUTROPHILS NFR BLD AUTO: 41.9 % (ref 42.7–76)
NRBC BLD AUTO-RTO: 0 /100 WBC (ref 0–0.2)
PLATELET # BLD AUTO: 328 10*3/MM3 (ref 140–450)
PMV BLD AUTO: 10.3 FL (ref 6–12)
POTASSIUM SERPL-SCNC: 3.8 MMOL/L (ref 3.5–5.2)
PROT SERPL-MCNC: 7.7 G/DL (ref 6–8.5)
PSA SERPL-MCNC: 3.02 NG/ML (ref 0–4)
RBC # BLD AUTO: 5.22 10*6/MM3 (ref 4.14–5.8)
SODIUM SERPL-SCNC: 139 MMOL/L (ref 136–145)
TRIGL SERPL-MCNC: 87 MG/DL (ref 0–150)
TSH SERPL DL<=0.05 MIU/L-ACNC: 2.25 UIU/ML (ref 0.27–4.2)
VLDLC SERPL-MCNC: 17 MG/DL (ref 5–40)
WBC # BLD AUTO: 8.44 10*3/MM3 (ref 3.4–10.8)

## 2021-05-19 PROCEDURE — 84443 ASSAY THYROID STIM HORMONE: CPT

## 2021-05-19 PROCEDURE — 80061 LIPID PANEL: CPT

## 2021-05-19 PROCEDURE — 80053 COMPREHEN METABOLIC PANEL: CPT

## 2021-05-19 PROCEDURE — 36415 COLL VENOUS BLD VENIPUNCTURE: CPT

## 2021-05-19 PROCEDURE — G0103 PSA SCREENING: HCPCS

## 2021-05-19 PROCEDURE — 85025 COMPLETE CBC W/AUTO DIFF WBC: CPT

## 2021-05-19 PROCEDURE — 99214 OFFICE O/P EST MOD 30 MIN: CPT | Performed by: PHYSICIAN ASSISTANT

## 2021-05-19 RX ORDER — HYDROCODONE BITARTRATE AND ACETAMINOPHEN 10; 325 MG/1; MG/1
1 TABLET ORAL 4 TIMES DAILY
Qty: 120 TABLET | Refills: 0 | Status: CANCELLED | OUTPATIENT
Start: 2021-05-19

## 2021-05-19 RX ORDER — PROMETHAZINE HYDROCHLORIDE 25 MG/1
25 TABLET ORAL EVERY 6 HOURS PRN
Qty: 60 TABLET | Refills: 1 | Status: SHIPPED | OUTPATIENT
Start: 2021-05-19 | End: 2021-08-20 | Stop reason: SDUPTHER

## 2021-05-19 RX ORDER — HYDROCODONE BITARTRATE AND ACETAMINOPHEN 10; 325 MG/1; MG/1
1 TABLET ORAL 4 TIMES DAILY
Qty: 120 TABLET | Refills: 0 | Status: SHIPPED | OUTPATIENT
Start: 2021-05-19 | End: 2021-06-24 | Stop reason: SDUPTHER

## 2021-05-19 NOTE — PROGRESS NOTES
Subjective   Sebastian Liao is a 61 y.o. male  Diabetes (F/U. FBS 88 A1C 7.2%), Nausea (RF phenergan), and DDD (RF Norco 10/325 QID)      History of Present Illness  Patient is a pleasant 61-year-old white male who comes in for follow-up of type 2 diabetes doing well with his blood sugar today, fasting blood sugar 88 A1c today was 7.2 patient has some nausea since having Covid needs refill of his phentermine works well.  Patient also complains of degenerative disc disease in L-spine needs refill of Norco meds working well pain is controlled  The following portions of the patient's history were reviewed and updated as appropriate: allergies, current medications, past social history and problem list    Review of Systems   Constitutional: Negative for appetite change, diaphoresis, fatigue and unexpected weight change.   Eyes: Negative for visual disturbance.   Respiratory: Negative for cough, chest tightness and shortness of breath.    Cardiovascular: Negative for chest pain, palpitations and leg swelling.   Gastrointestinal: Negative for diarrhea, nausea and vomiting.   Endocrine: Negative for polydipsia, polyphagia and polyuria.   Musculoskeletal: Positive for back pain.   Skin: Negative for color change and rash.   Neurological: Negative for dizziness, syncope, weakness, light-headedness, numbness and headaches.       Objective     Vitals:    05/19/21 0859   BP: 124/82   Pulse: 85   Resp: 16   Temp: 97.1 °F (36.2 °C)   SpO2: 98%       Physical Exam  Vitals and nursing note reviewed.   Constitutional:       Appearance: He is well-developed.   Neck:      Vascular: No JVD.   Cardiovascular:      Rate and Rhythm: Normal rate and regular rhythm.      Pulses: Normal pulses.      Heart sounds: Normal heart sounds. No murmur heard.     Pulmonary:      Effort: Pulmonary effort is normal. No respiratory distress.      Breath sounds: Normal breath sounds.   Abdominal:      General: Bowel sounds are normal.      Palpations:  Abdomen is soft.      Tenderness: There is no abdominal tenderness.   Musculoskeletal:      Lumbar back: Bony tenderness present. Decreased range of motion.   Skin:     General: Skin is warm and dry.         Assessment/Plan     Diagnoses and all orders for this visit:    1. DDD (degenerative disc disease), lumbar (Primary)    2. Nausea  -     promethazine (PHENERGAN) 25 MG tablet; Take 1 tablet by mouth Every 6 (Six) Hours As Needed for nausea and vomitting  Dispense: 60 tablet; Refill: 1    3. Type 2 diabetes mellitus with hyperglycemia, without long-term current use of insulin (CMS/[facility])    #1 Norco 10/325 1 p.o. 4 times daily dispense 120    2.  Phenergan every 6-8 hours as needed dispense 60    3.  Continue with diet and exercise patient's A1c today 7.2 fasting blood sugar 88 he was encouraged to lose 10 pounds overall doing well like to get an A1c under 7 and he will try to get that accomplished in the next 3 months.

## 2021-05-19 NOTE — TELEPHONE ENCOUNTER
This is already pending for Dr. Macedo to send in. He was seen this morning and Dr. DODSON is in clinic. He does them when he is able.

## 2021-05-19 NOTE — TELEPHONE ENCOUNTER
Caller: Sebastian Liao    Relationship: Self    Best call back number: [phone]    Medication needed:   Requested Prescriptions     Pending Prescriptions Disp Refills   • HYDROcodone-acetaminophen (NORCO)  MG per tablet 120 tablet 0     Sig: Take 1 tablet by mouth 4 (Four) Times a Day.       When do you need the refill by: ASAP    What additional details did the patient provide when requesting the medication: OUT AND NEEDS ASAP    Does the patient have less than a 3 day supply:  [x] Yes  [] No    What is the patient's preferred pharmacy: [facility]

## 2021-05-25 ENCOUNTER — TELEPHONE (OUTPATIENT)
Dept: FAMILY MEDICINE CLINIC | Facility: CLINIC | Age: 61
End: 2021-05-25

## 2021-05-25 NOTE — TELEPHONE ENCOUNTER
Caller: Sebastian Liao    Relationship: Self    Best call back number: [phone]    What medication are you requesting: ANTIBIOTIC     What are your current symptoms:DRAINAGE IN EYES AND BACK OF THROAT, RUNNY NOSE    How long have you been experiencing symptoms:   Have you had these symptoms before:    [x] Yes  [] No    Have you been treated for these symptoms before:   [x] Yes  [] No    If a prescription is needed, what is your preferred pharmacy and phone number: [facility]     Additional notes:

## 2021-06-08 ENCOUNTER — APPOINTMENT (OUTPATIENT)
Dept: CT IMAGING | Facility: HOSPITAL | Age: 61
End: 2021-06-08

## 2021-06-08 ENCOUNTER — HOSPITAL ENCOUNTER (EMERGENCY)
Facility: HOSPITAL | Age: 61
Discharge: HOME | End: 2021-06-08
Attending: EMERGENCY MEDICINE | Admitting: EMERGENCY MEDICINE

## 2021-06-08 VITALS
SYSTOLIC BLOOD PRESSURE: 146 MMHG | TEMPERATURE: 98.4 F | BODY MASS INDEX: 40.82 KG/M2 | WEIGHT: 245 LBS | RESPIRATION RATE: 20 BRPM | DIASTOLIC BLOOD PRESSURE: 75 MMHG | HEIGHT: 65 IN | OXYGEN SATURATION: 98 % | HEART RATE: 86 BPM

## 2021-06-08 DIAGNOSIS — A09 DIARRHEA OF INFECTIOUS ORIGIN: ICD-10-CM

## 2021-06-08 DIAGNOSIS — R10.9 ABDOMINAL CRAMPING: ICD-10-CM

## 2021-06-08 DIAGNOSIS — A04.5 CAMPYLOBACTER DIARRHEA: Primary | ICD-10-CM

## 2021-06-08 LAB
ADV 40+41 DNA STL QL NAA+NON-PROBE: NOT DETECTED
ALBUMIN SERPL-MCNC: 4 G/DL (ref 3.5–5.2)
ALBUMIN/GLOB SERPL: 1.1 G/DL
ALP SERPL-CCNC: 65 U/L (ref 39–117)
ALT SERPL W P-5'-P-CCNC: 16 U/L (ref 1–41)
ANION GAP SERPL CALCULATED.3IONS-SCNC: 11 MMOL/L (ref 5–15)
AST SERPL-CCNC: 28 U/L (ref 1–40)
ASTRO TYP 1-8 RNA STL QL NAA+NON-PROBE: NOT DETECTED
BASOPHILS # BLD AUTO: 0.03 10*3/MM3 (ref 0–0.2)
BASOPHILS NFR BLD AUTO: 0.2 % (ref 0–1.5)
BILIRUB SERPL-MCNC: 0.3 MG/DL (ref 0–1.2)
BILIRUB UR QL STRIP: NEGATIVE
BUN SERPL-MCNC: 10 MG/DL (ref 6–20)
BUN/CREAT SERPL: 8.4 (ref 7–25)
C CAYETANENSIS DNA STL QL NAA+NON-PROBE: NOT DETECTED
C COLI+JEJ+UPSA DNA STL QL NAA+NON-PROBE: DETECTED
C DIFF TOX GENS STL QL NAA+PROBE: NOT DETECTED
CALCIUM SPEC-SCNC: 9.5 MG/DL (ref 8.6–10.5)
CHLORIDE SERPL-SCNC: 97 MMOL/L (ref 98–107)
CLARITY UR: CLEAR
CO2 SERPL-SCNC: 26 MMOL/L (ref 22–29)
COLOR UR: YELLOW
CREAT SERPL-MCNC: 1.19 MG/DL (ref 0.76–1.27)
CRYPTOSP DNA STL QL NAA+NON-PROBE: NOT DETECTED
D-LACTATE SERPL-SCNC: 1.2 MMOL/L (ref 0.5–2)
DEPRECATED RDW RBC AUTO: 45.2 FL (ref 37–54)
E HISTOLYT DNA STL QL NAA+NON-PROBE: NOT DETECTED
EAEC PAA PLAS AGGR+AATA ST NAA+NON-PRB: NOT DETECTED
EC STX1+STX2 GENES STL QL NAA+NON-PROBE: NOT DETECTED
EOSINOPHIL # BLD AUTO: 0.11 10*3/MM3 (ref 0–0.4)
EOSINOPHIL NFR BLD AUTO: 0.9 % (ref 0.3–6.2)
EPEC EAE GENE STL QL NAA+NON-PROBE: NOT DETECTED
ERYTHROCYTE [DISTWIDTH] IN BLOOD BY AUTOMATED COUNT: 13.9 % (ref 12.3–15.4)
ETEC LTA+ST1A+ST1B TOX ST NAA+NON-PROBE: NOT DETECTED
FLUAV RNA RESP QL NAA+PROBE: NOT DETECTED
FLUBV RNA RESP QL NAA+PROBE: NOT DETECTED
G LAMBLIA DNA STL QL NAA+NON-PROBE: NOT DETECTED
GFR SERPL CREATININE-BSD FRML MDRD: 76 ML/MIN/1.73
GLOBULIN UR ELPH-MCNC: 3.6 GM/DL
GLUCOSE SERPL-MCNC: 143 MG/DL (ref 65–99)
GLUCOSE UR STRIP-MCNC: NEGATIVE MG/DL
HCT VFR BLD AUTO: 45.6 % (ref 37.5–51)
HGB BLD-MCNC: 14.7 G/DL (ref 13–17.7)
HGB UR QL STRIP.AUTO: NEGATIVE
HOLD SPECIMEN: NORMAL
IMM GRANULOCYTES # BLD AUTO: 0.04 10*3/MM3 (ref 0–0.05)
IMM GRANULOCYTES NFR BLD AUTO: 0.3 % (ref 0–0.5)
KETONES UR QL STRIP: NEGATIVE
LEUKOCYTE ESTERASE UR QL STRIP.AUTO: NEGATIVE
LIPASE SERPL-CCNC: 21 U/L (ref 13–60)
LYMPHOCYTES # BLD AUTO: 1.37 10*3/MM3 (ref 0.7–3.1)
LYMPHOCYTES NFR BLD AUTO: 10.7 % (ref 19.6–45.3)
MCH RBC QN AUTO: 28.6 PG (ref 26.6–33)
MCHC RBC AUTO-ENTMCNC: 32.2 G/DL (ref 31.5–35.7)
MCV RBC AUTO: 88.7 FL (ref 79–97)
MONOCYTES # BLD AUTO: 1.42 10*3/MM3 (ref 0.1–0.9)
MONOCYTES NFR BLD AUTO: 11.1 % (ref 5–12)
NEUTROPHILS NFR BLD AUTO: 76.8 % (ref 42.7–76)
NEUTROPHILS NFR BLD AUTO: 9.85 10*3/MM3 (ref 1.7–7)
NITRITE UR QL STRIP: NEGATIVE
NOROVIRUS GI+II RNA STL QL NAA+NON-PROBE: NOT DETECTED
NRBC BLD AUTO-RTO: 0 /100 WBC (ref 0–0.2)
P SHIGELLOIDES DNA STL QL NAA+NON-PROBE: NOT DETECTED
PH UR STRIP.AUTO: 7 [PH] (ref 5–8)
PLATELET # BLD AUTO: 286 10*3/MM3 (ref 140–450)
PMV BLD AUTO: 9.8 FL (ref 6–12)
POTASSIUM SERPL-SCNC: 4.2 MMOL/L (ref 3.5–5.2)
PROT SERPL-MCNC: 7.6 G/DL (ref 6–8.5)
PROT UR QL STRIP: NEGATIVE
RBC # BLD AUTO: 5.14 10*6/MM3 (ref 4.14–5.8)
RVA RNA STL QL NAA+NON-PROBE: NOT DETECTED
S ENT+BONG DNA STL QL NAA+NON-PROBE: NOT DETECTED
SAPO I+II+IV+V RNA STL QL NAA+NON-PROBE: NOT DETECTED
SARS-COV-2 RNA RESP QL NAA+PROBE: NOT DETECTED
SHIGELLA SP+EIEC IPAH ST NAA+NON-PROBE: NOT DETECTED
SODIUM SERPL-SCNC: 134 MMOL/L (ref 136–145)
SP GR UR STRIP: 1.01 (ref 1–1.03)
UROBILINOGEN UR QL STRIP: NORMAL
V CHOL+PARA+VUL DNA STL QL NAA+NON-PROBE: NOT DETECTED
V CHOLERAE DNA STL QL NAA+NON-PROBE: NOT DETECTED
WBC # BLD AUTO: 12.82 10*3/MM3 (ref 3.4–10.8)
WHOLE BLOOD HOLD SPECIMEN: NORMAL
WHOLE BLOOD HOLD SPECIMEN: NORMAL
Y ENTEROCOL DNA STL QL NAA+NON-PROBE: NOT DETECTED

## 2021-06-08 PROCEDURE — 0097U HC BIOFIRE FILMARRAY GI PANEL: CPT | Performed by: EMERGENCY MEDICINE

## 2021-06-08 PROCEDURE — 99283 EMERGENCY DEPT VISIT LOW MDM: CPT

## 2021-06-08 PROCEDURE — 81003 URINALYSIS AUTO W/O SCOPE: CPT | Performed by: EMERGENCY MEDICINE

## 2021-06-08 PROCEDURE — 25010000002 HYDROMORPHONE PER 4 MG: Performed by: EMERGENCY MEDICINE

## 2021-06-08 PROCEDURE — 80053 COMPREHEN METABOLIC PANEL: CPT

## 2021-06-08 PROCEDURE — 85025 COMPLETE CBC W/AUTO DIFF WBC: CPT

## 2021-06-08 PROCEDURE — 96374 THER/PROPH/DIAG INJ IV PUSH: CPT

## 2021-06-08 PROCEDURE — 96375 TX/PRO/DX INJ NEW DRUG ADDON: CPT

## 2021-06-08 PROCEDURE — 83605 ASSAY OF LACTIC ACID: CPT

## 2021-06-08 PROCEDURE — 83690 ASSAY OF LIPASE: CPT

## 2021-06-08 PROCEDURE — 87493 C DIFF AMPLIFIED PROBE: CPT | Performed by: EMERGENCY MEDICINE

## 2021-06-08 PROCEDURE — 25010000002 ONDANSETRON PER 1 MG: Performed by: EMERGENCY MEDICINE

## 2021-06-08 PROCEDURE — 74176 CT ABD & PELVIS W/O CONTRAST: CPT

## 2021-06-08 PROCEDURE — 87636 SARSCOV2 & INF A&B AMP PRB: CPT | Performed by: EMERGENCY MEDICINE

## 2021-06-08 RX ORDER — ONDANSETRON 2 MG/ML
4 INJECTION INTRAMUSCULAR; INTRAVENOUS ONCE
Status: COMPLETED | OUTPATIENT
Start: 2021-06-08 | End: 2021-06-08

## 2021-06-08 RX ORDER — DICYCLOMINE HCL 20 MG
20 TABLET ORAL EVERY 6 HOURS
Qty: 12 TABLET | Refills: 0 | Status: SHIPPED | OUTPATIENT
Start: 2021-06-08 | End: 2021-09-22 | Stop reason: SDUPTHER

## 2021-06-08 RX ORDER — SODIUM CHLORIDE 9 MG/ML
10 INJECTION INTRAVENOUS AS NEEDED
Status: DISCONTINUED | OUTPATIENT
Start: 2021-06-08 | End: 2021-06-08 | Stop reason: HOSPADM

## 2021-06-08 RX ORDER — AZITHROMYCIN 250 MG/1
500 TABLET, FILM COATED ORAL DAILY
Qty: 4 TABLET | Refills: 0 | Status: SHIPPED | OUTPATIENT
Start: 2021-06-08 | End: 2021-06-11

## 2021-06-08 RX ORDER — HYDROMORPHONE HYDROCHLORIDE 1 MG/ML
0.25 INJECTION, SOLUTION INTRAMUSCULAR; INTRAVENOUS; SUBCUTANEOUS ONCE
Status: COMPLETED | OUTPATIENT
Start: 2021-06-08 | End: 2021-06-08

## 2021-06-08 RX ORDER — AZITHROMYCIN 250 MG/1
500 TABLET, FILM COATED ORAL ONCE
Status: COMPLETED | OUTPATIENT
Start: 2021-06-08 | End: 2021-06-08

## 2021-06-08 RX ADMIN — HYDROMORPHONE HYDROCHLORIDE 0.25 MG: 1 INJECTION, SOLUTION INTRAMUSCULAR; INTRAVENOUS; SUBCUTANEOUS at 16:51

## 2021-06-08 RX ADMIN — SODIUM CHLORIDE 1000 ML: 9 INJECTION, SOLUTION INTRAVENOUS at 16:52

## 2021-06-08 RX ADMIN — AZITHROMYCIN MONOHYDRATE 500 MG: 250 TABLET ORAL at 19:52

## 2021-06-08 RX ADMIN — ONDANSETRON 4 MG: 2 INJECTION INTRAMUSCULAR; INTRAVENOUS at 16:51

## 2021-06-08 NOTE — ED PROVIDER NOTES
Subjective   3-day history of abdominal cramping general malaise body aches. He has had no cough no shortness of breath no wheezing. No loss of smell or taste. He has had Covid back in November has had both vaccinations but is concerned maybe he caught it again. States he has had chronic shortness of breath since the initial Covid infection still seeing pulmonology for this. He had quite a bit of diarrhea 10-15 episodes a day. States he has had no blood in stool no black tarry stool. He has not traveled outside the country, he has not had any food that has been suspect, his wife has not been ill. Not been on antibiotics has been ill and has no prior history of C. difficile.      History provided by:  Patient   used: No    Diarrhea  The primary symptoms include abdominal pain, nausea and diarrhea. Primary symptoms do not include fever, weight loss, vomiting, melena, hematemesis, jaundice, hematochezia, dysuria or myalgias. The illness began 3 to 5 days ago. The onset was gradual.   The illness is also significant for tenesmus. The illness does not include chills, dysphagia, bloating, constipation, back pain or itching. Associated medical issues do not include inflammatory bowel disease, GERD, gallstones, liver disease, alcohol abuse, gastric bypass, bowel resection, irritable bowel syndrome, hemorrhoids or diverticulitis.       Review of Systems   Constitutional: Negative for chills, fever and weight loss.   Respiratory: Negative for chest tightness, shortness of breath and wheezing.    Cardiovascular: Negative for chest pain and palpitations.   Gastrointestinal: Positive for abdominal pain, diarrhea and nausea. Negative for bloating, constipation, dysphagia, hematemesis, hematochezia, jaundice, melena and vomiting.   Genitourinary: Negative for dysuria, frequency and urgency.   Musculoskeletal: Negative for back pain and myalgias.   Skin: Negative for itching.   Psychiatric/Behavioral: Negative.     All other systems reviewed and are negative.      Past Medical History:   Diagnosis Date   • Acute bronchitis    • Cervicalgia    • Colon cancer (CMS/HCC)     pt reported on intake forms   • COVID-19    • Edema    • HTN (hypertension)    • Hyperlipidemia    • IBS (irritable bowel syndrome)    • Low testosterone    • Lung cancer (CMS/HCC)     pt reported on intake forms       No Known Allergies    Past Surgical History:   Procedure Laterality Date   • CERVICAL FUSION     • COLONOSCOPY  2012    repeat in ten yrs   • HIP SURGERY Right 2016    Dr. Schwartz   • NECK SURGERY      cervical spine fusion       Family History   Problem Relation Age of Onset   • Cancer Mother         Ovarian,    • Hypertension Father    • Hypertension Sister    • Hypertension Maternal Grandmother    • Diabetes Maternal Grandmother        Social History     Socioeconomic History   • Marital status:      Spouse name: Not on file   • Number of children: Not on file   • Years of education: Not on file   • Highest education level: Not on file   Tobacco Use   • Smoking status: Former Smoker     Packs/day: 1.00     Years: 10.00     Pack years: 10.00     Types: Cigarettes     Quit date:      Years since quittin.   • Smokeless tobacco: Never Used   Substance and Sexual Activity   • Alcohol use: Yes     Comment: occasional   • Drug use: No   • Sexual activity: Defer           Objective   Physical Exam  Vitals and nursing note reviewed.   Constitutional:       Appearance: He is well-developed.   HENT:      Head: Normocephalic and atraumatic.      Right Ear: External ear normal.      Left Ear: External ear normal.      Nose: Nose normal.   Eyes:      General: No scleral icterus.     Conjunctiva/sclera: Conjunctivae normal.      Pupils: Pupils are equal, round, and reactive to light.   Neck:      Thyroid: No thyromegaly.   Cardiovascular:      Rate and Rhythm: Normal rate and regular rhythm.      Heart sounds: Normal heart  sounds.   Pulmonary:      Effort: Pulmonary effort is normal. No respiratory distress.      Breath sounds: Normal breath sounds. No wheezing or rales.   Chest:      Chest wall: No tenderness.   Abdominal:      General: Bowel sounds are normal. There is no distension.      Palpations: Abdomen is soft.      Tenderness: There is abdominal tenderness.      Comments: Diffuse tenderness no focal tenderness no guarding no rebound no rigidity no CVA tenderness.   Musculoskeletal:         General: Normal range of motion.      Cervical back: Normal range of motion.   Lymphadenopathy:      Cervical: No cervical adenopathy.   Skin:     General: Skin is warm and dry.   Neurological:      Mental Status: He is alert and oriented to person, place, and time.      Cranial Nerves: No cranial nerve deficit.      Coordination: Coordination normal.      Deep Tendon Reflexes: Reflexes are normal and symmetric. Reflexes normal.   Psychiatric:         Behavior: Behavior normal.         Thought Content: Thought content normal.         Judgment: Judgment normal.         Procedures           ED Course  ED Course as of Jun 09 2317   Tue Jun 08, 2021 1933 Discussed the findings with Mr. Liao. Advised him of the positive Campylobacter. Covid swabs were negative. CT scan was unremarkable. We are sending home states his first dose of Zithromax here he will get 2 more 500 mg doses for the next 2 days and then should be okay. Give a short course of Bentyl. He will follow-up with Dr. Monroy and his PMD.    [BASSAM]      ED Course User Index  [BSASAM] Dontae Hendricks PA                                   No results found for this or any previous visit (from the past 24 hour(s)).  Note: In addition to lab results from this visit, the labs listed above may include labs taken at another facility or during a different encounter within the last 24 hours. Please correlate lab times with ED admission and discharge times for further clarification of the  "services performed during this visit.    CT Abdomen Pelvis Without Contrast   Preliminary Result   1. No mechanical obstructive process or focal inflammatory findings of   bowel with sigmoid diverticulosis, however no evidence for acute   diverticulitis. Appendix unremarkable.   2. Hepatic steatosis.       D:  06/08/2021   E:  06/09/2021                Vitals:    06/08/21 1358 06/08/21 1955   BP: 172/84 146/75   BP Location: Left arm    Patient Position: Sitting    Pulse: 114 86   Resp: 20    Temp: 98.4 °F (36.9 °C)    TempSrc: Oral    SpO2: 96% 98%   Weight: 111 kg (245 lb)    Height: 165.1 cm (65\")      Medications   sodium chloride 0.9 % bolus 1,000 mL (0 mL Intravenous Stopped 6/8/21 1942)   HYDROmorphone (DILAUDID) injection 0.25 mg (0.25 mg Intravenous Given 6/8/21 1651)   ondansetron (ZOFRAN) injection 4 mg (4 mg Intravenous Given 6/8/21 1651)   azithromycin (ZITHROMAX) tablet 500 mg (500 mg Oral Given 6/8/21 1952)     ECG/EMG Results (last 24 hours)     ** No results found for the last 24 hours. **        No orders to display               MDM  Number of Diagnoses or Management Options  Abdominal cramping: new and requires workup  Campylobacter diarrhea: new and requires workup  Diarrhea of infectious origin: new and requires workup     Amount and/or Complexity of Data Reviewed  Clinical lab tests: reviewed and ordered  Tests in the radiology section of CPT®: reviewed and ordered  Tests in the medicine section of CPT®: ordered and reviewed  Discuss the patient with other providers: yes    Patient Progress  Patient progress: stable      Final diagnoses:   Campylobacter diarrhea   Abdominal cramping   Diarrhea of infectious origin       ED Disposition  ED Disposition     ED Disposition Condition Comment    Discharge Stable           Fransico Ngo PA  [address]  [place] [id number] [phone]               Medication List      New Prescriptions    azithromycin 250 MG " tablet  Commonly known as: ZITHROMAX  Take 2 tablets by mouth Daily for 2 doses.        Stop    Testosterone Cypionate 200 MG/ML injection  Commonly known as: DEPOTESTOTERONE CYPIONATE           Where to Get Your Medications      These medications were sent to [facility] - [place] [id number]    Hours: 7:00 AM-5:30 PM M-F, 8:00 AM-4:30 PM Sat-Sun Phone: [phone]   · azithromycin 250 MG tablet  · dicyclomine 20 MG tablet          Dontae Hendricks PA  06/09/21 [id number]

## 2021-06-23 LAB — BACTERIA SPEC AEROBE CULT: ABNORMAL

## 2021-06-24 ENCOUNTER — OFFICE VISIT (OUTPATIENT)
Dept: FAMILY MEDICINE CLINIC | Facility: CLINIC | Age: 61
End: 2021-06-24

## 2021-06-24 VITALS
SYSTOLIC BLOOD PRESSURE: 140 MMHG | RESPIRATION RATE: 16 BRPM | DIASTOLIC BLOOD PRESSURE: 82 MMHG | WEIGHT: 250.8 LBS | TEMPERATURE: 96.9 F | BODY MASS INDEX: 41.79 KG/M2 | HEIGHT: 65 IN | OXYGEN SATURATION: 95 % | HEART RATE: 93 BPM

## 2021-06-24 DIAGNOSIS — R05.9 COUGH: Primary | ICD-10-CM

## 2021-06-24 DIAGNOSIS — M51.36 DDD (DEGENERATIVE DISC DISEASE), LUMBAR: ICD-10-CM

## 2021-06-24 DIAGNOSIS — E11.65 UNCONTROLLED TYPE 2 DIABETES MELLITUS WITH HYPERGLYCEMIA (HCC): ICD-10-CM

## 2021-06-24 LAB
GLUCOSE BLDC GLUCOMTR-MCNC: 98 MG/DL (ref 70–130)
HBA1C MFR BLD: 7.4 %

## 2021-06-24 PROCEDURE — 83036 HEMOGLOBIN GLYCOSYLATED A1C: CPT | Performed by: PHYSICIAN ASSISTANT

## 2021-06-24 PROCEDURE — 99213 OFFICE O/P EST LOW 20 MIN: CPT | Performed by: PHYSICIAN ASSISTANT

## 2021-06-24 PROCEDURE — 36416 COLLJ CAPILLARY BLOOD SPEC: CPT | Performed by: PHYSICIAN ASSISTANT

## 2021-06-24 PROCEDURE — 3051F HG A1C>EQUAL 7.0%<8.0%: CPT | Performed by: PHYSICIAN ASSISTANT

## 2021-06-24 RX ORDER — HYDROCODONE BITARTRATE AND ACETAMINOPHEN 10; 325 MG/1; MG/1
1 TABLET ORAL 4 TIMES DAILY
Qty: 120 TABLET | Refills: 0 | Status: SHIPPED | OUTPATIENT
Start: 2021-06-24 | End: 2021-08-20 | Stop reason: SDUPTHER

## 2021-06-24 RX ORDER — BENZONATATE 100 MG/1
100 CAPSULE ORAL 2 TIMES DAILY
Qty: 60 CAPSULE | Refills: 3 | Status: SHIPPED | OUTPATIENT
Start: 2021-06-24 | End: 2022-12-06

## 2021-06-24 NOTE — PROGRESS NOTES
Subjective   Sebastian Liao is a 61 y.o. male  Diabetes (FBS 98. A1C 7.4%) and Dry Mouth      History of Present Illness  Patient is a pleasant 61-year-old white male who comes in follow-up type 2 diabetes fasting blood sugars 98 A1c 7.4 patient is due microalbumin screen patient is been watching his diet and trying to exercise still shortness of breath has been seen pulmonary.    Patient planes of nonproductive cough at night spastic cough nonproductive no fever no chills still has some shortness of breath seeing pulmonary cough is nonproductive  The following portions of the patient's history were reviewed and updated as appropriate: allergies, current medications, past social history and problem list    Review of Systems   Constitutional: Negative for appetite change, diaphoresis and unexpected weight change.   Eyes: Negative for visual disturbance.   Respiratory: Negative for cough, chest tightness and shortness of breath.    Cardiovascular: Negative for palpitations and leg swelling.   Gastrointestinal: Negative for diarrhea, nausea and vomiting.   Skin: Negative for color change and rash.   Neurological: Negative for syncope, light-headedness and numbness.       Objective     Vitals:    06/24/21 1028   BP: 140/82   Pulse: 93   Resp: 16   Temp: 96.9 °F (36.1 °C)   SpO2: 95%       Physical Exam  Vitals and nursing note reviewed.   Constitutional:       Appearance: He is well-developed.   Neck:      Vascular: No JVD.   Cardiovascular:      Rate and Rhythm: Normal rate and regular rhythm.      Pulses: Normal pulses.      Heart sounds: Normal heart sounds. No murmur heard.     Pulmonary:      Effort: Respiratory distress present.      Breath sounds: Wheezing present.   Abdominal:      General: Bowel sounds are normal.      Palpations: Abdomen is soft.      Tenderness: There is no abdominal tenderness.   Skin:     General: Skin is warm and dry.         Assessment/Plan     Diagnoses and all orders for this  visit:    1. Cough (Primary)  -     benzonatate (Tessalon Perles) 100 MG capsule; Take 1 capsule by mouth 2 (Two) Times a Day.  Dispense: 60 capsule; Refill: 3    2. Uncontrolled type 2 diabetes mellitus with hyperglycemia (CMS/HCC)  -     MicroAlbumin, Urine, Random - Urine, Clean Catch; Future    Follow-up in 3 months, discussed diet and exercise discussed ways to lose weight get better blood sugar control.

## 2021-06-30 ENCOUNTER — OFFICE VISIT (OUTPATIENT)
Dept: PULMONOLOGY | Facility: CLINIC | Age: 61
End: 2021-06-30

## 2021-06-30 ENCOUNTER — LAB (OUTPATIENT)
Dept: LAB | Facility: HOSPITAL | Age: 61
End: 2021-06-30

## 2021-06-30 ENCOUNTER — PATIENT OUTREACH (OUTPATIENT)
Dept: CASE MANAGEMENT | Facility: OTHER | Age: 61
End: 2021-06-30

## 2021-06-30 VITALS
TEMPERATURE: 98.2 F | OXYGEN SATURATION: 97 % | SYSTOLIC BLOOD PRESSURE: 154 MMHG | RESPIRATION RATE: 18 BRPM | HEART RATE: 96 BPM | WEIGHT: 250.25 LBS | HEIGHT: 65 IN | DIASTOLIC BLOOD PRESSURE: 78 MMHG | BODY MASS INDEX: 41.69 KG/M2

## 2021-06-30 DIAGNOSIS — E11.65 UNCONTROLLED TYPE 2 DIABETES MELLITUS WITH HYPERGLYCEMIA (HCC): ICD-10-CM

## 2021-06-30 DIAGNOSIS — G47.33 OSA (OBSTRUCTIVE SLEEP APNEA): Primary | ICD-10-CM

## 2021-06-30 DIAGNOSIS — K21.9 CHRONIC GERD: ICD-10-CM

## 2021-06-30 DIAGNOSIS — Z86.16 HISTORY OF COVID-19: ICD-10-CM

## 2021-06-30 DIAGNOSIS — R06.09 DYSPNEA ON EXERTION: ICD-10-CM

## 2021-06-30 DIAGNOSIS — E66.01 OBESITY, CLASS III, BMI 40-49.9 (MORBID OBESITY) (HCC): ICD-10-CM

## 2021-06-30 DIAGNOSIS — Z87.891 FORMER SMOKER: ICD-10-CM

## 2021-06-30 LAB — ALBUMIN UR-MCNC: 4.4 MG/DL

## 2021-06-30 PROCEDURE — 99214 OFFICE O/P EST MOD 30 MIN: CPT | Performed by: INTERNAL MEDICINE

## 2021-06-30 PROCEDURE — 82043 UR ALBUMIN QUANTITATIVE: CPT

## 2021-06-30 NOTE — PROGRESS NOTES
PULMONARY  NOTE    Chief Complaint     Dyspnea, history of COVID-19 pneumonia, class III obesity, severe obstructive sleep apnea    History of Present Illness     61-year-old male returns today for follow-up  He was last seen in the office on 3/18/2021 by JULIO Jimenez    He has had dyspnea on exertion.  Is been worse since he experienced COVID-19 pneumonia in October 2020    Work-up to date is as outlined below.  Is also had transthoracic echocardiogram which revealed mild aortic valve disease    He comes in today indicating that while he is still short of breath he does feel that he is getting somewhat better.  No regular cough or sputum production  No fevers    Has been on Breo empirically which she thinks has improved his dyspnea, as well    Recent sleep study revealed severe obstructive sleep apnea with an elevated AHI and nocturnal desaturation.  RONIT equipment has not yet been set up    Patient Active Problem List   Diagnosis   • Testicular hypofunction   • Essential hypertension, benign   • Diabetes type 2, controlled (CMS/[facility])   • Hyperlipidemia   • Obesity, Class III, BMI 40-49.9 (morbid obesity) (CMS/[facility])   • Dyspnea on exertion   • History of COVID-19 pneumonia (10/2020)   • Former smoker (None x 35 years)   • GERD   • RONIT (obstructive sleep apnea) - severe     No Known Allergies    Current Outpatient Medications:   •  albuterol sulfate  (90 Base) MCG/ACT inhaler, Inhale 2 puffs Every 4 (Four) Hours As Needed for wheezing, Disp: 8.5 g, Rfl: 5  •  amLODIPine (NORVASC) 10 MG tablet, Take 1 tablet by mouth Daily., Disp: 90 tablet, Rfl: 3  •  benzonatate (Tessalon Perles) 100 MG capsule, Take 1 capsule by mouth 2 (Two) Times a Day., Disp: 60 capsule, Rfl: 3  •  carisoprodol (Soma) 350 MG tablet, Take 1 tablet by mouth 2 (two) times a day., Disp: 60 tablet, Rfl: 1  •  cyclobenzaprine (FLEXERIL) 10 MG tablet, Take 1 tablet by mouth 2 (Two) Times a Day As Needed for Muscle Spasms., Disp: 90  tablet, Rfl: 3  •  dicyclomine (BENTYL) 20 MG tablet, Take 1 tablet by mouth Every 6 (Six) Hours., Disp: 12 tablet, Rfl: 0  •  famotidine (PEPCID) 20 MG tablet, Take 1 tablet by mouth 2 (two) times a day., Disp: 180 tablet, Rfl: 3  •  fluticasone (Flonase) 50 MCG/ACT nasal spray, Use 2 sprays in each nostril as directed by provider Daily., Disp: 16 g, Rfl: 3  •  fluticasone-salmeterol (Advair Diskus) 100-50 MCG/DOSE DISKUS, Inhale 1 puff 2 (Two) Times a Day., Disp: 60 each, Rfl: 6  •  glyBURIDE-metFORMIN (GLUCOVANCE) 5-500 MG per tablet, Take 1 tablet by mouth 2 (Two) Times a Day With Meals., Disp: 180 tablet, Rfl: 2  •  HYDROcodone-acetaminophen (NORCO)  MG per tablet, Take 1 tablet by mouth 4 (Four) Times a Day., Disp: 120 tablet, Rfl: 0  •  loratadine (CLARITIN) 10 MG tablet, Take 1 tablet by mouth Daily., Disp: 30 tablet, Rfl: 10  •  olopatadine (PATANOL) 0.1 % ophthalmic solution, , Disp: , Rfl:   •  omeprazole (priLOSEC) 40 MG capsule, Take 1 capsule by mouth Daily., Disp: 90 capsule, Rfl: 3  •  promethazine (PHENERGAN) 25 MG tablet, Take 1 tablet by mouth Every 6 (Six) Hours As Needed for nausea and vomitting, Disp: 60 tablet, Rfl: 1  •  rosuvastatin (CRESTOR) 10 MG tablet, Take 1 tablet by mouth Daily., Disp: 90 tablet, Rfl: 3  •  sildenafil (VIAGRA) 100 MG tablet, Take 1 tablet by mouth As Needed as directed, Disp: 30 tablet, Rfl: 6  MEDICATION LIST AND ALLERGIES REVIEWED.    Family History   Problem Relation Age of Onset   • Cancer Mother         Ovarian,    • Hypertension Father    • Hypertension Sister    • Hypertension Maternal Grandmother    • Diabetes Maternal Grandmother      Social History     Tobacco Use   • Smoking status: Former Smoker     Packs/day: 1.00     Years: 10.00     Pack years: 10.00     Types: Cigarettes     Quit date:      Years since quittin.5   • Smokeless tobacco: Never Used   Substance Use Topics   • Alcohol use: Yes     Comment: occasional   • Drug use: No  "    Social History     Social History Narrative    Left hand dominant, , Exercises daily.     Former smoker, has not smoked for 35 years     FAMILY AND SOCIAL HISTORY REVIEWED.    Review of Systems  ALSO REFER TO SCANNED ROS SHEET FROM SAME DATE.    /78 (BP Location: Right arm, Patient Position: Sitting, Cuff Size: Adult)   Pulse 96   Temp 98.2 °F (36.8 °C)   Resp 18   Ht 165.1 cm (65\")   Wt 114 kg (250 lb 4 oz)   SpO2 97% Comment: room air at rest  BMI 41.64 kg/m²   Physical Exam  Vitals and nursing note reviewed.   Constitutional:       General: He is not in acute distress.     Appearance: He is well-developed. He is not diaphoretic.   HENT:      Head: Normocephalic and atraumatic.   Neck:      Thyroid: No thyromegaly.   Cardiovascular:      Rate and Rhythm: Normal rate and regular rhythm.      Heart sounds: Normal heart sounds. No murmur heard.     Pulmonary:      Effort: Pulmonary effort is normal.      Breath sounds: Normal breath sounds. No stridor.   Abdominal:      General: Bowel sounds are normal.      Palpations: Abdomen is soft.   Musculoskeletal:         General: Normal range of motion.      Comments: Trace ankle edema   Lymphadenopathy:      Cervical: No cervical adenopathy.      Upper Body:      Right upper body: No supraclavicular or epitrochlear adenopathy.      Left upper body: No supraclavicular or epitrochlear adenopathy.   Skin:     General: Skin is warm and dry.   Neurological:      Mental Status: He is alert and oriented to person, place, and time.   Psychiatric:         Behavior: Behavior normal.         Results     PFTs done previously were reviewed.  Mild restrictive defect without airway obstruction    Previously done CT scan of the chest reviewed on PACS.  Resolution of pulmonary infiltrates    Sleep study revealed severe obstructive sleep apnea and associated desaturation  Immunization History   Administered Date(s) Administered   • COVID-19 (PFIZER) 01/09/2021, " 02/01/2021   • Flulaval/Fluarix/Fluzone Quad 10/12/2020   • Hep B, Unspecified 02/05/1996   • Hepatitis A 11/19/2018, 05/20/2019   • Pneumococcal Conjugate 13-Valent (PCV13) 11/07/2016   • Pneumococcal, Unspecified 04/25/2017   • Rabies 12/05/2014, 12/08/2014, 12/19/2014, 12/19/2014   • Tetanus 04/25/2017     Problem List       ICD-10-CM ICD-9-CM   1. RONIT (obstructive sleep apnea) - severe  G47.33 327.23   2. Obesity, Class III, BMI 40-49.9 (morbid obesity) (CMS/[facility])  E66.01 278.01   3. History of COVID-19 pneumonia (10/2020)  Z86.16 V12.09   4. GERD  K21.9 530.81   5. Former smoker (None x 35 years)  Z87.891 V15.82   6. Dyspnea on exertion  R06.00 786.09       Discussion     We did a review of his testing to date  He continues to have a mild restrictive defect.  This could be on the basis of his body habitus although some residual from his COVID-19 pneumonia cannot be excluded.    At this point of recommended regular exercise program and efforts at weight loss  Also reflux precautions    We talked about his sleep study.  I have recommended AutoSet CPAP 8/18.  We will arrange this through a DME company.    He thinks that the Breo may be helping so he is going to stay on Breo and we will provide refills as needed    I will plan to see him back in 2 to 3 months for follow-up    Moderate level of Medical Decision Making complexity based on 2 or more chronic stable illnesses and prescription drug management.    Dontae Manzo MD  Note electronically signed    CC: Fransico Ngo PA

## 2021-06-30 NOTE — OUTREACH NOTE
Ambulatory Case Management Note        Care Plan: RONIT   Updates made since 6/30/2021 12:00 AM      Problem: INSUFFICIENT KNOWLEDGE ABOUT RONIT       Goal: Patient will have sufficient knowledge about RONIT       Task: Educate patient on the RONIT disease process    Due Date: 12/31/2021   Responsible User: Nely Powers RN   Note:    Reviewed cpap set and use 6/30/21     Task: Patient is able to teach back an understanding of RONIT and how to manage symptoms with diet modifications, medications, and lifestyle choices    Due Date: 12/31/2021   Responsible User: Nely Powers RN      Problem: RONIT IS NOT SELF-MANAGED       Goal: Patient will adhere to RONIT action plan and avoid hospitalizations for the next 6 months.       Task: Encourage patient to work with provider to create an action plan for monitoring and managing symptoms of RONIT Completed 6/30/2021   Responsible User: Nely Powers RN Lori Brown, RN  Ambulatory Case Management    6/30/2021, 14:22 EDT

## 2021-07-03 DIAGNOSIS — K21.9 GASTROESOPHAGEAL REFLUX DISEASE WITHOUT ESOPHAGITIS: ICD-10-CM

## 2021-07-06 RX ORDER — OMEPRAZOLE 40 MG/1
CAPSULE, DELAYED RELEASE ORAL
Qty: 90 CAPSULE | Refills: 3 | Status: SHIPPED | OUTPATIENT
Start: 2021-07-06 | End: 2021-07-14 | Stop reason: SDUPTHER

## 2021-07-14 DIAGNOSIS — K21.9 GASTROESOPHAGEAL REFLUX DISEASE WITHOUT ESOPHAGITIS: ICD-10-CM

## 2021-07-14 RX ORDER — OMEPRAZOLE 40 MG/1
40 CAPSULE, DELAYED RELEASE ORAL DAILY
Qty: 90 CAPSULE | Refills: 3 | Status: SHIPPED | OUTPATIENT
Start: 2021-07-14 | End: 2022-01-17 | Stop reason: SDUPTHER

## 2021-07-14 NOTE — TELEPHONE ENCOUNTER
Caller: Sebastian Liao    Relationship: Self    Best call back number: [phone]    Medication needed:   Requested Prescriptions     Pending Prescriptions Disp Refills   • omeprazole (priLOSEC) 40 MG capsule 90 capsule 3     Sig: Take 1 capsule by mouth Daily.       When do you need the refill by:07/14/2021    What additional details did the patient provide when requesting the medication:   PATIENT HAS APPOINTMENT SCHEDULED 08/20/2021    Does the patient have less than a 3 day supply:  [] Yes  [x] No    What is the patient's preferred pharmacy: [facility]

## 2021-08-06 ENCOUNTER — OFFICE VISIT (OUTPATIENT)
Dept: PULMONOLOGY | Facility: CLINIC | Age: 61
End: 2021-08-06

## 2021-08-06 VITALS
WEIGHT: 249 LBS | BODY MASS INDEX: 41.48 KG/M2 | OXYGEN SATURATION: 99 % | HEART RATE: 88 BPM | SYSTOLIC BLOOD PRESSURE: 138 MMHG | DIASTOLIC BLOOD PRESSURE: 76 MMHG | TEMPERATURE: 98.2 F | RESPIRATION RATE: 18 BRPM | HEIGHT: 65 IN

## 2021-08-06 DIAGNOSIS — K21.9 CHRONIC GERD: ICD-10-CM

## 2021-08-06 DIAGNOSIS — G47.33 OSA (OBSTRUCTIVE SLEEP APNEA): ICD-10-CM

## 2021-08-06 DIAGNOSIS — Z86.16 HISTORY OF COVID-19: ICD-10-CM

## 2021-08-06 DIAGNOSIS — R06.09 DYSPNEA ON EXERTION: Primary | ICD-10-CM

## 2021-08-06 PROCEDURE — 99214 OFFICE O/P EST MOD 30 MIN: CPT | Performed by: NURSE PRACTITIONER

## 2021-08-20 ENCOUNTER — OFFICE VISIT (OUTPATIENT)
Dept: FAMILY MEDICINE CLINIC | Facility: CLINIC | Age: 61
End: 2021-08-20

## 2021-08-20 VITALS
SYSTOLIC BLOOD PRESSURE: 130 MMHG | DIASTOLIC BLOOD PRESSURE: 80 MMHG | HEIGHT: 65 IN | HEART RATE: 88 BPM | BODY MASS INDEX: 41.38 KG/M2 | RESPIRATION RATE: 14 BRPM | OXYGEN SATURATION: 98 % | WEIGHT: 248.4 LBS | TEMPERATURE: 96.9 F

## 2021-08-20 DIAGNOSIS — M50.30 DDD (DEGENERATIVE DISC DISEASE), CERVICAL: Primary | ICD-10-CM

## 2021-08-20 DIAGNOSIS — M51.36 DDD (DEGENERATIVE DISC DISEASE), LUMBAR: ICD-10-CM

## 2021-08-20 DIAGNOSIS — R11.0 NAUSEA: ICD-10-CM

## 2021-08-20 PROCEDURE — 99213 OFFICE O/P EST LOW 20 MIN: CPT | Performed by: PHYSICIAN ASSISTANT

## 2021-08-20 RX ORDER — PROMETHAZINE HYDROCHLORIDE 25 MG/1
25 TABLET ORAL EVERY 6 HOURS PRN
Qty: 60 TABLET | Refills: 1 | Status: SHIPPED | OUTPATIENT
Start: 2021-08-20 | End: 2022-01-17 | Stop reason: SDUPTHER

## 2021-08-20 RX ORDER — HYDROCODONE BITARTRATE AND ACETAMINOPHEN 10; 325 MG/1; MG/1
1 TABLET ORAL 4 TIMES DAILY
Qty: 120 TABLET | Refills: 0 | Status: SHIPPED | OUTPATIENT
Start: 2021-08-20 | End: 2021-10-25 | Stop reason: SDUPTHER

## 2021-08-20 RX ORDER — PROMETHAZINE HYDROCHLORIDE 25 MG/1
25 TABLET ORAL EVERY 6 HOURS PRN
Qty: 60 TABLET | Refills: 1 | Status: SHIPPED | OUTPATIENT
Start: 2021-08-20 | End: 2021-08-20 | Stop reason: SDUPTHER

## 2021-08-20 NOTE — PROGRESS NOTES
Subjective   Sebastian Liao is a 61 y.o. male  DDD (RF Norco 10/325 QID), Diabetes (F/U), and Nausea      History of Present Illness  Patient is a pleasant 61-year-old white male who presents for chronic back pain needs refill [place] 10/325 4 times a day meds working well no problems or complaints back pain is controlled patient also comes in with recurrent nausea he has had nausea since getting Covid he still has some shortness of breath  The following portions of the patient's history were reviewed and updated as appropriate: allergies, current medications, past social history and problem list    Review of Systems   Constitutional: Negative for fatigue and unexpected weight change.   Respiratory: Negative for cough, chest tightness and shortness of breath.    Cardiovascular: Negative for chest pain, palpitations and leg swelling.   Gastrointestinal: Positive for nausea.   Musculoskeletal: Positive for back pain.   Skin: Negative for color change and rash.   Neurological: Negative for dizziness, syncope, weakness and headaches.       Objective     Vitals:    08/20/21 0854   BP: 130/80   Pulse: 88   Resp: 14   Temp: 96.9 °F (36.1 °C)   SpO2: 98%       Physical Exam  Vitals and nursing note reviewed.   Constitutional:       Appearance: He is well-developed. He is not diaphoretic.   Neck:      Thyroid: No thyromegaly.      Vascular: No JVD.   Cardiovascular:      Rate and Rhythm: Normal rate and regular rhythm.      Heart sounds: Normal heart sounds.   Pulmonary:      Effort: Pulmonary effort is normal.      Breath sounds: Normal breath sounds.   Abdominal:      General: Bowel sounds are normal.      Palpations: Abdomen is soft.   Musculoskeletal:      Cervical back: Neck supple.   Lymphadenopathy:      Cervical: No cervical adenopathy.   Skin:     General: Skin is warm and dry.   Neurological:      Sensory: No sensory deficit.         Assessment/Plan     Diagnoses and all orders for this visit:    1. DDD  (degenerative disc disease), cervical (Primary)    2. Nausea  -     promethazine (PHENERGAN) 25 MG tablet; Take 1 tablet by mouth Every 6 (Six) Hours As Needed for nausea and vomitting  Dispense: 60 tablet; Refill: 1    #1 Norco 10/325 1 p.o. 4 times daily dispense #120    I spent 15 minutes in patient care: Reviewing records prior to the visit, examining the patient, entering orders and documentation    As part of this patient's treatment plan, patient will be prescribed controlled substances. The patient has been made aware of appropriate use of such medications, including potential risk of somnolence, limited ability to drive and /or work safely, and potential for dependence or overdose. It has also been made clear that these medications are for use by this patient only, without concomitant use of alcohol or other substances unless prescribed.Controlled substance status of medication discussed with patient, discussed risks of medication including abuse potential and diversion potential and need to follow up for reevaluation appointment in order to receive further refills.    Please note that portions of this document were completed with a voice recognition program. Efforts were made to edit the dictations, but occasionally words are mis-transcribed

## 2021-09-22 RX ORDER — DICYCLOMINE HCL 20 MG
20 TABLET ORAL EVERY 6 HOURS
Qty: 12 TABLET | Refills: 0 | Status: SHIPPED | OUTPATIENT
Start: 2021-09-22 | End: 2021-10-12 | Stop reason: SDUPTHER

## 2021-10-04 ENCOUNTER — IMMUNIZATION (OUTPATIENT)
Dept: VACCINE CLINIC | Facility: HOSPITAL | Age: 61
End: 2021-10-04

## 2021-10-04 PROCEDURE — 0003A: CPT | Performed by: INTERNAL MEDICINE

## 2021-10-04 PROCEDURE — 91300 HC SARSCOV02 VAC 30MCG/0.3ML IM: CPT | Performed by: INTERNAL MEDICINE

## 2021-10-04 PROCEDURE — 0004A ADM SARSCOV2 30MCG/0.3ML BOOSTER: CPT | Performed by: INTERNAL MEDICINE

## 2021-10-13 RX ORDER — DICYCLOMINE HCL 20 MG
20 TABLET ORAL EVERY 6 HOURS
Qty: 12 TABLET | Refills: 0 | Status: SHIPPED | OUTPATIENT
Start: 2021-10-13 | End: 2021-10-25 | Stop reason: SDUPTHER

## 2021-10-18 ENCOUNTER — OFFICE VISIT (OUTPATIENT)
Dept: PULMONOLOGY | Facility: CLINIC | Age: 61
End: 2021-10-18

## 2021-10-18 VITALS
SYSTOLIC BLOOD PRESSURE: 140 MMHG | HEIGHT: 65 IN | TEMPERATURE: 98.4 F | DIASTOLIC BLOOD PRESSURE: 72 MMHG | RESPIRATION RATE: 20 BRPM | HEART RATE: 100 BPM | OXYGEN SATURATION: 99 % | WEIGHT: 240.25 LBS | BODY MASS INDEX: 40.03 KG/M2

## 2021-10-18 DIAGNOSIS — R06.2 WHEEZING: ICD-10-CM

## 2021-10-18 DIAGNOSIS — Z87.891 FORMER SMOKER: ICD-10-CM

## 2021-10-18 DIAGNOSIS — R06.09 DYSPNEA ON EXERTION: ICD-10-CM

## 2021-10-18 DIAGNOSIS — G47.33 OSA (OBSTRUCTIVE SLEEP APNEA): Primary | ICD-10-CM

## 2021-10-18 DIAGNOSIS — Z86.16 HISTORY OF COVID-19: ICD-10-CM

## 2021-10-18 DIAGNOSIS — K21.9 CHRONIC GERD: ICD-10-CM

## 2021-10-18 DIAGNOSIS — E66.01 OBESITY, CLASS III, BMI 40-49.9 (MORBID OBESITY) (HCC): ICD-10-CM

## 2021-10-18 PROCEDURE — 99214 OFFICE O/P EST MOD 30 MIN: CPT | Performed by: INTERNAL MEDICINE

## 2021-10-18 RX ORDER — ALBUTEROL SULFATE 90 UG/1
2 AEROSOL, METERED RESPIRATORY (INHALATION) EVERY 4 HOURS PRN
Qty: 18 G | Refills: 3 | Status: SHIPPED | OUTPATIENT
Start: 2021-10-18 | End: 2022-10-25 | Stop reason: SDUPTHER

## 2021-10-18 NOTE — PROGRESS NOTES
PULMONARY  NOTE    Chief Complaint     Dyspnea, history of COVID-19 pneumonia, class III obesity, severe obstructive sleep apnea    History of Present Illness     61-year-old male returns today for follow-up  We last saw him in the office on 6/30/2021    He has severe obstructive sleep apnea based on a prior sleep study.  Since I last saw him he has been on CPAP.  He is using it nightly and feels that he is feeling better since he has been using it    He continues to have intermittent dyspnea on exertion.  Sometimes this occurs at rest and sometimes with exertion  He has been on inhalers empirically.  He feels that they help  He had been on Breo but that has been changed Advair by his insurance  He also has albuterol to use as needed.    So far, he has not been able to pursue a regular exercise program or make a lot of progress on weight loss but he is interested in doing so    Patient Active Problem List   Diagnosis   • Testicular hypofunction   • Essential hypertension, benign   • Diabetes type 2, controlled ([facility])   • Hyperlipidemia   • Obesity, Class III, BMI 40-49.9 (morbid obesity) ([facility])   • Dyspnea on exertion   • History of COVID-19 pneumonia (10/2020)   • Former smoker (None x 35 years)   • GERD   • RONIT (obstructive sleep apnea) - severe     No Known Allergies    Current Outpatient Medications:   •  albuterol sulfate  (90 Base) MCG/ACT inhaler, Inhale 2 puffs Every 4 (Four) Hours As Needed for wheezing, Disp: 8.5 g, Rfl: 5  •  alfuzosin (UROXATRAL) 10 MG 24 hr tablet, Take 1 tablet by mouth Daily., Disp: 90 tablet, Rfl: 3  •  alfuzosin (UROXATRAL) 10 MG 24 hr tablet, Take 1 tablet by mouth Daily., Disp: 90 tablet, Rfl: 3  •  amLODIPine (NORVASC) 10 MG tablet, Take 1 tablet by mouth Daily., Disp: 90 tablet, Rfl: 3  •  benzonatate (Tessalon Perles) 100 MG capsule, Take 1 capsule by mouth 2 (Two) Times a Day., Disp: 60 capsule, Rfl: 3  •  carisoprodol (Soma) 350 MG tablet, Take 1 tablet by mouth 2  (two) times a day., Disp: 60 tablet, Rfl: 1  •  cyclobenzaprine (FLEXERIL) 10 MG tablet, Take 1 tablet by mouth 2 (Two) Times a Day As Needed for Muscle Spasms., Disp: 90 tablet, Rfl: 3  •  dicyclomine (BENTYL) 20 MG tablet, Take 1 tablet by mouth Every 6 (Six) Hours., Disp: 12 tablet, Rfl: 0  •  famotidine (PEPCID) 20 MG tablet, Take 1 tablet by mouth 2 (two) times a day., Disp: 180 tablet, Rfl: 3  •  fluticasone (Flonase) 50 MCG/ACT nasal spray, Use 2 sprays in each nostril as directed by provider Daily., Disp: 16 g, Rfl: 3  •  fluticasone-salmeterol (Advair Diskus) 100-50 MCG/DOSE DISKUS, Inhale 1 puff 2 (Two) Times a Day., Disp: 60 each, Rfl: 6  •  glyBURIDE-metFORMIN (GLUCOVANCE) 5-500 MG per tablet, Take 1 tablet by mouth 2 (Two) Times a Day With Meals., Disp: 180 tablet, Rfl: 2  •  HYDROcodone-acetaminophen (NORCO)  MG per tablet, Take 1 tablet by mouth 4 (Four) Times a Day., Disp: 120 tablet, Rfl: 0  •  loratadine (CLARITIN) 10 MG tablet, Take 1 tablet by mouth Daily., Disp: 30 tablet, Rfl: 10  •  olopatadine (PATANOL) 0.1 % ophthalmic solution, , Disp: , Rfl:   •  omeprazole (priLOSEC) 40 MG capsule, Take 1 capsule by mouth Daily., Disp: 90 capsule, Rfl: 3  •  promethazine (PHENERGAN) 25 MG tablet, Take 1 tablet by mouth Every 6 (Six) Hours As Needed for nausea and vomitting, Disp: 60 tablet, Rfl: 1  •  rosuvastatin (CRESTOR) 10 MG tablet, Take 1 tablet by mouth Daily., Disp: 90 tablet, Rfl: 3  •  sildenafil (VIAGRA) 100 MG tablet, Take 1 tablet by mouth As Needed as directed, Disp: 30 tablet, Rfl: 6  •  sildenafil (VIAGRA) 100 MG tablet, Take 1 tablet by mouth As Needed., Disp: 30 tablet, Rfl: 6  MEDICATION LIST AND ALLERGIES REVIEWED.    Family History   Problem Relation Age of Onset   • Cancer Mother         Ovarian,    • Hypertension Father    • Hypertension Sister    • Hypertension Maternal Grandmother    • Diabetes Maternal Grandmother      Social History     Tobacco Use   • Smoking status:  "Former Smoker     Packs/day: 1.00     Years: 10.00     Pack years: 10.00     Types: Cigarettes     Quit date:      Years since quittin.8   • Smokeless tobacco: Never Used   Substance Use Topics   • Alcohol use: Yes     Comment: occasional   • Drug use: No     Social History     Social History Narrative    Left hand dominant, , Exercises daily.     Former smoker, has not smoked for 35 years     FAMILY AND SOCIAL HISTORY REVIEWED.    Review of Systems  ALSO REFER TO SCANNED ROS SHEET FROM SAME DATE.    /72 (BP Location: Left arm, Patient Position: Sitting, Cuff Size: Adult)   Pulse 100   Temp 98.4 °F (36.9 °C)   Resp 20   Ht 165.1 cm (65\")   Wt 109 kg (240 lb 4 oz)   SpO2 99% Comment: room air at rest  BMI 39.98 kg/m²   Physical Exam  Vitals and nursing note reviewed.   Constitutional:       General: He is not in acute distress.     Appearance: He is well-developed. He is not diaphoretic.   HENT:      Head: Normocephalic and atraumatic.   Neck:      Thyroid: No thyromegaly.   Cardiovascular:      Rate and Rhythm: Normal rate and regular rhythm.      Heart sounds: Normal heart sounds. No murmur heard.      Pulmonary:      Effort: Pulmonary effort is normal.      Breath sounds: Normal breath sounds. No stridor.   Chest:   Breasts:      Right: No supraclavicular adenopathy.      Left: No supraclavicular adenopathy.       Abdominal:      General: Bowel sounds are normal.      Palpations: Abdomen is soft.   Musculoskeletal:         General: Normal range of motion.      Comments: Trace ankle edema   Lymphadenopathy:      Cervical: No cervical adenopathy.      Upper Body:      Right upper body: No supraclavicular or epitrochlear adenopathy.      Left upper body: No supraclavicular or epitrochlear adenopathy.   Skin:     General: Skin is warm and dry.   Neurological:      Mental Status: He is alert and oriented to person, place, and time.   Psychiatric:         Behavior: Behavior normal. "         Results     Immunization History   Administered Date(s) Administered   • COVID-19 (PFIZER) 01/09/2021, 02/01/2021, 10/04/2021   • FluLaval/Fluarix/Fluzone >6 10/12/2020   • Hep B, Unspecified 02/05/1996   • Hepatitis A 11/19/2018, 05/20/2019   • Pneumococcal Conjugate 13-Valent (PCV13) 11/07/2016   • Pneumococcal, Unspecified 04/25/2017   • Rabies 12/05/2014, 12/08/2014, 12/19/2014, 12/19/2014   • Tetanus 04/25/2017     Problem List       ICD-10-CM ICD-9-CM   1. RONIT (obstructive sleep apnea) - severe  G47.33 327.23   2. Obesity, Class III, BMI 40-49.9 (morbid obesity) ([facility])  E66.01 278.01   3. History of COVID-19 pneumonia (10/2020)  Z86.16 V12.09   4. GERD  K21.9 530.81   5. Former smoker (None x 35 years)  Z87.891 V15.82   6. Dyspnea on exertion  R06.00 786.09       Discussion     He appears to be tolerating his CPAP well and feels that it benefits him.  We will get follow-up data regarding compliance and effectiveness.    He is going to remain on Advair with albuterol on an as-needed basis.    I again encouraged regular exercise and weight loss efforts.    We will refill his inhalers.    I will plan to see him back in 6 months or earlier if there are any problems in the meantime    Moderate level of Medical Decision Making complexity based on 2 or more chronic stable illnesses and prescription drug management.    Dontae Manzo MD  Note electronically signed    CC: Fransico Ngo PA

## 2021-10-25 ENCOUNTER — OFFICE VISIT (OUTPATIENT)
Dept: FAMILY MEDICINE CLINIC | Facility: CLINIC | Age: 61
End: 2021-10-25

## 2021-10-25 ENCOUNTER — LAB (OUTPATIENT)
Dept: LAB | Facility: HOSPITAL | Age: 61
End: 2021-10-25

## 2021-10-25 VITALS
HEART RATE: 80 BPM | BODY MASS INDEX: 41.99 KG/M2 | OXYGEN SATURATION: 98 % | WEIGHT: 252 LBS | SYSTOLIC BLOOD PRESSURE: 138 MMHG | DIASTOLIC BLOOD PRESSURE: 80 MMHG | HEIGHT: 65 IN

## 2021-10-25 DIAGNOSIS — M51.36 DDD (DEGENERATIVE DISC DISEASE), LUMBAR: ICD-10-CM

## 2021-10-25 DIAGNOSIS — M50.30 DDD (DEGENERATIVE DISC DISEASE), CERVICAL: Primary | ICD-10-CM

## 2021-10-25 DIAGNOSIS — E11.65 TYPE 2 DIABETES MELLITUS WITH HYPERGLYCEMIA, WITHOUT LONG-TERM CURRENT USE OF INSULIN (HCC): ICD-10-CM

## 2021-10-25 DIAGNOSIS — R10.9 ABDOMINAL CRAMPING: ICD-10-CM

## 2021-10-25 DIAGNOSIS — I10 ESSENTIAL HYPERTENSION, BENIGN: ICD-10-CM

## 2021-10-25 LAB
ALBUMIN SERPL-MCNC: 4.4 G/DL (ref 3.5–5.2)
ALBUMIN/GLOB SERPL: 1.4 G/DL
ALP SERPL-CCNC: 68 U/L (ref 39–117)
ALT SERPL W P-5'-P-CCNC: 18 U/L (ref 1–41)
ANION GAP SERPL CALCULATED.3IONS-SCNC: 9.8 MMOL/L (ref 5–15)
AST SERPL-CCNC: 27 U/L (ref 1–40)
BILIRUB SERPL-MCNC: 0.2 MG/DL (ref 0–1.2)
BUN SERPL-MCNC: 13 MG/DL (ref 6–20)
BUN/CREAT SERPL: 12 (ref 7–25)
CALCIUM SPEC-SCNC: 9.5 MG/DL (ref 8.6–10.5)
CHLORIDE SERPL-SCNC: 102 MMOL/L (ref 98–107)
CHOLEST SERPL-MCNC: 132 MG/DL (ref 0–200)
CO2 SERPL-SCNC: 28.2 MMOL/L (ref 22–29)
CREAT SERPL-MCNC: 1.08 MG/DL (ref 0.76–1.27)
GFR SERPL CREATININE-BSD FRML MDRD: 85 ML/MIN/1.73
GLOBULIN UR ELPH-MCNC: 3.2 GM/DL
GLUCOSE SERPL-MCNC: 63 MG/DL (ref 65–99)
HBA1C MFR BLD: 7.81 % (ref 4.8–5.6)
HDLC SERPL-MCNC: 38 MG/DL (ref 40–60)
LDLC SERPL CALC-MCNC: 76 MG/DL (ref 0–100)
LDLC/HDLC SERPL: 1.98 {RATIO}
POTASSIUM SERPL-SCNC: 3.9 MMOL/L (ref 3.5–5.2)
PROT SERPL-MCNC: 7.6 G/DL (ref 6–8.5)
SODIUM SERPL-SCNC: 140 MMOL/L (ref 136–145)
TRIGL SERPL-MCNC: 94 MG/DL (ref 0–150)
VLDLC SERPL-MCNC: 18 MG/DL (ref 5–40)

## 2021-10-25 PROCEDURE — 83036 HEMOGLOBIN GLYCOSYLATED A1C: CPT

## 2021-10-25 PROCEDURE — 36415 COLL VENOUS BLD VENIPUNCTURE: CPT

## 2021-10-25 PROCEDURE — 80061 LIPID PANEL: CPT

## 2021-10-25 PROCEDURE — 80053 COMPREHEN METABOLIC PANEL: CPT

## 2021-10-25 PROCEDURE — 99214 OFFICE O/P EST MOD 30 MIN: CPT | Performed by: PHYSICIAN ASSISTANT

## 2021-10-25 RX ORDER — HYDROCODONE BITARTRATE AND ACETAMINOPHEN 10; 325 MG/1; MG/1
1 TABLET ORAL 4 TIMES DAILY
Qty: 120 TABLET | Refills: 0 | Status: SHIPPED | OUTPATIENT
Start: 2021-10-25 | End: 2022-01-17 | Stop reason: SDUPTHER

## 2021-10-25 RX ORDER — AMLODIPINE BESYLATE 10 MG/1
10 TABLET ORAL DAILY
Qty: 90 TABLET | Refills: 3 | Status: SHIPPED | OUTPATIENT
Start: 2021-10-25 | End: 2022-01-17 | Stop reason: SDUPTHER

## 2021-10-25 RX ORDER — GLYBURIDE-METFORMIN HYDROCHLORIDE 5; 500 MG/1; MG/1
1 TABLET ORAL 2 TIMES DAILY WITH MEALS
Qty: 180 TABLET | Refills: 2 | Status: SHIPPED | OUTPATIENT
Start: 2021-10-25 | End: 2022-01-17 | Stop reason: SDUPTHER

## 2021-10-25 RX ORDER — DICYCLOMINE HCL 20 MG
20 TABLET ORAL EVERY 6 HOURS
Qty: 60 TABLET | Refills: 3 | Status: SHIPPED | OUTPATIENT
Start: 2021-10-25 | End: 2022-01-17 | Stop reason: SDUPTHER

## 2021-10-25 NOTE — PROGRESS NOTES
Subjective   Sebastian Liao is a 61 y.o. male  GI Problem (nausea) and Med Refill  Neck pain  Diabetes  History of Present Illness  Patient is a pleasant 61-year-old male who comes in for chronic GI problems since Covid, patient has cramping upset stomach some shortness of breath secondary to Covid infection patient also has chronic neck pain needs refill [place] 10/325 4 times a day for chronic neck pain has degenerative disc disease.  Patient also needs blood work for type 2 diabetes past due has been try to watch his diet and exercise    Patient states he been trying to exercise watch blood sugar control blood pressure needs refill of medications also  The following portions of the patient's history were reviewed and updated as appropriate: allergies, current medications, past social history and problem list    Review of Systems   Constitutional: Negative for unexpected weight change.   Respiratory: Negative for cough, chest tightness and shortness of breath.    Cardiovascular: Negative for chest pain, palpitations and leg swelling.   Musculoskeletal: Positive for neck pain.   Skin: Negative for color change.   Neurological: Negative for syncope and headaches.       Objective     Vitals:    10/25/21 0849   BP: 138/80   Pulse: 80   SpO2: 98%       Physical Exam  Vitals and nursing note reviewed.   Constitutional:       Appearance: He is well-developed.   Neck:      Vascular: No JVD.   Cardiovascular:      Rate and Rhythm: Normal rate and regular rhythm.      Pulses: Normal pulses.      Heart sounds: Normal heart sounds. No murmur heard.      Pulmonary:      Effort: Pulmonary effort is normal. No respiratory distress.      Breath sounds: Normal breath sounds.   Abdominal:      General: Bowel sounds are normal.      Palpations: Abdomen is soft.      Tenderness: There is no abdominal tenderness.   Musculoskeletal:      Cervical back: Torticollis present. Pain with movement present. Decreased range of motion.    Skin:     General: Skin is warm and dry.         Assessment/Plan     Diagnoses and all orders for this visit:    1. DDD (degenerative disc disease), cervical (Primary)    2. Type 2 diabetes mellitus with hyperglycemia, without long-term current use of insulin (HCC)  -     Hemoglobin A1c; Future  -     Comprehensive Metabolic Panel; Future  -     Lipid Panel; Future  -     glyBURIDE-metFORMIN (GLUCOVANCE) 5-500 MG per tablet; Take 1 tablet by mouth 2 (Two) Times a Day With Meals.  Dispense: 180 tablet; Refill: 2    3. Essential hypertension, benign  -     amLODIPine (NORVASC) 10 MG tablet; Take 1 tablet by mouth Daily.  Dispense: 90 tablet; Refill: 3    4. Abdominal cramping    Bentyl 20 mg 3 times daily dispense 60x3 refills      Nor[place] 10/325 1 p.o. 4 times daily dispense #120    I spent 15 minutes in patient care: Reviewing records prior to the visit, examining the patient, entering orders and documentation        As part of this patient's treatment plan, patient will be prescribed controlled substances. The patient has been made aware of appropriate use of such medications, including potential risk of somnolence, limited ability to drive and /or work safely, and potential for dependence or overdose. It has also been made clear that these medications are for use by this patient only, without concomitant use of alcohol or other substances unless prescribed.Controlled substance status of medication discussed with patient, discussed risks of medication including abuse potential and diversion potential and need to follow up for reevaluation appointment in order to receive further refills.    Part of this note may be an electronic transcription/translation of spoken language to printed text using the Dragon Dictation System.    Part of this note may be an electronic transcription/translation of spoken language to printed text using the Dragon Dictation System.

## 2021-10-25 NOTE — PROGRESS NOTES
Subjective   Sebastian Liao is a 61 y.o. male  GI Problem (nausea) and Med Refill      History of Present Illness    The following portions of the patient's history were reviewed and updated as appropriate: allergies, current medications, past social history and problem list    Review of Systems   Constitutional: Negative for fatigue and unexpected weight change.   Eyes: Positive for itching.   Respiratory: Negative for cough, chest tightness and shortness of breath.    Cardiovascular: Negative for chest pain, palpitations and leg swelling.   Gastrointestinal: Negative for nausea.   Skin: Negative for color change and rash.   Neurological: Negative for dizziness, syncope, weakness and headaches.       Objective     Vitals:    10/25/21 0849   BP: 138/80   Pulse: 80   SpO2: 98%       Physical Exam  Vitals and nursing note reviewed.   Constitutional:       Appearance: He is well-developed.   Neck:      Vascular: No JVD.   Cardiovascular:      Rate and Rhythm: Normal rate and regular rhythm.      Pulses: Normal pulses.      Heart sounds: Normal heart sounds. No murmur heard.      Pulmonary:      Effort: Pulmonary effort is normal. No respiratory distress.      Breath sounds: Normal breath sounds.   Abdominal:      General: Bowel sounds are normal.      Palpations: Abdomen is soft.      Tenderness: There is no abdominal tenderness.   Musculoskeletal:      Cervical back: Decreased range of motion.   Skin:     General: Skin is warm and dry.         Assessment/Plan     Diagnoses and all orders for this visit:    1. DDD (degenerative disc disease), cervical (Primary)

## 2022-01-17 ENCOUNTER — LAB (OUTPATIENT)
Dept: LAB | Facility: HOSPITAL | Age: 62
End: 2022-01-17

## 2022-01-17 ENCOUNTER — OFFICE VISIT (OUTPATIENT)
Dept: FAMILY MEDICINE CLINIC | Facility: CLINIC | Age: 62
End: 2022-01-17

## 2022-01-17 VITALS
BODY MASS INDEX: 42.28 KG/M2 | TEMPERATURE: 97.1 F | SYSTOLIC BLOOD PRESSURE: 130 MMHG | OXYGEN SATURATION: 96 % | DIASTOLIC BLOOD PRESSURE: 82 MMHG | HEIGHT: 65 IN | WEIGHT: 253.8 LBS | RESPIRATION RATE: 16 BRPM | HEART RATE: 96 BPM

## 2022-01-17 DIAGNOSIS — Z00.00 ROUTINE GENERAL MEDICAL EXAMINATION AT A HEALTH CARE FACILITY: ICD-10-CM

## 2022-01-17 DIAGNOSIS — M51.36 DDD (DEGENERATIVE DISC DISEASE), LUMBAR: ICD-10-CM

## 2022-01-17 DIAGNOSIS — Z00.00 ROUTINE GENERAL MEDICAL EXAMINATION AT A HEALTH CARE FACILITY: Primary | ICD-10-CM

## 2022-01-17 LAB
ALBUMIN SERPL-MCNC: 4.4 G/DL (ref 3.5–5.2)
ALBUMIN/GLOB SERPL: 1.5 G/DL
ALP SERPL-CCNC: 66 U/L (ref 39–117)
ALT SERPL W P-5'-P-CCNC: 21 U/L (ref 1–41)
ANION GAP SERPL CALCULATED.3IONS-SCNC: 9.1 MMOL/L (ref 5–15)
AST SERPL-CCNC: 28 U/L (ref 1–40)
BASOPHILS # BLD AUTO: 0.07 10*3/MM3 (ref 0–0.2)
BASOPHILS NFR BLD AUTO: 0.8 % (ref 0–1.5)
BILIRUB BLD-MCNC: NEGATIVE MG/DL
BILIRUB SERPL-MCNC: 0.2 MG/DL (ref 0–1.2)
BUN SERPL-MCNC: 12 MG/DL (ref 6–20)
BUN/CREAT SERPL: 10.3 (ref 7–25)
CALCIUM SPEC-SCNC: 8.9 MG/DL (ref 8.6–10.5)
CHLORIDE SERPL-SCNC: 101 MMOL/L (ref 98–107)
CHOLEST SERPL-MCNC: 122 MG/DL (ref 0–200)
CLARITY, POC: CLEAR
CO2 SERPL-SCNC: 28.9 MMOL/L (ref 22–29)
COLOR UR: YELLOW
CREAT SERPL-MCNC: 1.17 MG/DL (ref 0.76–1.27)
DEPRECATED RDW RBC AUTO: 44.6 FL (ref 37–54)
EOSINOPHIL # BLD AUTO: 0.65 10*3/MM3 (ref 0–0.4)
EOSINOPHIL NFR BLD AUTO: 7.9 % (ref 0.3–6.2)
ERYTHROCYTE [DISTWIDTH] IN BLOOD BY AUTOMATED COUNT: 14 % (ref 12.3–15.4)
EXPIRATION DATE: ABNORMAL
GFR SERPL CREATININE-BSD FRML MDRD: 77 ML/MIN/1.73
GLOBULIN UR ELPH-MCNC: 3 GM/DL
GLUCOSE SERPL-MCNC: 100 MG/DL (ref 65–99)
GLUCOSE UR STRIP-MCNC: NEGATIVE MG/DL
HCT VFR BLD AUTO: 46.3 % (ref 37.5–51)
HDLC SERPL-MCNC: 41 MG/DL (ref 40–60)
HGB BLD-MCNC: 15 G/DL (ref 13–17.7)
IMM GRANULOCYTES # BLD AUTO: 0.02 10*3/MM3 (ref 0–0.05)
IMM GRANULOCYTES NFR BLD AUTO: 0.2 % (ref 0–0.5)
KETONES UR QL: NEGATIVE
LDLC SERPL CALC-MCNC: 64 MG/DL (ref 0–100)
LDLC/HDLC SERPL: 1.56 {RATIO}
LEUKOCYTE EST, POC: NEGATIVE
LYMPHOCYTES # BLD AUTO: 2.69 10*3/MM3 (ref 0.7–3.1)
LYMPHOCYTES NFR BLD AUTO: 32.6 % (ref 19.6–45.3)
Lab: ABNORMAL
MCH RBC QN AUTO: 28.2 PG (ref 26.6–33)
MCHC RBC AUTO-ENTMCNC: 32.4 G/DL (ref 31.5–35.7)
MCV RBC AUTO: 87 FL (ref 79–97)
MONOCYTES # BLD AUTO: 0.91 10*3/MM3 (ref 0.1–0.9)
MONOCYTES NFR BLD AUTO: 11 % (ref 5–12)
NEUTROPHILS NFR BLD AUTO: 3.91 10*3/MM3 (ref 1.7–7)
NEUTROPHILS NFR BLD AUTO: 47.5 % (ref 42.7–76)
NITRITE UR-MCNC: NEGATIVE MG/ML
NRBC BLD AUTO-RTO: 0 /100 WBC (ref 0–0.2)
PH UR: 6 [PH] (ref 5–8)
PLATELET # BLD AUTO: 366 10*3/MM3 (ref 140–450)
PMV BLD AUTO: 10.3 FL (ref 6–12)
POTASSIUM SERPL-SCNC: 4.3 MMOL/L (ref 3.5–5.2)
PROT SERPL-MCNC: 7.4 G/DL (ref 6–8.5)
PROT UR STRIP-MCNC: ABNORMAL MG/DL
RBC # BLD AUTO: 5.32 10*6/MM3 (ref 4.14–5.8)
RBC # UR STRIP: NEGATIVE /UL
SODIUM SERPL-SCNC: 139 MMOL/L (ref 136–145)
SP GR UR: 1.02 (ref 1–1.03)
TRIGL SERPL-MCNC: 86 MG/DL (ref 0–150)
TSH SERPL DL<=0.05 MIU/L-ACNC: 2.34 UIU/ML (ref 0.27–4.2)
UROBILINOGEN UR QL: NORMAL
VLDLC SERPL-MCNC: 17 MG/DL (ref 5–40)
WBC NRBC COR # BLD: 8.25 10*3/MM3 (ref 3.4–10.8)

## 2022-01-17 PROCEDURE — 80061 LIPID PANEL: CPT

## 2022-01-17 PROCEDURE — 80050 GENERAL HEALTH PANEL: CPT

## 2022-01-17 PROCEDURE — 93000 ELECTROCARDIOGRAM COMPLETE: CPT | Performed by: PHYSICIAN ASSISTANT

## 2022-01-17 PROCEDURE — 81003 URINALYSIS AUTO W/O SCOPE: CPT | Performed by: PHYSICIAN ASSISTANT

## 2022-01-17 PROCEDURE — 36415 COLL VENOUS BLD VENIPUNCTURE: CPT

## 2022-01-17 PROCEDURE — 99396 PREV VISIT EST AGE 40-64: CPT | Performed by: PHYSICIAN ASSISTANT

## 2022-01-17 RX ORDER — HYDROCODONE BITARTRATE AND ACETAMINOPHEN 10; 325 MG/1; MG/1
1 TABLET ORAL 4 TIMES DAILY
Qty: 120 TABLET | Refills: 0 | Status: SHIPPED | OUTPATIENT
Start: 2022-01-17 | End: 2022-04-26 | Stop reason: SDUPTHER

## 2022-01-17 RX ORDER — OMEPRAZOLE 40 MG/1
40 CAPSULE, DELAYED RELEASE ORAL DAILY
Qty: 90 CAPSULE | Refills: 3 | Status: SHIPPED | OUTPATIENT
Start: 2022-01-17 | End: 2022-11-10 | Stop reason: SDUPTHER

## 2022-01-17 RX ORDER — DICYCLOMINE HCL 20 MG
20 TABLET ORAL EVERY 6 HOURS
Qty: 60 TABLET | Refills: 3 | Status: SHIPPED | OUTPATIENT
Start: 2022-01-17 | End: 2022-04-26 | Stop reason: SDUPTHER

## 2022-01-17 RX ORDER — GLYBURIDE-METFORMIN HYDROCHLORIDE 5; 500 MG/1; MG/1
1 TABLET ORAL 2 TIMES DAILY WITH MEALS
Qty: 180 TABLET | Refills: 3 | Status: SHIPPED | OUTPATIENT
Start: 2022-01-17 | End: 2022-12-16 | Stop reason: SDUPTHER

## 2022-01-17 RX ORDER — AMLODIPINE BESYLATE 10 MG/1
10 TABLET ORAL DAILY
Qty: 90 TABLET | Refills: 3 | Status: SHIPPED | OUTPATIENT
Start: 2022-01-17 | End: 2022-11-10 | Stop reason: SDUPTHER

## 2022-01-17 RX ORDER — ROSUVASTATIN CALCIUM 10 MG/1
10 TABLET, COATED ORAL DAILY
Qty: 90 TABLET | Refills: 3 | Status: SHIPPED | OUTPATIENT
Start: 2022-01-17 | End: 2022-11-10 | Stop reason: SDUPTHER

## 2022-01-17 RX ORDER — PROMETHAZINE HYDROCHLORIDE 25 MG/1
25 TABLET ORAL EVERY 6 HOURS PRN
Qty: 60 TABLET | Refills: 3 | Status: SHIPPED | OUTPATIENT
Start: 2022-01-17 | End: 2022-04-26 | Stop reason: SDUPTHER

## 2022-01-17 NOTE — PROGRESS NOTES
Subjective   Sebastian Liao is a 61 y.o. male  Annual Exam (Pt is fasting. Due for colonoscopy in July. )      History of Present Illness  Patient is a pleasant 61-year-old white male who comes in for preventive medical examination denies any problems or complaints he is scheduled for colonoscopy in July  The following portions of the patient's history were reviewed and updated as appropriate: allergies, current medications, past social history and problem list    Review of Systems   Constitutional: Negative.    HENT: Negative.    Eyes: Negative.    Respiratory: Negative.    Cardiovascular: Negative.    Gastrointestinal: Negative.    Endocrine: Negative.    Genitourinary: Negative.    Musculoskeletal: Negative.    Skin: Negative.    Allergic/Immunologic: Negative.    Neurological: Negative.    Hematological: Negative.    Psychiatric/Behavioral: Negative.    All other systems reviewed and are negative.      Objective     Vitals:    01/17/22 0959   BP: 130/82   Pulse: 96   Resp: 16   Temp: 97.1 °F (36.2 °C)   SpO2: 96%       Physical Exam  Vitals and nursing note reviewed.   Constitutional:       General: He is not in acute distress.     Appearance: Normal appearance. He is well-developed. He is not ill-appearing, toxic-appearing or diaphoretic.   HENT:      Head: Normocephalic and atraumatic.      Right Ear: External ear normal.      Left Ear: External ear normal.   Eyes:      Conjunctiva/sclera: Conjunctivae normal.      Pupils: Pupils are equal, round, and reactive to light.   Neck:      Thyroid: No thyromegaly.      Vascular: No carotid bruit.   Cardiovascular:      Rate and Rhythm: Normal rate and regular rhythm.      Pulses: Normal pulses.      Heart sounds: Normal heart sounds. No murmur heard.      Pulmonary:      Effort: Pulmonary effort is normal. No respiratory distress.      Breath sounds: Normal breath sounds.   Abdominal:      General: Bowel sounds are normal.      Palpations: Abdomen is soft. There is  no mass.      Tenderness: There is no abdominal tenderness.   Musculoskeletal:         General: No swelling. Normal range of motion.      Cervical back: Normal range of motion and neck supple.   Lymphadenopathy:      Cervical: No cervical adenopathy.   Skin:     General: Skin is warm and dry.      Findings: No lesion or rash.   Neurological:      Mental Status: He is alert and oriented to person, place, and time.      Cranial Nerves: No cranial nerve deficit.      Sensory: No sensory deficit.      Motor: No weakness.      Coordination: Coordination normal.      Gait: Gait normal.      Deep Tendon Reflexes: Reflexes are normal and symmetric.   Psychiatric:         Mood and Affect: Mood normal.         Behavior: Behavior normal.         Thought Content: Thought content normal.         Judgment: Judgment normal.         ECG 12 Lead    Date/Time: 1/17/2022 3:57 PM  Performed by: Fransico Ngo PA  Authorized by: Fransico Ngo PA   Rhythm: sinus rhythm  Rate: normal  ST Segments: ST segments normal  T Waves: T waves normal  QRS axis: normal    Clinical impression: non-specific ECG          Assessment/Plan     Diagnoses and all orders for this visit:    1. Routine general medical examination at a health care facility (Primary)  -     amLODIPine (NORVASC) 10 MG tablet; Take 1 tablet by mouth Daily.  Dispense: 90 tablet; Refill: 3  -     rosuvastatin (CRESTOR) 10 MG tablet; Take 1 tablet by mouth Daily.  Dispense: 90 tablet; Refill: 3  -     dicyclomine (BENTYL) 20 MG tablet; Take 1 tablet by mouth Every 6 (Six) Hours.  Dispense: 60 tablet; Refill: 3  -     glyBURIDE-metFORMIN (GLUCOVANCE) 5-500 MG per tablet; Take 1 tablet by mouth 2 (Two) Times a Day With Meals.  Dispense: 180 tablet; Refill: 3  -     omeprazole (priLOSEC) 40 MG capsule; Take 1 capsule by mouth Daily.  Dispense: 90 capsule; Refill: 3  -     promethazine (PHENERGAN) 25 MG tablet; Take 1 tablet by mouth Every 6 (Six) Hours As Needed for  nausea and vomitting  Dispense: 60 tablet; Refill: 3  -     Comprehensive Metabolic Panel; Future  -     CBC & Differential; Future  -     Lipid Panel; Future  -     TSH; Future       [place] 10/325 1 p.o. 4 times daily dispense 120      Preventive medicine discussed, diet, exercise, healthy living discussed at length.  Discussed nutrition, physical activity, healthy weight, injury prevention, misuse of tobacco, alcohol and drugs, dental health, mental health, immunizations, screening    Part of this note may be an electronic transcription/translation of spoken language to printed text using the Dragon Dictation System.

## 2022-02-07 ENCOUNTER — TRANSCRIBE ORDERS (OUTPATIENT)
Dept: PHYSICAL THERAPY | Facility: CLINIC | Age: 62
End: 2022-02-07

## 2022-02-07 DIAGNOSIS — S46.911A RIGHT SHOULDER STRAIN, INITIAL ENCOUNTER: Primary | ICD-10-CM

## 2022-02-07 DIAGNOSIS — S16.1XXA NECK STRAIN, INITIAL ENCOUNTER: ICD-10-CM

## 2022-02-08 ENCOUNTER — TREATMENT (OUTPATIENT)
Dept: PHYSICAL THERAPY | Facility: CLINIC | Age: 62
End: 2022-02-08

## 2022-02-08 DIAGNOSIS — M79.601 PAIN OF RIGHT UPPER EXTREMITY: Primary | ICD-10-CM

## 2022-02-08 PROCEDURE — 97110 THERAPEUTIC EXERCISES: CPT | Performed by: PHYSICAL THERAPIST

## 2022-02-08 PROCEDURE — 97162 PT EVAL MOD COMPLEX 30 MIN: CPT | Performed by: PHYSICAL THERAPIST

## 2022-02-08 PROCEDURE — 97140 MANUAL THERAPY 1/> REGIONS: CPT | Performed by: PHYSICAL THERAPIST

## 2022-02-08 PROCEDURE — 97530 THERAPEUTIC ACTIVITIES: CPT | Performed by: PHYSICAL THERAPIST
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
## 2022-02-08 NOTE — PATIENT INSTRUCTIONS
Access Code: [email or web address]  URL: [email or web address]  Date: 02/08/2022  Prepared by: Cheryl Powers    Exercises  Supine Shoulder Flexion Extension AAROM with Dowel - 2-3 x daily - 15 reps  Supine Shoulder External Rotation with Dowel at 20 Degrees of Abduction - 2-3 x daily - 15 reps  Supine Cervical Retraction with Towel - 2-3 x daily - 15-20 reps  Seated Shoulder Flexion Towel Slide at Table Top - 2-3 x daily - 15 reps  Seated Upper Trapezius Stretch - 2-3 x daily - 3 reps - 15 sec hold  Gentle Levator Scapulae Stretch - 2-3 x daily - 3 reps - 15 sec hold

## 2022-02-11 ENCOUNTER — TREATMENT (OUTPATIENT)
Dept: PHYSICAL THERAPY | Facility: CLINIC | Age: 62
End: 2022-02-11

## 2022-02-11 DIAGNOSIS — M79.601 PAIN OF RIGHT UPPER EXTREMITY: Primary | ICD-10-CM

## 2022-02-11 PROCEDURE — 97140 MANUAL THERAPY 1/> REGIONS: CPT | Performed by: PHYSICAL THERAPIST

## 2022-02-11 PROCEDURE — 97530 THERAPEUTIC ACTIVITIES: CPT | Performed by: PHYSICAL THERAPIST

## 2022-02-11 PROCEDURE — 97112 NEUROMUSCULAR REEDUCATION: CPT | Performed by: PHYSICAL THERAPIST

## 2022-02-11 PROCEDURE — 97110 THERAPEUTIC EXERCISES: CPT | Performed by: PHYSICAL THERAPIST

## 2022-02-11 NOTE — PROGRESS NOTES
Physical Therapy Daily Treatment Note      Patient: Sebastian Liao   : 1960  Referring practitioner: JULIO Raman  Date of Initial Visit: Type: THERAPY  Noted: 2022  Today's Date: 2022  Patient seen for 2 sessions       Visit Diagnoses:    ICD-10-CM ICD-9-CM   1. Pain of right upper extremity  M79.601 729.5       Subjective   Sebastian Liao reports: R arm is feeling a little better. Still has pn radiating into his arm w/ tingling that is most prominent in his thumb but present in all fingers.    Pre tx pn score: 5  Post tx pn score: 4      Objective   See Exercise, Manual, and Modality Logs for complete treatment.     Right Shoulder   Flexion: 130 degrees pre tx; 145 deg post tx  Abduction: 115 degrees   External rotation 0°: 55 degrees     Cervical   Flexion: 32 degrees   Extension: 30 degrees   Left lateral flexion: 18 degrees   Right lateral flexion: 15 degrees       Assessment & Plan     Assessment    Assessment details: Pt compliant w/ initial HEP, reports slight improvement in neck/arm pn and paresthesia. Tingling remains most prominent in thumb. Demonstrates significant improvement in shoulder mobility, lesser gains in cervical. Noted diminished pn/paresthesia after MT techniques. Repeated cervical extn appears to reduce symptoms slightly. Focused visit on improving shoulder mobility, cervical decompression, postural activation. Pt tolerated all well. No changes made to HEP.    Plan  Plan details: Cont w/ traction, neurodynamics, postural strengthening, extn pattern; add UBE for strength/conditioning          Timed:         Manual Therapy:    10     mins  72257;     Therapeutic Exercise:    10     mins  73903;     Neuromuscular Brandon:    9    mins  38800;    Therapeutic Activity:     8     mins  80151;     Gait Trainin     mins  77011;     Ultrasound:     0     mins  06353;    Ionto                               0    mins   63836  Self Care                       0     mins    17825  Canalith Repos    0     mins 87807      Un-Timed:  Electrical Stimulation:    0     mins  62715 ( );  Dry Needling     0     mins self-pay  Traction     0     mins 69453      Timed Treatment:   37   mins   Total Treatment:     37   mins    Cheryl Powers, PT  [place] License: #[id number]

## 2022-02-15 ENCOUNTER — TREATMENT (OUTPATIENT)
Dept: PHYSICAL THERAPY | Facility: CLINIC | Age: 62
End: 2022-02-15

## 2022-02-15 DIAGNOSIS — M79.601 PAIN OF RIGHT UPPER EXTREMITY: Primary | ICD-10-CM

## 2022-02-15 PROCEDURE — 97140 MANUAL THERAPY 1/> REGIONS: CPT | Performed by: PHYSICAL THERAPIST

## 2022-02-15 PROCEDURE — 97110 THERAPEUTIC EXERCISES: CPT | Performed by: PHYSICAL THERAPIST

## 2022-02-15 PROCEDURE — 97112 NEUROMUSCULAR REEDUCATION: CPT | Performed by: PHYSICAL THERAPIST

## 2022-02-15 NOTE — PROGRESS NOTES
"   Physical Therapy Daily Progress Note        Patient: Sebastian Liao   : 1960  Diagnosis/ICD-10 Code:  Pain of right upper extremity [M79.601]  Referring practitioner: JULIO Raman  Date of Initial Visit: Type: THERAPY  Noted: 2022  Today's Date: 2/15/2022  Patient seen for 3 sessions         Sebastian Liao     Subjective \"I am about the same. My thumb is still numb.  It's all on my R side. It's just in my thumb now and not any other fingers.\"    Objective   See Exercise, Manual, and Modality Logs for complete treatment.       Assessment/Plan doorway pec stretches T, Y and corner stretches added to HEP this date. Pt cont to benefit from cervical traction, stretching, and postural cuing to help promote neutral positioning and decreased mm tension.      Progress per Plan of Care           Manual Therapy:    15     mins  35267;  Therapeutic Exercise:    11     mins  07154;     Neuromuscular Brandon:   10    mins  95856;    Therapeutic Activity:          mins  63492;     Gait Training:           mins  46938;     Ultrasound:          mins  01327;    Electrical Stimulation:         mins  54572 ( );  Dry Needling          mins self-pay    Timed Treatment:   36   mins   Total Treatment:     36   mins    Ada Burks PTA  Physical Therapist Assistant                    "

## 2022-02-18 ENCOUNTER — TREATMENT (OUTPATIENT)
Dept: PHYSICAL THERAPY | Facility: CLINIC | Age: 62
End: 2022-02-18

## 2022-02-18 DIAGNOSIS — M79.601 PAIN OF RIGHT UPPER EXTREMITY: Primary | ICD-10-CM

## 2022-02-18 PROCEDURE — 97140 MANUAL THERAPY 1/> REGIONS: CPT | Performed by: PHYSICAL THERAPIST

## 2022-02-18 PROCEDURE — 97530 THERAPEUTIC ACTIVITIES: CPT | Performed by: PHYSICAL THERAPIST

## 2022-02-18 PROCEDURE — 97110 THERAPEUTIC EXERCISES: CPT | Performed by: PHYSICAL THERAPIST

## 2022-02-18 PROCEDURE — 97112 NEUROMUSCULAR REEDUCATION: CPT | Performed by: PHYSICAL THERAPIST

## 2022-02-18 NOTE — PROGRESS NOTES
Physical Therapy Daily Treatment Note      Patient: Sebastian Liao   : 1960  Referring practitioner: JULIO Raman  Date of Initial Visit: Type: THERAPY  Noted: 2022  Today's Date: 2022  Patient seen for 4 sessions       Visit Diagnoses:    ICD-10-CM ICD-9-CM   1. Pain of right upper extremity  M79.601 729.5       Subjective   Sebastian Liao reports: Returning to work on light duty next Monday.Tingling in thumb not as intense.    Pre tx pn score: 7  Post tx pn score: 7      Objective   See Exercise, Manual, and Modality Logs for complete treatment.     Right Shoulder   Flexion: 150 degrees   Abduction: 120 degrees   External rotation 0°: 37 degrees (45 deg L arm)     Cervical   Flexion: 32 degrees   Extension: 30 degrees   Left lateral flexion: 20 degrees   Right lateral flexion: 20 degrees       Assessment & Plan     Assessment    Assessment details: Cont to make gains in shoulder mobility, notes gradual reduction in pn. Paresthesia persists w/ complaint of numbness into most fingers, most prominent in the thumb. Unable to relieve paresthesia w/ cervical traction/mobilization techniques. Continued w/ shoulder mobility exercises, postural training, stretching and incorporated nerve gliding techniques. Pt tolerated all interventions well, though pn/paresthesia unchanged. Discussed adding thumb extn nerve stretch to HEP.    Plan  Plan details: Cont w/ neurodynamics, shoulder mobility/strengthening          Timed:         Manual Therapy:   10      mins  61046;     Therapeutic Exercise:    12     mins  59777;     Neuromuscular Brandon:    8    mins  36746;    Therapeutic Activity:     15     mins  00194;     Gait Trainin     mins  93652;     Ultrasound:     0     mins  09334;    Ionto                               0    mins   77217  Self Care                       0     mins   67716  Canalith Repos    0     mins 20262      Un-Timed:  Electrical Stimulation:    0     mins  10440 (  );  Dry Needling     0     mins self-pay  Traction     0     mins 04617      Timed Treatment:   45   mins   Total Treatment:     45   mins    Cheryl Powers, PT  [place] License: #[id number]

## 2022-02-22 ENCOUNTER — TREATMENT (OUTPATIENT)
Dept: PHYSICAL THERAPY | Facility: CLINIC | Age: 62
End: 2022-02-22

## 2022-02-22 DIAGNOSIS — M79.601 PAIN OF RIGHT UPPER EXTREMITY: Primary | ICD-10-CM

## 2022-02-22 PROCEDURE — 97110 THERAPEUTIC EXERCISES: CPT | Performed by: PHYSICAL THERAPIST

## 2022-02-22 PROCEDURE — 97530 THERAPEUTIC ACTIVITIES: CPT | Performed by: PHYSICAL THERAPIST

## 2022-02-22 PROCEDURE — 97112 NEUROMUSCULAR REEDUCATION: CPT | Performed by: PHYSICAL THERAPIST

## 2022-02-22 NOTE — PROGRESS NOTES
"Physical Therapy Daily Treatment Note      Patient: Sebastian Liao   : 1960  Referring practitioner: JULIO Raman  Date of Initial Visit: Type: THERAPY  Noted: 2022  Today's Date: 2022  Patient seen for 5 sessions       Visit Diagnoses:    ICD-10-CM ICD-9-CM   1. Pain of right upper extremity  M79.601 729.5       Subjective   Sebastian Liao reports: RTW yesterday on restrictions- vacuumed, dust mopped. Had a little soreness  But seemed to loosen things up. \"My fingers are coming along, but my thumb is still numb and sometimes it gets stuck in one position and I have to help it move.\"    Pre tx pn score: 5  Post tx pn score: 5      Objective   See Exercise, Manual, and Modality Logs for complete treatment.     Right Shoulder   Flexion: 160 degrees   Abduction: 140 degrees   External rotation 0°: 50 deg (L 55 deg)    Assessment & Plan     Assessment    Assessment details: Pt continues to make gradual gains in shoulder mobility. He reports gradual reduction in paresthesia and pn. He RTW w/ restrictions yesterday w/o significant issue other than mild soreness. Continued today w/ focus on restoring full ROM, improving neurodynamic mobility, and strengthening. He denied any increase in pn throughout visit. No changes made to HEP.    Plan  Plan details: Cont w/ current POC, incorporating work specific strengthening as pt tolerates          Timed:         Manual Therapy:    0     mins  50777;     Therapeutic Exercise:    23     mins  61268;     Neuromuscular Brandon:    8    mins  63430;    Therapeutic Activity:     9     mins  37977;     Gait Trainin     mins  46745;     Ultrasound:     0     mins  79113;    Ionto                               0    mins   04710  Self Care                       0     mins   87618  Canalith Repos    0     mins 95758      Un-Timed:  Electrical Stimulation:    0     mins  24322 (MC );  Dry Needling     0     mins self-pay  Traction     0     mins " 39900      Timed Treatment:   40   mins   Total Treatment:     40   mins    Cheryl Powers, PT  [place] License: #[id number]

## 2022-02-25 ENCOUNTER — TREATMENT (OUTPATIENT)
Dept: PHYSICAL THERAPY | Facility: CLINIC | Age: 62
End: 2022-02-25

## 2022-02-25 DIAGNOSIS — M79.601 PAIN OF RIGHT UPPER EXTREMITY: Primary | ICD-10-CM

## 2022-02-25 PROCEDURE — 97530 THERAPEUTIC ACTIVITIES: CPT | Performed by: PHYSICAL THERAPIST

## 2022-02-25 PROCEDURE — 97140 MANUAL THERAPY 1/> REGIONS: CPT | Performed by: PHYSICAL THERAPIST

## 2022-02-25 PROCEDURE — 97112 NEUROMUSCULAR REEDUCATION: CPT | Performed by: PHYSICAL THERAPIST

## 2022-02-25 PROCEDURE — 97110 THERAPEUTIC EXERCISES: CPT | Performed by: PHYSICAL THERAPIST

## 2022-02-25 NOTE — PROGRESS NOTES
Physical Therapy Daily Treatment Note      Patient: Sebastian Liao   : 1960  Referring practitioner: JULIO Raman  Date of Initial Visit: Type: THERAPY  Noted: 2022  Today's Date: 2022  Patient seen for 6 sessions       Visit Diagnoses:    ICD-10-CM ICD-9-CM   1. Pain of right upper extremity  M79.601 729.5       Subjective   Sebastian Liao reports: Numbness in thumb constant, unchanged. Shoulder pn seems to be a little better. Going back to work seems to have helped. Doing most of his usual work activities.    Pre tx pn score: 5  Post tx pn score: 5      Objective   See Exercise, Manual, and Modality Logs for complete treatment.     TTP pronator teres->tingling into thumb  (+) ULTT w/ median nerve bias      Assessment & Plan     Assessment    Assessment details: Pt cont to complain of paresthesia affecting his R hand, w/ thumb most involved. (-) change in symptoms w/ cervical/shoulder testing, but reported tingling along median nerve distribution w/ compression over the pronator teres. Noted slight relief after pronator release/STM and stretching of the ventral forearm followed by median nerve mobilization. Pt tolerates shoulder strengthening/mobility very well, maintaining gains made in ROM. Demo full passive mobility in all planes, nearly restored active motion. He is making good progress-primary remaining issue is paresthesia. Discussed incorporating pronator stretch, median nerve glides throughout the day.    Plan  Plan details: Cont w/ POC          Timed:         Manual Therapy:    8     mins  38932;     Therapeutic Exercise:    10     mins  83429;     Neuromuscular Brandon:    9    mins  21389;    Therapeutic Activity:     13     mins  65449;     Gait Trainin     mins  04135;     Ultrasound:     0     mins  52992;    Ionto                               0    mins   42782  Self Care                       0     mins   89981  Canalith Repos    0     mins  77357      Un-Timed:  Electrical Stimulation:    0     mins  17812 ( );  Dry Needling     0     mins self-pay  Traction     0     mins 32628      Timed Treatment:   40   mins   Total Treatment:     40   mins    Cheryl Powers, PT  [place] License: #[id number]

## 2022-03-01 ENCOUNTER — TREATMENT (OUTPATIENT)
Dept: PHYSICAL THERAPY | Facility: CLINIC | Age: 62
End: 2022-03-01

## 2022-03-01 DIAGNOSIS — M79.601 PAIN OF RIGHT UPPER EXTREMITY: Primary | ICD-10-CM

## 2022-03-01 PROCEDURE — 97112 NEUROMUSCULAR REEDUCATION: CPT | Performed by: PHYSICAL THERAPIST

## 2022-03-01 PROCEDURE — 97140 MANUAL THERAPY 1/> REGIONS: CPT | Performed by: PHYSICAL THERAPIST

## 2022-03-01 PROCEDURE — 97110 THERAPEUTIC EXERCISES: CPT | Performed by: PHYSICAL THERAPIST

## 2022-03-01 PROCEDURE — 97530 THERAPEUTIC ACTIVITIES: CPT | Performed by: PHYSICAL THERAPIST

## 2022-03-01 NOTE — PATIENT INSTRUCTIONS
Access Code: [email or web address]  URL: [email or web address]  Date: 03/01/2022  Prepared by: Cheryl Powers    Exercises  Seated Upper Trapezius Stretch - 2 x daily - 3 reps - 15 sec hold  Gentle Levator Scapulae Stretch - 2 x daily - 3 reps - 15 sec hold  Median nerve glide - 2 x daily - 30 reps  Standing Wrist Extension Stretch - 2 x daily - 5 reps - 15 sec hold  Standing Shoulder Internal Rotation Stretch with Towel - 2 x daily - 10-15 reps

## 2022-03-01 NOTE — PROGRESS NOTES
"Physical Therapy Daily Treatment Note      Patient: Sebastian Liao   : 1960  Referring practitioner: JULIO Raman  Date of Initial Visit: Type: THERAPY  Noted: 2022  Today's Date: 3/1/2022  Patient seen for 7 sessions       Visit Diagnoses:    ICD-10-CM ICD-9-CM   1. Pain of right upper extremity  M79.601 729.5       Subjective   Sebastian Liao reports: \"I'm either getting more used to the pain or it's getting better. My thumb and fingertips still feel numb. I'm sore from my neck to my shoulder. I'm still having trouble reaching behind my back.\"      Pre tx pn score: 5  Post tx pn score: 5      Objective          Strength/Myotome Testing     Left Wrist/Hand      (2nd hand position)   Left  strength (2nd hand position) 55 lbs    Right Wrist/Hand      (2nd hand position)   Right  strength (2nd hand position) 56 lbs    Thumb Strength   Key/Lateral Pinch     Trial 1: 13 lbs  Palmar/Three-Point Pinch     Trial 1: 10 lbs        See Exercise, Manual, and Modality Logs for complete treatment.       Assessment & Plan     Assessment    Assessment details: Pt demonstrates improved /pinch strength, though remains at a deficit compared to his uninvolved, nondominant side. Paresthesia persists. He reports numbness throughout the entire hand, most prominent in the thumb and tips of his fingertips. No issues proximal to wrist. Worked today on cervical stretching, postural strengthening, median nerve mobilization, and shoulder mobility as well as MT techniques address pronator teres tightness/trigger points, cervical flexibility and joint mobility. Shoulder mobility now restored in all planes, including IR BTB after incorporating towel stretch. He remains TTP throughout the R cervical region as well in the ventral forearm over the pronator teres.     Plan  Plan details: Cont w/ current POC w/ focus on reducing median nerve compression          Timed:         Manual Therapy:    15     mins  " 58511;     Therapeutic Exercise:    10     mins  25555;     Neuromuscular Brandon:    12    mins  45078;    Therapeutic Activity:     15     mins  58599;     Gait Trainin     mins  80138;     Ultrasound:     0     mins  94423;    Ionto                               0    mins   40457  Self Care                       0     mins   54518  Canalith Repos    0     mins 51777      Un-Timed:  Electrical Stimulation:    0     mins  75459 ( );  Dry Needling     0     mins self-pay  Traction     0     mins 00048      Timed Treatment:   52   mins   Total Treatment:     52   mins    Cheryl Powers, PT  [place] License: #[id number]

## 2022-03-03 ENCOUNTER — TREATMENT (OUTPATIENT)
Dept: PHYSICAL THERAPY | Facility: CLINIC | Age: 62
End: 2022-03-03

## 2022-03-03 DIAGNOSIS — M79.601 PAIN OF RIGHT UPPER EXTREMITY: Primary | ICD-10-CM

## 2022-03-03 PROCEDURE — 97110 THERAPEUTIC EXERCISES: CPT | Performed by: PHYSICAL THERAPIST

## 2022-03-03 PROCEDURE — 97530 THERAPEUTIC ACTIVITIES: CPT | Performed by: PHYSICAL THERAPIST

## 2022-03-03 PROCEDURE — 97112 NEUROMUSCULAR REEDUCATION: CPT | Performed by: PHYSICAL THERAPIST

## 2022-03-03 PROCEDURE — 97140 MANUAL THERAPY 1/> REGIONS: CPT | Performed by: PHYSICAL THERAPIST

## 2022-03-03 NOTE — PROGRESS NOTES
Physical Therapy Daily Treatment Note      Patient: Sebastian Liao   : 1960  Referring practitioner: JULIO Raman  Date of Initial Visit: Type: THERAPY  Noted: 2022  Today's Date: 3/3/2022  Patient seen for 8 sessions       Visit Diagnoses:    ICD-10-CM ICD-9-CM   1. Pain of right upper extremity  M79.601 729.5       Subjective   Sebastian Liao reports: No change in N/T. No soreness after last visit. Doing ok w/ his updated HEP.    Pre tx pn score: 5  Post tx pn score: 4      Objective   See Exercise, Manual, and Modality Logs for complete treatment.       Assessment & Plan     Assessment    Assessment details: Continued w/ stretching, MT techniques aimed at opening intervertebral foramen to reduce nerve compression, along w/ neural mobilization, postural stretching/activation. He c/o difficulty reaching behind his back but demonstrates symmetrical excursion relative to his uninvolved side. Pt reported reduction in neck stiffness after MT techniques but no change in paresthesia. Somewhat difficult to access all cervical segments for mobilization given patient's body habitus. No additional changes made to HEP.    Plan  Plan details: Reassess next visit          Timed:         Manual Therapy:    20     mins  07916;     Therapeutic Exercise:    10     mins  24923;     Neuromuscular Brandon:    12    mins  31809;    Therapeutic Activity:     10     mins  07182;     Gait Trainin     mins  71657;     Ultrasound:     0     mins  31357;    Ionto                               0    mins   79342  Self Care                       0     mins   38471  Canalith Repos    0     mins 47114      Un-Timed:  Electrical Stimulation:    0     mins  22744 ( );  Dry Needling     0     mins self-pay  Traction     0     mins 53111      Timed Treatment:   52   mins   Total Treatment:     52   mins    Cheryl Powers, PT  [place] License: #[id number]

## 2022-03-08 ENCOUNTER — TREATMENT (OUTPATIENT)
Dept: PHYSICAL THERAPY | Facility: CLINIC | Age: 62
End: 2022-03-08

## 2022-03-08 DIAGNOSIS — M79.601 PAIN OF RIGHT UPPER EXTREMITY: Primary | ICD-10-CM

## 2022-03-08 PROCEDURE — 97112 NEUROMUSCULAR REEDUCATION: CPT | Performed by: PHYSICAL THERAPIST

## 2022-03-08 PROCEDURE — 97140 MANUAL THERAPY 1/> REGIONS: CPT | Performed by: PHYSICAL THERAPIST

## 2022-03-08 PROCEDURE — 97110 THERAPEUTIC EXERCISES: CPT | Performed by: PHYSICAL THERAPIST

## 2022-03-08 PROCEDURE — 97530 THERAPEUTIC ACTIVITIES: CPT | Performed by: PHYSICAL THERAPIST

## 2022-03-08 NOTE — PATIENT INSTRUCTIONS
Access Code: [email or web address]  URL: [email or web address]  Date: 03/08/2022  Prepared by: Cheryl Powers    Exercises  Seated Upper Trapezius Stretch - 2 x daily - 3 reps - 15 sec hold  Gentle Levator Scapulae Stretch - 2 x daily - 3 reps - 15 sec hold  Median nerve glide - 2 x daily - 30 reps  Standing Shoulder Internal Rotation Stretch with Towel - 2 x daily - 10-15 reps  Standing Row with Anchored Resistance - 2 x daily - 15-30 reps  Standing Shoulder Extension with Dowel - 2 x daily - 15-30 reps  Standing Shoulder Posterior Capsule Stretch - 2 x daily - 5 reps - 20 sec hold

## 2022-03-08 NOTE — PROGRESS NOTES
Physical Therapy Reassessment  Patient: Sebastian Liao   : 1960  Diagnosis/ICD-10 Code:  Pain of right upper extremity [M79.601]  Referring practitioner: JULIO Raman  Date of Initial Visit: 3/8/2022  Today's Date: 3/8/2022  Patient seen for 9 sessions         Visit Diagnoses:    ICD-10-CM ICD-9-CM   1. Pain of right upper extremity  M79.601 729.5       Subjective Questionnaire: QuickDASH: 61.36% impairment  Clinical Progress: improved  Home Program Compliance: Yes  Treatment has included: therapeutic exercise, neuromuscular re-education, manual therapy and therapeutic activity      Subjective   Sebastian Liao reports: Pt reports that muscle relaxers and exercises have helped to reduce pn and tightness. He has little to no issue w/ routine daily activities around his home. Able to raise his arm outward and overhead w/o much discomfort. Still difficult to reach behind his back when bathing. Denies any change in N/T in R hand.    Pre tx pn score: 5  Post tx pn score: 5      Objective          Tenderness     Additional Tenderness Details  TTP throughout the R cervical, post scapular, and pectoral regions; pronator teres    Neurological Testing     Sensation   Cervical/Thoracic   Left   Intact: light touch    Right   Paresthesia: light touch    Comments   Right light touch: dec sensation, tingling throughout hand.     Reflexes   Left   Biceps (C5/C6): absent (0)  Brachioradialis (C6): absent (0)    Right   Biceps (C5/C6): absent (0)  Brachioradialis (C6): absent (0)    Active Range of Motion   Cervical/Thoracic Spine   Cervical    Flexion: 25 degrees   Extension: 40 degrees   Left lateral flexion: 15 degrees   Right lateral flexion: 15 degrees   Left Shoulder   Flexion: 160 degrees   Abduction: 140 degrees   External rotation 0°: 50 degrees   Internal rotation BTB: T10     Right Shoulder   Flexion: 165 degrees   Abduction: 145 degrees   External rotation 0°: 70 degrees   Internal rotation BTB: T10  (lateral to)     Passive Range of Motion     Right Shoulder   Flexion: 160 degrees   Abduction: 160 degrees   External rotation 90°: 90 degrees     Additional Passive Range of Motion Details  Mild R cervical pn w/ PA gliding of the mid/lower cervical segments    Strength/Myotome Testing     Left Shoulder     Planes of Motion   Flexion: 4+   Abduction: 4+   External rotation at 0°: 4   Internal rotation at 0°: 4+     Right Shoulder     Planes of Motion   Flexion: 5   Abduction: 4+   External rotation at 0°: 4   Internal rotation at 0°: 4+     Left Elbow   Flexion: 5  Extension: 4+    Right Elbow   Flexion: 4+  Extension: 4+    Left Wrist/Hand   Wrist extension: 5  Wrist flexion: 4+     (2nd hand position)     Trial 1: 59 lbs    Trial 2: 54 lbs    Trial 3: 45 lbs    Average: 52.67 lbs    Thumb Strength  Key/Lateral Pinch     Trial 1: 15 lbs  Tip/Two-Point Pinch     Trial 1: 11 lbs    Right Wrist/Hand   Wrist extension: 5  Wrist flexion: 4+     (2nd hand position)     Trial 1: 60 lbs    Trial 2: 56 lbs    Trial 3: 62 lbs    Average: 59.33 lbs    Thumb Strength   Key/Lateral Pinch     Trial 1: 21 lbs  Tip/Two-Point Pinch     Trial 1: 10 lbs    Tests     Right Shoulder   Negative anterior load and shift, apprehension, belly press, drop arm, full can, Hawkin's and Neer's.     Additional Tests Details  (+) compression/distraction  (+) ULTT- paresthesia in thumb intensified w/ median and ulnar nerve biases  (+) pronator compression      See Exercise, Manual, and Modality Logs for complete treatment.       Assessment & Plan     Assessment    Assessment details: Reassessment performed. Pt has completed 9 PT visits. He subjectively reports improvement in neck and arm pn, though denies any change in R hand N/T since start of care. R shoulder mobility restored in all planes, w/ exception of mild limitation in BTB IR. R shoulder/elbow/wrist strength now grossly symmetrical to his uninvolved side, and R  strength now  exceeds that of his L hand. Special testing of the shoulder unremarkable. His primary complaint at this point is ongoing paresthesia that involves the entire right hand, most prominent along the median nerve distribution. Paresthesia worsened by both cervical compression, compression over the pronator teres in the forearm, and tinels over the carpal tunnel. Minimal to no change reported w/ manual cervical traction. Issued updated HEP addressing cervical/scapular/forearm flexibility, neurodynamic mobilization, and postural strengthening. Pt may benefit from cont PT services to address remaining symptoms/deficits and allow return to full work/daily activities w/o pn or dysfunction.    Goals  Plan Goals: STG 3 wks  1) Pt to be compliant w/ initial HEP for ROM, strength and symptom mgmt.-MET  2) Pt to report pn at rest to be no greater than 5/10.-MET  3) Pt to improve cervical ROM to 20 deg B SB, 35 deg B rotation.-ONGOING  4) Pt to improve R shoulder AROM to 120 deg flxn, 100 deg abd or better.-MET  5) Pt to improve QD score to 25% impairment or better to reflect improved pn and function.-ONGOING  6) Pt to report reduced frequency/intensity of radicular symptoms.-ONGOING    LTG 6 wks  1) Pt to be independent w/ long term HEP and self mgmt.-PROGRESSING  2) Pt to report resolution of radicular symptoms.-ONGOING  3) Pt to tolerate 40+ mins of work simulated activities w/ min to no pn or limitation.-PROGRESSING  4) Pt to improve cervical ROM to 30 deg B SB, 45 deg B rotation.-ONGOING  5) Pt to demo restored R shoulder mobility relative to contralateral side.-PARTIALLY MET  6) Pt to improve QD score to 15% impairment or better to reflect improved pn and function.-ONGOING    Plan  Plan details: Cont w/ focus on neurodynamic mobilization/decompression, work specific strengthening        Progress toward previous goals: Partially Met        Timed:         Manual Therapy:    8     mins  69736;     Therapeutic Exercise:    15      mins  42960;     Neuromuscular Brandon:    9    mins  32830;    Therapeutic Activity:     18     mins  34891;     Gait Trainin     mins  03001;     Ultrasound:     0     mins  94222;    Ionto                               0    mins   75202  Self Care                       0     mins   67268  Canalith Repos    0     mins 54420      Un-Timed:  Electrical Stimulation:    0     mins  80273 ( );  Dry Needling     0     mins self-pay  Traction     0     mins 98911  Re-Eval                           0    mins  88345      Timed Treatment:   50   mins   Total Treatment:     50   mins          PT: Cheryl Powers, PT     [place] License: #[id number]    Electronically signed by Cheryl Powers, PT, 22, 9:33 AM EST

## 2022-03-10 ENCOUNTER — TREATMENT (OUTPATIENT)
Dept: PHYSICAL THERAPY | Facility: CLINIC | Age: 62
End: 2022-03-10

## 2022-03-10 DIAGNOSIS — M79.601 PAIN OF RIGHT UPPER EXTREMITY: Primary | ICD-10-CM

## 2022-03-10 PROCEDURE — 97140 MANUAL THERAPY 1/> REGIONS: CPT | Performed by: PHYSICAL THERAPIST

## 2022-03-10 PROCEDURE — 97112 NEUROMUSCULAR REEDUCATION: CPT | Performed by: PHYSICAL THERAPIST

## 2022-03-10 PROCEDURE — 97110 THERAPEUTIC EXERCISES: CPT | Performed by: PHYSICAL THERAPIST

## 2022-03-10 NOTE — PROGRESS NOTES
Physical Therapy Daily Treatment Note      Patient: Sebastian Liao   : 1960  Referring practitioner: JULIO Raman  Date of Initial Visit: Type: THERAPY  Noted: 2022  Today's Date: 3/10/2022  Patient seen for 10 sessions       Visit Diagnoses:    ICD-10-CM ICD-9-CM   1. Pain of right upper extremity  M79.601 729.5       Subjective   Sebastian Liao reports: Followed up w/ referring provider, recommended continued PT x2 weeks, may consider cervical MRI if no improvement in hand N/T. Pt reports that shoulder is looser, but tightens up w/ a lot of activity. No change in N/T.    Pre tx pn score: 5  Post tx pn score: 5      Objective   See Exercise, Manual, and Modality Logs for complete treatment.       Assessment & Plan     Assessment    Assessment details: Pt continues to do well in terms of shoulder mobility and function. Hand paresthesia unchanged. No change w/ attempted cervical distraction so focused again on UE neurodynamic mobilization, MT techniques aimed at reducing muscle tone along the median nerve pathway. Symptoms fairly easily irritated so discussed performing stretches to point of tension and avoiding forcing mobility or triggering symptoms. He verbalized understanding.    Plan  Plan details: Cont w/ POC          Timed:         Manual Therapy:    10     mins  63770;     Therapeutic Exercise:    23     mins  60363;     Neuromuscular Brandon:    8    mins  83029;    Therapeutic Activity:     0     mins  01267;     Gait Trainin     mins  54231;     Ultrasound:     0     mins  79651;    Ionto                               0    mins   52701  Self Care                       0     mins   85197  Canalith Repos    0     mins 89269      Un-Timed:  Electrical Stimulation:    0     mins  33908 ( );  Dry Needling     0     mins self-pay  Traction     0     mins 13991      Timed Treatment:   41   mins   Total Treatment:     41   mins    Cheryl Powers, PT  [place] License:  #[id number]

## 2022-03-15 ENCOUNTER — TREATMENT (OUTPATIENT)
Dept: PHYSICAL THERAPY | Facility: CLINIC | Age: 62
End: 2022-03-15

## 2022-03-15 DIAGNOSIS — M79.601 PAIN OF RIGHT UPPER EXTREMITY: Primary | ICD-10-CM

## 2022-03-15 PROCEDURE — 97112 NEUROMUSCULAR REEDUCATION: CPT | Performed by: PHYSICAL THERAPIST

## 2022-03-15 PROCEDURE — 97530 THERAPEUTIC ACTIVITIES: CPT | Performed by: PHYSICAL THERAPIST

## 2022-03-15 PROCEDURE — 97110 THERAPEUTIC EXERCISES: CPT | Performed by: PHYSICAL THERAPIST

## 2022-03-15 NOTE — PROGRESS NOTES
Physical Therapy Daily Treatment Note      Patient: Sebastian Liao   : 1960  Referring practitioner: JULIO Raman  Date of Initial Visit: Type: THERAPY  Noted: 2022  Today's Date: 3/15/2022  Patient seen for 11 sessions       Visit Diagnoses:    ICD-10-CM ICD-9-CM   1. Pain of right upper extremity  M79.601 729.5       Subjective   Sebastian Liao reports: Shoulder feels tight, mildly sore w/ repetitive use at work, but feels more limited by stiffness/weakness/numbness in his R hand.    Pre tx pn score: 4  Post tx pn score: 4      Objective   See Exercise, Manual, and Modality Logs for complete treatment.       Assessment & Plan     Assessment    Assessment details: Demo full shoulder mobility in all planes relative to L, but subjectively reports mild tightness w/ IR BTB. /pinch strength intact and symmetrical. Pt cont to report paresthesia involving the entire R hand, does not appear to have a dermatomal pattern. Focused today's visit on R shoulder strengthening to improve tolerance to repetitive work activities. Able to perform 15lb box lifts to waist height, repetitive resisted overhead mobility w/o issue. He has one remaining authorized PT visit remaining.    Plan  Plan details: Assess shoulder strength, discuss POC          Timed:         Manual Therapy:    0     mins  77909;     Therapeutic Exercise:    25     mins  90026;     Neuromuscular Brandon:    8    mins  68877;    Therapeutic Activity:     12     mins  23310;     Gait Trainin     mins  37127;     Ultrasound:     0     mins  33536;    Ionto                               0    mins   12953  Self Care                       0     mins   69283  Canalith Repos    0     mins 26779      Un-Timed:  Electrical Stimulation:    0     mins  58441 ( );  Dry Needling     0     mins self-pay  Traction     0     mins 23729      Timed Treatment:   45   mins   Total Treatment:     45   mins    Cheryl Powers, PT  [place] License:  #[id number]

## 2022-03-17 ENCOUNTER — TREATMENT (OUTPATIENT)
Dept: PHYSICAL THERAPY | Facility: CLINIC | Age: 62
End: 2022-03-17

## 2022-03-17 DIAGNOSIS — M79.601 PAIN OF RIGHT UPPER EXTREMITY: Primary | ICD-10-CM

## 2022-03-17 PROCEDURE — 97112 NEUROMUSCULAR REEDUCATION: CPT | Performed by: PHYSICAL THERAPIST

## 2022-03-17 PROCEDURE — 97530 THERAPEUTIC ACTIVITIES: CPT | Performed by: PHYSICAL THERAPIST

## 2022-03-17 PROCEDURE — 97110 THERAPEUTIC EXERCISES: CPT | Performed by: PHYSICAL THERAPIST

## 2022-03-17 NOTE — PROGRESS NOTES
Physical Therapy Daily Treatment Note      Patient: Sebastian Liao   : 1960  Referring practitioner: JULIO Raman  Date of Initial Visit: Type: THERAPY  Noted: 2022  Today's Date: 3/17/2022  Patient seen for 12 sessions       Visit Diagnoses:    ICD-10-CM ICD-9-CM   1. Pain of right upper extremity  M79.601 729.5       Subjective   Sebastian Liao reports: Shoulder pn is minimal and does not limit him w/ work or daily activities. Still having numbness/tingling in his hand that is strongest in his thumb and radial palm.    Pre tx pn score: 4  Post tx pn score: 4      Objective          Neurological Testing     Sensation     Shoulder     Right Shoulder   Paresthesia: light touch    Comments   Right light touch: entire hand    Active Range of Motion     Right Shoulder   Flexion: 165 degrees   Abduction: 145 degrees   External rotation 0°: 70 degrees   Internal rotation BTB: T10     Strength/Myotome Testing     Right Shoulder     Planes of Motion   Flexion: 5   Abduction: 5   External rotation at 0°: 4+   Internal rotation at 0°: 5     Tests     Additional Tests Details  Cervical compression/distraction/spurling (-)  tinels over carpal tunnel (+), pronator compression (+)        See Exercise, Manual, and Modality Logs for complete treatment.       Assessment & Plan     Assessment    Assessment details: Pt has completed  authorized visits. He demonstrates restored R shoulder strength and mobility and denies functional limitations related to the shoulder. He continues to report paresthesia throughout the R hand. Symptoms reproduced w/ Tinel's over the carpal tunnel and compression over the median nerve pathway through the pronator teres in the forearm. He has no dermatomal/myotomal deficits. Cervical compression/distraction/spurlings (-). Pt returns to referring provider next week to discuss these ongoing symptoms. In terms of his shoulder I believe he will do well w/ a continued HEP for further  strengthening, conditioning, and neural mobilization. Issued/reviewed today as outlined in chart.    Goals  Plan Goals:   Plan Goals: STG 3 wks  1) Pt to be compliant w/ initial HEP for ROM, strength and symptom mgmt.-MET  2) Pt to report pn at rest to be no greater than 5/10.-MET  3) Pt to improve cervical ROM to 20 deg B SB, 35 deg B rotation.-ONGOING  4) Pt to improve R shoulder AROM to 120 deg flxn, 100 deg abd or better.-MET  5) Pt to improve QD score to 25% impairment or better to reflect improved pn and function.-ONGOING  6) Pt to report reduced frequency/intensity of radicular symptoms.-ONGOING    LTG 6 wks  1) Pt to be independent w/ long term HEP and self mgmt.-PROGRESSING  2) Pt to report resolution of radicular symptoms.-ONGOING  3) Pt to tolerate 40+ mins of work simulated activities w/ min to no pn or limitation.-MET  4) Pt to improve cervical ROM to 30 deg B SB, 45 deg B rotation.-ONGOING  5) Pt to demo restored R shoulder mobility relative to contralateral side.-MET  6) Pt to improve QD score to 15% impairment or better to reflect improved pn and function.-ONGOING    Plan  Plan details: Await MD recommendations          Timed:         Manual Therapy:    0     mins  83487;     Therapeutic Exercise:    25     mins  01951;     Neuromuscular Brandon:    8    mins  20983;    Therapeutic Activity:     10     mins  85405;     Gait Trainin     mins  48858;     Ultrasound:     0     mins  08257;    Ionto                               0    mins   89970  Self Care                       0     mins   48260  Canalith Repos    0     mins 46800      Un-Timed:  Electrical Stimulation:    0     mins  53321 ( );  Dry Needling     0     mins self-pay  Traction     0     mins 76468      Timed Treatment:   43   mins   Total Treatment:     43   mins    Cheryl Powers, PT  [place] License: #[id number]

## 2022-03-17 NOTE — PATIENT INSTRUCTIONS
Access Code: [email or web address]  URL: [email or web address]  Date: 03/17/2022  Prepared by: Cheryl Bailey  Seated Wrist Extension Stretch - 1-2 x daily - 5 reps - 10 sec hold  Median nerve glide - 1-2 x daily - 30 reps  Standing Pec Stretch at Wall - 1-2 x daily - 5 reps - 10 sec hold  Standing Shoulder Internal Rotation Stretch with Towel - 1-2 x daily - 10 reps  Standing Row with Anchored Resistance - 1-2 x daily - 15-30 reps  Standing Shoulder Extension with Dowel - 1-2 x daily - 15-30 reps

## 2022-03-23 ENCOUNTER — TRANSCRIBE ORDERS (OUTPATIENT)
Dept: PHYSICAL THERAPY | Facility: CLINIC | Age: 62
End: 2022-03-23

## 2022-04-12 ENCOUNTER — TRANSCRIBE ORDERS (OUTPATIENT)
Dept: ADMINISTRATIVE | Facility: HOSPITAL | Age: 62
End: 2022-04-12

## 2022-04-12 DIAGNOSIS — S16.1XXA STRAIN OF TENDON OF NECK, INITIAL ENCOUNTER: Primary | ICD-10-CM

## 2022-04-22 ENCOUNTER — TREATMENT (OUTPATIENT)
Dept: PHYSICAL THERAPY | Facility: CLINIC | Age: 62
End: 2022-04-22

## 2022-04-22 DIAGNOSIS — M79.601 PAIN OF RIGHT UPPER EXTREMITY: Primary | ICD-10-CM

## 2022-04-22 PROCEDURE — 97110 THERAPEUTIC EXERCISES: CPT | Performed by: PHYSICAL THERAPIST

## 2022-04-22 PROCEDURE — 97112 NEUROMUSCULAR REEDUCATION: CPT | Performed by: PHYSICAL THERAPIST

## 2022-04-22 PROCEDURE — 97530 THERAPEUTIC ACTIVITIES: CPT | Performed by: PHYSICAL THERAPIST

## 2022-04-22 NOTE — PROGRESS NOTES
Physical Therapy Reassessment  Patient: Sebastian Liao   : 1960  Diagnosis/ICD-10 Code:  Pain of right upper extremity [M79.601]  Referring practitioner: JULIO Raamn  Date of Initial Visit: 2022  Today's Date: 2022  Patient seen for 13 sessions         Visit Diagnoses:    ICD-10-CM ICD-9-CM   1. Pain of right upper extremity  M79.601 729.5       Subjective Questionnaire: QuickDASH: 40.91% impairment  Clinical Progress: improved  Home Program Compliance: Yes  Treatment has included: therapeutic exercise, neuromuscular re-education, manual therapy and therapeutic activity      Subjective   Sebastian Liao reports: Pt reports continued soreness in R shoulder at rest and w/ work activities. Also complains of ongoing N/T in R hand that is constant, most prominent in his thumb and radial palm. Has MRI scheduled for next Friday.     Pre tx pn score: 4  Post tx pn score: 4      Objective          Tenderness     Additional Tenderness Details  TTP throughout the R cervical, post scapular, and pectoral regions; ventral forearm and wrist, thenar eminence    Neurological Testing     Sensation   Cervical/Thoracic   Left   Intact: light touch    Right   Paresthesia: light touch    Comments   Right light touch: dec sensation, tingling throughout hand.     Reflexes   Left   Biceps (C5/C6): absent (0)  Brachioradialis (C6): absent (0)    Right   Biceps (C5/C6): absent (0)  Brachioradialis (C6): absent (0)    Active Range of Motion   Cervical/Thoracic Spine   Cervical    Flexion: 25 degrees   Extension: 40 degrees   Left lateral flexion: 22 degrees   Right lateral flexion: 22 degrees   Left rotation: 30 degrees   Right rotation: 40 degrees   Left Shoulder   Flexion: 160 degrees   Abduction: 140 degrees   External rotation 0°: 50 degrees   Internal rotation BTB: T10     Right Shoulder   Flexion: 165 degrees   Abduction: 150 degrees   External rotation 0°: 70 degrees   Internal rotation BTB: Active internal  rotation behind the back: to lateral hip w/ pn ant shoulder.     Passive Range of Motion     Right Shoulder   Flexion: 160 degrees   Abduction: 160 degrees   External rotation 90°: 90 degrees     Additional Passive Range of Motion Details  Mild R cervical pn w/ PA gliding of the mid/lower cervical segments    Strength/Myotome Testing     Left Shoulder     Planes of Motion   Flexion: 4+   Abduction: 4+   External rotation at 0°: 4   Internal rotation at 0°: 4+     Right Shoulder     Planes of Motion   Flexion: 4+   Abduction: 4+   External rotation at 0°: 4+   Internal rotation at 0°: 5     Left Elbow   Flexion: 5  Extension: 4+    Right Elbow   Flexion: 5  Extension: 5    Left Wrist/Hand   Wrist extension: 5  Wrist flexion: 4+     (2nd hand position)     Trial 1: 59 lbs    Trial 2: 54 lbs    Trial 3: 45 lbs    Average: 52.67 lbs    Thumb Strength  Key/Lateral Pinch     Trial 1: 15 lbs  Tip/Two-Point Pinch     Trial 1: 11 lbs    Right Wrist/Hand   Wrist extension: 5  Wrist flexion: 5     (2nd hand position)     Trial 1: 67 lbs    Trial 2: 61 lbs    Trial 3: 59 lbs    Average: 62.33 lbs    Thumb Strength   Key/Lateral Pinch     Trial 1: 22 lbs  Tip/Two-Point Pinch     Trial 1: 14 lbs    Tests     Right Shoulder   Negative anterior load and shift, apprehension, belly press, drop arm, full can, Hawkin's and Neer's.     Additional Tests Details  (-) cervical compression/distraction  (-) pronator compression  (-) interscalene compression  (-) tinels at cubital tunnel  (+) ULTT- paresthesia in thumb intensified w/ median and ulnar nerve biases  (+) tinels over carpal tunnel  (+) phalen  (?) pec minor compression-elicited symptoms in standing but not when performed in supine        See Exercise, Manual, and Modality Logs for complete treatment.       Assessment & Plan     Assessment    Assessment details: Reassessment performed. Pt returns to PT after 1 month absence while following up w/ referring provider and  awaiting additional authorization for PT visits. Pt has completed a total of 13 PT visits. He reports continued soreness in the R shoulder at rest and w/ activity, difficulty reaching behind his back, numbness/tingling that is isolated to the hand and most prominent in his thumb and thenar eminence. Shoulder IR significantly limited, but ROM in all other planes WNL. Other than slight weakness w/ shoulder elevation, UE motor function intact. He denies sensory changes proximal to the wrist. /pinch strength WNL. Paresthesia reproduced w/ tinel's over the carpal tunnel and phalens; not altered w/ cervical compression/distraction/spurlings. His symptom response to testing has seemed widely variable throughout his PT episode, making clinical diagnosis difficult. He may benefit from cont PT to improve shoulder mobility, strength, and pn. He is scheduled for MRI in one week. Issued updated HEP As outlined in chart.        Goals  Plan Goals: STG 3 wks  1) Pt to be compliant w/ initial HEP for ROM, strength and symptom mgmt.-MET  2) Pt to report pn at rest to be no greater than 5/10.-MET  3) Pt to improve cervical ROM to 20 deg B SB, 35 deg B rotation.-PARTIALLY MET  4) Pt to improve R shoulder AROM to 120 deg flxn, 100 deg abd or better.-MET  5) Pt to improve QD score to 25% impairment or better to reflect improved pn and function.-ONGOING  6) Pt to report reduced frequency/intensity of radicular symptoms.-ONGOING    LTG 6 wks  1) Pt to be independent w/ long term HEP and self mgmt.-PROGRESSING  2) Pt to report resolution of radicular symptoms.-ONGOING  3) Pt to tolerate 40+ mins of work simulated activities w/ min to no pn or limitation.-PROGRESSING  4) Pt to improve cervical ROM to 30 deg B SB, 45 deg B rotation.-DISCONTINUE (hx cspine surgery possibly limiting further mobility gains)  5) Pt to demo restored R shoulder mobility relative to contralateral side.-PARTIALLY MET  6) Pt to improve QD score to 15% impairment  or better to reflect improved pn and function.-ONGOING    Plan  Plan details: Cont w/ focus on neurodynamic mobilization/decompression, restoration of full shoulder mobility, work specific strengthening        Progress toward previous goals: Partially Met        Timed:         Manual Therapy:    0     mins  73255;     Therapeutic Exercise:    25     mins  31924;     Neuromuscular Brandon:    10    mins  33973;    Therapeutic Activity:     15     mins  02459;     Gait Trainin     mins  55129;     Ultrasound:     0     mins  94862;    Ionto                               0    mins   13011  Self Care                       0     mins   64093  Canalith Repos    0     mins 40134      Un-Timed:  Electrical Stimulation:    0     mins  00351 ( );  Dry Needling     0     mins self-pay  Traction     0     mins 50735  Re-Eval                           0    mins  99517      Timed Treatment:   50   mins   Total Treatment:     50   mins          PT: Cheryl Powers PT     [place] License: #[id number]    Electronically signed by Cheryl Powers PT, 22, 8:29 AM EDT

## 2022-04-22 NOTE — PATIENT INSTRUCTIONS
Access Code: [email or web address]  URL: [email or web address]  Date: 04/22/2022  Prepared by: Cheryl Powers    Exercises  Seated Median Nerve Glide - 1-2 x daily - 10 reps - 5 sec hold  Median nerve glide - 1-2 x daily - 30 reps  Standing Shoulder Internal Rotation Stretch with Towel - 1-2 x daily - 10 reps  Standing Shoulder Extension with Dowel - 1-2 x daily - 15-30 reps  Standing Pec Stretch at Wall - 1-2 x daily - 5 reps - 15 sec hold  Corner Pec Major Stretch - 1-2 x daily - 5 reps - 15 sec hold

## 2022-04-25 ENCOUNTER — TREATMENT (OUTPATIENT)
Dept: PHYSICAL THERAPY | Facility: CLINIC | Age: 62
End: 2022-04-25

## 2022-04-25 DIAGNOSIS — M79.601 PAIN OF RIGHT UPPER EXTREMITY: Primary | ICD-10-CM

## 2022-04-25 PROCEDURE — 97112 NEUROMUSCULAR REEDUCATION: CPT | Performed by: PHYSICAL THERAPIST

## 2022-04-25 PROCEDURE — 97110 THERAPEUTIC EXERCISES: CPT | Performed by: PHYSICAL THERAPIST

## 2022-04-25 PROCEDURE — 97140 MANUAL THERAPY 1/> REGIONS: CPT | Performed by: PHYSICAL THERAPIST

## 2022-04-25 PROCEDURE — 97530 THERAPEUTIC ACTIVITIES: CPT | Performed by: PHYSICAL THERAPIST

## 2022-04-25 NOTE — PROGRESS NOTES
Physical Therapy Daily Treatment Note      Patient: Sebastian Liao   : 1960  Referring practitioner: JULIO Raman  Date of Initial Visit: Type: THERAPY  Noted: 2022  Today's Date: 2022  Patient seen for 14 sessions       Visit Diagnoses:    ICD-10-CM ICD-9-CM   1. Pain of right upper extremity  M79.601 729.5       Subjective   Sebastian Liao reports: Shoulder feels a little stiff, trying to push his stretches at home. Has been doing more at work because they are short staffed, shoulder more sore.    Pre tx pn score: 5  Post tx pn score: 5      Objective   See Exercise, Manual, and Modality Logs for complete treatment.       Assessment & Plan     Assessment    Assessment details: Focused visit on median nerve mobilization, pec stretching, and postural activity to optimize scapular position and reduce any potential nerve compression in the subpectoral region. Pt noted slight dec in paresthesia in R hand at end of visit w/ exception of thumb. He has an MRI scheduled for this Friday. Pt needs continued PT to restore full strength, ROM, and function in order to allow return to full work and daily activities w/o pn or dysfunction.      Plan  Plan details: Cont w/ postural correction, pec stretching, low/mid trap activation; (+) isometric shoulder extn w/ scap setting, TB rows/lat pulldowns          Timed:         Manual Therapy:    8     mins  46018;     Therapeutic Exercise:    20     mins  76262;     Neuromuscular Brandon:    9    mins  50945;    Therapeutic Activity:     15     mins  71292;     Gait Trainin     mins  48887;     Ultrasound:     0     mins  32834;    Ionto                               0    mins   52681  Self Care                       0     mins   10968  Canalith Repos    0     mins 57575      Un-Timed:  Electrical Stimulation:    0     mins  53790 ( );  Dry Needling     0     mins self-pay  Traction     0     mins 86128      Timed Treatment:   52   mins   Total  Treatment:     52   mins    Cheryl Powers, PT  [place] License: #[id number]

## 2022-04-26 ENCOUNTER — OFFICE VISIT (OUTPATIENT)
Dept: FAMILY MEDICINE CLINIC | Facility: CLINIC | Age: 62
End: 2022-04-26

## 2022-04-26 VITALS
WEIGHT: 258.2 LBS | RESPIRATION RATE: 16 BRPM | TEMPERATURE: 97.1 F | HEIGHT: 65 IN | OXYGEN SATURATION: 99 % | DIASTOLIC BLOOD PRESSURE: 82 MMHG | BODY MASS INDEX: 43.02 KG/M2 | SYSTOLIC BLOOD PRESSURE: 128 MMHG | HEART RATE: 89 BPM

## 2022-04-26 DIAGNOSIS — M51.36 DDD (DEGENERATIVE DISC DISEASE), LUMBAR: ICD-10-CM

## 2022-04-26 DIAGNOSIS — R11.0 NAUSEA: ICD-10-CM

## 2022-04-26 DIAGNOSIS — E11.65 CONTROLLED TYPE 2 DIABETES MELLITUS WITH HYPERGLYCEMIA, WITHOUT LONG-TERM CURRENT USE OF INSULIN: Primary | ICD-10-CM

## 2022-04-26 DIAGNOSIS — K58.9 IRRITABLE BOWEL SYNDROME, UNSPECIFIED TYPE: ICD-10-CM

## 2022-04-26 DIAGNOSIS — M50.30 DDD (DEGENERATIVE DISC DISEASE), CERVICAL: ICD-10-CM

## 2022-04-26 LAB
EXPIRATION DATE: NORMAL
HBA1C MFR BLD: 7.1 %
Lab: NORMAL

## 2022-04-26 PROCEDURE — 83036 HEMOGLOBIN GLYCOSYLATED A1C: CPT | Performed by: PHYSICIAN ASSISTANT

## 2022-04-26 PROCEDURE — 99213 OFFICE O/P EST LOW 20 MIN: CPT | Performed by: PHYSICIAN ASSISTANT

## 2022-04-26 RX ORDER — DICYCLOMINE HCL 20 MG
20 TABLET ORAL EVERY 6 HOURS
Qty: 60 TABLET | Refills: 3 | Status: SHIPPED | OUTPATIENT
Start: 2022-04-26 | End: 2022-08-30 | Stop reason: SDUPTHER

## 2022-04-26 RX ORDER — PROMETHAZINE HYDROCHLORIDE 25 MG/1
25 TABLET ORAL EVERY 6 HOURS PRN
Qty: 60 TABLET | Refills: 3 | Status: SHIPPED | OUTPATIENT
Start: 2022-04-26 | End: 2022-11-03 | Stop reason: SDUPTHER

## 2022-04-26 RX ORDER — HYDROCODONE BITARTRATE AND ACETAMINOPHEN 10; 325 MG/1; MG/1
1 TABLET ORAL 4 TIMES DAILY
Qty: 120 TABLET | Refills: 0 | Status: SHIPPED | OUTPATIENT
Start: 2022-04-26 | End: 2022-06-01

## 2022-04-26 NOTE — PROGRESS NOTES
Subjective   Sebastian Liao is a 62 y.o. male  Irritable Bowel Syndrome (RF dicyclomine ), Back Pain (RF Norco 10/325 QID), Nausea (RF phenergan ), and Diabetes (A1C 7.1%)      History of Present Illness  Patient is a 62-year-old black male who comes in for follow-up of type 2 diabetes A1c is 7.1 he has been somewhat not watching his diet due to work changes, continue taking his medication as prescribed.    chronic low back pain degenerative disease needs refill of pain medication takes Norco 5/325 4 times daily symptoms are controlled.  Patient is a pain that rates both at night legs chronic    Patient has IBS and nausea needs refill of dicyclomine and relevant symptoms are controlled medication takes as needed.  The following portions of the patient's history were reviewed and updated as appropriate: allergies, current medications, past social history and problem list    Review of Systems   Constitutional: Negative for appetite change, diaphoresis, fatigue and unexpected weight change.   Eyes: Negative for visual disturbance.   Respiratory: Negative for chest tightness and shortness of breath.    Cardiovascular: Negative for chest pain, palpitations and leg swelling.   Gastrointestinal: Negative for diarrhea, nausea and vomiting.   Endocrine: Negative for polydipsia, polyphagia and polyuria.   Musculoskeletal: Positive for back pain.   Skin: Negative for color change.   Neurological: Negative for dizziness, weakness, light-headedness and numbness.       Objective     Vitals:    04/26/22 0854   BP: 128/82   Pulse: 89   Resp: 16   Temp: 97.1 °F (36.2 °C)   SpO2: 99%       Physical Exam  Vitals and nursing note reviewed.   Constitutional:       General: He is not in acute distress.     Appearance: Normal appearance. He is well-developed. He is not ill-appearing, toxic-appearing or diaphoretic.   Neck:      Thyroid: No thyromegaly.      Vascular: No carotid bruit or JVD.   Cardiovascular:      Rate and Rhythm:  Normal rate and regular rhythm.      Pulses: Normal pulses.      Heart sounds: Normal heart sounds. No murmur heard.  Pulmonary:      Effort: Pulmonary effort is normal. No respiratory distress.      Breath sounds: Normal breath sounds.   Abdominal:      Palpations: Abdomen is soft. There is no mass.      Tenderness: There is no abdominal tenderness.   Musculoskeletal:      Cervical back: Neck supple.      Lumbar back: Bony tenderness present. Decreased range of motion.   Lymphadenopathy:      Cervical: No cervical adenopathy.   Skin:     General: Skin is warm and dry.   Neurological:      Mental Status: He is alert.      Sensory: No sensory deficit.         Assessment/Plan     Diagnoses and all orders for this visit:    1. Controlled type 2 diabetes mellitus with hyperglycemia, without long-term current use of insulin (HCC) (Primary)  -     POC Glycosylated Hemoglobin (Hb A1C)    2. Irritable bowel syndrome, unspecified type  -     dicyclomine (BENTYL) 20 MG tablet; Take 1 tablet by mouth Every 6 (Six) Hours.  Dispense: 60 tablet; Refill: 3    3. Nausea  -     promethazine (PHENERGAN) 25 MG tablet; Take 1 tablet by mouth Every 6 (Six) Hours As Needed for nausea and vomitting  Dispense: 60 tablet; Refill: 3    4. DDD (degenerative disc disease), cervical      Norco 10/325 1 p.o. 4 times daily #120    As part of this patient's treatment plan, patient will be prescribed controlled substances. The patient has been made aware of appropriate use of such medications, including potential risk of somnolence, limited ability to drive and /or work safely, and potential for dependence or overdose. It has also been made clear that these medications are for use by this patient only, without concomitant use of alcohol or other substances unless prescribed.Controlled substance status of medication discussed with patient, discussed risks of medication including abuse potential and diversion potential and need to follow up for  reevaluation appointment in order to receive further refills.    Part of this note may be an electronic transcription/translation of spoken language to printed text using the Dragon Dictation System.

## 2022-04-28 ENCOUNTER — TREATMENT (OUTPATIENT)
Dept: PHYSICAL THERAPY | Facility: CLINIC | Age: 62
End: 2022-04-28

## 2022-04-28 DIAGNOSIS — M79.601 PAIN OF RIGHT UPPER EXTREMITY: Primary | ICD-10-CM

## 2022-04-28 PROCEDURE — 97112 NEUROMUSCULAR REEDUCATION: CPT | Performed by: PHYSICAL THERAPIST

## 2022-04-28 PROCEDURE — 97530 THERAPEUTIC ACTIVITIES: CPT | Performed by: PHYSICAL THERAPIST

## 2022-04-28 PROCEDURE — 97110 THERAPEUTIC EXERCISES: CPT | Performed by: PHYSICAL THERAPIST

## 2022-04-29 ENCOUNTER — HOSPITAL ENCOUNTER (OUTPATIENT)
Dept: MRI IMAGING | Facility: HOSPITAL | Age: 62
Discharge: HOME | End: 2022-04-29
Admitting: PHYSICAL MEDICINE & REHABILITATION

## 2022-04-29 DIAGNOSIS — S16.1XXA STRAIN OF TENDON OF NECK, INITIAL ENCOUNTER: ICD-10-CM

## 2022-04-29 PROCEDURE — 72141 MRI NECK SPINE W/O DYE: CPT

## 2022-05-04 ENCOUNTER — TREATMENT (OUTPATIENT)
Dept: PHYSICAL THERAPY | Facility: CLINIC | Age: 62
End: 2022-05-04

## 2022-05-04 ENCOUNTER — OFFICE VISIT (OUTPATIENT)
Dept: PULMONOLOGY | Facility: CLINIC | Age: 62
End: 2022-05-04

## 2022-05-04 VITALS
WEIGHT: 254.8 LBS | TEMPERATURE: 97.7 F | SYSTOLIC BLOOD PRESSURE: 132 MMHG | HEIGHT: 65 IN | OXYGEN SATURATION: 98 % | DIASTOLIC BLOOD PRESSURE: 84 MMHG | HEART RATE: 94 BPM | BODY MASS INDEX: 42.45 KG/M2

## 2022-05-04 DIAGNOSIS — K21.9 CHRONIC GERD: ICD-10-CM

## 2022-05-04 DIAGNOSIS — Z87.891 FORMER SMOKER: ICD-10-CM

## 2022-05-04 DIAGNOSIS — Z86.16 HISTORY OF COVID-19: ICD-10-CM

## 2022-05-04 DIAGNOSIS — G47.33 OSA (OBSTRUCTIVE SLEEP APNEA): Primary | ICD-10-CM

## 2022-05-04 DIAGNOSIS — R06.09 DYSPNEA ON EXERTION: ICD-10-CM

## 2022-05-04 DIAGNOSIS — E66.01 OBESITY, CLASS III, BMI 40-49.9 (MORBID OBESITY): ICD-10-CM

## 2022-05-04 DIAGNOSIS — M79.601 PAIN OF RIGHT UPPER EXTREMITY: Primary | ICD-10-CM

## 2022-05-04 PROCEDURE — 99214 OFFICE O/P EST MOD 30 MIN: CPT | Performed by: INTERNAL MEDICINE

## 2022-05-04 PROCEDURE — 97140 MANUAL THERAPY 1/> REGIONS: CPT | Performed by: PHYSICAL THERAPIST

## 2022-05-04 PROCEDURE — 97110 THERAPEUTIC EXERCISES: CPT | Performed by: PHYSICAL THERAPIST

## 2022-05-04 PROCEDURE — 97530 THERAPEUTIC ACTIVITIES: CPT | Performed by: PHYSICAL THERAPIST

## 2022-05-04 RX ORDER — MONTELUKAST SODIUM 10 MG/1
10 TABLET ORAL NIGHTLY
Qty: 90 TABLET | Refills: 3 | Status: SHIPPED | OUTPATIENT
Start: 2022-05-04 | End: 2022-10-25 | Stop reason: SDUPTHER

## 2022-05-04 NOTE — PROGRESS NOTES
"Physical Therapy Daily Treatment Note      Patient: Sebastian Liao   : 1960  Referring practitioner: JULIO Raman  Date of Initial Visit: Type: THERAPY  Noted: 2022  Today's Date: 2022  Patient seen for 16 sessions       Visit Diagnoses:    ICD-10-CM ICD-9-CM   1. Pain of right upper extremity  M79.601 729.5       Subjective   Sebastian Liao reports: \"I think I'm just used to the shoulder soreness because I don't notice it much anymore. It's doesn't limit me with work, but I'm still on restrictions. My hand is the same.\"    Pre tx pn score: 0  Post tx pn score: 0      Objective          Active Range of Motion     Right Shoulder   Flexion: 175 degrees     Strength/Myotome Testing     Right Shoulder     Planes of Motion   Flexion: 5   Abduction: 5   External rotation at 0°: 5   Internal rotation at 0°: 5     Left Wrist/Hand      (2nd hand position)   Left  strength (2nd hand position) 64 lbs    Thumb Strength  Key/Lateral Pinch     Trial 1: 21 lbs  Palmar/Three-Point Pinch     Trial 1: 13 lbs    Right Wrist/Hand      (2nd hand position)   Right  strength (2nd hand position) 79 lbs    Thumb Strength   Key/Lateral Pinch     Trial 1: 22 lbs  Palmar/Three-Point Pinch     Trial 1: 15 lbs      (+) phalens  (+) Tinels over carpal tunnel    See Exercise, Manual, and Modality Logs for complete treatment.           Assessment & Plan     Assessment    Assessment details: Pt demo improved shoulder mobility and UE strength. Shoulder elevation, ER ROM WNL; IR BTB nearly symmetrical to contralateral side. /pinch strength further improved and exceeds that of his contralateral side which he reports as his dominant side. He cont to c/o paresthesia in his R hand, most prominent in the 1st-3rd digits. Phalens, Tinels over carpal tunnel (+). Cervical compression/spurlings (-). Pt has completed MRI and follows up w/ MD to discuss after today's PT visit. He subjectively denies any significant " limitation w/ his current work duties as a result of his shoulder, but remains on restrictions.    Plan  Plan details: Cont per MD recommendation          Timed:         Manual Therapy:    10     mins  83812;     Therapeutic Exercise:    20     mins  25371;     Neuromuscular Brandon:    0    mins  80580;    Therapeutic Activity:     8     mins  01537;     Gait Trainin     mins  69730;     Ultrasound:     0     mins  17104;    Ionto                               0    mins   26223  Self Care                       0     mins   57376  Canalith Repos    0     mins 44533      Un-Timed:  Electrical Stimulation:    0     mins  56286 ( );  Dry Needling     0     mins self-pay  Traction     0     mins 03714      Timed Treatment:   38   mins   Total Treatment:     38   mins    Cheryl Powers, PT  [place] License: #[id number]

## 2022-05-04 NOTE — PROGRESS NOTES
"  PULMONARY  NOTE    Chief Complaint     Dyspnea, history of COVID-19 pneumonia, class III obesity, severe obstructive sleep apnea, allergic rhinitis    History of Present Illness     62-year-old male returns today for follow-up  I last saw him 10/18/2021    He has severe obstructive sleep apnea  He remains on CPAP which he is using on a nightly basis  He feels that he benefits from it    He continues to feel that he has some \"brain fog\" and dyspnea since his COVID-19  He is try to work on regular exercise    He remains on Advair with albuterol on an as-needed basis  He feels that these inhalers help    Primary problem this spring has been sinus congestion, drainage, and postnasal drip as well as itchy watery eyes  He has been started on Pataday eyedrops and also is using Allegra    Patient Active Problem List   Diagnosis   • Testicular hypofunction   • Essential hypertension, benign   • Diabetes type 2, controlled ([facility])   • Hyperlipidemia   • Obesity, Class III, BMI 40-49.9 (morbid obesity) ([facility])   • Dyspnea on exertion   • History of COVID-19 pneumonia (10/2020)   • Former smoker (None x 35 years)   • GERD   • RONIT (obstructive sleep apnea) - severe     No Known Allergies    Current Outpatient Medications:   •  albuterol sulfate  (90 Base) MCG/ACT inhaler, Inhale 2 puffs Every 4 (Four) Hours As Needed for wheezing, Disp: 18 g, Rfl: 3  •  alfuzosin (UROXATRAL) 10 MG 24 hr tablet, Take 1 tablet by mouth Daily., Disp: 90 tablet, Rfl: 3  •  amLODIPine (NORVASC) 10 MG tablet, Take 1 tablet by mouth Daily., Disp: 90 tablet, Rfl: 3  •  benzonatate (Tessalon Perles) 100 MG capsule, Take 1 capsule by mouth 2 (Two) Times a Day., Disp: 60 capsule, Rfl: 3  •  carisoprodol (Soma) 350 MG tablet, Take 1 tablet by mouth 2 (two) times a day., Disp: 60 tablet, Rfl: 1  •  cyclobenzaprine (FLEXERIL) 10 MG tablet, Take 1 tablet by mouth 2 (Two) Times a Day As Needed for Muscle Spasms., Disp: 90 tablet, Rfl: 3  •  cyclobenzaprine " (FLEXERIL) 10 MG tablet, Take 0.5-1 tablets by mouth At Night As Needed., Disp: 10 tablet, Rfl: 1  •  dicyclomine (BENTYL) 20 MG tablet, Take 1 tablet by mouth Every 6 (Six) Hours., Disp: 60 tablet, Rfl: 3  •  famotidine (PEPCID) 20 MG tablet, Take 1 tablet by mouth 2 (two) times a day., Disp: 180 tablet, Rfl: 3  •  fluticasone (Flonase) 50 MCG/ACT nasal spray, Use 2 sprays in each nostril as directed by provider Daily., Disp: 16 g, Rfl: 3  •  fluticasone-salmeterol (Advair Diskus) 100-50 MCG/DOSE DISKUS, Inhale 1 puff 2 (Two) Times a Day., Disp: 180 each, Rfl: 3  •  glyBURIDE-metFORMIN (GLUCOVANCE) 5-500 MG per tablet, Take 1 tablet by mouth 2 (Two) Times a Day With Meals., Disp: 180 tablet, Rfl: 3  •  HYDROcodone-acetaminophen (NORCO)  MG per tablet, Take 1 tablet by mouth 4 (Four) Times a Day., Disp: 120 tablet, Rfl: 0  •  loratadine (CLARITIN) 10 MG tablet, Take 1 tablet by mouth Daily., Disp: 30 tablet, Rfl: 10  •  olopatadine (PATANOL) 0.1 % ophthalmic solution, , Disp: , Rfl:   •  omeprazole (priLOSEC) 40 MG capsule, Take 1 capsule by mouth Daily., Disp: 90 capsule, Rfl: 3  •  promethazine (PHENERGAN) 25 MG tablet, Take 1 tablet by mouth Every 6 (Six) Hours As Needed for nausea and vomitting, Disp: 60 tablet, Rfl: 3  •  rosuvastatin (CRESTOR) 10 MG tablet, Take 1 tablet by mouth Daily., Disp: 90 tablet, Rfl: 3  •  sildenafil (VIAGRA) 100 MG tablet, Take 1 tablet by mouth As Needed as directed, Disp: 30 tablet, Rfl: 6  •  sildenafil (VIAGRA) 100 MG tablet, Take 1 tablet by mouth As Needed., Disp: 30 tablet, Rfl: 6  •  trimethoprim-polymyxin b (POLYTRIM) 19672-3.1 UNIT/ML-% ophthalmic solution, Administer 1 drop to both eyes Every 6 (Six) Hours for 7 days., Disp: 10 mL, Rfl: 0  MEDICATION LIST AND ALLERGIES REVIEWED.    Family History   Problem Relation Age of Onset   • Cancer Mother         Ovarian,    • Hypertension Father    • Hypertension Sister    • Hypertension Maternal Grandmother    • Diabetes  "Maternal Grandmother      Social History     Tobacco Use   • Smoking status: Former Smoker     Packs/day: 1.00     Years: 10.00     Pack years: 10.00     Types: Cigarettes     Quit date:      Years since quittin.3   • Smokeless tobacco: Never Used   Substance Use Topics   • Alcohol use: Yes     Comment: occasional   • Drug use: No     Social History     Social History Narrative    Left hand dominant, , Exercises daily.     Former smoker, has not smoked for 35 years     FAMILY AND SOCIAL HISTORY REVIEWED.    Review of Systems  ALSO REFER TO SCANNED ROS SHEET FROM SAME DATE.    /84 (BP Location: Right arm, Patient Position: Sitting, Cuff Size: Adult)   Pulse 94   Temp 97.7 °F (36.5 °C) (Infrared)   Ht 165.1 cm (65\")   Wt 116 kg (254 lb 12.8 oz)   SpO2 98% Comment: resting, room air  BMI 42.40 kg/m²   Physical Exam  Vitals and nursing note reviewed.   Constitutional:       General: He is not in acute distress.     Appearance: He is well-developed. He is not diaphoretic.   HENT:      Head: Normocephalic and atraumatic.   Neck:      Thyroid: No thyromegaly.   Cardiovascular:      Rate and Rhythm: Normal rate and regular rhythm.      Heart sounds: Normal heart sounds. No murmur heard.  Pulmonary:      Effort: Pulmonary effort is normal.      Breath sounds: Normal breath sounds. No stridor.   Chest:   Breasts:      Right: No supraclavicular adenopathy.      Left: No supraclavicular adenopathy.       Lymphadenopathy:      Cervical: No cervical adenopathy.      Upper Body:      Right upper body: No supraclavicular or epitrochlear adenopathy.      Left upper body: No supraclavicular or epitrochlear adenopathy.   Skin:     General: Skin is warm and dry.   Neurological:      Mental Status: He is alert and oriented to person, place, and time.   Psychiatric:         Behavior: Behavior normal.         Results     Immunization History   Administered Date(s) Administered   • COVID-19 (PFIZER) PURPLE CAP " 01/09/2021, 02/01/2021, 10/04/2021   • Flu Vaccine Quad PF 6-35MO 10/02/2021   • Flu Vaccine Quad PF >36MO 10/02/2021   • FluLaval/Fluarix/Fluzone >6 10/12/2020   • Flublok 18+yrs 10/02/2021   • Hep B, Unspecified 02/05/1996   • Hepatitis A 11/19/2018, 05/20/2019   • Pneumococcal Conjugate 13-Valent (PCV13) 11/07/2016   • Pneumococcal, Unspecified 04/25/2017   • Rabies 12/05/2014, 12/08/2014, 12/19/2014, 12/19/2014   • Tetanus 04/25/2017     Problem List       ICD-10-CM ICD-9-CM   1. RONIT (obstructive sleep apnea) - severe  G47.33 327.23   2. GERD  K21.9 530.81   3. Former smoker (None x 35 years)  Z87.891 V15.82   4. History of COVID-19 pneumonia (10/2020)  Z86.16 V12.09   5. Dyspnea on exertion  R06.00 786.09   6. Obesity, Class III, BMI 40-49.9 (morbid obesity) ([facility])  E66.01 278.01       Discussion     He is doing about the same  Unfortunately, no progress on weight loss    I am going to add Singulair to his medical regimen for his perennial rhinitis symptoms  We discussed potential side effects    Have encouraged regular exercise and efforts at weight loss    I encourage continued compliance with CPAP    I will plan to see him back in 6 months or earlier if there are any problems in the meantime    Moderate level of Medical Decision Making complexity based on 2 or more chronic stable illnesses and prescription drug management.    Dontae Manzo MD  Note electronically signed    CC: Fransico Ngo PA

## 2022-05-06 ENCOUNTER — TREATMENT (OUTPATIENT)
Dept: PHYSICAL THERAPY | Facility: CLINIC | Age: 62
End: 2022-05-06

## 2022-05-06 DIAGNOSIS — M79.601 PAIN OF RIGHT UPPER EXTREMITY: Primary | ICD-10-CM

## 2022-05-06 PROCEDURE — 97110 THERAPEUTIC EXERCISES: CPT | Performed by: PHYSICAL THERAPIST

## 2022-05-06 PROCEDURE — 97112 NEUROMUSCULAR REEDUCATION: CPT | Performed by: PHYSICAL THERAPIST

## 2022-05-06 PROCEDURE — 97530 THERAPEUTIC ACTIVITIES: CPT | Performed by: PHYSICAL THERAPIST

## 2022-05-06 NOTE — PROGRESS NOTES
"Physical Therapy Daily Treatment Note      Patient: Sebastian Liao   : 1960  Referring practitioner: JULIO Raman  Date of Initial Visit: Type: THERAPY  Noted: 2022  Today's Date: 2022  Patient seen for 17 sessions       Visit Diagnoses:    ICD-10-CM ICD-9-CM   1. Pain of right upper extremity  M79.601 729.5       Subjective   Sebastian Liao reports: Being referred to neurosurgery. Reports mild soreness in the R shoulder but states he has a \"high pn tolerance\" so doesn't notice the pn as much anymore.    Pre tx pn score: 0  Post tx pn score: 0      Objective   See Exercise, Manual, and Modality Logs for complete treatment.       Assessment & Plan     Assessment    Assessment details: Continued w/ median nerve mobilization, shoulder/thoracic mobility, and postural strengthening. Pt tolerated all well. Reported reduction of shoulder tightness at end of visit. Hand paresthesia unchanged. Pt being referred to neurosurgery for further evaluation. Recommended he continue PT. No changes made to HEP.          Timed:         Manual Therapy:    0     mins  35840;     Therapeutic Exercise:    15     mins  79217;     Neuromuscular Brandon:    10    mins  15359;    Therapeutic Activity:     15     mins  95449;     Gait Trainin     mins  28854;     Ultrasound:     0     mins  70627;    Ionto                               0    mins   02913  Self Care                       0     mins   40339  Canalith Repos    0     mins 37277      Un-Timed:  Electrical Stimulation:    0     mins  09127 ( );  Dry Needling     0     mins self-pay  Traction     0     mins 71660      Timed Treatment:   40   mins   Total Treatment:     40   mins    Cheryl Powers, PT  [place] License: #[id number]                "

## 2022-05-09 ENCOUNTER — TREATMENT (OUTPATIENT)
Dept: PHYSICAL THERAPY | Facility: CLINIC | Age: 62
End: 2022-05-09

## 2022-05-09 DIAGNOSIS — M79.601 PAIN OF RIGHT UPPER EXTREMITY: Primary | ICD-10-CM

## 2022-05-09 PROCEDURE — 97112 NEUROMUSCULAR REEDUCATION: CPT | Performed by: PHYSICAL THERAPIST

## 2022-05-09 PROCEDURE — 97530 THERAPEUTIC ACTIVITIES: CPT | Performed by: PHYSICAL THERAPIST

## 2022-05-09 PROCEDURE — 97110 THERAPEUTIC EXERCISES: CPT | Performed by: PHYSICAL THERAPIST

## 2022-05-09 NOTE — PROGRESS NOTES
Physical Therapy Daily Treatment Note      Patient: Sebastian Liao   : 1960  Referring practitioner: JULIO Raman  Date of Initial Visit: Type: THERAPY  Noted: 2022  Today's Date: 2022  Patient seen for 18 sessions       Visit Diagnoses:    ICD-10-CM ICD-9-CM   1. Pain of right upper extremity  M79.601 729.5       Subjective   Sebastian Liao reports: Woke one morning over the weekend with severe throbbing in his R hand, took over 90 minutes to subside. Hand pn sometimes wakes him at night. N/T has remained constant.    Pre tx pn score: 0  Post tx pn score: 0      Objective   See Exercise, Manual, and Modality Logs for complete treatment.       Assessment & Plan     Assessment    Assessment details: Pt notes occasional sleep disturbance resulting from R hand pn, N/T. Discussed considering use of night splint to reduce nerve compression-he verbalized understanding. Continued w/ focus on median nerve mobility, anterior shoulder stretching, postural re-education. Pt tolerated well, denied inc pn throughout visit. No changes made to HEP. Pt needs continued PT to restore full strength, ROM, and function in order to allow return to full work and daily activities w/o pn or dysfunction.      Plan  Plan details: Progress per POC          Timed:         Manual Therapy:    0     mins  31344;     Therapeutic Exercise:    25     mins  41596;     Neuromuscular Brandon:    8    mins  13231;    Therapeutic Activity:     12     mins  02070;     Gait Trainin     mins  85876;     Ultrasound:     0     mins  48723;    Ionto                               0    mins   92955  Self Care                       0     mins   86151  Canalith Repos    0     mins 82904      Un-Timed:  Electrical Stimulation:    0     mins  53970 ( );  Dry Needling     0     mins self-pay  Traction     0     mins 78910      Timed Treatment:   45   mins   Total Treatment:     45   mins    Cheryl Powers, PT  [place] License:  #[id number]

## 2022-05-11 ENCOUNTER — TREATMENT (OUTPATIENT)
Dept: PHYSICAL THERAPY | Facility: CLINIC | Age: 62
End: 2022-05-11

## 2022-05-11 DIAGNOSIS — M79.601 PAIN OF RIGHT UPPER EXTREMITY: Primary | ICD-10-CM

## 2022-05-11 PROCEDURE — 97110 THERAPEUTIC EXERCISES: CPT | Performed by: PHYSICAL THERAPIST

## 2022-05-11 PROCEDURE — 97530 THERAPEUTIC ACTIVITIES: CPT | Performed by: PHYSICAL THERAPIST

## 2022-05-11 PROCEDURE — 97112 NEUROMUSCULAR REEDUCATION: CPT | Performed by: PHYSICAL THERAPIST

## 2022-05-11 NOTE — PROGRESS NOTES
"Physical Therapy Daily Treatment Note      Patient: Sebastian Liao   : 1960  Referring practitioner: JULIO Raman  Date of Initial Visit: Type: THERAPY  Noted: 2022  Today's Date: 2022  Patient seen for 19 sessions       Visit Diagnoses:    ICD-10-CM ICD-9-CM   1. Pain of right upper extremity  M79.601 729.5       Subjective   Sebastian Liao reports: Min pn in the shoulder. \"I think I'm used to it.\" No change in hand N/T.    Pre tx pn score: 0  Post tx pn score: 0      Objective   See Exercise, Manual, and Modality Logs for complete treatment.       Assessment & Plan     Assessment    Assessment details: Continued w/ shoulder mobility/stretching, median nerve mobilization, postural strengthening. Incorporated tendon gliding techniques to improve mobility of structures in the carpal tunnel in attempt to reduce any neural compression. Pt noted some inc in paresthesia so limited repetition. Has not yet considered use of night splint. Pt needs continued PT to restore full strength, ROM, and function in order to allow return to full work and daily activities w/o pn or dysfunction.            Timed:         Manual Therapy:    0     mins  61248;     Therapeutic Exercise:    20     mins  61959;     Neuromuscular Brandon:    15    mins  76819;    Therapeutic Activity:     10     mins  21467;     Gait Trainin     mins  60026;     Ultrasound:     0     mins  10030;    Ionto                               0    mins   65931  Self Care                       0     mins   91623  Canalith Repos    0     mins 44583      Un-Timed:  Electrical Stimulation:    0     mins  67040 ( );  Dry Needling     0     mins self-pay  Traction     0     mins 37375      Timed Treatment:   45   mins   Total Treatment:     45   mins    Cheryl Powers, PT  [place] License: #[id number]                "

## 2022-05-17 ENCOUNTER — TREATMENT (OUTPATIENT)
Dept: PHYSICAL THERAPY | Facility: CLINIC | Age: 62
End: 2022-05-17

## 2022-05-17 DIAGNOSIS — M79.601 PAIN OF RIGHT UPPER EXTREMITY: Primary | ICD-10-CM

## 2022-05-17 PROCEDURE — 97530 THERAPEUTIC ACTIVITIES: CPT | Performed by: PHYSICAL THERAPIST

## 2022-05-17 PROCEDURE — 97110 THERAPEUTIC EXERCISES: CPT | Performed by: PHYSICAL THERAPIST

## 2022-05-17 PROCEDURE — 97112 NEUROMUSCULAR REEDUCATION: CPT | Performed by: PHYSICAL THERAPIST

## 2022-05-17 NOTE — PROGRESS NOTES
Physical Therapy Daily Treatment Note      Patient: Sebastian Liao   : 1960  Referring practitioner: JULIO Raman  Date of Initial Visit: Type: THERAPY  Noted: 2022  Today's Date: 2022  Patient seen for 20 sessions       Visit Diagnoses:    ICD-10-CM ICD-9-CM   1. Pain of right upper extremity  M79.601 729.5       Subjective   Sebastian Liao reports: Experienced significant soreness, throbbing in the base of his thumb yesterday. Improved some w/ icy hot, much better today. Unsure what could have caused it.    Pre tx pn score: 0  Post tx pn score: 0      Objective   See Exercise, Manual, and Modality Logs for complete treatment.       Assessment & Plan     Assessment    Assessment details: No change reported in paresthesia. Experienced exacerbation of R thumb pn yesterday w/ no known precipitating factors, indicated CMC joint involvement. Continued w/ tendon/median nerve gliding, thoracic/shoulder mobility, postural training. He tolerated well and denied inc pn or paresthesia throughout visit. Remains limited w/ BTB IR, though appears nearly symmetrical to contralateral shoulder. Otherwise shoulder mobility intact. Pt needs continued PT to restore full strength, ROM, and function in order to allow return to full work and daily activities w/o pn or dysfunction.      Plan  Plan details: Cont to progress POC as able          Timed:         Manual Therapy:    0     mins  46799;     Therapeutic Exercise:    25     mins  73689;     Neuromuscular Brandon:    10    mins  76848;    Therapeutic Activity:     8     mins  70350;     Gait Trainin     mins  29711;     Ultrasound:     0     mins  85697;    Ionto                               0    mins   28946  Self Care                       0     mins   94076  Canalith Repos    0     mins 13344      Un-Timed:  Electrical Stimulation:    0     mins  72116 ( );  Dry Needling     0     mins self-pay  Traction     0     mins 79296      Timed  Treatment:   43   mins   Total Treatment:     43   mins    Cheryl Powers, PT  [place] License: #[id number]

## 2022-05-19 ENCOUNTER — TREATMENT (OUTPATIENT)
Dept: PHYSICAL THERAPY | Facility: CLINIC | Age: 62
End: 2022-05-19

## 2022-05-19 DIAGNOSIS — M79.601 PAIN OF RIGHT UPPER EXTREMITY: Primary | ICD-10-CM

## 2022-05-19 PROCEDURE — 97110 THERAPEUTIC EXERCISES: CPT | Performed by: PHYSICAL THERAPIST

## 2022-05-19 PROCEDURE — 97530 THERAPEUTIC ACTIVITIES: CPT | Performed by: PHYSICAL THERAPIST

## 2022-05-19 PROCEDURE — 97140 MANUAL THERAPY 1/> REGIONS: CPT | Performed by: PHYSICAL THERAPIST

## 2022-05-19 NOTE — PROGRESS NOTES
Physical Therapy Re Certification Of Plan of Care    Patient: Sebastian Liao   : 1960  Diagnosis/ICD-10 Code:  Pain of right upper extremity [M79.601]  Referring practitioner: JULIO Raman  Date of Initial Visit: 2022  Today's Date: 2022  Patient seen for 21 sessions         Visit Diagnoses:    ICD-10-CM ICD-9-CM   1. Pain of right upper extremity  M79.601 729.5       Subjective Questionnaire: QuickDASH: 56.82% impairment  Clinical Progress: improved  Home Program Compliance: Yes  Treatment has included: therapeutic exercise, neuromuscular re-education, manual therapy and therapeutic activity      Subjective   Sebastian Liao reports: Shoulder stays mildly sore but does not limit his ability to perform work or daily activities. Cont to have constant N/T in hand along with pn that is most prominent in his thumb. Uses his hands all day, repetitively lifting/carrying/gripping-causes thumb to become sore. Denies difficulty gripping. Symptoms worse during the night and upon waking in the AM.    Pre tx pn score: 2  Post tx pn score: 2    Objective          Tenderness     Additional Tenderness Details  TTP ventral forearm and wrist, thenar eminence    Neurological Testing     Sensation   Cervical/Thoracic   Left   Intact: light touch    Right   Paresthesia: light touch    Comments   Right light touch: dec sensation, tingling throught palm, all digits; intact over dorsal hand.     Reflexes   Left   Biceps (C5/C6): absent (0)  Brachioradialis (C6): absent (0)    Right   Biceps (C5/C6): absent (0)  Brachioradialis (C6): absent (0)    Active Range of Motion   Cervical/Thoracic Spine   Cervical    Flexion: 25 degrees   Extension: 40 degrees   Left lateral flexion: 22 degrees   Right lateral flexion: 22 degrees   Left rotation: 30 degrees   Right rotation: 40 degrees   Left Shoulder   Flexion: 160 degrees   Abduction: 140 degrees   External rotation 0°: 50 degrees   Internal rotation BTB: T10     Right  Shoulder   Flexion: 168 degrees   Abduction: 153 degrees   External rotation 0°: 60 degrees   Internal rotation BTB: T10     Passive Range of Motion     Right Shoulder   Normal passive range of motion    Strength/Myotome Testing     Left Shoulder     Planes of Motion   Flexion: 4+   Abduction: 4+   External rotation at 0°: 4   Internal rotation at 0°: 4+     Right Shoulder     Planes of Motion   Flexion: 5   Abduction: 5   External rotation at 0°: 4+   Internal rotation at 0°: 5     Left Elbow   Flexion: 5  Extension: 4+    Right Elbow   Flexion: 5  Extension: 5    Left Wrist/Hand   Wrist extension: 5  Wrist flexion: 4+     (2nd hand position)     Trial 1: 59 lbs    Trial 2: 54 lbs    Trial 3: 45 lbs    Average: 52.67 lbs    Thumb Strength  Key/Lateral Pinch     Trial 1: 15 lbs  Tip/Two-Point Pinch     Trial 1: 11 lbs    Right Wrist/Hand   Wrist extension: 5  Wrist flexion: 5     (2nd hand position)     Trial 1: 79 lbs    Trial 2: 75 lbs    Trial 3: 70 lbs    Average: 74.67 lbs    Thumb Strength   Key/Lateral Pinch     Trial 1: 20 lbs  Tip/Two-Point Pinch     Trial 1: 12 lbs    Tests     Additional Tests Details  (-) TOS screen  (-) cervical compression/distraction  (-) pronator compression  (-) tinels at cubital tunnel  (+) ULTT- paresthesia in thumb intensified w/ median and ulnar nerve biases  (+) tinels over carpal tunnel  (+) phalens            Assessment & Plan     Assessment  Impairments: activity intolerance and pain with function  Functional Limitations: sleeping, uncomfortable because of pain and unable to perform repetitive tasks  Assessment details: 90 day re-certification performed. Pt has completed 21 PT visits. He has minimal complaints in regards to his R shoulder. He has regained functional shoulder mobility in all planes. IR BTB symmetrical to his contralateral side. He demonstrates 4+ to 5/5 shoulder strength in all planes and denies pn w/ testing. His primary complaint at this point  remains N/T that is isolated to the R hand, most prominent in the 1st-3rd digits. Sensation on dorsal aspect of hand preserved. Motor function normal. His R  strength actually exceeds that of the L. Concordant symptoms reproduced w/ ULTT w/ median and ulnar nerve biases, Phalens, tinels over the carpal tunnel. Cervical/TOS screen remain (-). Signs/symptoms more indicative of median and potentially ulnar nerve compression at the wrist vs cervical radiculopathy. Pt is awaiting scheduling for neurosurgery consult. He may benefit from ongoing PT, but prognosis guarded given lack of progress regarding his hand symptoms.  Prognosis: fair    Goals  Plan Goals: STG 3 wks  1) Pt to be compliant w/ initial HEP for ROM, strength and symptom mgmt.-MET  2) Pt to report pn at rest to be no greater than 5/10.-MET  3) Pt to improve cervical ROM to 20 deg B SB, 35 deg B rotation.-PARTIALLY MET  4) Pt to improve R shoulder AROM to 120 deg flxn, 100 deg abd or better.-MET  5) Pt to improve QD score to 25% impairment or better to reflect improved pn and function.-ONGOING  6) Pt to report reduced frequency/intensity of radicular symptoms.-ONGOING    LTG 6 wks  1) Pt to be independent w/ long term HEP and self mgmt.-PROGRESSING  2) Pt to report resolution of radicular symptoms.-ONGOING  3) Pt to tolerate 40+ mins of work simulated activities w/ min to no pn or limitation.-PARTIALLY MET (shoulder)  4) Pt to improve cervical ROM to 30 deg B SB, 45 deg B rotation.-DISCONTINUE (hx cspine surgery possibly limiting further mobility gains)  5) Pt to demo restored R shoulder mobility relative to contralateral side.-MET  6) Pt to improve QD score to 15% impairment or better to reflect improved pn and function.-ONGOING    Plan  Therapy options: will be seen for skilled therapy services  Planned modality interventions: cryotherapy, dry needling, TENS, thermotherapy (hydrocollator packs) and ultrasound  Planned therapy interventions: ADL  retraining, flexibility, functional ROM exercises, home exercise program, IADL retraining, manual therapy, neuromuscular re-education, postural training, strengthening, stretching and therapeutic activities  Frequency: 2x week  Duration in weeks: 6  Treatment plan discussed with: patient  Plan details: Cont w/ focus on joint/neural/soft tissue mobility           Recommendations: Continue as planned  Timeframe: 2 months  Prognosis to achieve goals: fair      Timed:         Manual Therapy:    15     mins  28316;     Therapeutic Exercise:    15     mins  81625;     Neuromuscular Brandon:    0    mins  09366;    Therapeutic Activity:     10     mins  96983;     Gait Trainin     mins  71144;     Ultrasound:     0     mins  94740;    Ionto                               0    mins   63738  Self Care                       0     mins   17349  Canalith Repos    0     mins 14793      Un-Timed:  Electrical Stimulation:    0     mins  64776 ( );  Dry Needling     0     mins self-pay  Traction     0     mins 53136  Re-Eval                           0    mins  50643      Timed Treatment:   40   mins   Total Treatment:     40   mins        PT: Cheryl Powers PT     [place] License:  [id number]    Electronically signed by Cheryl Powers PT, 22, 9:04 AM EDT    Certification Period: 2022 thru 2022  I certify that the therapy services are furnished while this patient is under my care.  The services outlined above are required by this patient, and will be reviewed every 90 days.         Physician Signature:__________________________________________________    PHYSICIAN: Tiffany Sawant APRN  NPI: [id number]                                      DATE:  :     Please sign and return via fax to [phone] . Thank you, [facility]

## 2022-05-24 ENCOUNTER — TREATMENT (OUTPATIENT)
Dept: PHYSICAL THERAPY | Facility: CLINIC | Age: 62
End: 2022-05-24

## 2022-05-24 DIAGNOSIS — M79.601 PAIN OF RIGHT UPPER EXTREMITY: Primary | ICD-10-CM

## 2022-05-24 PROCEDURE — 97110 THERAPEUTIC EXERCISES: CPT | Performed by: PHYSICAL THERAPIST

## 2022-05-24 PROCEDURE — 97530 THERAPEUTIC ACTIVITIES: CPT | Performed by: PHYSICAL THERAPIST

## 2022-05-24 PROCEDURE — 97140 MANUAL THERAPY 1/> REGIONS: CPT | Performed by: PHYSICAL THERAPIST

## 2022-05-24 NOTE — PROGRESS NOTES
Physical Therapy Daily Treatment Note      Patient: Sebastian Liao   : 1960  Referring practitioner: JULIO Raman  Date of Initial Visit: Type: THERAPY  Noted: 2022  Today's Date: 2022  Patient seen for 22 sessions       Visit Diagnoses:    ICD-10-CM ICD-9-CM   1. Pain of right upper extremity  M79.601 729.5       Subjective   Sebastian Liao reports: Numbness seems to be a little less intense since starting his hand exercises. Still has some pn at the base of his thumb.    Pre tx pn score: 2-R thumb  Post tx pn score: 2-R thumb      Objective   See Exercise, Manual, and Modality Logs for complete treatment.       Assessment & Plan     Assessment    Assessment details: Pt subjectively reports slight improvement in numbness-still present but seems less intense. Continued w/ MT techniques targeting improved mobility of the thumb/wrist, along w/ tendon/nerve gliding techniques, wrist/forearm stretches. Initiated resisted digit flxn/extn w/ good tolerance-mild discomfort reported at base of thumb. No inc in pn reported at end of visit.    Plan  Plan details: Progress per POC          Timed:         Manual Therapy:    23    mins  98094;     Therapeutic Exercise:    10     mins  14280;     Neuromuscular Brandon:    0    mins  87203;    Therapeutic Activity:     9     mins  80935;     Gait Trainin     mins  21181;     Ultrasound:     0     mins  54687;    Ionto                               0    mins   37275  Self Care                       0     mins   60784  Canalith Repos    0     mins 01626      Un-Timed:  Electrical Stimulation:    0     mins  86628 ( );  Dry Needling     0     mins self-pay  Traction     0     mins 40433      Timed Treatment:   42   mins   Total Treatment:     42   mins    Cheryl Powers, PT  [place] License: #[id number]

## 2022-05-26 ENCOUNTER — TREATMENT (OUTPATIENT)
Dept: PHYSICAL THERAPY | Facility: CLINIC | Age: 62
End: 2022-05-26

## 2022-05-26 DIAGNOSIS — M79.601 PAIN OF RIGHT UPPER EXTREMITY: Primary | ICD-10-CM

## 2022-05-26 PROCEDURE — 97112 NEUROMUSCULAR REEDUCATION: CPT | Performed by: PHYSICAL THERAPIST

## 2022-05-26 PROCEDURE — 97110 THERAPEUTIC EXERCISES: CPT | Performed by: PHYSICAL THERAPIST

## 2022-05-26 PROCEDURE — 97530 THERAPEUTIC ACTIVITIES: CPT | Performed by: PHYSICAL THERAPIST

## 2022-05-26 NOTE — PROGRESS NOTES
Physical Therapy Daily Treatment Note      Patient: Sebastian Liao   : 1960  Referring practitioner: JULIO Raman  Date of Initial Visit: Type: THERAPY  Noted: 2022  Today's Date: 2022  Patient seen for 23 sessions       Visit Diagnoses:    ICD-10-CM ICD-9-CM   1. Pain of right upper extremity  M79.601 729.5       Subjective   Sebastian Liao reports: Has been trying to sleep with his hand resting against his abdomen to avoid awkward positioning of his wrist at night and has seemed to helped some with the numbness. Thumb has stayed very sore.    Pre tx pn score: 6-base of thumb  Post tx pn score: 6      Objective   See Exercise, Manual, and Modality Logs for complete treatment.       Assessment & Plan     Assessment    Assessment details: Complains of increased soreness in thumb, indicates CMC joint. However notes slight improvement in N/T after changing his sleeping position. Continued w/ wrist mobility, tendon/nerve gliding techniques and MT as detailed in chart. Pt tolerated well. R wrist/thumb remain stiff. Encouraged continued stretching 2-3x/day. Neujro consult now scheduled for next week. Pt needs continued PT to restore full strength, ROM, and function in order to allow return to full work and daily activities w/o pn or dysfunction.      Plan  Plan details: Cont per POC          Timed:         Manual Therapy:    10     mins  34190;     Therapeutic Exercise:    23     mins  63433;     Neuromuscular Brandon:    8    mins  06954;    Therapeutic Activity:     0     mins  56793;     Gait Trainin     mins  82502;     Ultrasound:     0     mins  95786;    Ionto                               0    mins   80902  Self Care                       0     mins   67817  Canalith Repos    0     mins 40576      Un-Timed:  Electrical Stimulation:    0     mins  48833 ( );  Dry Needling     0     mins self-pay  Traction     0     mins 90306      Timed Treatment:   41   mins   Total  Treatment:     41   mins    Cheryl Powers, PT  [place] License: #[id number]

## 2022-05-31 ENCOUNTER — TREATMENT (OUTPATIENT)
Dept: PHYSICAL THERAPY | Facility: CLINIC | Age: 62
End: 2022-05-31

## 2022-05-31 DIAGNOSIS — M79.601 PAIN OF RIGHT UPPER EXTREMITY: Primary | ICD-10-CM

## 2022-05-31 PROCEDURE — 97530 THERAPEUTIC ACTIVITIES: CPT | Performed by: PHYSICAL THERAPIST

## 2022-05-31 PROCEDURE — 97110 THERAPEUTIC EXERCISES: CPT | Performed by: PHYSICAL THERAPIST

## 2022-05-31 NOTE — PROGRESS NOTES
"Physical Therapy Daily Treatment Note      Patient: Sebastian Liao   : 1960  Referring practitioner: JULIO Raman  Date of Initial Visit: Type: THERAPY  Noted: 2022  Today's Date: 2022  Patient seen for 24 sessions       Visit Diagnoses:    ICD-10-CM ICD-9-CM   1. Pain of right upper extremity  M79.601 729.5       Subjective   Sebastian Liao reports: \"I still have the numbness, but I've been so focused on my thumb hurting I haven't noticed it as much.\"    Pre tx pn score: 3  Post tx pn score: 3      Objective   See Exercise, Manual, and Modality Logs for complete treatment.       Assessment & Plan     Assessment    Assessment details: Pt reports questionable improvement in paresthesia, unsure if actually diminishing or superseded by ongoing thumb pn. Continued today w/ tendon/median nerve gliding techniques as well as active thumb mobility, strengthening. He reported some slight relief of thumb pn at end of visit. Has used all authorized visits. Has consult w/ neurosurgery tomorrow. Issued updated HEP to include thumb mobility exercises.    Plan  Plan details: Will await MD recommendations          Timed:         Manual Therapy:    0     mins  38164;     Therapeutic Exercise:    23     mins  97681;     Neuromuscular Brandon:    0    mins  92446;    Therapeutic Activity:     15     mins  97401;     Gait Trainin     mins  13613;     Ultrasound:     0     mins  86618;    Ionto                               0    mins   80828  Self Care                       0     mins   26072  Canalith Repos    0     mins 33981      Un-Timed:  Electrical Stimulation:    0     mins  18062 ( );  Dry Needling     0     mins self-pay  Traction     0     mins 92880      Timed Treatment:   38   mins   Total Treatment:     38   mins    Cheryl Powers, PT  [place] License: #[id number]                "

## 2022-06-01 ENCOUNTER — OFFICE VISIT (OUTPATIENT)
Dept: NEUROSURGERY | Facility: CLINIC | Age: 62
End: 2022-06-01

## 2022-06-01 VITALS — TEMPERATURE: 98.4 F | HEIGHT: 65 IN | WEIGHT: 249.4 LBS | BODY MASS INDEX: 41.55 KG/M2

## 2022-06-01 DIAGNOSIS — R20.0 RIGHT ARM NUMBNESS: ICD-10-CM

## 2022-06-01 DIAGNOSIS — M54.12 CERVICAL RADICULOPATHY: Primary | ICD-10-CM

## 2022-06-01 DIAGNOSIS — Z98.1 HISTORY OF FUSION OF CERVICAL SPINE: ICD-10-CM

## 2022-06-01 PROCEDURE — 99203 OFFICE O/P NEW LOW 30 MIN: CPT | Performed by: NEUROLOGICAL SURGERY
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
## 2022-06-03 ENCOUNTER — TELEPHONE (OUTPATIENT)
Dept: NEUROSURGERY | Facility: CLINIC | Age: 62
End: 2022-06-03

## 2022-06-03 NOTE — TELEPHONE ENCOUNTER
Caller: FREDERIC    Relationship: WORK [employer]    Best call back number: [phone]    What form or medical record are you requesting: PROGRESS NOTE 6/1/2022 AND ORDER FROM THIS DOS    Who is requesting this form or medical record from you: WORK [employer]    How would you like to receive the form or medical records (pick-up, mail, fax):   If fax, what is the fax number: [phone]      Timeframe paperwork needed: ASAP    Additional notes: WORK [employer]  REQUESTING TO HAVE PROGRESS NOTE FAXED WITH ORDERS.  NEED TO ADD RESTRICTIONS TO NOTE

## 2022-06-16 NOTE — TELEPHONE ENCOUNTER
I faxed clinicals to Gila. Also, left message on Gila's voicemail explaining the procedure won't be scheduled until we receive approval in writing, and gave her fax number.

## 2022-06-16 NOTE — TELEPHONE ENCOUNTER
Caller: FREDERIC    Relationship: WORK [employer]    Best call back number: [phone]    What orders are you requesting (i.e. lab or imaging): EMG AND MYILOGRAM    In what timeframe would the patient need to come in:     Where will you receive your lab/imaging services:     Additional notes: TEST HAVE BEEN ORDERED BUT FREDERIC NEEDS TO KNOW WHEN THEY HAVE BEEN  SCHEDULED TO INFORM THE WORK COMP .

## 2022-06-28 ENCOUNTER — HOSPITAL ENCOUNTER (OUTPATIENT)
Dept: NEUROLOGY | Facility: HOSPITAL | Age: 62
Discharge: HOME | End: 2022-06-28

## 2022-06-28 ENCOUNTER — HOSPITAL ENCOUNTER (OUTPATIENT)
Dept: GENERAL RADIOLOGY | Facility: HOSPITAL | Age: 62
Discharge: HOME | End: 2022-06-28

## 2022-06-28 ENCOUNTER — HOSPITAL ENCOUNTER (OUTPATIENT)
Dept: CT IMAGING | Facility: HOSPITAL | Age: 62
Discharge: HOME | End: 2022-06-28
Admitting: NEUROLOGICAL SURGERY

## 2022-06-28 VITALS
HEART RATE: 92 BPM | RESPIRATION RATE: 20 BRPM | SYSTOLIC BLOOD PRESSURE: 166 MMHG | WEIGHT: 254 LBS | TEMPERATURE: 97.4 F | BODY MASS INDEX: 42.32 KG/M2 | DIASTOLIC BLOOD PRESSURE: 108 MMHG | OXYGEN SATURATION: 98 % | HEIGHT: 65 IN

## 2022-06-28 DIAGNOSIS — M54.12 CERVICAL RADICULOPATHY: ICD-10-CM

## 2022-06-28 DIAGNOSIS — R20.0 RIGHT ARM NUMBNESS: ICD-10-CM

## 2022-06-28 PROCEDURE — 72126 CT NECK SPINE W/DYE: CPT

## 2022-06-28 PROCEDURE — 62284 INJECTION FOR MYELOGRAM: CPT | Performed by: NEUROLOGICAL SURGERY

## 2022-06-28 PROCEDURE — 95886 MUSC TEST DONE W/N TEST COMP: CPT

## 2022-06-28 PROCEDURE — 62302 MYELOGRAPHY LUMBAR INJECTION: CPT

## 2022-06-28 PROCEDURE — 25010000002 IOPAMIDOL 61 % SOLUTION: Performed by: NEUROLOGICAL SURGERY

## 2022-06-28 PROCEDURE — 95908 NRV CNDJ TST 3-4 STUDIES: CPT

## 2022-06-28 PROCEDURE — 72240 MYELOGRAPHY NECK SPINE: CPT

## 2022-06-28 RX ORDER — IBUPROFEN 600 MG/1
600 TABLET ORAL EVERY 6 HOURS PRN
COMMUNITY

## 2022-06-28 RX ORDER — ONDANSETRON 4 MG/1
4 TABLET, FILM COATED ORAL ONCE AS NEEDED
Status: DISCONTINUED | OUTPATIENT
Start: 2022-06-28 | End: 2022-06-30 | Stop reason: HOSPADM

## 2022-06-28 RX ORDER — PROMETHAZINE HYDROCHLORIDE 25 MG/1
25 TABLET ORAL ONCE AS NEEDED
Status: DISCONTINUED | OUTPATIENT
Start: 2022-06-28 | End: 2022-06-30 | Stop reason: HOSPADM

## 2022-06-28 RX ORDER — LIDOCAINE HYDROCHLORIDE 10 MG/ML
5 INJECTION, SOLUTION EPIDURAL; INFILTRATION; INTRACAUDAL; PERINEURAL ONCE
Status: COMPLETED | OUTPATIENT
Start: 2022-06-28 | End: 2022-06-28

## 2022-06-28 RX ADMIN — IOPAMIDOL 15 ML: 612 INJECTION, SOLUTION INTRATHECAL at 07:38

## 2022-06-28 RX ADMIN — LIDOCAINE HYDROCHLORIDE 5 ML: 10 INJECTION, SOLUTION EPIDURAL; INFILTRATION; INTRACAUDAL; PERINEURAL at 07:30

## 2022-06-28 NOTE — NURSING NOTE
Pt discharged from ir dept s/p myelogram and emg/ncv. Pt tolerated procedure without complications. Discharge instructions reviewed with patient and spouse both verbalized understanding. Pt transported to exit via wheelchair per tech.

## 2022-06-28 NOTE — POST-PROCEDURE NOTE
MYELOGRAM PROCEDURE NOTE  Neurosurgery    Patient: Sebastian Liao  : 1960      PreOp Diagnosis: Cervical radiculopathy    PostOp Diagnosis: Same    Procedure: Cervical myelogram    Surgeon: Izaiah    Anesthesia: 1% lidocaine    Technique:   Spinal needle: 20 gauge, 6in   Contrast: Isovue 300 (15ml)   Injection site: L4-5    EBL: Trace    Specimens removed: None    Complication: None        Nj Bliss MD  22  07:38 EDT

## 2022-06-29 ENCOUNTER — TELEPHONE (OUTPATIENT)
Dept: INFUSION THERAPY | Facility: HOSPITAL | Age: 62
End: 2022-06-29

## 2022-06-30 ENCOUNTER — TELEPHONE (OUTPATIENT)
Dept: NEUROSURGERY | Facility: CLINIC | Age: 62
End: 2022-06-30

## 2022-06-30 DIAGNOSIS — M54.12 CERVICAL RADICULOPATHY: Primary | ICD-10-CM

## 2022-06-30 DIAGNOSIS — R20.0 RIGHT ARM NUMBNESS: ICD-10-CM

## 2022-06-30 RX ORDER — METHYLPREDNISOLONE 4 MG/1
TABLET ORAL
Qty: 21 TABLET | Refills: 0 | Status: SHIPPED | OUTPATIENT
Start: 2022-06-30 | End: 2022-08-23

## 2022-06-30 NOTE — TELEPHONE ENCOUNTER
Provider: PARKER  Caller: ADRY  Relationship to Patient: SELF  Pharmacy:   Phone Number: [phone]  Reason for Call: ADVISE ON PAIN  When was the patient last seen: 6/28/2022  When did it start: PAIN STARTED AFTER PROCEDURE 6/28/2022,    Where is it located: RIGHT HIP INTO LEG  Characteristics of symptom/severity: 10/10 MOMENTS AND 8/10  Timing- Is it constant or intermittent: CONSTANT PAIN WITH SOME MOMENTS WITH SHOOTING PAINS  What makes it worse: SITTING AND RESTING WHEN GET UP PT IS IN A LOT OF PAIN, PUTTING PRESSURE ON LEG AFTER SITTING.   What makes it better: MOVING AROUND.  NOT TO BACK WHILE WALKING.   What therapies/medications have you tried: ADVIL/ALEVE/ IBUPROFEN 200 MG 3 TABS 3 TIMES A DAY BUT MED IS NOT RELIEVED     PLEASE CONTACT PT TO ADVISE.    IF CALLING IN RX SEND TO [facility]

## 2022-06-30 NOTE — TELEPHONE ENCOUNTER
He has been having worsening back pain and leg pain since his myelogram for his cervical herniated disc.    The patient is diabetic he is on metformin twice daily his latest hemoglobin was 7.1 I told him that for the next 2 weeks he has to be extra careful with no sugars and no simple carbs.  I have called in a Medrol Dosepak.     He has a follow-up appointment with Dr. Bliss to discuss his myelogram on 7/9/2022.Salma Roe PA-C

## 2022-06-30 NOTE — TELEPHONE ENCOUNTER
Provider:  Dr. Bliss   Surgery:    Surgery Date:    Last visit:   Office Visit with Nj Bliss MD (06/01/2022)    Next visit: 07/09/2022    OLAF:         Reason for call:       Patient had a Myelo completed on 06/28/2022. He states all of the pain started after he had the Myelo completed. Patient has not tried ice or heat at this time. Patient is wanting to know if there is anything else he can try to get some relief with the pain?

## 2022-07-08 NOTE — OUTREACH NOTE
Call Center TCM Note      Responses   [facility] patient discharged from?  [place]   Does the patient have one of the following disease processes/diagnoses(primary or secondary)?  COVID-19   COVID-19 underlying condition?  None   TCM attempt successful?  Yes   Call start time  1220   Call end time  1227   Discharge diagnosis  Pneumonia due to COVID-19 virus   Meds reviewed with patient/caregiver?  Yes   Is the patient having any side effects they believe may be caused by any medication additions or changes?  No   Does the patient have all medications ordered at discharge?  Yes   Is the patient taking all medications as directed (includes completed medication regime)?  Yes   Comments regarding appointments  Appt with ID on 10/13/20   Does the patient have a primary care provider?   Yes   Does the patient have an appointment with their PCP or specialist within 7 days of discharge?  No   What is preventing the patient from scheduling follow up appointments within 7 days of discharge?  -- [No hospital d/c f/u appt with PCP until 11/14/20]   Nursing Interventions  Advised patient to make appointment [routed to pcp office]   Has the patient kept scheduled appointments due by today?  N/A   Psychosocial issues?  No   Did the patient receive a copy of their discharge instructions?  Yes   Did the patient receive a copy of COVID-19 specific instructions?  Yes   Nursing interventions  Reviewed instructions with patient   What is the patient's perception of their health status since discharge?  Improving   Does the patient have any of the following symptoms?  None   Nursing Interventions  Nurse provided patient education   Pulse Ox monitoring  Intermittent   Pulse Ox device source  Hospital   O2 Sat comments  Pt has not taken O2 sat today   O2 Sat: education provided  Sat levels   Is the patient/caregiver able to teach back steps to recovery at home?  Rest and rebuild strength, gradually increase activity, Eat a  Pfizer dose 1 and 2 well-balance diet   If the patient is a current smoker, are they able to teach back resources for cessation?  Not a smoker   Is the patient/caregiver able to teach back the hierarchy of who to call/visit for symptoms/problems? PCP, Specialist, Home health nurse, Urgent Care, ED, 911  Yes   TCM call completed?  Yes          Kelsey John RN    10/13/2020, 12:27 EDT

## 2022-07-09 ENCOUNTER — OFFICE VISIT (OUTPATIENT)
Dept: NEUROSURGERY | Facility: CLINIC | Age: 62
End: 2022-07-09

## 2022-07-09 VITALS — TEMPERATURE: 97.9 F | HEIGHT: 65 IN | BODY MASS INDEX: 41.55 KG/M2 | WEIGHT: 249.4 LBS

## 2022-07-09 DIAGNOSIS — M54.12 CERVICAL RADICULOPATHY: ICD-10-CM

## 2022-07-09 DIAGNOSIS — G56.21 ULNAR NEUROPATHY AT ELBOW, RIGHT: ICD-10-CM

## 2022-07-09 DIAGNOSIS — G56.01 CARPAL TUNNEL SYNDROME OF RIGHT WRIST: Primary | ICD-10-CM

## 2022-07-09 DIAGNOSIS — Z98.1 HISTORY OF FUSION OF CERVICAL SPINE: ICD-10-CM

## 2022-07-09 DIAGNOSIS — M25.511 PAIN IN JOINT OF RIGHT SHOULDER: ICD-10-CM

## 2022-07-09 PROCEDURE — 99213 OFFICE O/P EST LOW 20 MIN: CPT | Performed by: NEUROLOGICAL SURGERY
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
## 2022-07-23 DIAGNOSIS — J30.9 ALLERGIC RHINITIS, UNSPECIFIED SEASONALITY, UNSPECIFIED TRIGGER: ICD-10-CM

## 2022-07-25 RX ORDER — FLUTICASONE PROPIONATE 50 MCG
2 SPRAY, SUSPENSION (ML) NASAL DAILY
Qty: 16 G | Refills: 3 | Status: SHIPPED | OUTPATIENT
Start: 2022-07-25 | End: 2022-10-25 | Stop reason: SDUPTHER

## 2022-08-04 NOTE — PROGRESS NOTES
"Physical Therapy Daily Treatment Note      Patient: Sebastian Liao   : 1960  Referring practitioner: JULIO Raman  Date of Initial Visit: Type: THERAPY  Noted: 2022  Today's Date: 2022  Patient seen for 15 sessions       Visit Diagnoses:    ICD-10-CM ICD-9-CM   1. Pain of right upper extremity  M79.601 729.5       Subjective   Sebastian Liao reports: \"We're short handed at work and working overtime. I have a high threshold for pn. I take tylenol.\"    Pre tx pn score: 5  Post tx pn score: 5      Objective   See Exercise, Manual, and Modality Logs for complete treatment.       Assessment & Plan     Assessment    Assessment details: Has MRI tomorrow. Cont to c/o paresthesia in the entire R hand, worst in the thumb. Reports ongoing soreness/stiffness in the shoulders that he contributes to inc work load w/ low staffing. Demo full passive shoulder mobility in all planes w/ exception of mild limitation w/ IR, improved after manual post capsule mobilization. Continued w/ scapular strengthening as outlined in flow sheet. Pt denied any change in paresthesia throughout visit.    Plan  Plan details: Cont per POC          Timed:         Manual Therapy:    10     mins  65799;     Therapeutic Exercise:    8     mins  22085;     Neuromuscular Brandon:    15    mins  60537;    Therapeutic Activity:     0     mins  83695;     Gait Trainin     mins  93565;     Ultrasound:     0     mins  32626;    Ionto                               0    mins   40396  Self Care                       0     mins   23896  Canalith Repos    0     mins 89068      Un-Timed:  Electrical Stimulation:    0     mins  59271 ( );  Dry Needling     0     mins self-pay  Traction     0     mins 87170      Timed Treatment:   33   mins   Total Treatment:     33   mins    Cheryl Powers, PT  [place] License: #[id number]                " Cantharidin Pregnancy And Lactation Text: The use of this medication during pregnancy or lactation is not recommended as there is insufficient data.

## 2022-08-08 ENCOUNTER — TELEPHONE (OUTPATIENT)
Dept: NEUROSURGERY | Facility: CLINIC | Age: 62
End: 2022-08-08

## 2022-08-08 DIAGNOSIS — G56.21 ULNAR NEUROPATHY AT ELBOW, RIGHT: ICD-10-CM

## 2022-08-08 DIAGNOSIS — G89.29 OTHER CHRONIC PAIN: Primary | ICD-10-CM

## 2022-08-08 DIAGNOSIS — Z98.1 HISTORY OF FUSION OF CERVICAL SPINE: ICD-10-CM

## 2022-08-08 DIAGNOSIS — G56.01 CARPAL TUNNEL SYNDROME OF RIGHT WRIST: ICD-10-CM

## 2022-08-08 DIAGNOSIS — M54.12 CERVICAL RADICULOPATHY: ICD-10-CM

## 2022-08-08 NOTE — TELEPHONE ENCOUNTER
"Provider:  Dr. Bliss  Surgery/Procedure:  EMG & Nerve Conduction Test (06/28/2022 06:55)  IR Myelogram Cervical Spine (06/28/2022 07:45)    Surgery/Procedure Date:  \" \"^  Last visit:   Office Visit with Nj Bliss MD (07/09/2022)    Next visit: Appointment with Shanice Luis PA-C (09/06/2022)       Reason for call:   \"What was the call regarding:PT CALLED AND STATES THAT HE IS WEARING THE BRACE THAT  HAD ORDERED BUT PT STATES THAT HE IS HAVING SEVERE PAIN-PT STATES THAT HE IS TAKING IBUPROFEN BUT IT IS NOT HELPING-PT IS ASKING FOR SOMETHING TO HELP WITH THE PAIN-\"      Patient is still experiencing the same symptoms that he was when he called in on 6/30/22.  States that the Medrol Dose Thom didn't help very much. Requesting something to help manage his pain and symptoms. Splint doesn't provide much comfort.       "

## 2022-08-08 NOTE — TELEPHONE ENCOUNTER
Caller: Sebastian Liao    Relationship: Self    Best call back number: [phone]    What is the best time to reach you:ANYTIME    Who are you requesting to speak with (clinical staff, provider,  specific staff member): CLINICAL STAFF    Do you know the name of the person who called:NA    What was the call regarding:PT CALLED AND STATES THAT HE IS WEARING THE BRACE THAT  HAD ORDERED BUT PT STATES THAT HE IS HAVING SEVERE PAIN-PT STATES THAT HE IS TAKING IBUPROFEN BUT IT IS NOT HELPING-PT IS ASKING FOR SOMETHING TO HELP WITH THE PAIN-PT WOULD LIKE A CALL BACK THANK YOU!    Do you require a callback: YES

## 2022-08-09 RX ORDER — GABAPENTIN 300 MG/1
300 CAPSULE ORAL NIGHTLY
Qty: 30 CAPSULE | Refills: 0 | Status: SHIPPED | OUTPATIENT
Start: 2022-08-09 | End: 2022-09-06 | Stop reason: SDUPTHER

## 2022-08-23 ENCOUNTER — TELEPHONE (OUTPATIENT)
Dept: FAMILY MEDICINE CLINIC | Facility: CLINIC | Age: 62
End: 2022-08-23

## 2022-08-23 ENCOUNTER — OFFICE VISIT (OUTPATIENT)
Dept: FAMILY MEDICINE CLINIC | Facility: CLINIC | Age: 62
End: 2022-08-23

## 2022-08-23 VITALS
SYSTOLIC BLOOD PRESSURE: 136 MMHG | DIASTOLIC BLOOD PRESSURE: 68 MMHG | TEMPERATURE: 97.1 F | BODY MASS INDEX: 41.92 KG/M2 | HEART RATE: 83 BPM | RESPIRATION RATE: 16 BRPM | WEIGHT: 251.6 LBS | OXYGEN SATURATION: 98 % | HEIGHT: 65 IN

## 2022-08-23 DIAGNOSIS — Z12.11 SCREEN FOR COLON CANCER: ICD-10-CM

## 2022-08-23 DIAGNOSIS — M50.30 DDD (DEGENERATIVE DISC DISEASE), CERVICAL: ICD-10-CM

## 2022-08-23 DIAGNOSIS — M51.36 DDD (DEGENERATIVE DISC DISEASE), LUMBAR: Primary | ICD-10-CM

## 2022-08-23 DIAGNOSIS — E11.65 TYPE 2 DIABETES MELLITUS WITH HYPERGLYCEMIA, WITHOUT LONG-TERM CURRENT USE OF INSULIN: Primary | ICD-10-CM

## 2022-08-23 DIAGNOSIS — J30.2 SEASONAL ALLERGIC RHINITIS: ICD-10-CM

## 2022-08-23 LAB
EXPIRATION DATE: NORMAL
GLUCOSE BLDC GLUCOMTR-MCNC: 200 MG/DL (ref 70–130)
HBA1C MFR BLD: 8 %
Lab: NORMAL

## 2022-08-23 PROCEDURE — 83036 HEMOGLOBIN GLYCOSYLATED A1C: CPT | Performed by: PHYSICIAN ASSISTANT

## 2022-08-23 PROCEDURE — 99214 OFFICE O/P EST MOD 30 MIN: CPT | Performed by: PHYSICIAN ASSISTANT

## 2022-08-23 PROCEDURE — 82962 GLUCOSE BLOOD TEST: CPT | Performed by: PHYSICIAN ASSISTANT

## 2022-08-23 RX ORDER — HYDROCODONE BITARTRATE AND ACETAMINOPHEN 10; 325 MG/1; MG/1
1 TABLET ORAL EVERY 6 HOURS
COMMUNITY
End: 2022-08-23 | Stop reason: SDUPTHER

## 2022-08-23 RX ORDER — HYDROCODONE BITARTRATE AND ACETAMINOPHEN 10; 325 MG/1; MG/1
1 TABLET ORAL EVERY 6 HOURS
Qty: 120 TABLET | Refills: 0 | Status: SHIPPED | OUTPATIENT
Start: 2022-08-23 | End: 2022-11-03

## 2022-08-23 RX ORDER — LORATADINE 10 MG/1
10 TABLET ORAL DAILY
Qty: 90 TABLET | Refills: 3 | Status: ON HOLD | OUTPATIENT
Start: 2022-08-23 | End: 2022-12-12 | Stop reason: SDUPTHER

## 2022-08-23 NOTE — ASSESSMENT & PLAN NOTE
- His A1c has increased from 7 percent to 8 percent. I advised him to avoid eating late at night and to drink more water. He will follow up with me in 3 months.

## 2022-08-23 NOTE — TELEPHONE ENCOUNTER
Caller: Sebastian Liao    Relationship: Self    Best call back number:     Requested Prescriptions:      HYDROcodone-acetaminophen (NORCO)  MG per tablet  1 tablet, Every 6 Hours         Pharmacy where request should be sent:   [facility]    Additional details provided by patient: PATIENT HAS RECEIVED A CALL FROM THE PHARMACY AND THE MEDICATION IS NOT THERE AS OF THIS CALL    Does the patient have less than a 3 day supply:  [x] Yes  [] No    Melissa Eckert Rep   08/23/22 13:03 EDT

## 2022-08-23 NOTE — PROGRESS NOTES
Subjective   Sebastian Liao is a 62 y.o. male  Diabetes (F/U. , A1C 8.0%-up from 7.1% three months ago), Allergies (RF Claritin), and Back Pain (F/U on DDD. RF Norco 10/325 QID )      History of Present Illness     The patient presents today for follow-up of his back pain. His back pain is manageable with the pain medication. He takes pain medication daily.    He is requesting a refill of his Claritin for his allergies. He has been referred to an eye specialist, but they are unable to get his allergies under control. He is also due for an eye exam for his diabetes. His A1c has increased from 7 percent to 8 percent, which is the highest it has been in a long time. He states he drinks a lot of water and coffee throughout the night.    The following portions of the patient's history were reviewed and updated as appropriate: allergies, current medications, past social history and problem list    Review of Systems   Constitutional: Negative for appetite change, diaphoresis, fatigue and unexpected weight change.   Eyes: Negative for visual disturbance.   Respiratory: Negative for cough, chest tightness and shortness of breath.    Cardiovascular: Negative for chest pain, palpitations and leg swelling.   Gastrointestinal: Negative for diarrhea, nausea and vomiting.   Endocrine: Negative for polydipsia, polyphagia and polyuria.   Skin: Negative for color change and rash.   Neurological: Negative for dizziness, syncope, weakness, light-headedness, numbness and headaches.       Objective     Vitals:    08/23/22 0857   BP: 136/68   Pulse: 83   Resp: 16   Temp: 97.1 °F (36.2 °C)   SpO2: 98%       Physical Exam  Vitals and nursing note reviewed.   Constitutional:       General: He is not in acute distress.     Appearance: Normal appearance. He is well-developed. He is not ill-appearing, toxic-appearing or diaphoretic.   Neck:      Thyroid: No thyromegaly.      Vascular: No carotid bruit or JVD.   Cardiovascular:       Rate and Rhythm: Normal rate and regular rhythm.      Pulses: Normal pulses.      Heart sounds: Normal heart sounds. No murmur heard.  Pulmonary:      Effort: Pulmonary effort is normal. No respiratory distress.      Breath sounds: Normal breath sounds.   Abdominal:      Palpations: Abdomen is soft. There is no mass.      Tenderness: There is no abdominal tenderness.   Musculoskeletal:      Cervical back: Neck supple.   Lymphadenopathy:      Cervical: No cervical adenopathy.   Skin:     General: Skin is warm and dry.   Neurological:      Mental Status: He is alert.      Sensory: No sensory deficit.         Assessment & Plan     Diagnoses and all orders for this visit:    1. Type 2 diabetes mellitus with hyperglycemia, without long-term current use of insulin (HCC) (Primary)  -     Hemoglobin A1c; Future  -     Comprehensive Metabolic Panel; Future  -     Lipid Panel; Future  -     MicroAlbumin, Urine, Random - Urine, Clean Catch; Future    2. DDD (degenerative disc disease), cervical    3. Seasonal allergic rhinitis  -     loratadine (CLARITIN) 10 MG tablet; Take 1 tablet by mouth Daily.  Dispense: 90 tablet; Refill: 3    4. Screen for colon cancer  -     Cologuard - Stool, Per Rectum; Future     Norco 10/325 1 po QID #120    As part of this patient's treatment plan, patient will be prescribed controlled substances. The patient has been made aware of appropriate use of such medications, including potential risk of somnolence, limited ability to drive and /or work safely, and potential for dependence or overdose. It has also been made clear that these medications are for use by this patient only, without concomitant use of alcohol or other substances unless prescribed.Controlled substance status of medication discussed with patient, discussed risks of medication including abuse potential and diversion potential and need to follow up for reevaluation appointment in order to receive further refills.    Part of this  note may be an electronic transcription/translation of spoken language to printed text using the Dragon Dictation System.      I spent 15 minutes in patient care: Reviewing records prior to the visit, examining the patient, entering orders and documentation    Part of this note may be an electronic transcription/translation of spoken language to printed text using the Dragon Dictation System.      Transcribed from ambient dictation for BRANDIN Stanton by Gabriela Huggins.  08/23/22   10:58 EDT    Patient verbalized consent to the visit recording.  I have personally performed the services described in this document as transcribed by the above individual, and it is both accurate and complete.  BRANDIN Stanton  8/23/2022  12:20 EDT

## 2022-08-30 DIAGNOSIS — K58.9 IRRITABLE BOWEL SYNDROME, UNSPECIFIED TYPE: ICD-10-CM

## 2022-08-30 RX ORDER — DICYCLOMINE HCL 20 MG
20 TABLET ORAL EVERY 6 HOURS
Qty: 60 TABLET | Refills: 3 | Status: SHIPPED | OUTPATIENT
Start: 2022-08-30 | End: 2022-11-10 | Stop reason: SDUPTHER

## 2022-09-06 ENCOUNTER — OFFICE VISIT (OUTPATIENT)
Dept: NEUROSURGERY | Facility: CLINIC | Age: 62
End: 2022-09-06

## 2022-09-06 VITALS
TEMPERATURE: 98.4 F | WEIGHT: 250.8 LBS | DIASTOLIC BLOOD PRESSURE: 68 MMHG | SYSTOLIC BLOOD PRESSURE: 124 MMHG | BODY MASS INDEX: 41.79 KG/M2 | HEIGHT: 65 IN

## 2022-09-06 DIAGNOSIS — G89.29 OTHER CHRONIC PAIN: ICD-10-CM

## 2022-09-06 DIAGNOSIS — G56.01 CARPAL TUNNEL SYNDROME OF RIGHT WRIST: Primary | ICD-10-CM

## 2022-09-06 DIAGNOSIS — Z98.1 HISTORY OF FUSION OF CERVICAL SPINE: ICD-10-CM

## 2022-09-06 DIAGNOSIS — G56.21 ULNAR NEUROPATHY AT ELBOW, RIGHT: ICD-10-CM

## 2022-09-06 DIAGNOSIS — M54.12 CERVICAL RADICULOPATHY: ICD-10-CM

## 2022-09-06 PROCEDURE — 99214 OFFICE O/P EST MOD 30 MIN: CPT | Performed by: PHYSICIAN ASSISTANT

## 2022-09-06 RX ORDER — GABAPENTIN 300 MG/1
300 CAPSULE ORAL 2 TIMES DAILY
Qty: 60 CAPSULE | Refills: 1 | Status: SHIPPED | OUTPATIENT
Start: 2022-09-06 | End: 2022-12-06

## 2022-09-06 NOTE — H&P
Patient: Sebastian Liao  : 1960  Chart #: [id number]    Date of Service: 2022    CHIEF COMPLAINT:   1. Right hand pain and numbness    History of Present Illness Mr. Liao is seen in follow-up.  He is a 62-year-old  who fractured his neck in a in  and underwent dorsal fusion and stabilization at the [facility].  He has had no further difficulties in that regard.  In February of this year he was tossing a garbage bag and developed severe weakness, numbness, and pain in his right upper extremity.  His neck pain has settled down.  He has ongoing pain in the forearm and thumb as well as numbness in the fingertips.  He has left hand dominant.  The numbness and pain is particularly bad at nighttime.  He reports pain limiting weakness.  Electrodiagnostic studies confirm moderate to severe carpal tunnel syndrome on the right as well as some moderate ulnar neuropathy on the right.  He has been wearing wrist splints as well as an elbow pad that has helped modestly.  He denies left arm symptoms.  He continues to work.  Myelogram did not show overt nerve root compromise at any level      Past Medical History:   Diagnosis Date   • Acute bronchitis    • Cervicalgia    • COVID-19    • Diabetes mellitus (HCC)    • Edema    • GERD (gastroesophageal reflux disease)    • HTN (hypertension)    • Hyperlipidemia    • IBS (irritable bowel syndrome)    • IBS (irritable bowel syndrome)    • Low testosterone    • Prostate disease          Current Outpatient Medications:   •  albuterol sulfate  (90 Base) MCG/ACT inhaler, Inhale 2 puffs Every 4 (Four) Hours As Needed for wheezing, Disp: 18 g, Rfl: 3  •  alfuzosin (UROXATRAL) 10 MG 24 hr tablet, Take 1 tablet by mouth Daily., Disp: 90 tablet, Rfl: 3  •  amLODIPine (NORVASC) 10 MG tablet, Take 1 tablet by mouth Daily., Disp: 90 tablet, Rfl: 3  •  benzonatate (Tessalon Perles) 100 MG capsule, Take 1 capsule by mouth 2 (Two) Times a Day., Disp: 60  capsule, Rfl: 3  •  carisoprodol (Soma) 350 MG tablet, Take 1 tablet by mouth 2 (two) times a day., Disp: 60 tablet, Rfl: 1  •  cyclobenzaprine (FLEXERIL) 10 MG tablet, Take 1 tablet by mouth 2 (Two) Times a Day As Needed for Muscle Spasms., Disp: 90 tablet, Rfl: 3  •  cyclobenzaprine (FLEXERIL) 10 MG tablet, Take 0.5-1 tablets by mouth At Night As Needed., Disp: 10 tablet, Rfl: 1  •  dicyclomine (BENTYL) 20 MG tablet, Take 1 tablet by mouth Every 6 (Six) Hours., Disp: 60 tablet, Rfl: 3  •  fluticasone (Flonase) 50 MCG/ACT nasal spray, Use 2 sprays in each nostril as directed by provider Daily., Disp: 16 g, Rfl: 3  •  fluticasone-salmeterol (Advair Diskus) 100-50 MCG/DOSE DISKUS, Inhale 1 puff 2 (Two) Times a Day., Disp: 180 each, Rfl: 3  •  Fluticasone-Salmeterol (ADVAIR/WIXELA) 100-50 MCG/ACT DISKUS, Inhale 1 puff 2 (Two) Times a Day., Disp: 180 each, Rfl: 1  •  gabapentin (NEURONTIN) 300 MG capsule, Take 1 capsule by mouth 2 (Two) Times a Day., Disp: 60 capsule, Rfl: 1  •  glyBURIDE-metFORMIN (GLUCOVANCE) 5-500 MG per tablet, Take 1 tablet by mouth 2 (Two) Times a Day With Meals., Disp: 180 tablet, Rfl: 3  •  HYDROcodone-acetaminophen (NORCO)  MG per tablet, Take 1 tablet by mouth Every 6 (Six) Hours., Disp: 120 tablet, Rfl: 0  •  ibuprofen (ADVIL,MOTRIN) 600 MG tablet, Take 600 mg by mouth 3 (Three) Times a Day., Disp: , Rfl:   •  loratadine (CLARITIN) 10 MG tablet, Take 1 tablet by mouth Daily., Disp: 90 tablet, Rfl: 3  •  montelukast (SINGULAIR) 10 MG tablet, Take 1 tablet by mouth Every Night., Disp: 90 tablet, Rfl: 3  •  olopatadine (PATANOL) 0.1 % ophthalmic solution, , Disp: , Rfl:   •  omeprazole (priLOSEC) 40 MG capsule, Take 1 capsule by mouth Daily., Disp: 90 capsule, Rfl: 3  •  promethazine (PHENERGAN) 25 MG tablet, Take 1 tablet by mouth Every 6 (Six) Hours As Needed for nausea and vomitting, Disp: 60 tablet, Rfl: 3  •  rosuvastatin (CRESTOR) 10 MG tablet, Take 1 tablet by mouth Daily., Disp:  90 tablet, Rfl: 3  •  sildenafil (VIAGRA) 100 MG tablet, Take 1 tablet by mouth Daily as needed, Disp: 30 tablet, Rfl: 6  •  silodosin (RAPAFLO) 8 MG capsule capsule, Take 1 capsule by mouth Daily., Disp: 90 capsule, Rfl: 3  •  tadalafil (CIALIS) 20 MG tablet, Take 1 tablet by oral route as directed., Disp: 30 tablet, Rfl: 6  •  trimethoprim-polymyxin b (POLYTRIM) 81331-5.1 UNIT/ML-% ophthalmic solution, Administer 1 drop to both eyes Every 6 (Six) Hours for 7 days., Disp: 10 mL, Rfl: 0  •  Chlorhexidine Gluconate 4 % solution, Shower with solution daily as directed for 5 days prior to surgery., Disp: 237 mL, Rfl: 0  •  mupirocin (BACTROBAN) 2 % ointment, Apply a pea-sized amount to each nostril as directed by provider twice daily for 5 days prior to surgery., Disp: 22 g, Rfl: 0    Past Surgical History:   Procedure Laterality Date   • CERVICAL FUSION      Dr. Nickerson, . [place], [place]   • COLONOSCOPY  2012    repeat in ten yrs   • HIP SURGERY Right 2016    Dr. Schwartz   • NECK SURGERY      cervical spine fusion       Social History     Socioeconomic History   • Marital status:    Tobacco Use   • Smoking status: Former Smoker     Packs/day: 0.50     Years: 10.00     Pack years: 5.00     Types: Cigarettes     Quit date:      Years since quittin.7   • Smokeless tobacco: Never Used   Vaping Use   • Vaping Use: Never used   Substance and Sexual Activity   • Alcohol use: Yes     Comment: 2 drinks per week   • Drug use: Yes     Types: Marijuana     Comment: Quit in    • Sexual activity: Defer         Review of Systems   Constitutional: Negative for activity change, appetite change, chills, diaphoresis, fatigue, fever and unexpected weight change.   HENT: Negative for congestion, dental problem, drooling, ear discharge, ear pain, facial swelling, hearing loss, mouth sores, nosebleeds, postnasal drip, rhinorrhea, sinus pressure, sinus pain, sneezing, sore throat, tinnitus, trouble swallowing  "and voice change.    Eyes: Positive for discharge, redness and itching. Negative for photophobia, pain and visual disturbance.   Respiratory: Negative for apnea, cough, choking, chest tightness, shortness of breath, wheezing and stridor.    Cardiovascular: Negative for chest pain, palpitations and leg swelling.   Gastrointestinal: Negative for abdominal distention, abdominal pain, anal bleeding, blood in stool, constipation, diarrhea, nausea, rectal pain and vomiting.   Endocrine: Negative for cold intolerance, heat intolerance, polydipsia, polyphagia and polyuria.   Genitourinary: Negative for decreased urine volume, difficulty urinating, dysuria, enuresis, flank pain, frequency, genital sores, hematuria, penile discharge, penile pain, penile swelling, scrotal swelling, testicular pain and urgency.   Musculoskeletal: Negative for arthralgias, back pain, gait problem, joint swelling, myalgias, neck pain and neck stiffness.   Skin: Negative for color change, pallor, rash and wound.   Allergic/Immunologic: Positive for environmental allergies. Negative for food allergies and immunocompromised state.   Neurological: Positive for numbness. Negative for dizziness, tremors, seizures, syncope, facial asymmetry, speech difficulty, weakness, light-headedness and headaches.   Hematological: Negative for adenopathy. Does not bruise/bleed easily.   Psychiatric/Behavioral: Negative for agitation, behavioral problems, confusion, decreased concentration, dysphoric mood, hallucinations, self-injury, sleep disturbance and suicidal ideas. The patient is not nervous/anxious and is not hyperactive.        Objective   Vital Signs: Blood pressure 124/68, temperature 98.4 °F (36.9 °C), temperature source Infrared, height 165.1 cm (65\"), weight 114 kg (250 lb 12.8 oz).  Physical Exam  Vitals and nursing note reviewed.   Constitutional:       General: He is not in acute distress.     Appearance: He is well-developed.   HENT:      Head: " Normocephalic and atraumatic.   Eyes:      Pupils: Pupils are equal, round, and reactive to light.   Cardiovascular:      Heart sounds: Normal heart sounds.   Pulmonary:      Breath sounds: Normal breath sounds.   Psychiatric:         Behavior: Behavior normal.         Thought Content: Thought content normal.     Musculoskeletal:     Strength is intact in upper and lower extremities to direct testing.     Station and gait are normal.     No thenar atrophy.  Positive Tinel's sign on the right.  PositiveFinkelstein test  Neurologic:     Muscle tone is normal throughout.     Coordination is intact.     Sensation is intact to light touch throughout.     Patient is oriented to person, place, and time.         Electrodiagnostic studies demonstrate moderate to severe right carpal tunnel syndrome and right ulnar neuropathy at the elbow.    Assessment & Plan   Diagnosis:  1.  Right carpal tunnel syndrome, moderate to severe  2.  Right ulnar neuropathy at the elbow  3.  Possible de Quervains tenosynovitis  4.  Mechanical neck pain    Medical Decision Making: Patient seems mostly afflicted by his carpal tunnel syndrome.  He has tried wrist splints for 8 weeks which only helped modestly.  Gabapentin has helped with his sleep.  I have discussed with Dr. Bliss and our recommendation is carpal tunnel release.  I discussed the general nature of the procedure as well as risk, limitations, complications and alternatives to the procedure.  Patient wishes to proceed.  I have renewed his Neurontin.    Diagnoses and all orders for this visit:    1. Carpal tunnel syndrome of right wrist (Primary)    2. History of fusion of cervical spine    3. Ulnar neuropathy at elbow, right    4. Cervical radiculopathy    5. Other chronic pain  -     gabapentin (NEURONTIN) 300 MG capsule; Take 1 capsule by mouth 2 (Two) Times a Day.  Dispense: 60 capsule; Refill: 1                      Class 3 Severe Obesity (BMI >=40). Obesity-related health  conditions include the following: diabetes mellitus. Obesity is unchanged. BMI is is above average; BMI management plan is completed. We discussed portion control and increasing exercise.         FRANCISCO J NewtonC  Patient Care Team:  Fransico Ngo PA as PCP - General (Family Medicine)

## 2022-09-06 NOTE — PROGRESS NOTES
Patient: Sebastian Liao  : 1960  Chart #: [id number]    Date of Service: 2022    CHIEF COMPLAINT:   1. Right hand pain and numbness    History of Present Illness Mr. Liao is seen in follow-up.  He is a 62-year-old  who fractured his neck in a in  and underwent dorsal fusion and stabilization at the [facility].  He has had no further difficulties in that regard.  In February of this year he was tossing a garbage bag and developed severe weakness, numbness, and pain in his right upper extremity.  His neck pain has settled down.  He has ongoing pain in the forearm and thumb as well as numbness in the fingertips.  He has left hand dominant.  The numbness and pain is particularly bad at nighttime.  He reports pain limiting weakness.  Electrodiagnostic studies confirm moderate to severe carpal tunnel syndrome on the right as well as some moderate ulnar neuropathy on the right.  He has been wearing wrist splints as well as an elbow pad that has helped modestly.  He denies left arm symptoms.  He continues to work.  Myelogram did not show overt nerve root compromise at any level      Past Medical History:   Diagnosis Date   • Acute bronchitis    • Cervicalgia    • COVID-19    • Diabetes mellitus (HCC)    • Edema    • GERD (gastroesophageal reflux disease)    • HTN (hypertension)    • Hyperlipidemia    • IBS (irritable bowel syndrome)    • IBS (irritable bowel syndrome)    • Low testosterone    • Prostate disease          Current Outpatient Medications:   •  albuterol sulfate  (90 Base) MCG/ACT inhaler, Inhale 2 puffs Every 4 (Four) Hours As Needed for wheezing, Disp: 18 g, Rfl: 3  •  alfuzosin (UROXATRAL) 10 MG 24 hr tablet, Take 1 tablet by mouth Daily., Disp: 90 tablet, Rfl: 3  •  amLODIPine (NORVASC) 10 MG tablet, Take 1 tablet by mouth Daily., Disp: 90 tablet, Rfl: 3  •  benzonatate (Tessalon Perles) 100 MG capsule, Take 1 capsule by mouth 2 (Two) Times a Day., Disp: 60  capsule, Rfl: 3  •  carisoprodol (Soma) 350 MG tablet, Take 1 tablet by mouth 2 (two) times a day., Disp: 60 tablet, Rfl: 1  •  cyclobenzaprine (FLEXERIL) 10 MG tablet, Take 1 tablet by mouth 2 (Two) Times a Day As Needed for Muscle Spasms., Disp: 90 tablet, Rfl: 3  •  cyclobenzaprine (FLEXERIL) 10 MG tablet, Take 0.5-1 tablets by mouth At Night As Needed., Disp: 10 tablet, Rfl: 1  •  dicyclomine (BENTYL) 20 MG tablet, Take 1 tablet by mouth Every 6 (Six) Hours., Disp: 60 tablet, Rfl: 3  •  fluticasone (Flonase) 50 MCG/ACT nasal spray, Use 2 sprays in each nostril as directed by provider Daily., Disp: 16 g, Rfl: 3  •  fluticasone-salmeterol (Advair Diskus) 100-50 MCG/DOSE DISKUS, Inhale 1 puff 2 (Two) Times a Day., Disp: 180 each, Rfl: 3  •  Fluticasone-Salmeterol (ADVAIR/WIXELA) 100-50 MCG/ACT DISKUS, Inhale 1 puff 2 (Two) Times a Day., Disp: 180 each, Rfl: 1  •  gabapentin (NEURONTIN) 300 MG capsule, Take 1 capsule by mouth 2 (Two) Times a Day., Disp: 60 capsule, Rfl: 1  •  glyBURIDE-metFORMIN (GLUCOVANCE) 5-500 MG per tablet, Take 1 tablet by mouth 2 (Two) Times a Day With Meals., Disp: 180 tablet, Rfl: 3  •  HYDROcodone-acetaminophen (NORCO)  MG per tablet, Take 1 tablet by mouth Every 6 (Six) Hours., Disp: 120 tablet, Rfl: 0  •  ibuprofen (ADVIL,MOTRIN) 600 MG tablet, Take 600 mg by mouth 3 (Three) Times a Day., Disp: , Rfl:   •  loratadine (CLARITIN) 10 MG tablet, Take 1 tablet by mouth Daily., Disp: 90 tablet, Rfl: 3  •  montelukast (SINGULAIR) 10 MG tablet, Take 1 tablet by mouth Every Night., Disp: 90 tablet, Rfl: 3  •  olopatadine (PATANOL) 0.1 % ophthalmic solution, , Disp: , Rfl:   •  omeprazole (priLOSEC) 40 MG capsule, Take 1 capsule by mouth Daily., Disp: 90 capsule, Rfl: 3  •  promethazine (PHENERGAN) 25 MG tablet, Take 1 tablet by mouth Every 6 (Six) Hours As Needed for nausea and vomitting, Disp: 60 tablet, Rfl: 3  •  rosuvastatin (CRESTOR) 10 MG tablet, Take 1 tablet by mouth Daily., Disp:  90 tablet, Rfl: 3  •  sildenafil (VIAGRA) 100 MG tablet, Take 1 tablet by mouth Daily as needed, Disp: 30 tablet, Rfl: 6  •  silodosin (RAPAFLO) 8 MG capsule capsule, Take 1 capsule by mouth Daily., Disp: 90 capsule, Rfl: 3  •  tadalafil (CIALIS) 20 MG tablet, Take 1 tablet by oral route as directed., Disp: 30 tablet, Rfl: 6  •  trimethoprim-polymyxin b (POLYTRIM) 77307-7.1 UNIT/ML-% ophthalmic solution, Administer 1 drop to both eyes Every 6 (Six) Hours for 7 days., Disp: 10 mL, Rfl: 0  •  Chlorhexidine Gluconate 4 % solution, Shower with solution daily as directed for 5 days prior to surgery., Disp: 237 mL, Rfl: 0  •  mupirocin (BACTROBAN) 2 % ointment, Apply a pea-sized amount to each nostril as directed by provider twice daily for 5 days prior to surgery., Disp: 22 g, Rfl: 0    Past Surgical History:   Procedure Laterality Date   • CERVICAL FUSION      Dr. Nickerson, . [place], [place]   • COLONOSCOPY  2012    repeat in ten yrs   • HIP SURGERY Right 2016    Dr. Schwartz   • NECK SURGERY      cervical spine fusion       Social History     Socioeconomic History   • Marital status:    Tobacco Use   • Smoking status: Former Smoker     Packs/day: 0.50     Years: 10.00     Pack years: 5.00     Types: Cigarettes     Quit date:      Years since quittin.7   • Smokeless tobacco: Never Used   Vaping Use   • Vaping Use: Never used   Substance and Sexual Activity   • Alcohol use: Yes     Comment: 2 drinks per week   • Drug use: Yes     Types: Marijuana     Comment: Quit in    • Sexual activity: Defer         Review of Systems   Constitutional: Negative for activity change, appetite change, chills, diaphoresis, fatigue, fever and unexpected weight change.   HENT: Negative for congestion, dental problem, drooling, ear discharge, ear pain, facial swelling, hearing loss, mouth sores, nosebleeds, postnasal drip, rhinorrhea, sinus pressure, sinus pain, sneezing, sore throat, tinnitus, trouble swallowing  "and voice change.    Eyes: Positive for discharge, redness and itching. Negative for photophobia, pain and visual disturbance.   Respiratory: Negative for apnea, cough, choking, chest tightness, shortness of breath, wheezing and stridor.    Cardiovascular: Negative for chest pain, palpitations and leg swelling.   Gastrointestinal: Negative for abdominal distention, abdominal pain, anal bleeding, blood in stool, constipation, diarrhea, nausea, rectal pain and vomiting.   Endocrine: Negative for cold intolerance, heat intolerance, polydipsia, polyphagia and polyuria.   Genitourinary: Negative for decreased urine volume, difficulty urinating, dysuria, enuresis, flank pain, frequency, genital sores, hematuria, penile discharge, penile pain, penile swelling, scrotal swelling, testicular pain and urgency.   Musculoskeletal: Negative for arthralgias, back pain, gait problem, joint swelling, myalgias, neck pain and neck stiffness.   Skin: Negative for color change, pallor, rash and wound.   Allergic/Immunologic: Positive for environmental allergies. Negative for food allergies and immunocompromised state.   Neurological: Positive for numbness. Negative for dizziness, tremors, seizures, syncope, facial asymmetry, speech difficulty, weakness, light-headedness and headaches.   Hematological: Negative for adenopathy. Does not bruise/bleed easily.   Psychiatric/Behavioral: Negative for agitation, behavioral problems, confusion, decreased concentration, dysphoric mood, hallucinations, self-injury, sleep disturbance and suicidal ideas. The patient is not nervous/anxious and is not hyperactive.        Objective   Vital Signs: Blood pressure 124/68, temperature 98.4 °F (36.9 °C), temperature source Infrared, height 165.1 cm (65\"), weight 114 kg (250 lb 12.8 oz).  Physical Exam  Vitals and nursing note reviewed.   Constitutional:       General: He is not in acute distress.     Appearance: He is well-developed.   HENT:      Head: " Normocephalic and atraumatic.   Eyes:      Pupils: Pupils are equal, round, and reactive to light.   Cardiovascular:      Heart sounds: Normal heart sounds.   Pulmonary:      Breath sounds: Normal breath sounds.   Psychiatric:         Behavior: Behavior normal.         Thought Content: Thought content normal.     Musculoskeletal:     Strength is intact in upper and lower extremities to direct testing.     Station and gait are normal.     No thenar atrophy.  Positive Tinel's sign on the right.  PositiveFinkelstein test  Neurologic:     Muscle tone is normal throughout.     Coordination is intact.     Sensation is intact to light touch throughout.     Patient is oriented to person, place, and time.         Electrodiagnostic studies demonstrate moderate to severe right carpal tunnel syndrome and right ulnar neuropathy at the elbow.    Assessment & Plan   Diagnosis:  1.  Right carpal tunnel syndrome, moderate to severe  2.  Right ulnar neuropathy at the elbow  3.  Possible de Quervains tenosynovitis  4.  Mechanical neck pain    Medical Decision Making: Patient seems mostly afflicted by his carpal tunnel syndrome.  He has tried wrist splints for 8 weeks which only helped modestly.  Gabapentin has helped with his sleep.  I have discussed with Dr. Bliss and our recommendation is carpal tunnel release.  I discussed the general nature of the procedure as well as risk, limitations, complications and alternatives to the procedure.  Patient wishes to proceed.  I have renewed his Neurontin.    Diagnoses and all orders for this visit:    1. Carpal tunnel syndrome of right wrist (Primary)    2. History of fusion of cervical spine    3. Ulnar neuropathy at elbow, right    4. Cervical radiculopathy    5. Other chronic pain  -     gabapentin (NEURONTIN) 300 MG capsule; Take 1 capsule by mouth 2 (Two) Times a Day.  Dispense: 60 capsule; Refill: 1                      Class 3 Severe Obesity (BMI >=40). Obesity-related health  conditions include the following: diabetes mellitus. Obesity is unchanged. BMI is is above average; BMI management plan is completed. We discussed portion control and increasing exercise.         FRANCISCO J NewtonC  Patient Care Team:  Fransico Ngo PA as PCP - General (Family Medicine)

## 2022-09-07 ENCOUNTER — TELEPHONE (OUTPATIENT)
Dept: NEUROSURGERY | Facility: CLINIC | Age: 62
End: 2022-09-07

## 2022-09-07 NOTE — TELEPHONE ENCOUNTER
Caller: FREDERIC JONES     Relationship: WC NURSE     Best call back number: [phone]    What form or medical record are you requestin) YESTERDAY OVN Office Visit with Shanice Luis PA-C (2022)  2) UPDATED WORK STATUS/RELEASE LETTER  3) ANY SURGERY/PROCEDURE CASE REQUEST    Who is requesting this form or medical record from you: ROBERT LAGOS    How would you like to receive the form or medical records (pick-up, mail, fax): FAX  If fax, what is the fax number: [phone] ATTN: FREDERIC JONES    Timeframe paperwork needed: AS SOON AS POSSIBLE.     Additional notes: REQUESTING INFORMATION ABOVE FAXED TO ROBERT MCKEON/STACIA ATTN: FREDERIC.

## 2022-09-21 NOTE — TELEPHONE ENCOUNTER
FREDERIC CALLED, STATES SHE DID NOT RECEIVE WORK RELEASE. CONFIRMED FAX AND PHONE #; PLEASE FAX WORK RELEASE -[phone], ATTN FREDERIC.

## 2022-09-22 NOTE — TELEPHONE ENCOUNTER
"Is patient released to work, W/C is requesting an updated work status.     Last letter that states work status:    \" It is my medical opinion that Sebastian Liao may return to full unrestricted work as of 7-9-22.\"    This is dated 07/11/2022.    Per last OV note from 09/06/2022:    \" Medical Decision Making: Patient seems mostly afflicted by his carpal tunnel syndrome.  He has tried wrist splints for 8 weeks which only helped modestly.  Gabapentin has helped with his sleep.  I have discussed with Dr. Bliss and our recommendation is carpal tunnel release.  I discussed the general nature of the procedure as well as risk, limitations, complications and alternatives to the procedure.  Patient wishes to proceed.  I have renewed his Neurontin.\"     "

## 2022-10-25 ENCOUNTER — OFFICE VISIT (OUTPATIENT)
Dept: PULMONOLOGY | Facility: CLINIC | Age: 62
End: 2022-10-25

## 2022-10-25 ENCOUNTER — LAB (OUTPATIENT)
Dept: LAB | Facility: HOSPITAL | Age: 62
End: 2022-10-25

## 2022-10-25 VITALS
DIASTOLIC BLOOD PRESSURE: 70 MMHG | BODY MASS INDEX: 43.15 KG/M2 | TEMPERATURE: 98 F | SYSTOLIC BLOOD PRESSURE: 116 MMHG | WEIGHT: 259 LBS | RESPIRATION RATE: 18 BRPM | HEART RATE: 86 BPM | OXYGEN SATURATION: 99 % | HEIGHT: 65 IN

## 2022-10-25 DIAGNOSIS — K21.9 CHRONIC GERD: ICD-10-CM

## 2022-10-25 DIAGNOSIS — E11.65 TYPE 2 DIABETES MELLITUS WITH HYPERGLYCEMIA, WITHOUT LONG-TERM CURRENT USE OF INSULIN: ICD-10-CM

## 2022-10-25 DIAGNOSIS — G56.01 CARPAL TUNNEL SYNDROME OF RIGHT WRIST: ICD-10-CM

## 2022-10-25 DIAGNOSIS — G47.33 OSA (OBSTRUCTIVE SLEEP APNEA): Primary | ICD-10-CM

## 2022-10-25 DIAGNOSIS — J30.9 ALLERGIC RHINITIS, UNSPECIFIED SEASONALITY, UNSPECIFIED TRIGGER: ICD-10-CM

## 2022-10-25 DIAGNOSIS — J30.89 SEASONAL ALLERGIC RHINITIS DUE TO OTHER ALLERGIC TRIGGER: ICD-10-CM

## 2022-10-25 DIAGNOSIS — R06.2 WHEEZING: ICD-10-CM

## 2022-10-25 DIAGNOSIS — Z87.891 FORMER SMOKER: ICD-10-CM

## 2022-10-25 DIAGNOSIS — E66.01 OBESITY, CLASS III, BMI 40-49.9 (MORBID OBESITY): ICD-10-CM

## 2022-10-25 DIAGNOSIS — Z86.16 HISTORY OF COVID-19: ICD-10-CM

## 2022-10-25 LAB
ALBUMIN SERPL-MCNC: 4.2 G/DL (ref 3.5–5.2)
ALBUMIN UR-MCNC: 5.7 MG/DL
ALBUMIN/GLOB SERPL: 1.4 G/DL
ALP SERPL-CCNC: 51 U/L (ref 39–117)
ALT SERPL W P-5'-P-CCNC: 20 U/L (ref 1–41)
ANION GAP SERPL CALCULATED.3IONS-SCNC: 8.4 MMOL/L (ref 5–15)
AST SERPL-CCNC: 33 U/L (ref 1–40)
BASOPHILS # BLD AUTO: 0.08 10*3/MM3 (ref 0–0.2)
BASOPHILS NFR BLD AUTO: 1 % (ref 0–1.5)
BILIRUB SERPL-MCNC: 0.3 MG/DL (ref 0–1.2)
BUN SERPL-MCNC: 9 MG/DL (ref 8–23)
BUN/CREAT SERPL: 6.8 (ref 7–25)
CALCIUM SPEC-SCNC: 8.9 MG/DL (ref 8.6–10.5)
CHLORIDE SERPL-SCNC: 100 MMOL/L (ref 98–107)
CHOLEST SERPL-MCNC: 121 MG/DL (ref 0–200)
CO2 SERPL-SCNC: 30.6 MMOL/L (ref 22–29)
CREAT SERPL-MCNC: 1.32 MG/DL (ref 0.76–1.27)
DEPRECATED RDW RBC AUTO: 46.8 FL (ref 37–54)
EGFRCR SERPLBLD CKD-EPI 2021: 61.7 ML/MIN/1.73
EOSINOPHIL # BLD AUTO: 0.5 10*3/MM3 (ref 0–0.4)
EOSINOPHIL NFR BLD AUTO: 6.5 % (ref 0.3–6.2)
ERYTHROCYTE [DISTWIDTH] IN BLOOD BY AUTOMATED COUNT: 14.8 % (ref 12.3–15.4)
GLOBULIN UR ELPH-MCNC: 3 GM/DL
GLUCOSE SERPL-MCNC: 85 MG/DL (ref 65–99)
HBA1C MFR BLD: 7.3 % (ref 4.8–5.6)
HCT VFR BLD AUTO: 43.4 % (ref 37.5–51)
HDLC SERPL-MCNC: 43 MG/DL (ref 40–60)
HGB BLD-MCNC: 14.5 G/DL (ref 13–17.7)
IMM GRANULOCYTES # BLD AUTO: 0.03 10*3/MM3 (ref 0–0.05)
IMM GRANULOCYTES NFR BLD AUTO: 0.4 % (ref 0–0.5)
LDLC SERPL CALC-MCNC: 65 MG/DL (ref 0–100)
LDLC/HDLC SERPL: 1.53 {RATIO}
LYMPHOCYTES # BLD AUTO: 2.11 10*3/MM3 (ref 0.7–3.1)
LYMPHOCYTES NFR BLD AUTO: 27.4 % (ref 19.6–45.3)
MCH RBC QN AUTO: 28.7 PG (ref 26.6–33)
MCHC RBC AUTO-ENTMCNC: 33.4 G/DL (ref 31.5–35.7)
MCV RBC AUTO: 85.8 FL (ref 79–97)
MONOCYTES # BLD AUTO: 1.27 10*3/MM3 (ref 0.1–0.9)
MONOCYTES NFR BLD AUTO: 16.5 % (ref 5–12)
NEUTROPHILS NFR BLD AUTO: 3.7 10*3/MM3 (ref 1.7–7)
NEUTROPHILS NFR BLD AUTO: 48.2 % (ref 42.7–76)
NRBC BLD AUTO-RTO: 0 /100 WBC (ref 0–0.2)
PLATELET # BLD AUTO: 324 10*3/MM3 (ref 140–450)
PMV BLD AUTO: 10.2 FL (ref 6–12)
POTASSIUM SERPL-SCNC: 3.5 MMOL/L (ref 3.5–5.2)
PROT SERPL-MCNC: 7.2 G/DL (ref 6–8.5)
RBC # BLD AUTO: 5.06 10*6/MM3 (ref 4.14–5.8)
SODIUM SERPL-SCNC: 139 MMOL/L (ref 136–145)
TRIGL SERPL-MCNC: 62 MG/DL (ref 0–150)
VLDLC SERPL-MCNC: 13 MG/DL (ref 5–40)
WBC NRBC COR # BLD: 7.69 10*3/MM3 (ref 3.4–10.8)

## 2022-10-25 PROCEDURE — 99214 OFFICE O/P EST MOD 30 MIN: CPT | Performed by: INTERNAL MEDICINE

## 2022-10-25 PROCEDURE — 80061 LIPID PANEL: CPT

## 2022-10-25 PROCEDURE — 36415 COLL VENOUS BLD VENIPUNCTURE: CPT

## 2022-10-25 PROCEDURE — 80053 COMPREHEN METABOLIC PANEL: CPT

## 2022-10-25 PROCEDURE — 82043 UR ALBUMIN QUANTITATIVE: CPT

## 2022-10-25 PROCEDURE — 83036 HEMOGLOBIN GLYCOSYLATED A1C: CPT

## 2022-10-25 PROCEDURE — 85025 COMPLETE CBC W/AUTO DIFF WBC: CPT

## 2022-10-25 RX ORDER — FLUTICASONE PROPIONATE AND SALMETEROL 100; 50 UG/1; UG/1
1 POWDER RESPIRATORY (INHALATION) 2 TIMES DAILY
Qty: 180 EACH | Refills: 1 | Status: SHIPPED | OUTPATIENT
Start: 2022-10-25 | End: 2022-12-06

## 2022-10-25 RX ORDER — FLUTICASONE PROPIONATE 50 MCG
2 SPRAY, SUSPENSION (ML) NASAL DAILY
Qty: 16 G | Refills: 3 | Status: ON HOLD | OUTPATIENT
Start: 2022-10-25 | End: 2022-12-12 | Stop reason: SDUPTHER

## 2022-10-25 RX ORDER — MONTELUKAST SODIUM 10 MG/1
10 TABLET ORAL NIGHTLY
Qty: 90 TABLET | Refills: 3 | Status: SHIPPED | OUTPATIENT
Start: 2022-10-25

## 2022-10-25 RX ORDER — ALBUTEROL SULFATE 90 UG/1
2 AEROSOL, METERED RESPIRATORY (INHALATION) EVERY 4 HOURS PRN
Qty: 54 G | Refills: 3 | Status: SHIPPED | OUTPATIENT
Start: 2022-10-25

## 2022-10-25 NOTE — PROGRESS NOTES
"  PULMONARY  NOTE    Chief Complaint     Dyspnea and fatigue, history of COVID-19, class III morbid obesity, severe obstructive sleep apnea, allergic rhinitis    History of Present Illness     62-year-old male returns today for follow-up  I last saw him 5/4/2022    He has severe obstructive sleep apnea continues to use CPAP on a nightly basis  He feels better since he has been using it    He continues to complain of \"brain fog, fatigue and shortness of breath since his COVID-19 pneumonia    He remains on Advair and albuterol as needed  Feels that the inhalers helped    He continues to have sinus drainage and postnasal drip  He remains on Allegra and Singulair which she feels helps, as well    Patient Active Problem List   Diagnosis   • Testicular hypofunction   • Essential hypertension, benign   • Type 2 diabetes mellitus with hyperglycemia, without long-term current use of insulin ([facility])   • Hyperlipidemia   • Obesity, Class III, BMI 40-49.9 (morbid obesity) ([facility])   • Dyspnea on exertion   • History of COVID-19 pneumonia (10/2020)   • Former smoker (None x 35 years)   • GERD   • RONIT (obstructive sleep apnea) - severe   • DDD (degenerative disc disease), cervical   • Seasonal allergic rhinitis   • Screen for colon cancer     No Known Allergies    Current Outpatient Medications:   •  albuterol sulfate  (90 Base) MCG/ACT inhaler, Inhale 2 puffs Every 4 (Four) Hours As Needed for wheezing, Disp: 54 g, Rfl: 3  •  alfuzosin (UROXATRAL) 10 MG 24 hr tablet, Take 1 tablet by mouth Daily., Disp: 90 tablet, Rfl: 3  •  amLODIPine (NORVASC) 10 MG tablet, Take 1 tablet by mouth Daily., Disp: 90 tablet, Rfl: 3  •  benzonatate (Tessalon Perles) 100 MG capsule, Take 1 capsule by mouth 2 (Two) Times a Day., Disp: 60 capsule, Rfl: 3  •  carisoprodol (Soma) 350 MG tablet, Take 1 tablet by mouth 2 (two) times a day., Disp: 60 tablet, Rfl: 1  •  Chlorhexidine Gluconate 4 % solution, Shower with solution daily as directed for 5 " days prior to surgery., Disp: 237 mL, Rfl: 0  •  cyclobenzaprine (FLEXERIL) 10 MG tablet, Take 1 tablet by mouth 2 (Two) Times a Day As Needed for Muscle Spasms., Disp: 90 tablet, Rfl: 3  •  cyclobenzaprine (FLEXERIL) 10 MG tablet, Take 0.5-1 tablets by mouth At Night As Needed., Disp: 10 tablet, Rfl: 1  •  dicyclomine (BENTYL) 20 MG tablet, Take 1 tablet by mouth Every 6 (Six) Hours., Disp: 60 tablet, Rfl: 3  •  fluticasone (Flonase) 50 MCG/ACT nasal spray, Use 2 sprays in each nostril as directed by provider Daily., Disp: 16 g, Rfl: 3  •  fluticasone-salmeterol (Advair Diskus) 100-50 MCG/DOSE DISKUS, Inhale 1 puff 2 (Two) Times a Day., Disp: 180 each, Rfl: 3  •  Fluticasone-Salmeterol (ADVAIR/WIXELA) 100-50 MCG/ACT DISKUS, Inhale 1 puff 2 (Two) Times a Day., Disp: 180 each, Rfl: 1  •  gabapentin (NEURONTIN) 300 MG capsule, Take 1 capsule by mouth 2 (Two) Times a Day., Disp: 60 capsule, Rfl: 1  •  glyBURIDE-metFORMIN (GLUCOVANCE) 5-500 MG per tablet, Take 1 tablet by mouth 2 (Two) Times a Day With Meals., Disp: 180 tablet, Rfl: 3  •  HYDROcodone-acetaminophen (NORCO)  MG per tablet, Take 1 tablet by mouth Every 6 (Six) Hours., Disp: 120 tablet, Rfl: 0  •  ibuprofen (ADVIL,MOTRIN) 600 MG tablet, Take 600 mg by mouth 3 (Three) Times a Day., Disp: , Rfl:   •  loratadine (CLARITIN) 10 MG tablet, Take 1 tablet by mouth Daily., Disp: 90 tablet, Rfl: 3  •  montelukast (SINGULAIR) 10 MG tablet, Take 1 tablet by mouth Every Night., Disp: 90 tablet, Rfl: 3  •  mupirocin (BACTROBAN) 2 % ointment, Apply a pea-sized amount to each nostril as directed by provider twice daily for 5 days prior to surgery., Disp: 22 g, Rfl: 0  •  olopatadine (PATANOL) 0.1 % ophthalmic solution, , Disp: , Rfl:   •  omeprazole (priLOSEC) 40 MG capsule, Take 1 capsule by mouth Daily., Disp: 90 capsule, Rfl: 3  •  promethazine (PHENERGAN) 25 MG tablet, Take 1 tablet by mouth Every 6 (Six) Hours As Needed for nausea and vomitting, Disp: 60 tablet,  "Rfl: 3  •  rosuvastatin (CRESTOR) 10 MG tablet, Take 1 tablet by mouth Daily., Disp: 90 tablet, Rfl: 3  •  sildenafil (VIAGRA) 100 MG tablet, Take 1 tablet by mouth Daily as needed, Disp: 30 tablet, Rfl: 6  •  silodosin (RAPAFLO) 8 MG capsule capsule, Take 1 capsule by mouth Daily., Disp: 90 capsule, Rfl: 3  •  tadalafil (CIALIS) 20 MG tablet, Take 1 tablet by oral route as directed., Disp: 30 tablet, Rfl: 6  •  trimethoprim-polymyxin b (POLYTRIM) 98850-6.1 UNIT/ML-% ophthalmic solution, Administer 1 drop to both eyes Every 6 (Six) Hours for 7 days., Disp: 10 mL, Rfl: 0  MEDICATION LIST AND ALLERGIES REVIEWED.    Family History   Problem Relation Age of Onset   • Cancer Mother         Ovarian,    • Stroke Father    • Hypertension Father    • Hypertension Sister    • Diabetes Brother    • Heart attack Brother    • Hypertension Maternal Grandmother    • Diabetes Maternal Grandmother      Social History     Tobacco Use   • Smoking status: Former     Packs/day: 0.50     Years: 10.00     Pack years: 5.00     Types: Cigarettes     Quit date:      Years since quittin.8   • Smokeless tobacco: Never   Vaping Use   • Vaping Use: Never used   Substance Use Topics   • Alcohol use: Yes     Comment: 2 drinks per week   • Drug use: Yes     Types: Marijuana     Comment: Quit in      Social History     Social History Narrative    Left hand dominant, , Exercises daily.     Former smoker, has not smoked for 35 years     FAMILY AND SOCIAL HISTORY REVIEWED.    Review of Systems  ALSO REFER TO SCANNED ROS SHEET FROM SAME DATE.    /70 (BP Location: Left arm, Patient Position: Sitting, Cuff Size: Adult)   Pulse 86   Temp 98 °F (36.7 °C)   Resp 18   Ht 165.1 cm (65\")   Wt 117 kg (259 lb)   SpO2 99% Comment: room air at rest  BMI 43.10 kg/m²   Physical Exam  Vitals and nursing note reviewed.   Constitutional:       General: He is not in acute distress.     Appearance: He is well-developed. He is not " diaphoretic.   HENT:      Head: Normocephalic and atraumatic.   Neck:      Thyroid: No thyromegaly.   Cardiovascular:      Rate and Rhythm: Normal rate and regular rhythm.      Heart sounds: Normal heart sounds. No murmur heard.  Pulmonary:      Effort: Pulmonary effort is normal.      Breath sounds: Normal breath sounds. No stridor.   Lymphadenopathy:      Cervical: No cervical adenopathy.      Upper Body:      Right upper body: No supraclavicular or epitrochlear adenopathy.      Left upper body: No supraclavicular or epitrochlear adenopathy.   Skin:     General: Skin is warm and dry.   Neurological:      Mental Status: He is alert and oriented to person, place, and time.   Psychiatric:         Behavior: Behavior normal.         Results     Immunization History   Administered Date(s) Administered   • COVID-19 (PFIZER) PURPLE CAP 01/09/2021, 02/01/2021, 10/04/2021   • Flu Vaccine Quad PF 6-35MO 10/02/2021   • Flu Vaccine Quad PF >36MO 10/02/2021   • FluLaval/Fluzone >6mos 10/12/2020   • Flublok 18+yrs 10/02/2021   • Hep B, Unspecified 02/05/1996   • Hepatitis A 11/19/2018, 05/20/2019   • Pneumococcal Conjugate 13-Valent (PCV13) 11/07/2016   • Pneumococcal, Unspecified 04/25/2017   • Rabies 12/05/2014, 12/08/2014, 12/19/2014, 12/19/2014   • Tetanus 04/25/2017     Problem List       ICD-10-CM ICD-9-CM   1. RONIT (obstructive sleep apnea) - severe  G47.33 327.23   2. Obesity, Class III, BMI 40-49.9 (morbid obesity) ([facility])  E66.01 278.01   3. History of COVID-19 pneumonia (10/2020)  Z86.16 V12.09   4. GERD  K21.9 530.81   5. Former smoker (None x 35 years)  Z87.891 V15.82   6. Seasonal allergic rhinitis due to other allergic trigger  J30.89 477.8   7. Allergic rhinitis, unspecified seasonality, unspecified trigger  J30.9 477.9   8. Wheezing  R06.2 786.07       Discussion     No significant change  No exacerbation of asthma or respiratory symptoms    Perennial rhinitis symptoms seem little bit better on his current medical  regimen    I have strongly encouraged him to really work on a regular exercise program and efforts at weight loss  While I understand that he is fatigued and short of breath I think it is highly unlikely that he is going to improve much without significant weight loss    Encourage continued use of CPAP    I gave him a referral sheet for the long COVID clinic offered through [facility] level of Medical Decision Making complexity based on 2 or more chronic stable illnesses and prescription drug management.    Dontae Manzo MD  Note electronically signed    CC: Fransico Ngo PA

## 2022-11-03 DIAGNOSIS — R11.0 NAUSEA: ICD-10-CM

## 2022-11-03 RX ORDER — PROMETHAZINE HYDROCHLORIDE 25 MG/1
25 TABLET ORAL EVERY 6 HOURS PRN
Qty: 60 TABLET | Refills: 3 | Status: SHIPPED | OUTPATIENT
Start: 2022-11-03

## 2022-11-10 ENCOUNTER — OFFICE VISIT (OUTPATIENT)
Dept: FAMILY MEDICINE CLINIC | Facility: CLINIC | Age: 62
End: 2022-11-10

## 2022-11-10 VITALS
OXYGEN SATURATION: 99 % | WEIGHT: 261 LBS | DIASTOLIC BLOOD PRESSURE: 72 MMHG | HEIGHT: 65 IN | SYSTOLIC BLOOD PRESSURE: 140 MMHG | TEMPERATURE: 97.2 F | HEART RATE: 81 BPM | BODY MASS INDEX: 43.49 KG/M2

## 2022-11-10 DIAGNOSIS — K58.9 IRRITABLE BOWEL SYNDROME WITHOUT DIARRHEA: ICD-10-CM

## 2022-11-10 DIAGNOSIS — E11.649 UNCONTROLLED TYPE 2 DIABETES MELLITUS WITH HYPOGLYCEMIA, UNSPECIFIED HYPOGLYCEMIA COMA STATUS: ICD-10-CM

## 2022-11-10 DIAGNOSIS — E78.49 OTHER HYPERLIPIDEMIA: ICD-10-CM

## 2022-11-10 DIAGNOSIS — I10 ESSENTIAL HYPERTENSION, BENIGN: Primary | ICD-10-CM

## 2022-11-10 DIAGNOSIS — K21.9 GASTROESOPHAGEAL REFLUX DISEASE WITHOUT ESOPHAGITIS: ICD-10-CM

## 2022-11-10 DIAGNOSIS — Z23 IMMUNIZATION DUE: ICD-10-CM

## 2022-11-10 DIAGNOSIS — K58.9 IRRITABLE BOWEL SYNDROME, UNSPECIFIED TYPE: ICD-10-CM

## 2022-11-10 PROBLEM — G56.01 CARPAL TUNNEL SYNDROME OF RIGHT WRIST: Status: ACTIVE | Noted: 2022-11-10

## 2022-11-10 PROCEDURE — 99214 OFFICE O/P EST MOD 30 MIN: CPT | Performed by: PHYSICIAN ASSISTANT

## 2022-11-10 RX ORDER — ROSUVASTATIN CALCIUM 10 MG/1
10 TABLET, COATED ORAL DAILY
Qty: 90 TABLET | Refills: 3 | Status: SHIPPED | OUTPATIENT
Start: 2022-11-10

## 2022-11-10 RX ORDER — OMEPRAZOLE 40 MG/1
40 CAPSULE, DELAYED RELEASE ORAL DAILY
Qty: 90 CAPSULE | Refills: 3 | Status: SHIPPED | OUTPATIENT
Start: 2022-11-10

## 2022-11-10 RX ORDER — DICYCLOMINE HCL 20 MG
20 TABLET ORAL EVERY 6 HOURS
Qty: 180 TABLET | Refills: 3 | Status: SHIPPED | OUTPATIENT
Start: 2022-11-10

## 2022-11-10 RX ORDER — SEMAGLUTIDE 1.34 MG/ML
0.25 INJECTION, SOLUTION SUBCUTANEOUS WEEKLY
Qty: 1.5 ML | Refills: 1 | Status: SHIPPED | OUTPATIENT
Start: 2022-11-10 | End: 2022-12-08 | Stop reason: SDUPTHER

## 2022-11-10 RX ORDER — AMLODIPINE BESYLATE 10 MG/1
10 TABLET ORAL DAILY
Qty: 90 TABLET | Refills: 3 | Status: SHIPPED | OUTPATIENT
Start: 2022-11-10

## 2022-11-10 NOTE — PROGRESS NOTES
Subjective   Sebastian Liao is a 62 y.o. male  Irritable Bowel Syndrome (Refill on Bentyl for Ibs, 90 day supply), Heartburn (Refill on omeprazole for GERD, 90 day supply), Hypertension (Follow up on BP, refill on amlodipine, 90 day supply), and Hyperlipidemia (Follow up on cholesterol, refill on Crestor, 90 day supply)      History of Present Illness  Pt has IBS,GERD, HTN, Hyperlipdemnia all chronic chronic illnesses are stable patient needing refill on Bentyl omeprazole amlodipine and Crestor these chronic conditions are stable.  Patient comes in for follow-up of uncontrolled type 2 diabetes A1c is over 7 at at 7.3 patient has been gaining weight trouble with weight gain has gained over 15 pounds last 2 months.  Uncontrolled type 2 diabetes  The following portions of the patient's history were reviewed and updated as appropriate: allergies, current medications, past social history and problem list    Review of Systems   Constitutional: Negative for appetite change, diaphoresis, fatigue and unexpected weight change.   Eyes: Negative for visual disturbance.   Respiratory: Negative for cough, chest tightness and shortness of breath.    Cardiovascular: Negative for chest pain, palpitations and leg swelling.   Gastrointestinal: Negative for diarrhea, nausea and vomiting.   Endocrine: Negative for polydipsia, polyphagia and polyuria.   Skin: Negative for color change and rash.   Neurological: Negative for dizziness, syncope, weakness, light-headedness, numbness and headaches.       Objective     Vitals:    11/10/22 0911   BP: 140/72   Pulse: 81   Temp: 97.2 °F (36.2 °C)   SpO2: 99%       Physical Exam  Vitals and nursing note reviewed.   Constitutional:       General: He is not in acute distress.     Appearance: Normal appearance. He is well-developed. He is not ill-appearing, toxic-appearing or diaphoretic.   Neck:      Thyroid: No thyromegaly.      Vascular: No carotid bruit or JVD.   Cardiovascular:      Rate and  Rhythm: Normal rate and regular rhythm.      Pulses: Normal pulses.      Heart sounds: Normal heart sounds. No murmur heard.  Pulmonary:      Effort: Pulmonary effort is normal. No respiratory distress.      Breath sounds: Normal breath sounds.   Abdominal:      Palpations: Abdomen is soft. There is no mass.      Tenderness: There is no abdominal tenderness.   Musculoskeletal:      Cervical back: Neck supple.   Lymphadenopathy:      Cervical: No cervical adenopathy.   Skin:     General: Skin is warm and dry.   Neurological:      Mental Status: He is alert.      Sensory: No sensory deficit.         Assessment & Plan     Diagnoses and all orders for this visit:    1. Essential hypertension, benign (Primary)  -     amLODIPine (NORVASC) 10 MG tablet; Take 1 tablet by mouth Daily.  Dispense: 90 tablet; Refill: 3    2. Irritable bowel syndrome without diarrhea  -     dicyclomine (BENTYL) 20 MG tablet; Take 1 tablet by mouth Every 6 (Six) Hours.  Dispense: 180 tablet; Refill: 3    3. Other hyperlipidemia  -     rosuvastatin (CRESTOR) 10 MG tablet; Take 1 tablet by mouth Daily.  Dispense: 90 tablet; Refill: 3    4. Gastroesophageal reflux disease without esophagitis  -     omeprazole (priLOSEC) 40 MG capsule; Take 1 capsule by mouth Daily.  Dispense: 90 capsule; Refill: 3    5. Irritable bowel syndrome, unspecified type  -     dicyclomine (BENTYL) 20 MG tablet; Take 1 tablet by mouth Every 6 (Six) Hours.  Dispense: 180 tablet; Refill: 3     I spent 15 minutes in patient care: Reviewing records prior to the visit, examining the patient, entering orders and documentation    Part of this note may be an electronic transcription/translation of spoken language to printed text using the Dragon Dictation System.

## 2022-11-11 ENCOUNTER — TELEPHONE (OUTPATIENT)
Dept: FAMILY MEDICINE CLINIC | Facility: CLINIC | Age: 62
End: 2022-11-11

## 2022-11-11 NOTE — TELEPHONE ENCOUNTER
Caller: Sebastian Liao    Relationship: Self    Best call back number: [phone]    What medication are you requesting: MEDICATION FOR WATER RETENTION    What are your current symptoms: PATIENT IS HAVING SIGNIFICANT WATER RETENTION.     How long have you been experiencing symptoms: BEEN ON AND OFF FOR A WHILE.     Have you had these symptoms before:    [x] Yes  [] No    Have you been treated for these symptoms before:   [x] Yes  [] No    If a prescription is needed, what is your preferred pharmacy and phone number: [facility]     Additional notes:MIGUEL ONEAL TOLD HIM TO CALL US ABOUT THIS MEDICATION TO SEE IF HE CAN TAKE IT.

## 2022-12-06 ENCOUNTER — PRE-ADMISSION TESTING (OUTPATIENT)
Dept: PREADMISSION TESTING | Facility: HOSPITAL | Age: 62
End: 2022-12-06

## 2022-12-06 VITALS — HEIGHT: 64 IN | BODY MASS INDEX: 43.28 KG/M2 | WEIGHT: 253.53 LBS

## 2022-12-06 LAB
DEPRECATED RDW RBC AUTO: 45.6 FL (ref 37–54)
ERYTHROCYTE [DISTWIDTH] IN BLOOD BY AUTOMATED COUNT: 13.9 % (ref 12.3–15.4)
HCT VFR BLD AUTO: 48.7 % (ref 37.5–51)
HGB BLD-MCNC: 15.7 G/DL (ref 13–17.7)
MCH RBC QN AUTO: 28.7 PG (ref 26.6–33)
MCHC RBC AUTO-ENTMCNC: 32.2 G/DL (ref 31.5–35.7)
MCV RBC AUTO: 89 FL (ref 79–97)
PLATELET # BLD AUTO: 350 10*3/MM3 (ref 140–450)
PMV BLD AUTO: 9.8 FL (ref 6–12)
POTASSIUM SERPL-SCNC: 4.5 MMOL/L (ref 3.5–5.2)
RBC # BLD AUTO: 5.47 10*6/MM3 (ref 4.14–5.8)
WBC NRBC COR # BLD: 6.32 10*3/MM3 (ref 3.4–10.8)

## 2022-12-06 PROCEDURE — 93005 ELECTROCARDIOGRAM TRACING: CPT

## 2022-12-06 PROCEDURE — 36415 COLL VENOUS BLD VENIPUNCTURE: CPT

## 2022-12-06 PROCEDURE — 93010 ELECTROCARDIOGRAM REPORT: CPT | Performed by: INTERNAL MEDICINE

## 2022-12-06 PROCEDURE — 85027 COMPLETE CBC AUTOMATED: CPT

## 2022-12-06 PROCEDURE — 84132 ASSAY OF SERUM POTASSIUM: CPT

## 2022-12-06 NOTE — PAT
Patient to apply Chlorhexadine wipes  to surgical area (as instructed) the night before procedure and the AM of procedure. Wipes provided.  Bactroban (if prescribed) and Chlorhexidine Prescription prescribed by physician before PAT visit.  Verified with patient that medication(s) were picked up from their pharmacy.  Written instructions given to patient during PAT visit.  Patient/family also instructed to complete skin prep checklist and return the checklist on the day of surgery to preoperative staff.  Patient/family verbalized understanding.

## 2022-12-08 ENCOUNTER — OFFICE VISIT (OUTPATIENT)
Dept: FAMILY MEDICINE CLINIC | Facility: CLINIC | Age: 62
End: 2022-12-08

## 2022-12-08 VITALS
DIASTOLIC BLOOD PRESSURE: 84 MMHG | TEMPERATURE: 97.3 F | OXYGEN SATURATION: 99 % | BODY MASS INDEX: 43.46 KG/M2 | WEIGHT: 254.6 LBS | HEIGHT: 64 IN | HEART RATE: 80 BPM | SYSTOLIC BLOOD PRESSURE: 128 MMHG | RESPIRATION RATE: 18 BRPM

## 2022-12-08 DIAGNOSIS — E11.65 TYPE 2 DIABETES MELLITUS WITH HYPERGLYCEMIA, WITHOUT LONG-TERM CURRENT USE OF INSULIN: Primary | ICD-10-CM

## 2022-12-08 DIAGNOSIS — M50.30 DDD (DEGENERATIVE DISC DISEASE), CERVICAL: ICD-10-CM

## 2022-12-08 LAB
QT INTERVAL: 350 MS
QTC INTERVAL: 413 MS

## 2022-12-08 PROCEDURE — 99213 OFFICE O/P EST LOW 20 MIN: CPT | Performed by: PHYSICIAN ASSISTANT

## 2022-12-08 RX ORDER — CYCLOBENZAPRINE HCL 10 MG
5-10 TABLET ORAL NIGHTLY PRN
Qty: 90 TABLET | Refills: 1 | Status: SHIPPED | OUTPATIENT
Start: 2022-12-08

## 2022-12-08 RX ORDER — SEMAGLUTIDE 1.34 MG/ML
0.5 INJECTION, SOLUTION SUBCUTANEOUS WEEKLY
Qty: 1.5 ML | Refills: 6 | Status: SHIPPED | OUTPATIENT
Start: 2022-12-08 | End: 2023-03-18

## 2022-12-08 NOTE — PROGRESS NOTES
Subjective   Sebastian Liao is a 62 y.o. male  Diabetes (RF Ozempic)      History of Present Illness  Patient is a 62-year-old black male who comes in for follow-up type 2 diabetes, blood sugars are running much better since starting Ozempic patient is lost 7 pounds patient is watching his diet and exercise.  Patient also has chronic back pain needs refill cyclobenzaprine  The following portions of the patient's history were reviewed and updated as appropriate: allergies, current medications, past social history and problem list    Review of Systems   Constitutional: Negative for appetite change, diaphoresis, fatigue and unexpected weight change.   Eyes: Negative for visual disturbance.   Respiratory: Negative for cough, chest tightness and shortness of breath.    Cardiovascular: Negative for chest pain, palpitations and leg swelling.   Gastrointestinal: Negative for diarrhea, nausea and vomiting.   Endocrine: Negative for polydipsia, polyphagia and polyuria.   Skin: Negative for color change and rash.   Neurological: Negative for dizziness, syncope, weakness, light-headedness, numbness and headaches.       Objective     Vitals:    12/08/22 0923   BP: 128/84   Pulse: 80   Resp: 18   Temp: 97.3 °F (36.3 °C)   SpO2: 99%       Physical Exam  Vitals and nursing note reviewed.   Constitutional:       General: He is not in acute distress.     Appearance: Normal appearance. He is well-developed. He is not ill-appearing, toxic-appearing or diaphoretic.   Neck:      Thyroid: No thyromegaly.      Vascular: No carotid bruit or JVD.   Cardiovascular:      Rate and Rhythm: Normal rate and regular rhythm.      Pulses: Normal pulses.      Heart sounds: Normal heart sounds. No murmur heard.  Pulmonary:      Effort: Pulmonary effort is normal. No respiratory distress.      Breath sounds: Normal breath sounds.   Abdominal:      Palpations: Abdomen is soft. There is no mass.      Tenderness: There is no abdominal tenderness.    Musculoskeletal:      Cervical back: Neck supple.   Lymphadenopathy:      Cervical: No cervical adenopathy.   Skin:     General: Skin is warm and dry.   Neurological:      Mental Status: He is alert.      Sensory: No sensory deficit.         Assessment & Plan     Diagnoses and all orders for this visit:    1. Type 2 diabetes mellitus with hyperglycemia, without long-term current use of insulin (HCC) (Primary)  -     Semaglutide,0.25 or 0.5MG/DOS, (Ozempic, 0.25 or 0.5 MG/DOSE,) 2 MG/1.5ML solution pen-injector; Inject 0.5 mg under the skin into the appropriate area as directed 1 (One) Time Per Week.  Dispense: 1.5 mL; Refill: 6    2. DDD (degenerative disc disease), cervical  -     cyclobenzaprine (FLEXERIL) 10 MG tablet; Take 0.5-1 tablet by mouth At Night As Needed for Muscle Spasms.  Dispense: 90 tablet; Refill: 1     I spent 15 minutes in patient care: Reviewing records prior to the visit, examining the patient, entering orders and documentation    Part of this note may be an electronic transcription/translation of spoken language to printed text using the Dragon Dictation System.

## 2022-12-12 ENCOUNTER — ANESTHESIA (OUTPATIENT)
Dept: PERIOP | Facility: HOSPITAL | Age: 62
End: 2022-12-12

## 2022-12-12 ENCOUNTER — ANESTHESIA EVENT (OUTPATIENT)
Dept: PERIOP | Facility: HOSPITAL | Age: 62
End: 2022-12-12

## 2022-12-12 ENCOUNTER — HOSPITAL ENCOUNTER (OUTPATIENT)
Facility: HOSPITAL | Age: 62
Setting detail: HOSPITAL OUTPATIENT SURGERY
Discharge: HOME | End: 2022-12-12
Attending: NEUROLOGICAL SURGERY | Admitting: NEUROLOGICAL SURGERY

## 2022-12-12 VITALS
RESPIRATION RATE: 18 BRPM | DIASTOLIC BLOOD PRESSURE: 83 MMHG | BODY MASS INDEX: 43.36 KG/M2 | HEART RATE: 85 BPM | SYSTOLIC BLOOD PRESSURE: 139 MMHG | OXYGEN SATURATION: 97 % | TEMPERATURE: 99.1 F | HEIGHT: 64 IN | WEIGHT: 254 LBS

## 2022-12-12 DIAGNOSIS — G62.9 NEUROPATHY: ICD-10-CM

## 2022-12-12 DIAGNOSIS — J30.9 ALLERGIC RHINITIS, UNSPECIFIED SEASONALITY, UNSPECIFIED TRIGGER: ICD-10-CM

## 2022-12-12 DIAGNOSIS — J30.2 SEASONAL ALLERGIC RHINITIS: ICD-10-CM

## 2022-12-12 DIAGNOSIS — G56.01 CARPAL TUNNEL SYNDROME OF RIGHT WRIST: ICD-10-CM

## 2022-12-12 LAB
GLUCOSE BLDC GLUCOMTR-MCNC: 115 MG/DL (ref 70–130)
GLUCOSE BLDC GLUCOMTR-MCNC: 94 MG/DL (ref 70–130)

## 2022-12-12 PROCEDURE — 25010000002 FENTANYL CITRATE (PF) 50 MCG/ML SOLUTION

## 2022-12-12 PROCEDURE — 0 LIDOCAINE 1 % SOLUTION 50 ML VIAL: Performed by: NEUROLOGICAL SURGERY

## 2022-12-12 PROCEDURE — 82962 GLUCOSE BLOOD TEST: CPT

## 2022-12-12 PROCEDURE — S0260 H&P FOR SURGERY: HCPCS

## 2022-12-12 PROCEDURE — 25010000002 DEXAMETHASONE PER 1 MG: Performed by: NURSE ANESTHETIST, CERTIFIED REGISTERED

## 2022-12-12 PROCEDURE — 25010000002 PROPOFOL 10 MG/ML EMULSION: Performed by: NURSE ANESTHETIST, CERTIFIED REGISTERED

## 2022-12-12 PROCEDURE — 25010000002 ONDANSETRON PER 1 MG: Performed by: NURSE ANESTHETIST, CERTIFIED REGISTERED

## 2022-12-12 PROCEDURE — 25010000002 CEFAZOLIN IN DEXTROSE 2-4 GM/100ML-% SOLUTION: Performed by: NURSE ANESTHETIST, CERTIFIED REGISTERED

## 2022-12-12 PROCEDURE — 64721 CARPAL TUNNEL SURGERY: CPT | Performed by: NEUROLOGICAL SURGERY

## 2022-12-12 PROCEDURE — 25010000002 FENTANYL CITRATE (PF) 100 MCG/2ML SOLUTION: Performed by: NURSE ANESTHETIST, CERTIFIED REGISTERED

## 2022-12-12 RX ORDER — HYDRALAZINE HYDROCHLORIDE 20 MG/ML
5 INJECTION INTRAMUSCULAR; INTRAVENOUS
Status: DISCONTINUED | OUTPATIENT
Start: 2022-12-12 | End: 2022-12-12 | Stop reason: HOSPADM

## 2022-12-12 RX ORDER — PROMETHAZINE HYDROCHLORIDE 25 MG/1
25 TABLET ORAL ONCE AS NEEDED
Status: DISCONTINUED | OUTPATIENT
Start: 2022-12-12 | End: 2022-12-12 | Stop reason: HOSPADM

## 2022-12-12 RX ORDER — CEFAZOLIN SODIUM IN 0.9 % NACL 3 G/100 ML
3 INTRAVENOUS SOLUTION, PIGGYBACK (ML) INTRAVENOUS
Status: DISCONTINUED | OUTPATIENT
Start: 2022-12-12 | End: 2022-12-12 | Stop reason: HOSPADM

## 2022-12-12 RX ORDER — CEFAZOLIN SODIUM 2 G/100ML
2 INJECTION, SOLUTION INTRAVENOUS ONCE
Status: DISCONTINUED | OUTPATIENT
Start: 2022-12-12 | End: 2022-12-12

## 2022-12-12 RX ORDER — SODIUM CHLORIDE 0.9 % (FLUSH) 0.9 %
10 SYRINGE (ML) INJECTION AS NEEDED
Status: DISCONTINUED | OUTPATIENT
Start: 2022-12-12 | End: 2022-12-12 | Stop reason: HOSPADM

## 2022-12-12 RX ORDER — LIDOCAINE HYDROCHLORIDE 10 MG/ML
INJECTION, SOLUTION EPIDURAL; INFILTRATION; INTRACAUDAL; PERINEURAL AS NEEDED
Status: DISCONTINUED | OUTPATIENT
Start: 2022-12-12 | End: 2022-12-12 | Stop reason: SURG

## 2022-12-12 RX ORDER — HYDROCODONE BITARTRATE AND ACETAMINOPHEN 5; 325 MG/1; MG/1
1 TABLET ORAL ONCE AS NEEDED
Status: DISCONTINUED | OUTPATIENT
Start: 2022-12-12 | End: 2022-12-12 | Stop reason: HOSPADM

## 2022-12-12 RX ORDER — DEXAMETHASONE SODIUM PHOSPHATE 4 MG/ML
INJECTION, SOLUTION INTRA-ARTICULAR; INTRALESIONAL; INTRAMUSCULAR; INTRAVENOUS; SOFT TISSUE AS NEEDED
Status: DISCONTINUED | OUTPATIENT
Start: 2022-12-12 | End: 2022-12-12 | Stop reason: SURG

## 2022-12-12 RX ORDER — SODIUM CHLORIDE 0.9 % (FLUSH) 0.9 %
3-10 SYRINGE (ML) INJECTION AS NEEDED
Status: DISCONTINUED | OUTPATIENT
Start: 2022-12-12 | End: 2022-12-12 | Stop reason: HOSPADM

## 2022-12-12 RX ORDER — LABETALOL HYDROCHLORIDE 5 MG/ML
5 INJECTION, SOLUTION INTRAVENOUS
Status: DISCONTINUED | OUTPATIENT
Start: 2022-12-12 | End: 2022-12-12 | Stop reason: HOSPADM

## 2022-12-12 RX ORDER — CEFAZOLIN SODIUM 2 G/100ML
INJECTION, SOLUTION INTRAVENOUS AS NEEDED
Status: DISCONTINUED | OUTPATIENT
Start: 2022-12-12 | End: 2022-12-12 | Stop reason: SURG

## 2022-12-12 RX ORDER — CARISOPRODOL 350 MG/1
350 TABLET ORAL 2 TIMES DAILY PRN
Start: 2022-12-12

## 2022-12-12 RX ORDER — MAGNESIUM HYDROXIDE 1200 MG/15ML
LIQUID ORAL AS NEEDED
Status: DISCONTINUED | OUTPATIENT
Start: 2022-12-12 | End: 2022-12-12 | Stop reason: HOSPADM

## 2022-12-12 RX ORDER — LIDOCAINE HYDROCHLORIDE 10 MG/ML
0.5 INJECTION, SOLUTION EPIDURAL; INFILTRATION; INTRACAUDAL; PERINEURAL ONCE AS NEEDED
Status: COMPLETED | OUTPATIENT
Start: 2022-12-12 | End: 2022-12-12

## 2022-12-12 RX ORDER — ONDANSETRON 2 MG/ML
INJECTION INTRAMUSCULAR; INTRAVENOUS AS NEEDED
Status: DISCONTINUED | OUTPATIENT
Start: 2022-12-12 | End: 2022-12-12 | Stop reason: SURG

## 2022-12-12 RX ORDER — FENTANYL CITRATE 50 UG/ML
INJECTION, SOLUTION INTRAMUSCULAR; INTRAVENOUS AS NEEDED
Status: DISCONTINUED | OUTPATIENT
Start: 2022-12-12 | End: 2022-12-12 | Stop reason: SURG

## 2022-12-12 RX ORDER — FENTANYL CITRATE 50 UG/ML
INJECTION, SOLUTION INTRAMUSCULAR; INTRAVENOUS
Status: COMPLETED
Start: 2022-12-12 | End: 2022-12-12

## 2022-12-12 RX ORDER — IPRATROPIUM BROMIDE AND ALBUTEROL SULFATE 2.5; .5 MG/3ML; MG/3ML
3 SOLUTION RESPIRATORY (INHALATION) ONCE AS NEEDED
Status: DISCONTINUED | OUTPATIENT
Start: 2022-12-12 | End: 2022-12-12 | Stop reason: HOSPADM

## 2022-12-12 RX ORDER — TADALAFIL 20 MG/1
20 TABLET ORAL DAILY PRN
Start: 2022-12-12

## 2022-12-12 RX ORDER — FLUTICASONE PROPIONATE 50 MCG
2 SPRAY, SUSPENSION (ML) NASAL DAILY PRN
Start: 2022-12-12

## 2022-12-12 RX ORDER — SODIUM CHLORIDE 0.9 % (FLUSH) 0.9 %
3 SYRINGE (ML) INJECTION EVERY 12 HOURS SCHEDULED
Status: DISCONTINUED | OUTPATIENT
Start: 2022-12-12 | End: 2022-12-12 | Stop reason: HOSPADM

## 2022-12-12 RX ORDER — OXYCODONE HYDROCHLORIDE AND ACETAMINOPHEN 5; 325 MG/1; MG/1
1 TABLET ORAL 3 TIMES DAILY PRN
Qty: 15 TABLET | Refills: 0 | Status: SHIPPED | OUTPATIENT
Start: 2022-12-12 | End: 2022-12-23

## 2022-12-12 RX ORDER — PROPOFOL 10 MG/ML
VIAL (ML) INTRAVENOUS AS NEEDED
Status: DISCONTINUED | OUTPATIENT
Start: 2022-12-12 | End: 2022-12-12 | Stop reason: SURG

## 2022-12-12 RX ORDER — SODIUM CHLORIDE, SODIUM LACTATE, POTASSIUM CHLORIDE, CALCIUM CHLORIDE 600; 310; 30; 20 MG/100ML; MG/100ML; MG/100ML; MG/100ML
9 INJECTION, SOLUTION INTRAVENOUS CONTINUOUS PRN
Status: DISCONTINUED | OUTPATIENT
Start: 2022-12-12 | End: 2022-12-12 | Stop reason: HOSPADM

## 2022-12-12 RX ORDER — HYDROMORPHONE HYDROCHLORIDE 1 MG/ML
0.5 INJECTION, SOLUTION INTRAMUSCULAR; INTRAVENOUS; SUBCUTANEOUS
Status: DISCONTINUED | OUTPATIENT
Start: 2022-12-12 | End: 2022-12-12 | Stop reason: HOSPADM

## 2022-12-12 RX ORDER — FENTANYL CITRATE 50 UG/ML
50 INJECTION, SOLUTION INTRAMUSCULAR; INTRAVENOUS
Status: DISCONTINUED | OUTPATIENT
Start: 2022-12-12 | End: 2022-12-12 | Stop reason: HOSPADM

## 2022-12-12 RX ORDER — LORATADINE 10 MG/1
10 TABLET ORAL NIGHTLY PRN
Start: 2022-12-12

## 2022-12-12 RX ORDER — PROMETHAZINE HYDROCHLORIDE 25 MG/1
25 SUPPOSITORY RECTAL ONCE AS NEEDED
Status: DISCONTINUED | OUTPATIENT
Start: 2022-12-12 | End: 2022-12-12 | Stop reason: HOSPADM

## 2022-12-12 RX ORDER — DROPERIDOL 2.5 MG/ML
0.62 INJECTION, SOLUTION INTRAMUSCULAR; INTRAVENOUS
Status: DISCONTINUED | OUTPATIENT
Start: 2022-12-12 | End: 2022-12-12 | Stop reason: HOSPADM

## 2022-12-12 RX ORDER — DROPERIDOL 2.5 MG/ML
0.62 INJECTION, SOLUTION INTRAMUSCULAR; INTRAVENOUS ONCE AS NEEDED
Status: DISCONTINUED | OUTPATIENT
Start: 2022-12-12 | End: 2022-12-12 | Stop reason: HOSPADM

## 2022-12-12 RX ORDER — OXYCODONE HYDROCHLORIDE AND ACETAMINOPHEN 5; 325 MG/1; MG/1
TABLET ORAL
Status: COMPLETED
Start: 2022-12-12 | End: 2022-12-12

## 2022-12-12 RX ORDER — NALOXONE HCL 0.4 MG/ML
0.4 VIAL (ML) INJECTION AS NEEDED
Status: DISCONTINUED | OUTPATIENT
Start: 2022-12-12 | End: 2022-12-12 | Stop reason: HOSPADM

## 2022-12-12 RX ORDER — SILDENAFIL 100 MG/1
100 TABLET, FILM COATED ORAL DAILY PRN
Start: 2022-12-12

## 2022-12-12 RX ORDER — MEPERIDINE HYDROCHLORIDE 25 MG/ML
12.5 INJECTION INTRAMUSCULAR; INTRAVENOUS; SUBCUTANEOUS
Status: DISCONTINUED | OUTPATIENT
Start: 2022-12-12 | End: 2022-12-12 | Stop reason: HOSPADM

## 2022-12-12 RX ORDER — SODIUM CHLORIDE 0.9 % (FLUSH) 0.9 %
10 SYRINGE (ML) INJECTION EVERY 12 HOURS SCHEDULED
Status: DISCONTINUED | OUTPATIENT
Start: 2022-12-12 | End: 2022-12-12 | Stop reason: HOSPADM

## 2022-12-12 RX ORDER — ONDANSETRON 2 MG/ML
4 INJECTION INTRAMUSCULAR; INTRAVENOUS ONCE AS NEEDED
Status: DISCONTINUED | OUTPATIENT
Start: 2022-12-12 | End: 2022-12-12 | Stop reason: HOSPADM

## 2022-12-12 RX ORDER — SODIUM CHLORIDE 9 MG/ML
40 INJECTION, SOLUTION INTRAVENOUS AS NEEDED
Status: DISCONTINUED | OUTPATIENT
Start: 2022-12-12 | End: 2022-12-12 | Stop reason: HOSPADM

## 2022-12-12 RX ORDER — FAMOTIDINE 20 MG/1
20 TABLET, FILM COATED ORAL
Status: COMPLETED | OUTPATIENT
Start: 2022-12-12 | End: 2022-12-12

## 2022-12-12 RX ADMIN — FENTANYL CITRATE 50 MCG: 50 INJECTION, SOLUTION INTRAMUSCULAR; INTRAVENOUS at 07:50

## 2022-12-12 RX ADMIN — ONDANSETRON 4 MG: 2 INJECTION INTRAMUSCULAR; INTRAVENOUS at 07:17

## 2022-12-12 RX ADMIN — FENTANYL CITRATE 50 MCG: 50 INJECTION INTRAMUSCULAR; INTRAVENOUS at 06:57

## 2022-12-12 RX ADMIN — FENTANYL CITRATE 25 MCG: 50 INJECTION INTRAMUSCULAR; INTRAVENOUS at 07:21

## 2022-12-12 RX ADMIN — FAMOTIDINE 20 MG: 20 TABLET, FILM COATED ORAL at 06:09

## 2022-12-12 RX ADMIN — FENTANYL CITRATE 50 MCG: 50 INJECTION, SOLUTION INTRAMUSCULAR; INTRAVENOUS at 08:32

## 2022-12-12 RX ADMIN — LIDOCAINE HYDROCHLORIDE 0.5 ML: 10 INJECTION, SOLUTION EPIDURAL; INFILTRATION; INTRACAUDAL; PERINEURAL at 05:52

## 2022-12-12 RX ADMIN — SODIUM CHLORIDE, POTASSIUM CHLORIDE, SODIUM LACTATE AND CALCIUM CHLORIDE 9 ML/HR: 600; 310; 30; 20 INJECTION, SOLUTION INTRAVENOUS at 05:52

## 2022-12-12 RX ADMIN — LIDOCAINE HYDROCHLORIDE 50 MG: 10 INJECTION, SOLUTION EPIDURAL; INFILTRATION; INTRACAUDAL; PERINEURAL at 06:57

## 2022-12-12 RX ADMIN — OXYCODONE HYDROCHLORIDE AND ACETAMINOPHEN 1 TABLET: 5; 325 TABLET ORAL at 09:18

## 2022-12-12 RX ADMIN — DEXAMETHASONE SODIUM PHOSPHATE 4 MG: 4 INJECTION, SOLUTION INTRAMUSCULAR; INTRAVENOUS at 06:57

## 2022-12-12 RX ADMIN — FENTANYL CITRATE 25 MCG: 50 INJECTION INTRAMUSCULAR; INTRAVENOUS at 07:04

## 2022-12-12 RX ADMIN — CEFAZOLIN SODIUM 3 G: 2 INJECTION, SOLUTION INTRAVENOUS at 06:55

## 2022-12-12 RX ADMIN — PROPOFOL 200 MG: 10 INJECTION, EMULSION INTRAVENOUS at 06:57

## 2022-12-12 NOTE — OP NOTE
NEUROSURGICAL OPERATIVE NOTE        PREOPERATIVE DIAGNOSIS:    Right carpal tunnel syndrome      POSTOPERATIVE DIAGNOSIS:  Same      PROCEDURE:  Right carpal tunnel release      SURGEON:  Nj Bliss M.D.      ASSISTANT: DASHAWN Lewis assisted with:   Application of dressing      ANESTHESIA: Local MAC      ESTIMATED BLOOD LOSS: Minimal      SPECIMEN: None      DRAINS: None      COMPLICATIONS:  None      CLINICAL NOTE:  The patient is a 62-year-old gentleman with progressive right hand and wrist pain with sensory alteration.  He has known severe carpal tunnel syndrome.  Symptoms have been unresponsive to nonoperative measures.  As such, he presents at this time for right carpal tunnel release.  The nature of the procedure as well as the potential risks, complications, limitations, and alternatives to the procedure were discussed at length with the patient and the patient has agreed to proceed with surgery.      TECHNICAL NOTE:  The patient was brought to the operating room and placed on the operating table in the supine position and was provided sedation. A tourniquet was placed about his right upper extremity proximally. The right upper extremity from the elbow to the fingertips was then prepared and draped in the usual fashion. A linear incision was marked in the right proximal palm in line with the 4th digit. This was infiltrated with 1% lidocaine with sodium bicarbonate. Tourniquet was inflated.  Incision fashion. Soft tissues were divided to provide exposure to the transverse carpal ligament, which was also divided sharply with scalpel and tenotomy scissors. Dissection was conducted proximally and then distally until palmar fat was identified. The tourniquet was lowered. Modest bleeding points were controlled with bipolar cautery. The wound was washed out with a saline solution and then closed in an interrupted vertical mattress fashion with a 4-0 nylon suture. A sterile dressing was applied. He  did receive preoperative antibiotics. He was taken to the recovery room in satisfactory condition.             jN Bliss M.D.

## 2022-12-12 NOTE — ANESTHESIA PREPROCEDURE EVALUATION
Anesthesia Evaluation                  Airway   Mallampati: I  TM distance: >3 FB  Neck ROM: full  No difficulty expected  Dental      Pulmonary    (+) asthma,recent URI, sleep apnea,   Cardiovascular     ECG reviewed    (+) hypertension, valvular problems/murmurs AS and MR,     ROS comment: AS and MR are mild    Neuro/Psych  (+) numbness,    GI/Hepatic/Renal/Endo    (+) morbid obesity, GERD,  diabetes mellitus,     Musculoskeletal     (+) neck pain,   Abdominal    Substance History      OB/GYN          Other   arthritis,                      Anesthesia Plan    ASA 3     general     intravenous induction     Anesthetic plan, risks, benefits, and alternatives have been provided, discussed and informed consent has been obtained with: patient.    Plan discussed with CRNA.        CODE STATUS:

## 2022-12-12 NOTE — H&P
"Pre-Op H&P  Sebastian Liao  [id number]  1960    Chief complaint: \" I am here for carpal tunnel surgery on my right hand.\"     HPI:    Patient is a 62 y.o.male who presents with carpal tunnel syndrome of right wrist associated with weakness, pain, and numbness.  The inciting incident occurred in February 2022.  He denies any changes since his last office visit.  He does not take any anticoagulant medications.    Review of Systems:  General ROS: negative for chills, fever or skin lesions;  No changes since last office visit.  Neg for recent sick exposure  Cardiovascular ROS: no chest pain or dyspnea on exertion  Respiratory ROS: no cough, shortness of breath, or wheezing    Allergies: Denies allergy to latex or contrast dye.   No Known Allergies    Home Meds:    No current facility-administered medications on file prior to encounter.     Current Outpatient Medications on File Prior to Encounter   Medication Sig Dispense Refill   • albuterol sulfate  (90 Base) MCG/ACT inhaler Inhale 2 puffs Every 4 (Four) Hours As Needed for wheezing 54 g 3   • alfuzosin (UROXATRAL) 10 MG 24 hr tablet Take 1 tablet by mouth Daily. 90 tablet 3   • amLODIPine (NORVASC) 10 MG tablet Take 1 tablet by mouth Daily. 90 tablet 3   • dicyclomine (BENTYL) 20 MG tablet Take 1 tablet by mouth Every 6 (Six) Hours. 180 tablet 3   • fluticasone-salmeterol (Advair Diskus) 100-50 MCG/DOSE DISKUS Inhale 1 puff 2 (Two) Times a Day. 180 each 3   • glyBURIDE-metFORMIN (GLUCOVANCE) 5-500 MG per tablet Take 1 tablet by mouth 2 (Two) Times a Day With Meals. 180 tablet 3   • ibuprofen (ADVIL,MOTRIN) 600 MG tablet Take 600 mg by mouth Every 6 (Six) Hours As Needed for Mild Pain.     • mupirocin (BACTROBAN) 2 % ointment Apply a pea-sized amount to each nostril as directed by provider twice daily for 5 days prior to surgery. 22 g 0   • olopatadine (PATANOL) 0.1 % ophthalmic solution Administer 1 drop to both eyes 2 (Two) Times a Day.     • omeprazole " (priLOSEC) 40 MG capsule Take 1 capsule by mouth Daily. 90 capsule 3   • rosuvastatin (CRESTOR) 10 MG tablet Take 1 tablet by mouth Daily. 90 tablet 3   • carisoprodol (Soma) 350 MG tablet Take 1 tablet by mouth 2 (two) times a day. (Patient taking differently: Take 350 mg by mouth 2 (Two) Times a Day As Needed for Muscle Spasms.) 60 tablet 1   • Chlorhexidine Gluconate 4 % solution Shower with solution daily as directed for 5 days prior to surgery. 237 mL 0   • fluticasone (Flonase) 50 MCG/ACT nasal spray Use 2 sprays in each nostril as directed by provider Daily. (Patient taking differently: 2 sprays into the nostril(s) as directed by provider Daily As Needed for Allergies.) 16 g 3   • loratadine (CLARITIN) 10 MG tablet Take 1 tablet by mouth Daily. (Patient taking differently: Take 10 mg by mouth At Night As Needed.) 90 tablet 3   • montelukast (SINGULAIR) 10 MG tablet Take 1 tablet by mouth Every Night. (Patient taking differently: Take 10 mg by mouth 2 (Two) Times a Day As Needed.) 90 tablet 3   • promethazine (PHENERGAN) 25 MG tablet Take 1 tablet by mouth Every 6 (Six) Hours As Needed for nausea and vomiting 60 tablet 3   • sildenafil (VIAGRA) 100 MG tablet Take 1 tablet by mouth Daily as needed (Patient taking differently: Take 100 mg by mouth Daily As Needed for Erectile Dysfunction.) 30 tablet 6   • silodosin (RAPAFLO) 8 MG capsule capsule Take 1 capsule by mouth Daily. 90 capsule 3   • tadalafil (CIALIS) 20 MG tablet Take 1 tablet by mouth daily as directed. (Patient taking differently: Take 20 mg by mouth Daily As Needed for Erectile Dysfunction.) 30 tablet 6       PMH:   Past Medical History:   Diagnosis Date   • Acute bronchitis    • Arthritis    • Asthma    • Cervicalgia    • COVID-19    • CPAP (continuous positive airway pressure) dependence    • Diabetes mellitus (HCC)    • Edema    • GERD (gastroesophageal reflux disease)    • HTN (hypertension)    • Hyperlipidemia    • IBS (irritable bowel  "syndrome)    • IBS (irritable bowel syndrome)    • Low testosterone    • Prostate disease    • Sleep apnea    • Wears glasses      PSH:    Past Surgical History:   Procedure Laterality Date   • CERVICAL FUSION      Dr. Nickerson, . [place], [place]   • COLONOSCOPY  2012    repeat in ten yrs   • HIP SURGERY Right 2016    Dr. Schwartz   • NECK SURGERY      cervical spine fusion   • TONSILLECTOMY         Immunization History:  Influenza: Yes  Pneumococcal: Yes  Tetanus: Yes    Social History:   Tobacco:   Social History     Tobacco Use   Smoking Status Former   • Packs/day: 0.50   • Years: 10.00   • Pack years: 5.00   • Types: Cigarettes   • Quit date:    • Years since quittin.9   Smokeless Tobacco Former   • Types: Chew   • Quit date: 1986      Alcohol:     Social History     Substance and Sexual Activity   Alcohol Use Yes    Comment: 2 drinks per month       Vitals:           /86 (BP Location: Right arm, Patient Position: Lying)   Pulse 88   Temp 97.5 °F (36.4 °C) (Temporal)   Resp 18   Ht 162.6 cm (64.02\")   Wt 115 kg (254 lb)   SpO2 96%   BMI 43.57 kg/m²     Physical Exam:  General Appearance:    Alert, cooperative, no distress, appears stated age   Head:    Normocephalic, without obvious abnormality, atraumatic   Lungs:     Clear to auscultation bilaterally, respirations unlabored    Heart:   Regular rate and rhythm, S1 and S2 normal, no murmur, rub    or gallop    Abdomen:   Positive for tenderness but he states this is normal and due to irritable bowel syndrome.  Soft.  +bowel sounds                       Results Review  LABS:  Lab Results   Component Value Date    WBC 6.32 2022    HGB 15.7 2022    HCT 48.7 2022    MCV 89.0 2022     2022    NEUTROABS 3.70 10/25/2022    GLUCOSE 85 10/25/2022    BUN 9 10/25/2022    CREATININE 1.32 (H) 10/25/2022    EGFRIFAFRI 77 2022     10/25/2022    K 4.5 2022     10/25/2022    CO2 " 30.6 (H) 10/25/2022    MG 2.2 10/08/2020    CALCIUM 8.9 10/25/2022    ALBUMIN 4.20 10/25/2022    AST 33 10/25/2022    ALT 20 10/25/2022    BILITOT 0.3 10/25/2022    PTT 25 03/09/2014    INR 0.96 03/09/2014       RADIOLOGY:  No radiology results for the last 3 days     I reviewed the patient's new clinical results.  CBC 12/6/2022 and CHEM profile from 10/25/2022 reviewed and available within patient's chart.  Hemoglobin A1c is 7.30.    Cancer Staging (if applicable)  Cancer Patient: __ yes __no __unknown; If yes, clinical stage T:__ N:__M:__, stage group or __N/A    Impression: Patient presents with carpal tunnel syndrome of right wrist.    Plan: Dr. Bliss will perform a right carpal tunnel release- right.       John Tobias PA-C   12/12/22   6:44 AM EST

## 2022-12-12 NOTE — ANESTHESIA POSTPROCEDURE EVALUATION
Patient: Sebastian Liao    Procedure Summary     Date: 12/12/22 Room / Location:  [facility] /  [facility]    Anesthesia Start: 0653 Anesthesia Stop: 0734    Procedure: CARPAL TUNNEL RELEASE RIGHT (Right: Wrist) Diagnosis:       Carpal tunnel syndrome of right wrist      (Carpal tunnel syndrome of right wrist [G56.01])    Surgeons: Nj Bliss MD Provider: Chavo Valdes MD    Anesthesia Type: general ASA Status: 3          Anesthesia Type: general    Vitals  Vitals Value Taken Time   BP     Temp     Pulse 77 12/12/22 0734   Resp     SpO2 93 % 12/12/22 0734   Vitals shown include unvalidated device data.        Post Anesthesia Care and Evaluation    Patient location during evaluation: PACU  Patient participation: waiting for patient participation  Level of consciousness: sleepy but conscious  Pain management: adequate    Airway patency: patent  Anesthetic complications: No anesthetic complications  PONV Status: none  Cardiovascular status: hemodynamically stable and acceptable  Respiratory status: nonlabored ventilation, acceptable, nasal cannula and oral airway  Hydration status: acceptable

## 2022-12-12 NOTE — ANESTHESIA PROCEDURE NOTES
Airway  Urgency: elective    Date/Time: 12/12/2022 6:58 AM  Airway not difficult    General Information and Staff    Patient location during procedure: OR  CRNA/CAA: Nura Machuca CRNA    Indications and Patient Condition  Indications for airway management: airway protection    Preoxygenated: yes  Mask difficulty assessment: 1 - vent by mask    Final Airway Details  Final airway type: supraglottic airway      Successful airway: I-gel  Size 4     Number of attempts at approach: 1  Assessment: lips, teeth, and gum same as pre-op    Additional Comments  LMA placed without difficulty, ventilation with assist, equal breath sounds and symmetric chest rise and fall

## 2022-12-13 ENCOUNTER — TELEPHONE (OUTPATIENT)
Dept: NEUROSURGERY | Facility: CLINIC | Age: 62
End: 2022-12-13

## 2022-12-13 NOTE — TELEPHONE ENCOUNTER
Caller: FREDERIC @ [facility]      Relationship:  NURSE      Best call back number: [phone]     What form or medical record are you requesting:   OPERATIVE NOTE     Who is requesting this form or medical record from you: [facility] W/STACIA     How would you like to receive the form or medical records (pick-up, mail, fax): FAX  If fax, what is the fax number: [phone] ATTN: FREDERIC @ [facility]     Timeframe paperwork needed: AS SOON AS POSSIBLE.      Additional notes: REQUESTING INFORMATION ABOVE FAXED TO [facility] W/C ATTN: FREDERIC.

## 2022-12-16 DIAGNOSIS — Z00.00 ROUTINE GENERAL MEDICAL EXAMINATION AT A HEALTH CARE FACILITY: ICD-10-CM

## 2022-12-16 RX ORDER — GLYBURIDE-METFORMIN HYDROCHLORIDE 5; 500 MG/1; MG/1
1 TABLET ORAL 2 TIMES DAILY WITH MEALS
Qty: 180 TABLET | Refills: 3 | Status: SHIPPED | OUTPATIENT
Start: 2022-12-16

## 2022-12-23 ENCOUNTER — OFFICE VISIT (OUTPATIENT)
Dept: NEUROSURGERY | Facility: CLINIC | Age: 62
End: 2022-12-23

## 2022-12-23 VITALS
DIASTOLIC BLOOD PRESSURE: 82 MMHG | SYSTOLIC BLOOD PRESSURE: 130 MMHG | BODY MASS INDEX: 42.49 KG/M2 | TEMPERATURE: 98.2 F | HEIGHT: 65 IN | WEIGHT: 255 LBS

## 2022-12-23 DIAGNOSIS — R20.0 NUMBNESS AND TINGLING IN RIGHT HAND: ICD-10-CM

## 2022-12-23 DIAGNOSIS — R20.2 NUMBNESS AND TINGLING IN RIGHT HAND: ICD-10-CM

## 2022-12-23 DIAGNOSIS — Z98.890 HISTORY OF CARPAL TUNNEL RELEASE: Primary | ICD-10-CM

## 2022-12-23 PROCEDURE — 99024 POSTOP FOLLOW-UP VISIT: CPT

## 2022-12-23 RX ORDER — TESTOSTERONE 75 MG/1
PELLET SUBCUTANEOUS
COMMUNITY

## 2022-12-23 NOTE — PROGRESS NOTES
Subjective   Patient: Sebastian Liao   Age, Date of Birth: 62 y.o., 1960  Sex: male    Primary Care Provider: Fransico Ngo PA    Chief Complaint: Carpal Tunnel 2 week      History of Present Illness:  Patient is a 62-year-old male who underwent a right sided carpal tunnel release with Dr. Nj Bliss on 2022, prior to the surgery he was experiencing pain, weakness, tingling in his right hand, postoperatively he has had resolvent of pain, but still feels weak and still experiencing tingling.  Currently he is not taking any pain medications.  He denies any probs with his incision, feels as though his pain is well controlled.  He does not feel ready to return to work.    No Known Allergies    Past Medical History:   Diagnosis Date   • Acute bronchitis    • Arthritis    • Asthma    • Cervicalgia    • COVID-19    • CPAP (continuous positive airway pressure) dependence    • Diabetes mellitus (HCC)    • Edema    • GERD (gastroesophageal reflux disease)    • HTN (hypertension)    • Hyperlipidemia    • IBS (irritable bowel syndrome)    • IBS (irritable bowel syndrome)    • Low testosterone    • Prostate disease    • Sleep apnea    • Wears glasses        Social History     Socioeconomic History   • Marital status:    Tobacco Use   • Smoking status: Former     Packs/day: 0.50     Years: 10.00     Pack years: 5.00     Types: Cigarettes     Quit date:      Years since quittin.0   • Smokeless tobacco: Former     Types: Chew     Quit date: 1986   Vaping Use   • Vaping Use: Never used   Substance and Sexual Activity   • Alcohol use: Yes     Comment: 2 drinks per month   • Drug use: Yes     Types: Marijuana     Comment: Quit in    • Sexual activity: Defer       Review of Systems   Constitutional: Negative for activity change, appetite change, chills, diaphoresis, fatigue, fever and unexpected weight change.   HENT: Negative for congestion, dental problem, drooling, ear discharge, ear  pain, facial swelling, hearing loss, mouth sores, nosebleeds, postnasal drip, rhinorrhea, sinus pressure, sinus pain, sneezing, sore throat, tinnitus, trouble swallowing and voice change.    Eyes: Negative for photophobia, pain, discharge, redness, itching and visual disturbance.   Respiratory: Negative for apnea, cough, choking, chest tightness, shortness of breath, wheezing and stridor.    Cardiovascular: Negative for chest pain, palpitations and leg swelling.   Gastrointestinal: Negative for abdominal distention, abdominal pain, anal bleeding, blood in stool, constipation, diarrhea, nausea, rectal pain and vomiting.   Endocrine: Negative for cold intolerance, heat intolerance, polydipsia, polyphagia and polyuria.   Genitourinary: Negative for decreased urine volume, difficulty urinating, dysuria, enuresis, flank pain, frequency, genital sores, hematuria, penile discharge, penile pain, penile swelling, scrotal swelling, testicular pain and urgency.   Musculoskeletal: Positive for arthralgias. Negative for back pain, gait problem, joint swelling, myalgias, neck pain and neck stiffness.   Skin: Negative for color change, pallor, rash and wound.   Allergic/Immunologic: Negative for environmental allergies, food allergies and immunocompromised state.   Neurological: Negative for dizziness, tremors, seizures, syncope, facial asymmetry, speech difficulty, weakness, light-headedness, numbness and headaches.   Hematological: Negative for adenopathy. Does not bruise/bleed easily.   Psychiatric/Behavioral: Negative for agitation, behavioral problems, confusion, decreased concentration, dysphoric mood, hallucinations, self-injury, sleep disturbance and suicidal ideas. The patient is not nervous/anxious and is not hyperactive.        Objective   Vitals:    12/23/22 1006   BP: 130/82   BP Location: Right arm   Patient Position: Sitting   Cuff Size: Adult   Temp: 98.2 °F (36.8 °C)   TempSrc: Infrared   Weight: 116 kg (255 lb)  "  Height: 165.1 cm (65\")        Physical Exam:    Physical Exam  Musculoskeletal:      Comments: 4+ out of 5 strength in right hand.   Skin:     Comments: Incision clean, dry, intact.   Neurological:      Comments: Sensation intact in right hand.         Tobacco Use: Medium Risk   • Smoking Tobacco Use: Former   • Smokeless Tobacco Use: Former   • Passive Exposure: Not on file       Class 3 Severe Obesity (BMI >=40). Obesity-related health conditions include the following: none. Obesity is unchanged. BMI is is above average; BMI management plan is completed. We discussed increasing exercise.      STEADI Fall Risk Assessment has not been completed.    Assessment & Plan     Data Review:  (All imaging is independently reviewed unless stated otherwise.)  No new data reviewed this time.      Medical Decision Making:  Patient can be kept off work for another 4 weeks until his follow-up visit with Dr. Nj Bliss where he will be reevaluated, I have given him a referral to physical therapy in the hopes of strengthening his hand and returning him to work as soon as possible.  His incision looks like it is healing well, I have taken his sutures out.  Patient encouraged to contact us if he has any changes in his condition or any concerns.     Diagnosis Plan   1. History of carpal tunnel release  Ambulatory Referral to Physical Therapy      2. Numbness and tingling in right hand  Ambulatory Referral to Physical Therapy           Electronically signed and dated:    Raoul Alexander PA-C on 10:42 EST 12/23/22  "

## 2022-12-27 ENCOUNTER — TELEPHONE (OUTPATIENT)
Dept: NEUROSURGERY | Facility: CLINIC | Age: 62
End: 2022-12-27

## 2022-12-27 DIAGNOSIS — M79.641 RIGHT HAND PAIN: ICD-10-CM

## 2022-12-27 DIAGNOSIS — Z98.890 HISTORY OF CARPAL TUNNEL RELEASE: Primary | ICD-10-CM

## 2022-12-27 RX ORDER — METHYLPREDNISOLONE 4 MG/1
TABLET ORAL
Qty: 21 TABLET | Refills: 0 | Status: SHIPPED | OUTPATIENT
Start: 2022-12-27 | End: 2023-01-18

## 2022-12-27 NOTE — TELEPHONE ENCOUNTER
Patient appeared to be doing well last Friday, I will call him in a steroid dose pack. He should stop taking ibuprofen while he is on the steroids. This also will elevate his blood sugar and he will need to be more vigilant on his levels.

## 2022-12-27 NOTE — TELEPHONE ENCOUNTER
Patient was seen on 12/23 and his incision was healing well. We don't typically give pain medication at this point. He was referred to physical therapy to help with massage of the site and hand exercises and needs to get in with them this week. Please make sure he has called to make a PT appointment.   If there have been changes to his incision over the weekend that he is concerned about, I could work him into my schedule today.

## 2022-12-27 NOTE — TELEPHONE ENCOUNTER
Provider:  Izaiah  Surgery/Procedure:  Carpal Tunnel Release of right wrist  Surgery/Procedure Date:  12/12/22  Last visit:   12/23/22  Next visit: 1/25/23     Reason for call: Patient called reporting increased pain at surgical site on right wrist. He did see Raoul Alexander PA-C on 12/23/22, sutures were removed and he was instructed to take ibuprofen 200mg 3 tablets TID PRN, as well as icing the incision site for pain. Patient reports doing so over the weekend but has not had any relief. Reports the pain as a throbbing pain that has worsened since the surgery, and also radiates to his fingers, fingers are tingling. No loss of  strength. His wrist does feel stiff and the skin around the incision feels rock hard. He reports mild swelling at the site, but no drainage. He would like to have something additional for pain relief, reports he was not given anything post operatively.

## 2022-12-27 NOTE — TELEPHONE ENCOUNTER
Caller: FREDERIC JONES      Relationship:  NURSE      Best call back number: [phone]     What form or medical record are you requesting:   LAST OVN (12/23/22), WORK STATUS, AND ANY NEW ORDERS     Who is requesting this form or medical record from you: [facility]     How would you like to receive the form or medical records (pick-up, mail, fax): FAX  If fax, what is the fax number: [phone] ATTN: FREDERIC @ [facility]     Timeframe paperwork needed: AS SOON AS POSSIBLE.      Additional notes: REQUESTING INFORMATION ABOVE FAXED TO [facility] ATTN: FREDERIC.

## 2022-12-27 NOTE — TELEPHONE ENCOUNTER
Notified pt of steroid pack order, to stop taking ibuprofen while on Steroids and to check blood sugar levels more often while on steroids. Pt verbalized understanding and was thankful for the return call.

## 2022-12-27 NOTE — TELEPHONE ENCOUNTER
Ibuprofen 200mg 3 cabsules p 4 hours,   Sharp pains/throbbing coming from incision., icing every 2 hours.  Incision feels hard and sore to the touch.     Hasn't called PT yet due to it needing to be approved through workers comp.

## 2023-01-13 ENCOUNTER — TREATMENT (OUTPATIENT)
Dept: PHYSICAL THERAPY | Facility: CLINIC | Age: 63
End: 2023-01-13
Payer: COMMERCIAL

## 2023-01-13 DIAGNOSIS — R29.898 DECREASED GRIP STRENGTH OF RIGHT HAND: ICD-10-CM

## 2023-01-13 DIAGNOSIS — Z98.890 S/P CARPAL TUNNEL RELEASE: Primary | ICD-10-CM

## 2023-01-13 PROCEDURE — 97110 THERAPEUTIC EXERCISES: CPT | Performed by: PHYSICAL THERAPIST

## 2023-01-13 PROCEDURE — 97140 MANUAL THERAPY 1/> REGIONS: CPT | Performed by: PHYSICAL THERAPIST

## 2023-01-13 PROCEDURE — 97161 PT EVAL LOW COMPLEX 20 MIN: CPT | Performed by: PHYSICAL THERAPIST

## 2023-01-13 PROCEDURE — 97530 THERAPEUTIC ACTIVITIES: CPT | Performed by: PHYSICAL THERAPIST

## 2023-01-13 NOTE — PROGRESS NOTES
Physical Therapy Initial Evaluation and Plan of Care             [address] [facility] [address]    [place] [address]    Patient: Sebastian Liao   : 1960  Diagnosis/ICD-10 Code:  S/P carpal tunnel release [Z98.890]  Referring practitioner: Raoul Alexander PA-C  Date of Initial Visit: 2023  Today's Date: 2023  Patient seen for 1 session         Visit Diagnoses:    ICD-10-CM ICD-9-CM   1. S/P carpal tunnel release  Z98.890 V45.89   2. Decreased  strength of right hand  R29.898 729.89         Subjective Questionnaire: QuickDASH: 75% impairment      Subjective Evaluation    History of Present Illness  Date of surgery: 2022  Mechanism of injury: Underwent R carpal tunnel release 22. Has had persistent incisional pn, wrist stiffness since surgery. Just completed a steroid pack which helped improved symptoms-completed 4 days ago. Still notices occasional but slight N/T in tip of his R thumb and index finger. Sleeping well. Reports difficulty gripping, lifting items, opening jars. Minimal to no issue w/ fine motor skills.    Subjective comment: s/p R carpal tunnel release  Patient Occupation: [employer]-currently off work Quality of life: good    Pain  Current pain ratin  At best pain ratin  At worst pain ratin  Quality: tight and sharp  Relieving factors: ice, medications and rest  Aggravating factors: movement    Hand dominance: left    Treatments  Previous treatment: physical therapy  Patient Goals  Patient goals for therapy: decreased edema, decreased pain, increased motion, increased strength, independence with ADLs/IADLs, return to work and return to sport/leisure activities             Treatment  See Exercise, Manual, and Modality Logs for complete treatment.     Objective          Observations     Right Wrist/Hand   Negative for atrophy and edema.     Additional Wrist/Hand Observation Details  Incision healing well, very small scab at distal portion of  incision      Tenderness     Additional Tenderness Details  Min sandi-incisional TTP, palpable soft tissue adhesions    Neurological Testing     Sensation     Wrist/Hand     Right   Intact: light touch    Comments   Right light touch: slight tingling tip of 1st/2nd digits    Active Range of Motion     Left Elbow   Forearm supination: 70 degrees   Forearm pronation: 80 degrees     Right Elbow   Forearm supination: 65 degrees   Forearm pronation: 75 degrees     Left Wrist   Wrist flexion: 65 degrees   Wrist extension: 52 degrees   Radial deviation: 15 degrees   Ulnar deviation: 40 degrees     Right Wrist   Wrist flexion: 55 degrees   Wrist extension: 60 degrees   Radial deviation: 10 degrees   Ulnar deviation: 40 degrees     Additional Active Range of Motion Details  Slight limitation w/ opposition of thumb to 5th digit, otherwise digit mobility WNL    Strength/Myotome Testing     Left Wrist/Hand   Wrist extension: 5  Wrist flexion: 5  Radial deviation: 5  Ulnar deviation: 5     (2nd hand position)     Trial 1: 72 lbs    Trial 2: 50 lbs    Trial 3: 35 lbs    Average: 52.33 lbs    Thumb Strength  Key/Lateral Pinch     Trial 1: 20 lbs  Tip/Two-Point Pinch     Trial 1: 10 lbs  Palmar/Three-Point Pinch     Trial 1: 15 lbs    Right Wrist/Hand   Wrist extension: 5  Wrist flexion: 4+  Radial deviation: 4+  Ulnar deviation: 5     (2nd hand position)     Trial 1: 35 lbs    Trial 2: 38 lbs    Trial 3: 39 lbs    Average: 37.33 lbs    Thumb Strength   Key/Lateral Pinch     Trial 1: 15 lbs  Tip/Two-Point Pinch     Trial 1: 10 lbs  Palmar/Three-Point Pinch     Trial 1: 14 lbs    Additional Strength Details  Thumb extn 4+/5 bilaterally  Thumb abd 4+/5 bilaterally  Thumb opposition R 4-/5, L 5/5  Finger abd 4+/5 bilaterally            Assessment & Plan     Assessment  Impairments: abnormal or restricted ROM, activity intolerance, impaired physical strength, lacks appropriate home exercise program and pain with  function  Functional Limitations: carrying objects, lifting, pulling, pushing, uncomfortable because of pain and unable to perform repetitive tasks  Assessment details: Pt is a 62 YOM who presents to PT approx 1 month s/p R carpal tunnel release. Pt appears to be recovering very well. He reports elevated pn level post operatively but has improved after recently completing steroid pack. Incision healing well. Sensation nearly fully restored w/ exception of slight N/T in tips of 1st/2nd digits. Demo functional wrist mobility, mild deficits in digit ROM, pinch/ strength, and wrist strength limiting his ability to perform ADL's and return to work. Pt would benefit from skilled PT services to decrease pn, restore strength/mobility, and assist w/ return to full work and daily activities w/ min to no limitation.    Barriers to therapy: none  Prognosis: good    Goals  Plan Goals: STG 3 wks  1) Pt to be compliant w/ initial HEP for ROM/strength/symptom mgmt  2) Pt to report pn w/ household activities to be no greater than 3/10.  3) Pt to improve R wrist mobility to 65 deg flxn or better.  5) Pt to improve QD score to 50% impairment or better to reflect improved pn and function.    LTG 6 wks  1) Pt to be independent w/ long term HEP and self mgmt.  2) Pt to tolerate 40 mins or more of work simulated activities w/ min to no pn or dysfunction.  3) Pt to improve R  strength avg to 45lb or better to assist w/ performance of daily and work activities.  4) Pt to improve QD score to 25% impairment or better to reflect improved pn and function.  5) Pt to improve R wrist/forearm strength to 5/5 in all planes.      Plan  Therapy options: will be seen for skilled therapy services  Planned modality interventions: cryotherapy, electrical stimulation/Russian stimulation, TENS, thermotherapy (hydrocollator packs), ultrasound and thermotherapy (paraffin bath)  Planned therapy interventions: ADL retraining, fine motor coordination  training, flexibility, functional ROM exercises, home exercise program, joint mobilization, IADL retraining, manual therapy, neuromuscular re-education, soft tissue mobilization, strengthening, stretching and therapeutic activities  Frequency: 2x week  Duration in weeks: 12  Treatment plan discussed with: patient  Plan details: PT POC to include restoration of wrist mobility, wrist/ strength, fine motor skills/coordination utilizing TE/TA/NMR/MT, modalities as indicated.        History # of Personal Factors and/or Comorbidities: LOW (0)  Examination of Body System(s): # of elements: LOW (1-2)  Clinical Presentation: EVOLVING  Clinical Decision Making: LOW       Timed:         Manual Therapy:    8     mins  80609;     Therapeutic Exercise:    15     mins  00454;     Neuromuscular Brandon:    0   mins  52725;    Therapeutic Activity:     10     mins  11877;     Gait Trainin     mins  81412;     Ultrasound:     0     mins  76337;    Ionto                               0    mins   79106  Self Care                       0     mins   94057  Canalith Repos    0     mins 32216      Un-Timed:  Electrical Stimulation:    0     mins  06328 (MC );  Dry Needling     0     mins self-pay  Traction     0     mins 22082  Low Eval     20     Mins  63311  Mod Eval     0     Mins  33299  High Eval                       0     Mins  70480        Timed Treatment:   36   mins   Total Treatment:     56   mins          PT: Cheryl Powers PT     [place] License: #[id number]    Electronically signed by Cheryl Powers PT, 23, 8:49 AM EST    Certification Period: 2023 thru 2023  I certify that the therapy services are furnished while this patient is under my care.  The services outlined above are required by this patient, and will be reviewed every 90 days.         Physician Signature:__________________________________________________    PHYSICIAN: Raoul Alexander PA-C      DATE:     Please sign and return via fax to  [phone]. Thank you, [facility].

## 2023-01-13 NOTE — PATIENT INSTRUCTIONS
Access Code: [email or web address]  URL: [email or web address]  Date: 01/13/2023  Prepared by: Cheryl Powers    Exercises  Standing Wrist Flexor Stretch with Arm Bent - 2 x daily - 3 reps - 20 sec hold  Seated Wrist Flexion with Overpressure - 2 x daily - 3 reps - 20 sec hold  Hand AROM Tendon Gliding Series - 2 x daily - 15-30 reps  Seated Thumb Circumduction AROM - 2 x daily - 15-30 reps  Thumb Opposition - 2 x daily - 15-30 reps  Key Pinch with Putty - 2 x daily - 15-30 reps  Putty Squeezes - 2 x daily - 15-30 reps    Provided pt education regarding recovery after CTR, PT POC, HEP, scar tissue massage, use of ice after HEP and as needed for pn control

## 2023-01-16 ENCOUNTER — TREATMENT (OUTPATIENT)
Dept: PHYSICAL THERAPY | Facility: CLINIC | Age: 63
End: 2023-01-16
Payer: COMMERCIAL

## 2023-01-16 DIAGNOSIS — R29.898 DECREASED GRIP STRENGTH OF RIGHT HAND: ICD-10-CM

## 2023-01-16 DIAGNOSIS — Z98.890 S/P CARPAL TUNNEL RELEASE: Primary | ICD-10-CM

## 2023-01-16 PROCEDURE — 97530 THERAPEUTIC ACTIVITIES: CPT | Performed by: PHYSICAL THERAPIST

## 2023-01-16 PROCEDURE — 97112 NEUROMUSCULAR REEDUCATION: CPT | Performed by: PHYSICAL THERAPIST

## 2023-01-16 PROCEDURE — 97110 THERAPEUTIC EXERCISES: CPT | Performed by: PHYSICAL THERAPIST

## 2023-01-16 PROCEDURE — 97140 MANUAL THERAPY 1/> REGIONS: CPT | Performed by: PHYSICAL THERAPIST

## 2023-01-16 NOTE — PROGRESS NOTES
Physical Therapy Daily Treatment Note  [address] [facility]  [address]   [place] [address]      Patient: Sebastian Liao   : 1960  Referring practitioner: Raoul Alexander PA-C  Date of Initial Visit: Type: THERAPY  Noted: 2023  Today's Date: 2023  Patient seen for 2 sessions       Visit Diagnoses:    ICD-10-CM ICD-9-CM   1. S/P carpal tunnel release  Z98.890 V45.89   2. Decreased  strength of right hand  R29.898 729.89       Subjective   Sebastian Liao reports: HEP going well. 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 Feels that wrist is less tight.     Pre tx pn score: 4  Post tx pn score: 3    Treatment  See Exercise, Manual, and Modality Logs for complete treatment.     Objective     Min sandi-incisional TTP    Assessment & Plan     Assessment    Assessment details: Compliant w/ HEP and tolerating initial exercises well. Demo improved excursion w/ opposition and subjectively appreciates reduced tightness around incision and ventral wrist. Proceeded w/ wrist/digit ROM, tendon gliding, scar tissue mobilization, and light /pinch strengthening. He did well w/ all. Pt very motivated to return to work soon. Pt needs continued PT to restore full strength, ROM, and function in order to allow return to full work and daily activities w/o pn or dysfunction.      Plan  Plan details: Progress strengthening as tolerated           Timed:         Manual Therapy:    8     mins  57065;     Therapeutic Exercise:    15     mins  16383;     Neuromuscular Brandon:    8    mins  74938;    Therapeutic Activity:     10     mins  36790;     Gait Trainin     mins  40399;     Ultrasound:     0     mins  07915;    Ionto                               0    mins   92924  Self Care                       0     mins   79672  Canalith Repos    0     mins 24111      Un-Timed:  Electrical Stimulation:    0     mins  27408 (MC );  Dry Needling     0     mins self-pay  Traction     0     mins 01375      Timed  Treatment:   41   mins   Total Treatment:     41   mins    Cheryl Powers, PT  [place] License: #[id number]

## 2023-01-18 ENCOUNTER — OFFICE VISIT (OUTPATIENT)
Dept: FAMILY MEDICINE CLINIC | Facility: CLINIC | Age: 63
End: 2023-01-18
Payer: COMMERCIAL

## 2023-01-18 VITALS
SYSTOLIC BLOOD PRESSURE: 138 MMHG | RESPIRATION RATE: 16 BRPM | TEMPERATURE: 97.3 F | HEIGHT: 65 IN | HEART RATE: 89 BPM | BODY MASS INDEX: 41.59 KG/M2 | OXYGEN SATURATION: 98 % | WEIGHT: 249.6 LBS | DIASTOLIC BLOOD PRESSURE: 84 MMHG

## 2023-01-18 DIAGNOSIS — Z12.5 SPECIAL SCREENING FOR MALIGNANT NEOPLASM OF PROSTATE: ICD-10-CM

## 2023-01-18 DIAGNOSIS — E11.65 TYPE 2 DIABETES MELLITUS WITH HYPERGLYCEMIA, WITHOUT LONG-TERM CURRENT USE OF INSULIN: ICD-10-CM

## 2023-01-18 DIAGNOSIS — Z00.00 ROUTINE GENERAL MEDICAL EXAMINATION AT A HEALTH CARE FACILITY: Primary | ICD-10-CM

## 2023-01-18 LAB
EXPIRATION DATE: NORMAL
HBA1C MFR BLD: 6.7 %
Lab: NORMAL
POC CREATININE URINE: 8.8
POC MICROALBUMIN URINE: 80

## 2023-01-18 PROCEDURE — 99396 PREV VISIT EST AGE 40-64: CPT | Performed by: PHYSICIAN ASSISTANT

## 2023-01-18 PROCEDURE — 82044 UR ALBUMIN SEMIQUANTITATIVE: CPT | Performed by: PHYSICIAN ASSISTANT

## 2023-01-18 PROCEDURE — 83036 HEMOGLOBIN GLYCOSYLATED A1C: CPT | Performed by: PHYSICIAN ASSISTANT

## 2023-01-18 NOTE — PROGRESS NOTES
Subjective   Sebastian Liao is a 62 y.o. male  Annual Exam (Fasting for labs. UTD on colorectal screening. ) and Diabetes (A1C 6.7%)      History of Present Illness  Patient is a pleasant 62-year-old white male who comes in for preventive medical examination also follow-up of type 2 diabetes patient started Ozempic has lost weight A1c 6.7  The following portions of the patient's history were reviewed and updated as appropriate: allergies, current medications, past social history and problem list    Review of Systems   Constitutional: Negative.    HENT: Negative.    Eyes: Negative.    Respiratory: Negative.    Cardiovascular: Negative.    Gastrointestinal: Negative.    Endocrine: Negative.    Genitourinary: Negative.    Musculoskeletal: Negative.    Skin: Negative.    Allergic/Immunologic: Negative.    Neurological: Negative.    Hematological: Negative.    Psychiatric/Behavioral: Negative.    All other systems reviewed and are negative.      Objective     Vitals:    01/18/23 0950   BP: 138/84   Pulse: 89   Resp: 16   Temp: 97.3 °F (36.3 °C)   SpO2: 98%       Physical Exam  Vitals and nursing note reviewed.   Constitutional:       General: He is not in acute distress.     Appearance: Normal appearance. He is well-developed. He is not ill-appearing, toxic-appearing or diaphoretic.   HENT:      Head: Normocephalic and atraumatic.      Right Ear: External ear normal.      Left Ear: External ear normal.   Eyes:      Conjunctiva/sclera: Conjunctivae normal.      Pupils: Pupils are equal, round, and reactive to light.   Neck:      Thyroid: No thyromegaly.      Vascular: No carotid bruit.   Cardiovascular:      Rate and Rhythm: Normal rate and regular rhythm.      Pulses: Normal pulses.      Heart sounds: Normal heart sounds. No murmur heard.  Pulmonary:      Effort: Pulmonary effort is normal. No respiratory distress.      Breath sounds: Normal breath sounds.   Abdominal:      General: Bowel sounds are normal.       Palpations: Abdomen is soft. There is no mass.      Tenderness: There is no abdominal tenderness.   Musculoskeletal:         General: No swelling. Normal range of motion.      Cervical back: Normal range of motion and neck supple.   Lymphadenopathy:      Cervical: No cervical adenopathy.   Skin:     General: Skin is warm and dry.      Findings: No lesion or rash.   Neurological:      Mental Status: He is alert and oriented to person, place, and time.      Cranial Nerves: No cranial nerve deficit.      Sensory: No sensory deficit.      Motor: No weakness.      Coordination: Coordination normal.      Gait: Gait normal.      Deep Tendon Reflexes: Reflexes are normal and symmetric.   Psychiatric:         Mood and Affect: Mood normal.         Behavior: Behavior normal.         Thought Content: Thought content normal.         Judgment: Judgment normal.         Assessment & Plan     Diagnoses and all orders for this visit:    1. Routine general medical examination at a health care facility (Primary)  -     Comprehensive Metabolic Panel; Future  -     Lipid Panel; Future  -     TSH; Future  -     CBC (No Diff); Future    2. Special screening for malignant neoplasm of prostate  -     PSA Screen; Future    3. Type 2 diabetes mellitus with hyperglycemia, without long-term current use of insulin (HCC)  -     POC Glycosylated Hemoglobin (Hb A1C)  -     POCT microalbumin     Preventive medicine discussed, diet, exercise, healthy living discussed at length.  Discussed nutrition, physical activity, healthy weight, injury prevention, misuse of tobacco, alcohol and drugs, dental health, mental health, immunizations, screening    Part of this note may be an electronic transcription/translation of spoken language to printed text using the Dragon Dictation System.

## 2023-01-19 ENCOUNTER — TREATMENT (OUTPATIENT)
Dept: PHYSICAL THERAPY | Facility: CLINIC | Age: 63
End: 2023-01-19
Payer: COMMERCIAL

## 2023-01-19 DIAGNOSIS — Z98.890 S/P CARPAL TUNNEL RELEASE: Primary | ICD-10-CM

## 2023-01-19 DIAGNOSIS — R29.898 DECREASED GRIP STRENGTH OF RIGHT HAND: ICD-10-CM

## 2023-01-19 PROCEDURE — 97140 MANUAL THERAPY 1/> REGIONS: CPT | Performed by: PHYSICAL THERAPIST

## 2023-01-19 PROCEDURE — 97110 THERAPEUTIC EXERCISES: CPT | Performed by: PHYSICAL THERAPIST

## 2023-01-19 PROCEDURE — 97112 NEUROMUSCULAR REEDUCATION: CPT | Performed by: PHYSICAL THERAPIST

## 2023-01-19 NOTE — PROGRESS NOTES
Physical Therapy Daily Treatment Note  [address]  [address]   [place] [address]      Patient: Sebastian Liao   : 1960  Referring practitioner: Raoul Alexander PA-C  Date of Initial Visit: Type: THERAPY  Noted: 2023  Today's Date: 2023  Patient seen for 3 sessions       Visit Diagnoses:    ICD-10-CM ICD-9-CM   1. S/P carpal tunnel release  Z98.890 V45.89   2. Decreased  strength of right hand  R29.898 729.89       Subjective   Sebastian Liao reports: Sore after exercises but subsides w/ rest. Feels he is gaining strength. Primary complaint is tightness over surgical site.    Pre tx pn score: 1  Post tx pn score: 1-2    Treatment  See Exercise, Manual, and Modality Logs for complete treatment.     Objective          Strength/Myotome Testing     Right Wrist/Hand      (2nd hand position)     Trial 1: 62 lbs    Trial 2: 57 lbs    Trial 3: 60 lbs    Average: 59.67 lbs    Thumb Strength   Key/Lateral Pinch     Trial 1: 18 lbs  Tip/Two-Point Pinch     Trial 1: 10 lbs  Palmar/Three-Point Pinch     Trial 1: 15 lbs            Assessment & Plan     Assessment    Assessment details: Demo significant gains in  and pinch strength. C/o tightness over surgical site as expected. Added median nerve flossing techniques and purdue pegboard to facilitate fine motor function. Min challenge w/ pegboard. Encouraged continued stretching, tendon/nerve glides, and massage at home. Issued updated HEP. Pt needs continued PT to restore full strength, ROM, and function in order to allow return to full work and daily activities w/o pn or dysfunction.      Plan  Plan details: MD note next visit          Timed:         Manual Therapy:    8     mins  90290;     Therapeutic Exercise:    25     mins  51540;     Neuromuscular Brandon:    10    mins  67331;    Therapeutic Activity:     0     mins  07680;     Gait Trainin     mins  47677;     Ultrasound:     0     mins  76990;    Ionto                                0    mins   50211  Self Care                       0     mins   34422  Canalith Repos    0     mins 07340      Un-Timed:  Electrical Stimulation:    0     mins  83618 ( );  Dry Needling     0     mins self-pay  Traction     0     mins 80060      Timed Treatment:   43   mins   Total Treatment:     43   mins    Cheryl Powers, PT  [place] License: #[id number]

## 2023-01-23 ENCOUNTER — TREATMENT (OUTPATIENT)
Dept: PHYSICAL THERAPY | Facility: CLINIC | Age: 63
End: 2023-01-23
Payer: COMMERCIAL

## 2023-01-23 DIAGNOSIS — Z98.890 S/P CARPAL TUNNEL RELEASE: Primary | ICD-10-CM

## 2023-01-23 DIAGNOSIS — R29.898 DECREASED GRIP STRENGTH OF RIGHT HAND: ICD-10-CM

## 2023-01-23 PROCEDURE — 97110 THERAPEUTIC EXERCISES: CPT | Performed by: PHYSICAL THERAPIST

## 2023-01-23 PROCEDURE — 97140 MANUAL THERAPY 1/> REGIONS: CPT | Performed by: PHYSICAL THERAPIST

## 2023-01-23 PROCEDURE — 97530 THERAPEUTIC ACTIVITIES: CPT | Performed by: PHYSICAL THERAPIST

## 2023-01-23 NOTE — PROGRESS NOTES
Physical Therapy Daily Treatment Note  [address] [facility] [address]   [place] [address]      Patient: Sebastian Liao   : 1960  Referring practitioner: Raoul Alexander PA-C  Date of Initial Visit: Type: THERAPY  Noted: 2023  Today's Date: 2023  Patient seen for 4 sessions       Visit Diagnoses:    ICD-10-CM ICD-9-CM   1. S/P carpal tunnel release  Z98.890 V45.89   2. Decreased  strength of right hand  R29.898 729.89       Subjective   Sebastian Liao reports: Reports soreness along surgical site, no pn. Feels that  strength/mobility are improved but still has some concern about being able to perform lifting/carrying activities for the duration of a full work shift. Still aches from time to time but subsides w/ ice.     Pre tx pn score: 4  Post tx pn score: 5    Treatment  See Exercise, Manual, and Modality Logs for complete treatment.     Objective          Observations     Right Wrist/Hand   Negative for atrophy and edema.     Additional Wrist/Hand Observation Details  Incision well healed      Tenderness     Additional Tenderness Details  (-) TTP    Neurological Testing     Sensation     Wrist/Hand     Right   Intact: light touch    Active Range of Motion     Left Elbow   Forearm supination: 70 degrees   Forearm pronation: 80 degrees     Right Elbow   Forearm supination: 65 degrees   Forearm pronation: 75 degrees     Left Wrist   Wrist flexion: 65 degrees   Wrist extension: 52 degrees   Radial deviation: 15 degrees   Ulnar deviation: 40 degrees     Right Wrist   Wrist flexion: 65 degrees   Wrist extension: 60 degrees   Radial deviation: 20 degrees   Ulnar deviation: 40 degrees     Additional Active Range of Motion Details  Digit mobility WNL    Strength/Myotome Testing     Left Wrist/Hand   Wrist extension: 5  Wrist flexion: 5  Radial deviation: 5  Ulnar deviation: 5     (2nd hand position)     Trial 1: 72 lbs    Trial 2: 50 lbs    Trial 3: 35 lbs    Average: 52.33 lbs    Thumb  Strength  Key/Lateral Pinch     Trial 1: 20 lbs  Tip/Two-Point Pinch     Trial 1: 10 lbs  Palmar/Three-Point Pinch     Trial 1: 15 lbs    Right Wrist/Hand   Wrist extension: 5  Wrist flexion: 5  Radial deviation: 5  Ulnar deviation: 5     (2nd hand position)     Trial 1: 74 lbs    Trial 2: 70 lbs    Trial 3: 69 lbs    Average: 71 lbs    Thumb Strength   Key/Lateral Pinch     Trial 1: 21 lbs  Tip/Two-Point Pinch     Trial 1: 10 lbs  Palmar/Three-Point Pinch     Trial 1: 15 lbs    Additional Strength Details  Thumb extn 4+/5 bilaterally  Thumb abd 4+/5 bilaterally  Thumb opposition R 5/5, L 5/5  Finger abd 4+/5 bilaterally            Assessment & Plan     Assessment    Assessment details: Pt has completed 4 PT visits and is now 6 wks post op. Pn overall minimal, complains more of soreness/tightness over his surgical site. He has made significant progress w/ wrist and hand strength/mobility. Demo restored wrist/forearm/digit AROM in all plans.  strength restored. Sensation intact. Given his progress and minimal discomfort, transitioned to work simulated activity at today's visit.  Able to tolerate repetitively lifting/carrying up to 25lbs (see flow sheet for details), reporting only fatigue and soreness in ventral wrist. Pt expresses concern about completing a full work shift. Could benefit from ongoing PT to maximize strength and endurance, progress work simulated activity to prepare for RTW w/o pn or limitation.    Plan  Plan details: Cont per MD recommendation          Timed:         Manual Therapy:    8     mins  07975;     Therapeutic Exercise:    15     mins  92138;     Neuromuscular Brandon:    0    mins  85502;    Therapeutic Activity:     25     mins  24457;     Gait Trainin     mins  75328;     Ultrasound:     0     mins  84064;    Ionto                               0    mins   71797  Self Care                       0     mins   08897  Canalith Repos    0     mins  16451      Un-Timed:  Electrical Stimulation:    0     mins  05168 ( );  Dry Needling     0     mins self-pay  Traction     0     mins 16731      Timed Treatment:   48   mins   Total Treatment:     48   mins    Cheryl Powers, PT  [place] License: #[id number]

## 2023-01-24 DIAGNOSIS — R06.02 SHORTNESS OF BREATH: Primary | ICD-10-CM

## 2023-01-24 RX ORDER — FLUTICASONE PROPIONATE AND SALMETEROL 100; 50 UG/1; UG/1
1 POWDER RESPIRATORY (INHALATION)
Qty: 60 EACH | Refills: 5 | Status: SHIPPED | OUTPATIENT
Start: 2023-01-24 | End: 2023-01-24

## 2023-01-24 RX ORDER — FLUTICASONE PROPIONATE AND SALMETEROL 100; 50 UG/1; UG/1
1 POWDER RESPIRATORY (INHALATION)
Qty: 60 EACH | Refills: 5 | Status: SHIPPED | OUTPATIENT
Start: 2023-01-24

## 2023-01-25 ENCOUNTER — OFFICE VISIT (OUTPATIENT)
Dept: NEUROSURGERY | Facility: CLINIC | Age: 63
End: 2023-01-25
Payer: COMMERCIAL

## 2023-01-25 VITALS — BODY MASS INDEX: 41.65 KG/M2 | TEMPERATURE: 96.8 F | WEIGHT: 250 LBS | HEIGHT: 65 IN

## 2023-01-25 DIAGNOSIS — Z98.890 S/P CARPAL TUNNEL RELEASE: Primary | ICD-10-CM

## 2023-01-25 DIAGNOSIS — R20.0 NUMBNESS AND TINGLING IN RIGHT HAND: ICD-10-CM

## 2023-01-25 DIAGNOSIS — R20.2 NUMBNESS AND TINGLING IN RIGHT HAND: ICD-10-CM

## 2023-01-25 PROCEDURE — 99024 POSTOP FOLLOW-UP VISIT: CPT | Performed by: NEUROLOGICAL SURGERY
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
## 2023-01-26 ENCOUNTER — TELEPHONE (OUTPATIENT)
Dept: NEUROSURGERY | Facility: CLINIC | Age: 63
End: 2023-01-26
Payer: COMMERCIAL

## 2023-01-26 ENCOUNTER — TREATMENT (OUTPATIENT)
Dept: PHYSICAL THERAPY | Facility: CLINIC | Age: 63
End: 2023-01-26
Payer: COMMERCIAL

## 2023-01-26 DIAGNOSIS — R29.898 DECREASED GRIP STRENGTH OF RIGHT HAND: ICD-10-CM

## 2023-01-26 DIAGNOSIS — Z98.890 S/P CARPAL TUNNEL RELEASE: Primary | ICD-10-CM

## 2023-01-26 PROCEDURE — 97112 NEUROMUSCULAR REEDUCATION: CPT | Performed by: PHYSICAL THERAPIST

## 2023-01-26 PROCEDURE — 97530 THERAPEUTIC ACTIVITIES: CPT | Performed by: PHYSICAL THERAPIST

## 2023-01-26 PROCEDURE — 97110 THERAPEUTIC EXERCISES: CPT | Performed by: PHYSICAL THERAPIST

## 2023-01-26 NOTE — PROGRESS NOTES
Physical Therapy Daily Treatment Note  [address] [facility]  [address]   [place] [address]      Patient: Sebastian Liao   : 1960  Referring practitioner: Raoul Alexander PA-C  Date of Initial Visit: Type: THERAPY  Noted: 2023  Today's Date: 2023  Patient seen for 5 sessions       Visit Diagnoses:    ICD-10-CM ICD-9-CM   1. S/P carpal tunnel release  Z98.890 V45.89   2. Decreased  strength of right hand  R29.898 729.89       Subjective   Sebastian Liao reports: Being released to RTW on full duty on 22 to give him time to complete PT. No significant pn after last visit. Has some aching in wrist throughout the day but manages well w/ ice.    Pre tx pn score: 3  Post tx pn score: 3    Treatment  See Exercise, Manual, and Modality Logs for complete treatment.     Objective         Assessment & Plan     Assessment    Assessment details: Surgeon recommends continued PT for strengthening/conditioning until RTW mid Feb. Pt doing well, minimal pn. No issues after progression to work simulated activity last visit. Continued weighted box lift/carry and added sled push/pull. Added flexbar pinch/twist, TB wrist and forearm strengthening. Reported fatigue and slight aching at ventral wrist but min to no pn. Pt needs continued PT to restore full strength, ROM, and function in order to allow return to full work and daily activities w/o pn or dysfunction.      Plan  Plan details: Cont w/ functional strengthening/work simulated activity          Timed:         Manual Therapy:    0     mins  03703;     Therapeutic Exercise:    25     mins  96864;     Neuromuscular Brandon:    10    mins  36892;    Therapeutic Activity:     15     mins  73953;     Gait Trainin     mins  35028;     Ultrasound:     0     mins  81566;    Ionto                               0    mins   38088  Self Care                       0     mins   10347  Canalith Repos    0     mins 38831      Un-Timed:  Electrical Stimulation:    0     mins   46064 ( );  Dry Needling     0     mins self-pay  Traction     0     mins 97346      Timed Treatment:   50   mins   Total Treatment:     50   mins    Cheryl Powers, PT  [place] License: #[id number]

## 2023-01-26 NOTE — TELEPHONE ENCOUNTER
Caller: FREDERIC    Relationship: NURSE  ROBERT    Best call back number: [phone]    What form or medical record are you requesting: WORK STATUS-AND OFFICE NOTE    Who is requesting this form or medical record from you:ROBERT-    How would you like to receive the form or medical records (pick-up, mail, fax):FAX  If fax, what is the fax number:[phone] ATTN:FREDERIC  If mail, what is the address:   If pick-up, provide patient with address and location details    Timeframe paperwork needed:ASAP    Additional notes:FREDERIC JONES CALLED TO REQUEST WORK STATUS AND LAST OFFICE NOTE TO BE SENT TO FAX ABOVE THANK YOU!

## 2023-01-30 ENCOUNTER — TREATMENT (OUTPATIENT)
Dept: PHYSICAL THERAPY | Facility: CLINIC | Age: 63
End: 2023-01-30
Payer: COMMERCIAL

## 2023-01-30 DIAGNOSIS — R29.898 DECREASED GRIP STRENGTH OF RIGHT HAND: ICD-10-CM

## 2023-01-30 DIAGNOSIS — Z98.890 S/P CARPAL TUNNEL RELEASE: Primary | ICD-10-CM

## 2023-01-30 PROCEDURE — 97110 THERAPEUTIC EXERCISES: CPT | Performed by: PHYSICAL THERAPIST

## 2023-01-30 PROCEDURE — 97530 THERAPEUTIC ACTIVITIES: CPT | Performed by: PHYSICAL THERAPIST

## 2023-01-30 PROCEDURE — 97112 NEUROMUSCULAR REEDUCATION: CPT | Performed by: PHYSICAL THERAPIST

## 2023-01-30 NOTE — PROGRESS NOTES
Physical Therapy Daily Treatment Note  [address] [facility]  [address]   [place] [address]      Patient: Sebastian Liao   : 1960  Referring practitioner: Raoul Alexander PA-C  Date of Initial Visit: Type: THERAPY  Noted: 2023  Today's Date: 2023  Patient seen for 6 sessions       Visit Diagnoses:    ICD-10-CM ICD-9-CM   1. S/P carpal tunnel release  Z98.890 V45.89   2. Decreased  strength of right hand  R29.898 729.89       Subjective   Sebastian Liao reports: Feeling good, minimal pn. Still some throbbing in the wrist w/ activity but responds well to ice.    Pre tx pn score: 2  Post tx pn score: 0    Treatment  See Exercise, Manual, and Modality Logs for complete treatment.     Objective         Assessment & Plan     Assessment    Assessment details: Min pn reported at rest or w/ routine daily activities. Maintaining functional wrist mobility. No complaints after last visit's exercise and work sim progressions. Continued w/ previous program, increasing resistance w/ box lift/carry, and sled pushing/pulling. Pt noted mild throbbing in wrist, subsided w/ ice and rest. No pn reported at end of visit.    Plan  Plan details: (+) one handed dumbbell lifts to shelf          Timed:         Manual Therapy:    0     mins  08328;     Therapeutic Exercise:    25     mins  53167;     Neuromuscular Brandon:    10    mins  13096;    Therapeutic Activity:     15     mins  49383;     Gait Trainin     mins  66542;     Ultrasound:     0     mins  10350;    Ionto                               0    mins   44418  Self Care                       0     mins   88087  Canalith Repos    0     mins 97801      Un-Timed:  Electrical Stimulation:    0     mins  47149 ( );  Dry Needling     0     mins self-pay  Traction     0     mins 21890      Timed Treatment:   50   mins   Total Treatment:     50   mins    Cheryl Powers, PT  [place] License: #[id number]

## 2023-02-02 ENCOUNTER — TREATMENT (OUTPATIENT)
Dept: PHYSICAL THERAPY | Facility: CLINIC | Age: 63
End: 2023-02-02
Payer: COMMERCIAL

## 2023-02-02 DIAGNOSIS — Z98.890 S/P CARPAL TUNNEL RELEASE: Primary | ICD-10-CM

## 2023-02-02 DIAGNOSIS — R29.898 DECREASED GRIP STRENGTH OF RIGHT HAND: ICD-10-CM

## 2023-02-02 PROCEDURE — 97530 THERAPEUTIC ACTIVITIES: CPT | Performed by: PHYSICAL THERAPIST

## 2023-02-02 PROCEDURE — 97110 THERAPEUTIC EXERCISES: CPT | Performed by: PHYSICAL THERAPIST

## 2023-02-02 PROCEDURE — 97112 NEUROMUSCULAR REEDUCATION: CPT | Performed by: PHYSICAL THERAPIST

## 2023-02-02 NOTE — PROGRESS NOTES
Physical Therapy Daily Treatment Note  [address] [facility]  [address]   [place] [address]      Patient: Sebastian Liao   : 1960  Referring practitioner: Raoul Alexander PA-C  Date of Initial Visit: Type: THERAPY  Noted: 2023  Today's Date: 2023  Patient seen for 7 sessions       Visit Diagnoses:    ICD-10-CM ICD-9-CM   1. S/P carpal tunnel release  Z98.890 V45.89   2. Decreased  strength of right hand  R29.898 729.89       Subjective   Sebastian Liao reports: Reports ongoing throbbing in R wrist with repetitive or sustained gripping. Otherwise asymptomatic.     Pre tx pn score: 3  Post tx pn score: 3    Treatment  See Exercise, Manual, and Modality Logs for complete treatment.     Objective         Assessment & Plan     Assessment    Assessment details: Continued w/ /pinch strengthening, conditioning, work simulated activity w/ progressions as outlined in chart. Pt performed all w/ good mechanics, noted some fatigue but no significant increase in wrist pn throughout visit. Pt needs continued PT to restore full strength, ROM, and function in order to allow return to full work and daily activities w/o pn or dysfunction.      Plan  Plan details: Cont w/ work sim, functional strengthening/conditioning          Timed:         Manual Therapy:    0     mins  56671;     Therapeutic Exercise:    10     mins  98629;     Neuromuscular Brandon:    10    mins  51700;    Therapeutic Activity:     25     mins  67865;     Gait Trainin     mins  89690;     Ultrasound:     0     mins  44776;    Ionto                               0    mins   41229  Self Care                       0     mins   65743  Canalith Repos    0     mins 74839      Un-Timed:  Electrical Stimulation:    0     mins  03261 ( );  Dry Needling     0     mins self-pay  Traction     0     mins 78361      Timed Treatment:   45   mins   Total Treatment:     45   mins    Cheryl Powers, PT  [place] License: #[id number]

## 2023-02-09 ENCOUNTER — TREATMENT (OUTPATIENT)
Dept: PHYSICAL THERAPY | Facility: CLINIC | Age: 63
End: 2023-02-09
Payer: COMMERCIAL

## 2023-02-09 DIAGNOSIS — Z98.890 S/P CARPAL TUNNEL RELEASE: Primary | ICD-10-CM

## 2023-02-09 DIAGNOSIS — R29.898 DECREASED GRIP STRENGTH OF RIGHT HAND: ICD-10-CM

## 2023-02-09 PROCEDURE — 97110 THERAPEUTIC EXERCISES: CPT | Performed by: PHYSICAL THERAPIST

## 2023-02-09 PROCEDURE — 97530 THERAPEUTIC ACTIVITIES: CPT | Performed by: PHYSICAL THERAPIST

## 2023-02-09 PROCEDURE — 97112 NEUROMUSCULAR REEDUCATION: CPT | Performed by: PHYSICAL THERAPIST

## 2023-02-09 NOTE — PROGRESS NOTES
Physical Therapy Daily Treatment Note  [address]  [address]   [place] [address]      Patient: Sebastian Liao   : 1960  Referring practitioner: Raoul Alexander PA-C  Date of Initial Visit: Type: THERAPY  Noted: 2023  Today's Date: 2023  Patient seen for 8 sessions       Visit Diagnoses:    ICD-10-CM ICD-9-CM   1. S/P carpal tunnel release  Z98.890 V45.89   2. Decreased  strength of right hand  R29.898 729.89       Subjective   Sebastian Liao reports: Doing well, no worsening of symptoms. Mostly pn free. No pn/soreness from PT visits but feels tired afterward.    Pre tx pn score: 2  Post tx pn score: 2    Treatment  See Exercise, Manual, and Modality Logs for complete treatment.     Objective         Assessment & Plan     Assessment    Assessment details: Continued w/ general wrist/ strengthening, stretching, and work simulated activity. Added timed but/bolt box manipulation to facilitate fine motor skills, improve dexterity and endurance. Able to progress box lift/transfers by additional 5lbs w/o issue. Set to RTW starting 23. Pt needs continued PT to restore full strength, ROM, and function in order to allow return to full work and daily activities w/o pn or dysfunction.      Plan  Plan details: Reassess next visit          Timed:         Manual Therapy:    0     mins  43007;     Therapeutic Exercise:    15     mins  59887;     Neuromuscular Brandon:    10    mins  05008;    Therapeutic Activity:     25     mins  82858;     Gait Trainin     mins  40570;     Ultrasound:     0     mins  47695;    Ionto                               0    mins   96114  Self Care                       0     mins   31276  Canalith Repos    0     mins 87769      Un-Timed:  Electrical Stimulation:    0     mins  13529 ( );  Dry Needling     0     mins self-pay  Traction     0     mins 10630      Timed Treatment:   50   mins   Total Treatment:     60   mins    Cheryl Powers, PT  [place]  License: #[id number]

## 2023-02-13 ENCOUNTER — TREATMENT (OUTPATIENT)
Dept: PHYSICAL THERAPY | Facility: CLINIC | Age: 63
End: 2023-02-13
Payer: COMMERCIAL

## 2023-02-13 DIAGNOSIS — R29.898 DECREASED GRIP STRENGTH OF RIGHT HAND: ICD-10-CM

## 2023-02-13 DIAGNOSIS — Z98.890 S/P CARPAL TUNNEL RELEASE: Primary | ICD-10-CM

## 2023-02-13 PROCEDURE — 97530 THERAPEUTIC ACTIVITIES: CPT | Performed by: PHYSICAL THERAPIST

## 2023-02-13 PROCEDURE — 97110 THERAPEUTIC EXERCISES: CPT | Performed by: PHYSICAL THERAPIST

## 2023-02-13 PROCEDURE — 97112 NEUROMUSCULAR REEDUCATION: CPT | Performed by: PHYSICAL THERAPIST

## 2023-02-13 NOTE — PROGRESS NOTES
Physical Therapy Reassessment  [address] [facility] [address]    [place] [address]  Patient: Sebastian Liao   : 1960  Diagnosis/ICD-10 Code:  S/P carpal tunnel release [Z98.890]  Referring practitioner: Raoul Alexander PA-C  Date of Initial Visit: 2023  Today's Date: 2023  Patient seen for 9 sessions         Visit Diagnoses:    ICD-10-CM ICD-9-CM   1. S/P carpal tunnel release  Z98.890 V45.89   2. Decreased  strength of right hand  R29.898 729.89       Subjective Questionnaire: QuickDASH: 27.27% impairment  Clinical Progress: improved  Home Program Compliance: Yes  Treatment has included: therapeutic exercise, neuromuscular re-education, manual therapy, therapeutic activity and cryotherapy      Subjective   Sebastian Liao reports: Goes back to work today w/o restrictions. No big concerns. Feels itching, occasional throbbing over volar wrist. Numbness localized to tip of thumb, seems to be improving. Does not feel limited at all w/ routine household activities.    Pre tx pn score: 2  Post tx pn score: 2    Treatment  See Exercise, Manual, and Modality Logs for complete treatment.     Objective          Tenderness     Additional Tenderness Details  Min TTP volar wrist    Neurological Testing     Sensation     Wrist/Hand     Right   Intact: light touch    Comments   Right light touch: slight numbness tip 1st digit    Active Range of Motion     Left Elbow   Forearm supination: 70 degrees   Forearm pronation: 80 degrees     Right Elbow   Forearm supination: 65 degrees   Forearm pronation: 75 degrees     Left Wrist   Wrist flexion: 65 degrees   Wrist extension: 52 degrees   Radial deviation: 15 degrees   Ulnar deviation: 40 degrees     Right Wrist   Wrist flexion: 65 degrees   Wrist extension: 60 degrees   Radial deviation: 15 degrees   Ulnar deviation: 40 degrees     Strength/Myotome Testing     Left Wrist/Hand   Wrist extension: 5  Wrist flexion: 5  Radial deviation: 5  Ulnar deviation: 5     (2nd  hand position)     Trial 1: 72 lbs    Trial 2: 50 lbs    Trial 3: 35 lbs    Average: 52.33 lbs    Thumb Strength  Key/Lateral Pinch     Trial 1: 20 lbs  Tip/Two-Point Pinch     Trial 1: 10 lbs  Palmar/Three-Point Pinch     Trial 1: 15 lbs    Right Wrist/Hand   Wrist extension: 5  Wrist flexion: 5  Radial deviation: 5  Ulnar deviation: 5     (2nd hand position)     Trial 1: 74 lbs    Trial 2: 60 lbs    Trial 3: 64 lbs    Average: 66 lbs    Thumb Strength   Key/Lateral Pinch     Trial 1: 20 lbs  Tip/Two-Point Pinch     Trial 1: 13 lbs  Palmar/Three-Point Pinch     Trial 1: 18 lbs    Additional Strength Details  Thumb extn 4+/5 bilaterally  Thumb abd 4+/5 bilaterally  Thumb opposition R 5/5, L 5/5  Finger abd 4+/5 bilaterally            Assessment & Plan     Assessment    Assessment details: Reassessment performed. Pt has completed 9 PT visits. He demonstrates restored wrist mobility, wrist strength, /pinch strength. Numbness now slight and localized to tip of thumb. Min TTP over surgical site. Fine motor skills/coordination intact. Able to perform work simulated activities for 40+ mins w/o issue other than fatigue-see exercise flow sheet for details. He has met nearly all goals set for PT. Set to RTW w/o restrictions today. Could benefit from a few additional visits to ensure RTW w/o issue and allow transition to independent HEP.    Goals  Plan Goals: STG 3 wks  1) Pt to be compliant w/ initial HEP for ROM/strength/symptom mgmt-MET  2) Pt to report pn w/ household activities to be no greater than 3/10.-MET  3) Pt to improve R wrist mobility to 65 deg flxn or better.-MET  5) Pt to improve QD score to 50% impairment or better to reflect improved pn and function.-MET    LTG 6 wks  1) Pt to be independent w/ long term HEP and self mgmt.-PROGRESSING  2) Pt to tolerate 40 mins or more of work simulated activities w/ min to no pn or dysfunction.-MET  3) Pt to improve R  strength avg to 45lb or better to assist w/  performance of daily and work activities.-MET  4) Pt to improve QD score to 25% impairment or better to reflect improved pn and function.-PROGRESSING  5) Pt to improve R wrist/forearm strength to 5/5 in all planes.-MET      Plan  Plan details: Assess RTW; continue w/ work sim activity        Progress toward previous goals: Partially Met        Timed:         Manual Therapy:    0     mins  63501;     Therapeutic Exercise:    15     mins  38305;     Neuromuscular Brandon:    10    mins  31562;    Therapeutic Activity:     25     mins  34385;     Gait Trainin     mins  97476;     Ultrasound:     0     mins  27169;    Ionto                               0    mins   95889  Self Care                       0     mins   73152  Canalith Repos    0     mins 73593      Un-Timed:  Electrical Stimulation:    0     mins  78927 ( );  Dry Needling     0     mins self-pay  Traction     0     mins 49145  Re-Eval                           0    mins  78196      Timed Treatment:   50   mins   Total Treatment:     50   mins          PT: Cheryl Powers, PT     [place] License: #[id number]    Electronically signed by Cheryl Powers PT, 23, 9:38 AM EST

## 2023-02-16 ENCOUNTER — TREATMENT (OUTPATIENT)
Dept: PHYSICAL THERAPY | Facility: CLINIC | Age: 63
End: 2023-02-16
Payer: COMMERCIAL

## 2023-02-16 DIAGNOSIS — M79.601 PAIN OF RIGHT UPPER EXTREMITY: ICD-10-CM

## 2023-02-16 DIAGNOSIS — Z98.890 S/P CARPAL TUNNEL RELEASE: Primary | ICD-10-CM

## 2023-02-16 DIAGNOSIS — R29.898 DECREASED GRIP STRENGTH OF RIGHT HAND: ICD-10-CM

## 2023-02-16 PROCEDURE — 97530 THERAPEUTIC ACTIVITIES: CPT | Performed by: PHYSICAL THERAPIST

## 2023-02-16 PROCEDURE — 97110 THERAPEUTIC EXERCISES: CPT | Performed by: PHYSICAL THERAPIST

## 2023-02-16 PROCEDURE — 97140 MANUAL THERAPY 1/> REGIONS: CPT | Performed by: PHYSICAL THERAPIST

## 2023-02-16 NOTE — PROGRESS NOTES
Physical Therapy Daily Treatment Note                  [address] [facility] [address]  [place] [address]      Patient: Sebastian Liao   : 1960  Diagnosis/ICD-10 Code:  S/P carpal tunnel release [Z98.890]  Referring practitioner: Raoul Alexander PA-C  Date of Initial Visit: Type: THERAPY  Noted: 2023  Today's Date: 2023  Patient seen for 10 sessions             Subjective   Sebastian Liao reports: still get N/T at the scar when doing a lot of gripping but if let it rest it goes away. Able to get everything at work done just takes a little longer.     Objective   See Exercise, Manual, and Modality Logs for complete treatment.       Assessment/Plan  Pt is doing work simulated activities with no issues, just fatigue. N/T at scar should improve with daily scar massage and was instructed on 10 minute self massage.   Progress per Plan of Care and Progress strengthening /stabilization /functional activity           Timed:  Manual Therapy:    10     mins  08715;  Therapeutic Exercise:    30     mins  98713;     Neuromuscular Brandon:        mins  15796;    Therapeutic Activity:     15     mins  99318;     Gait Training:           mins  10907;     Ultrasound:          mins  76088;    Electrical Stimulation:         mins  92388;  Iontophoresis          mins  42353    Untimed:  Electrical Stimulation:         mins  41548 ( );  Mechanical Traction:         mins  11390;   Fluidotherapy          mins  02055    Timed Treatment:   55   mins   Total Treatment:     55   mins        Cheryl Ramires PTA  Physical Therapist Assistant

## 2023-02-20 ENCOUNTER — TREATMENT (OUTPATIENT)
Dept: PHYSICAL THERAPY | Facility: CLINIC | Age: 63
End: 2023-02-20
Payer: COMMERCIAL

## 2023-02-20 DIAGNOSIS — R29.898 DECREASED GRIP STRENGTH OF RIGHT HAND: ICD-10-CM

## 2023-02-20 DIAGNOSIS — Z98.890 S/P CARPAL TUNNEL RELEASE: Primary | ICD-10-CM

## 2023-02-20 DIAGNOSIS — M79.601 PAIN OF RIGHT UPPER EXTREMITY: ICD-10-CM

## 2023-02-20 PROCEDURE — 97530 THERAPEUTIC ACTIVITIES: CPT | Performed by: PHYSICAL THERAPIST

## 2023-02-20 PROCEDURE — 97110 THERAPEUTIC EXERCISES: CPT | Performed by: PHYSICAL THERAPIST

## 2023-02-20 PROCEDURE — 97112 NEUROMUSCULAR REEDUCATION: CPT | Performed by: PHYSICAL THERAPIST

## 2023-02-20 NOTE — PROGRESS NOTES
Physical Therapy Daily Treatment Note  [address]  [address]   [place] [address]      Patient: Sebastian Liao   : 1960  Referring practitioner: Raoul Alexander PA-C  Date of Initial Visit: Type: THERAPY  Noted: 2023  Today's Date: 2023  Patient seen for 11 sessions       Visit Diagnoses:    ICD-10-CM ICD-9-CM   1. S/P carpal tunnel release  Z98.890 V45.89   2. Decreased  strength of right hand  R29.898 729.89   3. Pain of right upper extremity  M79.601 729.5       Subjective   Sebastian Liao reports: Hand throbbed some over the weekend but had done some housework. Otherwise doing great. Feels good to be back at work. Able to complete all of his usual tasks.    Pre tx pn score: 2  Post tx pn score: 2    Treatment  See Exercise, Manual, and Modality Logs for complete treatment.     Objective         Assessment & Plan     Assessment    Assessment details: Pt denies any symptom exacerbation since RTW last week. Denies any significant limitations w/ work activities. Proceeded w/ wrist stretching, scar mobilization techniques, and work simulated activity as outlined in chart. No pn reported at end of visit. Pt has 1 approved visit remaining. Will plan discharge at that time if no issues arise.    Plan  Plan details: D/c to HEP; update HEP          Timed:         Manual Therapy:    0     mins  60165;     Therapeutic Exercise:    15     mins  93245;     Neuromuscular Brandon:    10    mins  94259;    Therapeutic Activity:     25     mins  88382;     Gait Trainin     mins  60628;     Ultrasound:     0     mins  20975;    Ionto                               0    mins   63070  Self Care                       0     mins   47809  Canalith Repos    0     mins 79367      Un-Timed:  Electrical Stimulation:    0     mins  45649 ( );  Dry Needling     0     mins self-pay  Traction     0     mins 20826      Timed Treatment:   50   mins   Total Treatment:     60   mins    Cheryl Powers, PT  [place]  License: #[id number]

## 2023-02-23 ENCOUNTER — TREATMENT (OUTPATIENT)
Dept: PHYSICAL THERAPY | Facility: CLINIC | Age: 63
End: 2023-02-23
Payer: COMMERCIAL

## 2023-02-23 DIAGNOSIS — R29.898 DECREASED GRIP STRENGTH OF RIGHT HAND: ICD-10-CM

## 2023-02-23 DIAGNOSIS — M79.601 PAIN OF RIGHT UPPER EXTREMITY: ICD-10-CM

## 2023-02-23 DIAGNOSIS — Z98.890 S/P CARPAL TUNNEL RELEASE: Primary | ICD-10-CM

## 2023-02-23 PROCEDURE — 97530 THERAPEUTIC ACTIVITIES: CPT | Performed by: PHYSICAL THERAPIST

## 2023-02-23 PROCEDURE — 97110 THERAPEUTIC EXERCISES: CPT | Performed by: PHYSICAL THERAPIST

## 2023-02-23 NOTE — PATIENT INSTRUCTIONS
Access Code: [email or web address]  URL: [email or web address]  Date: 02/23/2023  Prepared by: Cheryl Powers    Exercises  Standing Wrist Flexor Stretch with Arm Bent - 1 x daily - 3 reps - 20 sec hold  Hand AROM Tendon Gliding Series - 1 x daily - 15-30 reps  Median nerve glide - 1 x daily - 15-30 reps  Seated Wrist Fracture Tissue Massage - 1 x daily - 15-30 reps

## 2023-02-23 NOTE — PROGRESS NOTES
Discharge Summary  [address] [facility] [address]  [place] [address]      Patient: Sebastian Liao   : 1960  Diagnosis/ICD-10 Code:  S/P carpal tunnel release [Z98.890]  Referring practitioner: Raoul Alexander PA-C  Date of Initial Visit: Type: THERAPY  Noted: 2023  Today's Date: 2023  Patient seen for 12 sessions      Subjective:   Subjective Questionnaire: QuickDASH: 25% impairment  Clinical Progress: improved  Home Program Compliance: Yes  Treatment has included: therapeutic exercise, neuromuscular re-education, manual therapy, therapeutic activity and cryotherapy    Subjective    Sebastian Liao reports: Feels that he is doing very well. Still throbs occasionally but controlled w/ ice. Tingling in thumb very slight, not always noticeable. Does not feel limited w/ work/daily activities.    Pre tx pn score: 0  Post tx pn score: 0      Treatment  See Exercise, Manual, and Modality Logs for complete treatment.        Objective          Tenderness     Additional Tenderness Details  Min TTP volar wrist    Neurological Testing     Sensation     Wrist/Hand     Right   Intact: light touch    Comments   Right light touch: slight numbness tip 1st digit    Active Range of Motion     Left Elbow   Forearm supination: 70 degrees   Forearm pronation: 80 degrees     Right Elbow   Forearm supination: 65 degrees   Forearm pronation: 75 degrees     Left Wrist   Wrist flexion: 65 degrees   Wrist extension: 60 degrees   Radial deviation: 15 degrees   Ulnar deviation: 40 degrees     Right Wrist   Wrist flexion: 70 degrees   Wrist extension: 70 degrees   Radial deviation: 20 degrees   Ulnar deviation: 46 degrees     Strength/Myotome Testing     Left Wrist/Hand   Wrist extension: 5  Wrist flexion: 5  Radial deviation: 5  Ulnar deviation: 5     (2nd hand position)     Trial 1: 72 lbs    Trial 2: 50 lbs    Trial 3: 35 lbs    Average: 52.33 lbs    Thumb Strength  Key/Lateral Pinch     Trial 1: 20 lbs  Tip/Two-Point Pinch      Trial 1: 10 lbs  Palmar/Three-Point Pinch     Trial 1: 15 lbs    Right Wrist/Hand   Wrist extension: 5  Wrist flexion: 5  Radial deviation: 5  Ulnar deviation: 5     (2nd hand position)     Trial 1: 68 lbs    Trial 2: 73 lbs    Trial 3: 68 lbs    Average: 69.67 lbs    Thumb Strength   Key/Lateral Pinch     Trial 1: 25 lbs  Tip/Two-Point Pinch     Trial 1: 16 lbs  Palmar/Three-Point Pinch     Trial 1: 18 lbs    Additional Strength Details  Thumb extn 4+/5 bilaterally  Thumb abd 4+/5 bilaterally  Thumb opposition R 5/5, L 5/5  Finger abd 4+/5 bilaterally          Assessment & Plan     Assessment    Assessment details: Pt progressed well w/ PT. Demonstrates restored wrist strength/mobility, /pinch strength, and denies pn. Has some lingering intermittent tingling in tip of his thumb. He has met all goals set for PT, has RTW w/o limitations. Pt is independent w/ HEP and appropriate to d/c to self mgmt.    Goals  Plan Goals: STG 3 wks  1) Pt to be compliant w/ initial HEP for ROM/strength/symptom mgmt-MET  2) Pt to report pn w/ household activities to be no greater than 3/10.-MET  3) Pt to improve R wrist mobility to 65 deg flxn or better.-MET  5) Pt to improve QD score to 50% impairment or better to reflect improved pn and function.-MET    LTG 6 wks  1) Pt to be independent w/ long term HEP and self mgmt.-MET  2) Pt to tolerate 40 mins or more of work simulated activities w/ min to no pn or dysfunction.-MET  3) Pt to improve R  strength avg to 45lb or better to assist w/ performance of daily and work activities.-MET  4) Pt to improve QD score to 25% impairment or better to reflect improved pn and function.-MET  5) Pt to improve R wrist/forearm strength to 5/5 in all planes.-MET      Plan  Plan details: D/c to independent mgmt            PT Signature: Cheryl Powers, PT        Timed:  Manual Therapy:    0     mins  56852;  Therapeutic Exercise:    30     mins  53796;     Neuromuscular Brandon:    0    mins   58522;    Therapeutic Activity:     10     mins  89674;     Gait Trainin     mins  47076;     Ultrasound:     0     mins  60701;    Electrical Stimulation:    0     mins  33085 ( );    Untimed:  Electrical Stimulation:    0     mins  12930 ( );  Mechanical Traction:    0     mins  28130;     Timed Treatment:   40   mins   Total Treatment:     40   mins

## 2023-05-05 DIAGNOSIS — E11.65 TYPE 2 DIABETES MELLITUS WITH HYPERGLYCEMIA, WITHOUT LONG-TERM CURRENT USE OF INSULIN: Primary | ICD-10-CM

## 2023-05-05 RX ORDER — SEMAGLUTIDE 0.68 MG/ML
0.5 INJECTION, SOLUTION SUBCUTANEOUS WEEKLY
Qty: 1.5 ML | Refills: 3 | OUTPATIENT
Start: 2023-05-05

## 2023-05-05 NOTE — TELEPHONE ENCOUNTER
Patient notified that you put orders in for him today and he will go Monday morning to get them drawn.

## 2023-05-08 ENCOUNTER — LAB (OUTPATIENT)
Dept: LAB | Facility: HOSPITAL | Age: 63
End: 2023-05-08
Payer: COMMERCIAL

## 2023-05-08 DIAGNOSIS — Z12.5 SPECIAL SCREENING FOR MALIGNANT NEOPLASM OF PROSTATE: ICD-10-CM

## 2023-05-08 DIAGNOSIS — E11.65 TYPE 2 DIABETES MELLITUS WITH HYPERGLYCEMIA, WITHOUT LONG-TERM CURRENT USE OF INSULIN: ICD-10-CM

## 2023-05-08 DIAGNOSIS — Z00.00 ROUTINE GENERAL MEDICAL EXAMINATION AT A HEALTH CARE FACILITY: ICD-10-CM

## 2023-05-08 LAB
ALBUMIN SERPL-MCNC: 4.3 G/DL (ref 3.5–5.2)
ALBUMIN/GLOB SERPL: 1.4 G/DL
ALP SERPL-CCNC: 66 U/L (ref 39–117)
ALT SERPL W P-5'-P-CCNC: 21 U/L (ref 1–41)
ANION GAP SERPL CALCULATED.3IONS-SCNC: 9.5 MMOL/L (ref 5–15)
AST SERPL-CCNC: 29 U/L (ref 1–40)
BILIRUB SERPL-MCNC: 0.3 MG/DL (ref 0–1.2)
BUN SERPL-MCNC: 14 MG/DL (ref 8–23)
BUN/CREAT SERPL: 11.4 (ref 7–25)
CALCIUM SPEC-SCNC: 9.5 MG/DL (ref 8.6–10.5)
CHLORIDE SERPL-SCNC: 101 MMOL/L (ref 98–107)
CHOLEST SERPL-MCNC: 126 MG/DL (ref 0–200)
CO2 SERPL-SCNC: 28.5 MMOL/L (ref 22–29)
CREAT SERPL-MCNC: 1.23 MG/DL (ref 0.76–1.27)
DEPRECATED RDW RBC AUTO: 45.7 FL (ref 37–54)
EGFRCR SERPLBLD CKD-EPI 2021: 66.8 ML/MIN/1.73
ERYTHROCYTE [DISTWIDTH] IN BLOOD BY AUTOMATED COUNT: 14.5 % (ref 12.3–15.4)
GLOBULIN UR ELPH-MCNC: 3.1 GM/DL
GLUCOSE SERPL-MCNC: 96 MG/DL (ref 65–99)
HBA1C MFR BLD: 6.1 % (ref 4.8–5.6)
HCT VFR BLD AUTO: 44.1 % (ref 37.5–51)
HDLC SERPL-MCNC: 37 MG/DL (ref 40–60)
HGB BLD-MCNC: 14.9 G/DL (ref 13–17.7)
LDLC SERPL CALC-MCNC: 68 MG/DL (ref 0–100)
LDLC/HDLC SERPL: 1.8 {RATIO}
MCH RBC QN AUTO: 29.1 PG (ref 26.6–33)
MCHC RBC AUTO-ENTMCNC: 33.8 G/DL (ref 31.5–35.7)
MCV RBC AUTO: 86.1 FL (ref 79–97)
PLATELET # BLD AUTO: 335 10*3/MM3 (ref 140–450)
PMV BLD AUTO: 9.9 FL (ref 6–12)
POTASSIUM SERPL-SCNC: 3.9 MMOL/L (ref 3.5–5.2)
PROT SERPL-MCNC: 7.4 G/DL (ref 6–8.5)
PSA SERPL-MCNC: 1.26 NG/ML (ref 0–4)
RBC # BLD AUTO: 5.12 10*6/MM3 (ref 4.14–5.8)
SODIUM SERPL-SCNC: 139 MMOL/L (ref 136–145)
TRIGL SERPL-MCNC: 112 MG/DL (ref 0–150)
TSH SERPL DL<=0.05 MIU/L-ACNC: 1.99 UIU/ML (ref 0.27–4.2)
VLDLC SERPL-MCNC: 21 MG/DL (ref 5–40)
WBC NRBC COR # BLD: 7.74 10*3/MM3 (ref 3.4–10.8)

## 2023-05-08 PROCEDURE — 36415 COLL VENOUS BLD VENIPUNCTURE: CPT

## 2023-05-08 PROCEDURE — 80050 GENERAL HEALTH PANEL: CPT

## 2023-05-08 PROCEDURE — 80061 LIPID PANEL: CPT

## 2023-05-08 PROCEDURE — G0103 PSA SCREENING: HCPCS

## 2023-05-08 PROCEDURE — 83036 HEMOGLOBIN GLYCOSYLATED A1C: CPT

## 2023-05-15 RX ORDER — SEMAGLUTIDE 0.68 MG/ML
0.5 INJECTION, SOLUTION SUBCUTANEOUS WEEKLY
Qty: 1.5 ML | Refills: 3 | Status: SHIPPED | OUTPATIENT
Start: 2023-05-15

## 2023-05-23 ENCOUNTER — OFFICE VISIT (OUTPATIENT)
Dept: PULMONOLOGY | Facility: CLINIC | Age: 63
End: 2023-05-23
Payer: COMMERCIAL

## 2023-05-23 VITALS
DIASTOLIC BLOOD PRESSURE: 76 MMHG | OXYGEN SATURATION: 98 % | HEIGHT: 65 IN | HEART RATE: 88 BPM | WEIGHT: 244.13 LBS | SYSTOLIC BLOOD PRESSURE: 134 MMHG | TEMPERATURE: 98 F | RESPIRATION RATE: 18 BRPM | BODY MASS INDEX: 40.68 KG/M2

## 2023-05-23 DIAGNOSIS — Z87.891 FORMER SMOKER: ICD-10-CM

## 2023-05-23 DIAGNOSIS — Z86.16 HISTORY OF COVID-19: ICD-10-CM

## 2023-05-23 DIAGNOSIS — G47.33 OSA (OBSTRUCTIVE SLEEP APNEA): ICD-10-CM

## 2023-05-23 DIAGNOSIS — E66.01 OBESITY, CLASS III, BMI 40-49.9 (MORBID OBESITY): ICD-10-CM

## 2023-05-23 DIAGNOSIS — R06.09 DYSPNEA ON EXERTION: Primary | ICD-10-CM

## 2023-05-23 DIAGNOSIS — K21.9 CHRONIC GERD: ICD-10-CM

## 2023-05-23 NOTE — PROGRESS NOTES
PULMONARY  NOTE    Chief Complaint     Dyspnea on exertion, fatigue, history of COVID-19, class III morbid obesity, severe obstructive sleep apnea, allergic rhinitis    History of Present Illness     63-year-old male returns today for follow-up  I last saw him 10/25/2022    He has obstructive sleep apnea  He continues to use CPAP on a daily basis  He works night shift so he actually uses it during the day when he sleeps  His compliance is excellent and he feels that it benefits him    Continues to have issues with post COVID 19 symptoms  He did contact the [facility] and they have made some recommendations    He has been on Advair and albuterol  He feels that the inhalers helps with his symptoms of dyspnea    He has seasonally aggravated perennial rhinitis for which she has taken Allegra and Singulair in the past    Patient Active Problem List   Diagnosis   • Testicular hypofunction   • Essential hypertension, benign   • Type 2 diabetes mellitus with hyperglycemia, without long-term current use of insulin (HCC)   • Hyperlipidemia   • Obesity, Class III, BMI 40-49.9 (morbid obesity)   • Dyspnea on exertion   • History of COVID-19 pneumonia (10/2020)   • Former smoker (None x 35 years)   • GERD   • RONIT (obstructive sleep apnea) - severe   • DDD (degenerative disc disease), cervical   • Seasonal allergic rhinitis   • Screen for colon cancer   • Carpal tunnel syndrome of right wrist     No Known Allergies    Current Outpatient Medications:   •  albuterol sulfate  (90 Base) MCG/ACT inhaler, Inhale 2 puffs Every 4 (Four) Hours As Needed for wheezing, Disp: 54 g, Rfl: 3  •  alfuzosin (UROXATRAL) 10 MG 24 hr tablet, Take 1 tablet by mouth Daily., Disp: 90 tablet, Rfl: 3  •  amLODIPine (NORVASC) 10 MG tablet, Take 1 tablet by mouth Daily., Disp: 90 tablet, Rfl: 3  •  carisoprodol (Soma) 350 MG tablet, Take 1 tablet by mouth 2 (Two) Times a Day As Needed for Muscle Spasms., Disp: , Rfl:   •  cyclobenzaprine  (FLEXERIL) 10 MG tablet, Take 0.5-1 tablet by mouth At Night As Needed for Muscle Spasms., Disp: 90 tablet, Rfl: 1  •  dicyclomine (BENTYL) 20 MG tablet, Take 1 tablet by mouth Every 6 (Six) Hours., Disp: 180 tablet, Rfl: 3  •  fluticasone (Flonase) 50 MCG/ACT nasal spray, 2 sprays into the nostril(s) as directed by provider Daily As Needed for Allergies., Disp: , Rfl:   •  fluticasone-salmeterol (Advair Diskus) 100-50 MCG/DOSE DISKUS, Inhale 1 puff 2 (Two) Times a Day., Disp: 180 each, Rfl: 3  •  Fluticasone-Salmeterol (ADVAIR/WIXELA) 100-50 MCG/ACT DISKUS, Inhale 1 puff 2 (Two) Times a Day., Disp: 60 each, Rfl: 5  •  glyBURIDE-metFORMIN (GLUCOVANCE) 5-500 MG per tablet, Take 1 tablet by mouth 2 (Two) Times a Day With Meals., Disp: 180 tablet, Rfl: 3  •  ibuprofen (ADVIL,MOTRIN) 600 MG tablet, Take 1 tablet by mouth Every 6 (Six) Hours As Needed for Mild Pain., Disp: , Rfl:   •  loratadine (CLARITIN) 10 MG tablet, Take 1 tablet by mouth At Night As Needed for Allergies., Disp: , Rfl:   •  montelukast (SINGULAIR) 10 MG tablet, Take 1 tablet by mouth Every Night., Disp: 90 tablet, Rfl: 3  •  olopatadine (PATANOL) 0.1 % ophthalmic solution, Administer 1 drop to both eyes 2 (Two) Times a Day., Disp: , Rfl:   •  omeprazole (priLOSEC) 40 MG capsule, Take 1 capsule by mouth Daily., Disp: 90 capsule, Rfl: 3  •  promethazine (PHENERGAN) 25 MG tablet, Take 1 tablet by mouth Every 6 (Six) Hours As Needed for nausea and vomiting, Disp: 60 tablet, Rfl: 3  •  rosuvastatin (CRESTOR) 10 MG tablet, Take 1 tablet by mouth Daily., Disp: 90 tablet, Rfl: 3  •  Semaglutide,0.25 or 0.5MG/DOS, (Ozempic, 0.25 or 0.5 MG/DOSE,) 2 MG/3ML solution pen-injector, Inject 0.5 mg under the skin into the appropriate area as directed 1 (One) Time Per Week., Disp: 1.5 mL, Rfl: 3  •  sildenafil (VIAGRA) 100 MG tablet, Take 1 tablet by mouth Daily As Needed for Erectile Dysfunction., Disp: , Rfl:   •  silodosin (RAPAFLO) 8 MG capsule capsule, Take 1  "capsule by mouth Daily., Disp: 90 capsule, Rfl: 3  •  tadalafil (CIALIS) 20 MG tablet, Take 1 tablet by mouth Daily As Needed for Erectile Dysfunction., Disp: , Rfl:   •  Testosterone (Testopel) 75 MG implant pellet, Testopel 75 mg implant pellet, Disp: , Rfl:     Current Facility-Administered Medications:   •  Semaglutide(0.25 or 0.5MG/DOS) (OZEMPIC) solution pen-injector 0.25 mg, 0.25 mg, Subcutaneous, Weekly, Fransico Ngo PA  MEDICATION LIST AND ALLERGIES REVIEWED.    Family History   Problem Relation Age of Onset   • Cancer Mother         Ovarian,    • Stroke Father    • Hypertension Father    • Hypertension Sister    • Diabetes Brother    • Heart attack Brother    • Hypertension Maternal Grandmother    • Diabetes Maternal Grandmother      Social History     Tobacco Use   • Smoking status: Former     Packs/day: 0.50     Years: 10.00     Pack years: 5.00     Types: Cigarettes     Quit date:      Years since quittin.4   • Smokeless tobacco: Former     Types: Chew     Quit date: 1986   Vaping Use   • Vaping Use: Never used   Substance Use Topics   • Alcohol use: Yes     Comment: 2 drinks per month   • Drug use: Yes     Types: Marijuana     Comment: Quit in      Social History     Social History Narrative    Left hand dominant, , Exercises daily.     Former smoker, has not smoked for 35 years     FAMILY AND SOCIAL HISTORY REVIEWED.    Review of Systems  IF PRESENT REFER TO SCANNED ROS SHEET FROM SAME DATE  OTHERWISE ROS OBTAINED AND NON-CONTRIBUTORY OVER HPI.    /76 (BP Location: Left arm, Patient Position: Sitting, Cuff Size: Adult)   Pulse 88   Temp 98 °F (36.7 °C)   Resp 18   Ht 165.1 cm (65\")   Wt 111 kg (244 lb 2 oz)   SpO2 98% Comment: room air at rest  BMI 40.62 kg/m²   Physical Exam  Vitals and nursing note reviewed.   Constitutional:       General: He is not in acute distress.     Appearance: He is well-developed. He is not diaphoretic.   HENT:      Head: " Normocephalic and atraumatic.   Neck:      Thyroid: No thyromegaly.   Cardiovascular:      Rate and Rhythm: Normal rate and regular rhythm.      Heart sounds: Murmur heard.   Pulmonary:      Effort: Pulmonary effort is normal.      Breath sounds: Normal breath sounds. No stridor.   Lymphadenopathy:      Cervical: No cervical adenopathy.      Upper Body:      Right upper body: No supraclavicular or epitrochlear adenopathy.      Left upper body: No supraclavicular or epitrochlear adenopathy.   Skin:     General: Skin is warm and dry.   Neurological:      Mental Status: He is alert and oriented to person, place, and time.   Psychiatric:         Behavior: Behavior normal.         Results     CPAP data reviewed    Immunization History   Administered Date(s) Administered   • COVID-19 (PFIZER) BIVALENT 12+YRS 01/04/2023   • COVID-19 (PFIZER) Purple Cap Monovalent 01/09/2021, 02/01/2021, 10/04/2021   • Flu Vaccine Intradermal Quad 18-64YR 10/05/2022   • Flu Vaccine Quad PF 6-35MO 10/02/2021   • Flu Vaccine Quad PF >36MO 10/02/2021   • FluLaval/Fluzone >6mos 10/12/2020   • Flublok 18+yrs 10/02/2021   • Hep B, Unspecified 02/05/1996   • Hepatitis A 11/19/2018, 05/20/2019   • Hepatitis B Adult/Adolescent IM 02/05/1996   • Pneumococcal Conjugate 13-Valent (PCV13) 11/07/2016   • Pneumococcal Conjugate 20-Valent (PCV20) 11/10/2022   • Pneumococcal, Unspecified 04/25/2017   • Rabies 12/05/2014, 12/08/2014, 12/19/2014, 12/19/2014   • Rabies IM 2 12/19/2014   • Tetanus 04/25/2017     Problem List       ICD-10-CM ICD-9-CM   1. Dyspnea on exertion  R06.09 786.09   2. RONIT (obstructive sleep apnea) - severe  G47.33 327.23   3. Obesity, Class III, BMI 40-49.9 (morbid obesity)  E66.01 278.01   4. History of COVID-19 pneumonia (10/2020)  Z86.16 V12.09   5. Former smoker (None x 35 years)  Z87.891 V15.82   6. GERD  K21.9 530.81       Discussion     We reviewed his CPAP data  Compliance is excellent and effective AHI is less than  5  Reasonable leak    I have encouraged his ongoing efforts at weight loss    I encouraged regular exercise    He is can remain on Advair with albuterol    Also, Allegra and Singulair for seasonal aggravated sinus drainage    I will plan to see him back in 6 months for follow-up    Moderate level of Medical Decision Making complexity based on 2 or more chronic stable illnesses and an independent review of test results and/or prescription drug management.    Dontae Manzo MD  Note electronically signed    CC: Fransico Ngo PA

## 2023-06-12 ENCOUNTER — OFFICE VISIT (OUTPATIENT)
Dept: FAMILY MEDICINE CLINIC | Facility: CLINIC | Age: 63
End: 2023-06-12
Payer: COMMERCIAL

## 2023-06-12 VITALS
BODY MASS INDEX: 40.55 KG/M2 | RESPIRATION RATE: 16 BRPM | HEART RATE: 82 BPM | DIASTOLIC BLOOD PRESSURE: 78 MMHG | HEIGHT: 65 IN | WEIGHT: 243.4 LBS | SYSTOLIC BLOOD PRESSURE: 126 MMHG | OXYGEN SATURATION: 98 %

## 2023-06-12 DIAGNOSIS — J30.9 ALLERGIC RHINITIS, UNSPECIFIED SEASONALITY, UNSPECIFIED TRIGGER: ICD-10-CM

## 2023-06-12 DIAGNOSIS — R11.0 NAUSEA: ICD-10-CM

## 2023-06-12 DIAGNOSIS — E11.65 TYPE 2 DIABETES MELLITUS WITH HYPERGLYCEMIA, WITHOUT LONG-TERM CURRENT USE OF INSULIN: Primary | ICD-10-CM

## 2023-06-12 DIAGNOSIS — K21.9 GASTROESOPHAGEAL REFLUX DISEASE WITHOUT ESOPHAGITIS: ICD-10-CM

## 2023-06-12 LAB
EXPIRATION DATE: NORMAL
GLUCOSE BLDC GLUCOMTR-MCNC: 125 MG/DL (ref 70–130)
HBA1C MFR BLD: 5.7 %
Lab: NORMAL

## 2023-06-12 RX ORDER — OMEPRAZOLE 40 MG/1
40 CAPSULE, DELAYED RELEASE ORAL DAILY
Qty: 90 CAPSULE | Refills: 3 | Status: SHIPPED | OUTPATIENT
Start: 2023-06-12

## 2023-06-12 RX ORDER — CYCLOBENZAPRINE HCL 10 MG
10 TABLET ORAL 3 TIMES DAILY PRN
Qty: 30 TABLET | Refills: 1 | Status: SHIPPED | OUTPATIENT
Start: 2023-06-12

## 2023-06-12 RX ORDER — FLUTICASONE PROPIONATE 50 MCG
2 SPRAY, SUSPENSION (ML) NASAL DAILY PRN
Start: 2023-06-12

## 2023-06-12 RX ORDER — PROMETHAZINE HYDROCHLORIDE 25 MG/1
25 TABLET ORAL EVERY 6 HOURS PRN
Qty: 60 TABLET | Refills: 3 | Status: SHIPPED | OUTPATIENT
Start: 2023-06-12

## 2023-06-12 NOTE — PROGRESS NOTES
Subjective   Sebastian Liao is a 63 y.o. male  Diabetes (F/U. FBS A1C ), Nausea (RF phenergan), GERD (RF omeprazole), Allergic Rhinitis (RF Flonase and Claritin), and Back Pain (RF Flexeril)      History of Present Illness  Patient is a 63-year-old black male who comes in complaint of A1c today is 5.7, GERD, allergic rhinitis back pain chronic issues meds re working well meds working well needs refill diabetes meds is working a controlled.  No shortness of breath no chest pain        The following portions of the patient's history were reviewed and updated as appropriate: allergies, current medications, past social history and problem list    Review of Systems   Constitutional:  Negative for unexpected weight change.   Respiratory:  Negative for chest tightness and shortness of breath.    Cardiovascular:  Negative for palpitations and leg swelling.   Skin:  Negative for color change.   Neurological:  Negative for syncope.     Objective     Vitals:    06/12/23 0907   BP: 126/78   Pulse: 82   Resp: 16   SpO2: 98%       Physical Exam  Vitals and nursing note reviewed.   Constitutional:       General: He is not in acute distress.     Appearance: Normal appearance. He is well-developed. He is not ill-appearing, toxic-appearing or diaphoretic.   Neck:      Vascular: No carotid bruit or JVD.   Cardiovascular:      Rate and Rhythm: Normal rate and regular rhythm.      Pulses: Normal pulses.      Heart sounds: Normal heart sounds. No murmur heard.  Pulmonary:      Effort: Pulmonary effort is normal. No respiratory distress.      Breath sounds: Normal breath sounds.   Abdominal:      Palpations: Abdomen is soft.      Tenderness: There is no abdominal tenderness.   Skin:     General: Skin is warm and dry.   Neurological:      Mental Status: He is alert.       Assessment & Plan     Diagnoses and all orders for this visit:    1. Type 2 diabetes mellitus with hyperglycemia, without long-term current use of insulin (Primary)  -      POC Glycosylated Hemoglobin (Hb A1C)  -     POCT Glucose    2. Nausea  -     promethazine (PHENERGAN) 25 MG tablet; Take 1 tablet by mouth Every 6 (Six) Hours As Needed for nausea and vomiting  Dispense: 60 tablet; Refill: 3    3. Gastroesophageal reflux disease without esophagitis  -     omeprazole (priLOSEC) 40 MG capsule; Take 1 capsule by mouth Daily.  Dispense: 90 capsule; Refill: 3  -     PHARMACOGENOMICS PROFILE,BIOTAP - Swab,; Future    4. Allergic rhinitis, unspecified seasonality, unspecified trigger  -     fluticasone (Flonase) 50 MCG/ACT nasal spray; 2 sprays into the nostril(s) as directed by provider Daily As Needed for Allergies.    Other orders  -     cyclobenzaprine (FLEXERIL) 10 MG tablet; Take 1 tablet by mouth 3 (Three) Times a Day As Needed for Muscle Spasms.  Dispense: 30 tablet; Refill: 1     I spent 15 minutes in patient care: Reviewing records prior to the visit, examining the patient, entering orders and documentation    Part of this note may be an electronic transcription/translation of spoken language to printed text using the Dragon Dictation System.

## 2023-06-12 NOTE — PROGRESS NOTES
Subjective   Sebastian Liao is a 63 y.o. male  Diabetes (F/U. FBS A1C ), Nausea (RF phenergan), GERD (RF omeprazole), Allergic Rhinitis (RF Flonase and Claritin), and Back Pain (RF Flexeril)      Diabetes    Nausea  Associated symptoms include nausea.   Allergic Rhinitis    Back Pain      The following portions of the patient's history were reviewed and updated as appropriate: allergies, current medications, past social history and problem list    Review of Systems   Constitutional: Negative.    HENT: Negative.     Eyes: Negative.    Respiratory: Negative.     Cardiovascular: Negative.    Gastrointestinal: Negative.  Positive for nausea.   Endocrine: Negative.    Genitourinary: Negative.    Musculoskeletal: Negative.  Positive for back pain.   Skin: Negative.    Allergic/Immunologic: Negative.    Neurological: Negative.    Hematological: Negative.    Psychiatric/Behavioral: Negative.     All other systems reviewed and are negative.    Objective     Vitals:    06/12/23 0907   BP: 126/78   Pulse: 82   Resp: 16   SpO2: 98%       Physical Exam    Assessment & Plan     Diagnoses and all orders for this visit:    1. Type 2 diabetes mellitus with hyperglycemia, without long-term current use of insulin (Primary)  -     POC Glycosylated Hemoglobin (Hb A1C)  -     POCT Glucose

## 2023-06-18 DIAGNOSIS — J30.2 SEASONAL ALLERGIC RHINITIS: ICD-10-CM

## 2023-06-19 RX ORDER — LORATADINE 10 MG/1
10 TABLET ORAL DAILY
Qty: 30 TABLET | Refills: 10 | Status: SHIPPED | OUTPATIENT
Start: 2023-06-19

## 2023-08-14 RX ORDER — CYCLOBENZAPRINE HCL 10 MG
10 TABLET ORAL 3 TIMES DAILY PRN
Qty: 30 TABLET | Refills: 1 | Status: SHIPPED | OUTPATIENT
Start: 2023-08-14

## 2023-08-28 ENCOUNTER — OFFICE VISIT (OUTPATIENT)
Dept: FAMILY MEDICINE CLINIC | Facility: CLINIC | Age: 63
End: 2023-08-28
Payer: COMMERCIAL

## 2023-08-28 VITALS
DIASTOLIC BLOOD PRESSURE: 86 MMHG | HEIGHT: 65 IN | OXYGEN SATURATION: 97 % | BODY MASS INDEX: 39.85 KG/M2 | HEART RATE: 89 BPM | SYSTOLIC BLOOD PRESSURE: 126 MMHG | TEMPERATURE: 96.9 F | WEIGHT: 239.2 LBS

## 2023-08-28 DIAGNOSIS — E11.65 TYPE 2 DIABETES MELLITUS WITH HYPERGLYCEMIA, WITHOUT LONG-TERM CURRENT USE OF INSULIN: Primary | ICD-10-CM

## 2023-08-28 DIAGNOSIS — B35.1 NAIL FUNGUS: ICD-10-CM

## 2023-08-28 LAB
EXPIRATION DATE: NORMAL
GLUCOSE BLDC GLUCOMTR-MCNC: 86 MG/DL (ref 70–130)
HBA1C MFR BLD: 6.1 %
Lab: NORMAL

## 2023-08-28 RX ORDER — TERBINAFINE HYDROCHLORIDE 250 MG/1
250 TABLET ORAL DAILY
Qty: 30 TABLET | Refills: 2 | Status: SHIPPED | OUTPATIENT
Start: 2023-08-28

## 2023-08-28 NOTE — PROGRESS NOTES
Subjective   Sebastian Liao is a 63 y.o. male  Diabetes and Bleeding/Bruising (Pinky toe on both feet is turning color and bruised. There is pain and soreness as well. )      Diabetes  Pertinent negatives for hypoglycemia include no dizziness or headaches. Pertinent negatives for diabetes include no chest pain, no fatigue and no weakness.   Patient is a pleasant 63-year-old black male who comes in for follow-up of type 2 diabetes A1c was 6.1 watching diet and exercise patient does complain of bilateral thickened yellow nails  The following portions of the patient's history were reviewed and updated as appropriate: allergies, current medications, past social history and problem list    Review of Systems   Constitutional:  Negative for fatigue and unexpected weight change.   Respiratory:  Negative for cough, chest tightness and shortness of breath.    Cardiovascular:  Negative for chest pain, palpitations and leg swelling.   Gastrointestinal:  Negative for nausea.   Skin:  Negative for color change and rash.   Neurological:  Negative for dizziness, syncope, weakness and headaches.     Objective     Vitals:    08/28/23 0851   BP: 126/86   Pulse: 89   Temp: 96.9 øF (36.1 øC)   SpO2: 97%       Physical Exam  Vitals and nursing note reviewed.   Constitutional:       General: He is not in acute distress.     Appearance: Normal appearance. He is well-developed. He is not ill-appearing, toxic-appearing or diaphoretic.   Neck:      Thyroid: No thyromegaly.      Vascular: No carotid bruit or JVD.   Cardiovascular:      Rate and Rhythm: Normal rate and regular rhythm.      Pulses: Normal pulses.      Heart sounds: Normal heart sounds. No murmur heard.  Pulmonary:      Effort: Pulmonary effort is normal. No respiratory distress.      Breath sounds: Normal breath sounds.   Abdominal:      Palpations: Abdomen is soft. There is no mass.      Tenderness: There is no abdominal tenderness.   Musculoskeletal:      Cervical back: Neck  supple.        Feet:    Lymphadenopathy:      Cervical: No cervical adenopathy.   Skin:     General: Skin is warm and dry.   Neurological:      Mental Status: He is alert.      Sensory: No sensory deficit.       Assessment & Plan     Diagnoses and all orders for this visit:    1. Type 2 diabetes mellitus with hyperglycemia, without long-term current use of insulin (Primary)  -     POC Glycosylated Hemoglobin (Hb A1C)  -     POC Glucose    2. Nail fungus  -     terbinafine (LamISIL) 250 MG tablet; Take 1 tablet by mouth Daily.  Dispense: 30 tablet; Refill: 2     I spent 15 minutes in patient care: Reviewing records prior to the visit, examining the patient, entering orders and documentation    Part of this note may be an electronic transcription/translation of spoken language to printed text using the Dragon Dictation System.

## 2023-08-29 RX ORDER — CYCLOBENZAPRINE HCL 10 MG
10 TABLET ORAL 3 TIMES DAILY PRN
Qty: 30 TABLET | Refills: 1 | Status: SHIPPED | OUTPATIENT
Start: 2023-08-29

## 2023-09-14 ENCOUNTER — HOSPITAL ENCOUNTER (EMERGENCY)
Facility: HOSPITAL | Age: 63
Discharge: HOME | End: 2023-09-14
Attending: EMERGENCY MEDICINE
Payer: COMMERCIAL

## 2023-09-14 ENCOUNTER — APPOINTMENT (OUTPATIENT)
Dept: GENERAL RADIOLOGY | Facility: HOSPITAL | Age: 63
End: 2023-09-14
Payer: COMMERCIAL

## 2023-09-14 VITALS
HEART RATE: 106 BPM | OXYGEN SATURATION: 97 % | TEMPERATURE: 98.7 F | HEIGHT: 65 IN | DIASTOLIC BLOOD PRESSURE: 88 MMHG | SYSTOLIC BLOOD PRESSURE: 170 MMHG | BODY MASS INDEX: 39.99 KG/M2 | RESPIRATION RATE: 24 BRPM | WEIGHT: 240 LBS

## 2023-09-14 DIAGNOSIS — M25.551 ACUTE PAIN OF RIGHT HIP: Primary | ICD-10-CM

## 2023-09-14 DIAGNOSIS — M16.12 ARTHRITIS OF LEFT HIP: ICD-10-CM

## 2023-09-14 DIAGNOSIS — M54.41 ACUTE RIGHT-SIDED LOW BACK PAIN WITH RIGHT-SIDED SCIATICA: ICD-10-CM

## 2023-09-14 PROCEDURE — 73502 X-RAY EXAM HIP UNI 2-3 VIEWS: CPT

## 2023-09-14 PROCEDURE — 99283 EMERGENCY DEPT VISIT LOW MDM: CPT

## 2023-09-14 PROCEDURE — 72100 X-RAY EXAM L-S SPINE 2/3 VWS: CPT

## 2023-09-14 PROCEDURE — 63710000001 ONDANSETRON ODT 4 MG TABLET DISPERSIBLE: Performed by: EMERGENCY MEDICINE

## 2023-09-14 RX ORDER — ONDANSETRON 4 MG/1
4 TABLET, ORALLY DISINTEGRATING ORAL ONCE
Status: COMPLETED | OUTPATIENT
Start: 2023-09-14 | End: 2023-09-14

## 2023-09-14 RX ORDER — OXYCODONE AND ACETAMINOPHEN 7.5; 325 MG/1; MG/1
1 TABLET ORAL ONCE
Status: COMPLETED | OUTPATIENT
Start: 2023-09-14 | End: 2023-09-14

## 2023-09-14 RX ORDER — OXYCODONE HYDROCHLORIDE AND ACETAMINOPHEN 5; 325 MG/1; MG/1
1 TABLET ORAL EVERY 6 HOURS PRN
Qty: 12 TABLET | Refills: 0 | Status: SHIPPED | OUTPATIENT
Start: 2023-09-14

## 2023-09-14 RX ADMIN — ONDANSETRON 4 MG: 4 TABLET, ORALLY DISINTEGRATING ORAL at 16:16

## 2023-09-14 RX ADMIN — OXYCODONE HYDROCHLORIDE AND ACETAMINOPHEN 1 TABLET: 7.5; 325 TABLET ORAL at 16:16

## 2023-09-14 NOTE — Clinical Note
[facility] EMERGENCY DEPARTMENT  [address]  [facility] [address]  Phone: [phone]    Sebastian Liao was seen and treated in our emergency department on 9/14/2023.  He may return to work on 09/18/2023.         Thank you for choosing [facility].    Fran Kelley, RN

## 2023-09-14 NOTE — ED PROVIDER NOTES
Subjective   History of Present Illness  Pleasant patient presents the ER for right-sided hip pain started within the last couple days.  No trauma.  This started in his low back and goes down into his right thigh.  Pain is worse with movement.  Denies any trauma fever or chills.  History of right hip replacement by Dr. Fuchs around .  This orthopedist has moved from [place].      Review of Systems    Past Medical History:   Diagnosis Date    Acute bronchitis     Arthritis     Asthma     Cervicalgia     COVID-19     CPAP (continuous positive airway pressure) dependence     Diabetes mellitus     Edema     GERD (gastroesophageal reflux disease)     HTN (hypertension)     Hyperlipidemia     IBS (irritable bowel syndrome)     IBS (irritable bowel syndrome)     Low testosterone     Prostate disease     Sleep apnea     Wears glasses        No Known Allergies    Past Surgical History:   Procedure Laterality Date    CARPAL TUNNEL RELEASE Right 2022    Procedure: CARPAL TUNNEL RELEASE RIGHT;  Surgeon: Nj Bliss MD;  Location: [facility];  Service: Neurosurgery;  Laterality: Right;    CERVICAL FUSION      Dr. Nickerson, . [place], [place]    COLONOSCOPY  2012    repeat in ten yrs    HIP SURGERY Right 2016    Dr. Schwartz    NECK SURGERY      cervical spine fusion    TONSILLECTOMY         Family History   Problem Relation Age of Onset    Cancer Mother         Ovarian,     Stroke Father     Hypertension Father     Hypertension Sister     Diabetes Brother     Heart attack Brother     Hypertension Maternal Grandmother     Diabetes Maternal Grandmother        Social History     Socioeconomic History    Marital status:    Tobacco Use    Smoking status: Former     Packs/day: 0.50     Years: 10.00     Pack years: 5.00     Types: Cigarettes     Quit date:      Years since quittin.7    Smokeless tobacco: Former     Types: Chew     Quit date: 1986   Vaping Use    Vaping Use: Never used    Substance and Sexual Activity    Alcohol use: Yes     Comment: 2 drinks per month    Drug use: Yes     Types: Marijuana     Comment: Quit in 1988    Sexual activity: Defer           Objective   Physical Exam  Constitutional:       Appearance: He is well-developed.   HENT:      Head: Normocephalic and atraumatic.      Right Ear: External ear normal.      Left Ear: External ear normal.      Nose: Nose normal.   Eyes:      Conjunctiva/sclera: Conjunctivae normal.      Pupils: Pupils are equal, round, and reactive to light.   Cardiovascular:      Rate and Rhythm: Normal rate and regular rhythm.      Heart sounds: Normal heart sounds.   Pulmonary:      Effort: Pulmonary effort is normal.      Breath sounds: Normal breath sounds.   Abdominal:      General: Bowel sounds are normal.      Palpations: Abdomen is soft.   Musculoskeletal:         General: Normal range of motion.      Cervical back: Normal range of motion and neck supple.      Comments: Painful movement in the right leg particular in the right hip and lower back on the right side.   Skin:     General: Skin is warm and dry.   Neurological:      Mental Status: He is alert and oriented to person, place, and time.   Psychiatric:         Behavior: Behavior normal.         Judgment: Judgment normal.       Procedures           ED Course  ED Course as of 09/14/23 1631   Thu Sep 14, 2023   1400 Pain is most likely musculoskeletal in origin.  I doubt DVT or circulation compromise.  He has had COVID recently and has improved from this.  No history of DVT.  We will need to get x-rays for further evaluation. [JM]   1625 Pt thankful and agreeable with tx poc. Well aware of the ss of worsening condition.   [JM]      ED Course User Index  [JM] Dontae Alexandre APRN              Pt thankful and agreeable with tx poc. Well aware of the ss of worsening condition.      XR Spine Lumbar 2 or 3 View   Final Result   Impression:   Negative lumbosacral spine. No acute findings          Electronically Signed: Dontae Lr MD     9/14/2023 2:35 PM EDT     Workstation ID: [id number]      XR Hip With or Without Pelvis 2 - 3 View Right   Final Result   Impression:   Normal-appearing right hip arthroplasty.      Severe degenerative changes of the left hip.         Electronically Signed: Kelsey Robles MD     9/14/2023 2:41 PM EDT     Workstation ID: [id number]                             OLAF reviewed by Rick Haider MD       Medical Decision Making  Amount and/or Complexity of Data Reviewed  Radiology: ordered.    Risk  Prescription drug management.        Final diagnoses:   Acute pain of right hip   Acute right-sided low back pain with right-sided sciatica   Arthritis of left hip       ED Disposition  ED Disposition       ED Disposition   Discharge    Condition   Stable    Comment   --               Fransico Ngo PA  [address]  [place] [address]  [phone]    Schedule an appointment as soon as possible for a visit       Shahzad Conte MD  [address] [facility] [address]  [place] [address]  [phone]    Schedule an appointment as soon as possible for a visit            Medication List        New Prescriptions      oxyCODONE-acetaminophen 5-325 MG per tablet  Commonly known as: PERCOCET  Take 1 tablet by mouth Every 6 (Six) Hours As Needed for Severe Pain.               Where to Get Your Medications        These medications were sent to [facility] [place]  [address] , [place] [address]      Hours: Monday to Friday 7 AM to 5:30 PM, Saturday & Sunday 8 AM to 4:30 PM Phone: [phone]   oxyCODONE-acetaminophen 5-325 MG per tablet            Dontae Alexandre, JULIO  09/14/23 6981

## 2023-09-14 NOTE — Clinical Note
[facility] EMERGENCY DEPARTMENT  [address]  [facility] [address]  Phone: [phone]    Sebastian Liao was seen and treated in our emergency department on 9/14/2023.  He may return to work on 09/18/2023.         Thank you for choosing [facility].    Fran Kelley, RN

## 2023-09-14 NOTE — Clinical Note
[facility] EMERGENCY DEPARTMENT  [address]  [facility] [address]  Phone: [phone]    Sebastian Liao was seen and treated in our emergency department on 9/14/2023.  He may return to work on 09/18/2023.         Thank you for choosing [facility].    Fran Kelley, RN

## 2023-09-18 ENCOUNTER — HOSPITAL ENCOUNTER (OUTPATIENT)
Dept: GENERAL RADIOLOGY | Facility: HOSPITAL | Age: 63
Discharge: HOME | End: 2023-09-18
Admitting: STUDENT IN AN ORGANIZED HEALTH CARE EDUCATION/TRAINING PROGRAM
Payer: COMMERCIAL

## 2023-09-18 ENCOUNTER — OFFICE VISIT (OUTPATIENT)
Age: 63
End: 2023-09-18
Payer: COMMERCIAL

## 2023-09-18 VITALS
DIASTOLIC BLOOD PRESSURE: 88 MMHG | WEIGHT: 238.43 LBS | BODY MASS INDEX: 39.72 KG/M2 | HEIGHT: 65 IN | SYSTOLIC BLOOD PRESSURE: 158 MMHG

## 2023-09-18 DIAGNOSIS — M25.552 LEFT HIP PAIN: Primary | ICD-10-CM

## 2023-09-18 DIAGNOSIS — E66.01 OBESITY, CLASS III, BMI 40-49.9 (MORBID OBESITY): ICD-10-CM

## 2023-09-18 DIAGNOSIS — M25.552 LEFT HIP PAIN: ICD-10-CM

## 2023-09-18 DIAGNOSIS — M16.12 PRIMARY OSTEOARTHRITIS OF LEFT HIP: ICD-10-CM

## 2023-09-18 PROCEDURE — 73502 X-RAY EXAM HIP UNI 2-3 VIEWS: CPT

## 2023-09-18 RX ORDER — LIDOCAINE HYDROCHLORIDE 10 MG/ML
2 INJECTION, SOLUTION EPIDURAL; INFILTRATION; INTRACAUDAL; PERINEURAL
Status: COMPLETED | OUTPATIENT
Start: 2023-09-18 | End: 2023-09-18

## 2023-09-18 RX ORDER — BUPIVACAINE HYDROCHLORIDE 5 MG/ML
2 INJECTION, SOLUTION EPIDURAL; INTRACAUDAL
Status: COMPLETED | OUTPATIENT
Start: 2023-09-18 | End: 2023-09-18

## 2023-09-18 RX ORDER — TRIAMCINOLONE ACETONIDE 40 MG/ML
2 INJECTION, SUSPENSION INTRA-ARTICULAR; INTRAMUSCULAR
Status: COMPLETED | OUTPATIENT
Start: 2023-09-18 | End: 2023-09-18

## 2023-09-18 RX ADMIN — TRIAMCINOLONE ACETONIDE 2 ML: 40 INJECTION, SUSPENSION INTRA-ARTICULAR; INTRAMUSCULAR at 09:25

## 2023-09-18 RX ADMIN — BUPIVACAINE HYDROCHLORIDE 2 ML: 5 INJECTION, SOLUTION EPIDURAL; INTRACAUDAL at 09:25

## 2023-09-18 RX ADMIN — LIDOCAINE HYDROCHLORIDE 2 ML: 10 INJECTION, SOLUTION EPIDURAL; INFILTRATION; INTRACAUDAL; PERINEURAL at 09:25

## 2023-09-18 NOTE — PROGRESS NOTES
[facility] Orthopaedic Surgery Office Visit     Office Visit       Date: 09/18/2023   Patient Name: Sebastian Liao  MRN: [id number]  YOB: 1960    Referring Physician: No ref. provider found     Chief Complaint:   Chief Complaint   Patient presents with    Left Hip - Pain       History of Present Illness:   Sebastian Liao is a 63 y.o. male who presents with new problem of: left hip pain.  Onset: atraumatic and gradual in nature. The issue has been ongoing for 5 day(s). Pain is a 5/10 on the pain scale. Pain is described as dull. Associated symptoms include pain. The pain is worse with walking, standing, sitting, climbing stairs, sleeping, working, leisure, lying on affected side, rising from seated position, and any movement of the joint; pain medication and/or NSAID improve the pain. Previous treatments have included: nothing.    Subjective   Review of Systems: Review of Systems   Constitutional: Negative.    HENT: Negative.     Eyes: Negative.    Respiratory: Negative.     Cardiovascular: Negative.    Gastrointestinal: Negative.    Endocrine: Negative.    Genitourinary: Negative.    Musculoskeletal:  Positive for arthralgias.   Skin: Negative.    Allergic/Immunologic: Negative.    Neurological: Negative.    Hematological: Negative.    Psychiatric/Behavioral: Negative.        I have reviewed the following portions of the patient's history:History of Present Illness and review of systems.    Past Medical History:   Past Medical History:   Diagnosis Date    Acute bronchitis     Arthritis     Asthma     Cervicalgia     COVID-19     CPAP (continuous positive airway pressure) dependence     Diabetes mellitus     Edema     GERD (gastroesophageal reflux disease)     HTN (hypertension)     Hyperlipidemia     IBS (irritable bowel syndrome)     IBS (irritable bowel syndrome)     Low testosterone     Prostate disease     Sleep apnea     Wears glasses        Past Surgical  History:   Past Surgical History:   Procedure Laterality Date    CARPAL TUNNEL RELEASE Right 2022    Procedure: CARPAL TUNNEL RELEASE RIGHT;  Surgeon: Nj Bliss MD;  Location: [facility];  Service: Neurosurgery;  Laterality: Right;    CERVICAL FUSION      Dr. Nickerson, . [place], [place]    COLONOSCOPY  2012    repeat in ten yrs    HIP SURGERY Right 2016    Dr. Schwartz    NECK SURGERY      cervical spine fusion    TONSILLECTOMY         Family History:   Family History   Problem Relation Age of Onset    Cancer Mother         Ovarian,     Stroke Father     Hypertension Father     Hypertension Sister     Diabetes Brother     Heart attack Brother     Hypertension Maternal Grandmother     Diabetes Maternal Grandmother        Social History:   Social History     Socioeconomic History    Marital status:    Tobacco Use    Smoking status: Former     Packs/day: 0.50     Years: 10.00     Pack years: 5.00     Types: Cigarettes     Quit date:      Years since quittin.7    Smokeless tobacco: Former     Types: Chew     Quit date: 1986   Vaping Use    Vaping Use: Never used   Substance and Sexual Activity    Alcohol use: Yes     Comment: 2 drinks per month    Drug use: Yes     Types: Marijuana     Comment: Quit in     Sexual activity: Defer       Medications:   Current Outpatient Medications:     albuterol sulfate  (90 Base) MCG/ACT inhaler, Inhale 2 puffs Every 4 (Four) Hours As Needed for wheezing, Disp: 54 g, Rfl: 3    alfuzosin (UROXATRAL) 10 MG 24 hr tablet, Take 1 tablet by mouth Daily., Disp: 90 tablet, Rfl: 3    amLODIPine (NORVASC) 10 MG tablet, Take 1 tablet by mouth Daily., Disp: 90 tablet, Rfl: 3    carisoprodol (Soma) 350 MG tablet, Take 1 tablet by mouth 2 (Two) Times a Day As Needed for Muscle Spasms., Disp: , Rfl:     cyclobenzaprine (FLEXERIL) 10 MG tablet, Take 0.5-1 tablet by mouth At Night As Needed for Muscle Spasms., Disp: 90 tablet, Rfl: 1     cyclobenzaprine (FLEXERIL) 10 MG tablet, Take 1 tablet by mouth up to 3 Times a Day As Needed for Muscle Spasms., Disp: 30 tablet, Rfl: 1    dicyclomine (BENTYL) 20 MG tablet, Take 1 tablet by mouth Every 6 (Six) Hours., Disp: 180 tablet, Rfl: 3    fluticasone (Flonase) 50 MCG/ACT nasal spray, 2 sprays into the nostril(s) as directed by provider Daily As Needed for Allergies., Disp: , Rfl:     fluticasone (FLONASE) 50 MCG/ACT nasal spray, Use 2 sprays in each nostril as directed by provider Daily., Disp: 16 g, Rfl: 3    fluticasone-salmeterol (Advair Diskus) 100-50 MCG/DOSE DISKUS, Inhale 1 puff 2 (Two) Times a Day., Disp: 180 each, Rfl: 3    Fluticasone-Salmeterol (ADVAIR/WIXELA) 100-50 MCG/ACT DISKUS, Inhale 1 puff 2 (Two) Times a Day., Disp: 60 each, Rfl: 5    glyBURIDE-metFORMIN (GLUCOVANCE) 5-500 MG per tablet, Take 1 tablet by mouth 2 (Two) Times a Day With Meals., Disp: 180 tablet, Rfl: 3    ibuprofen (ADVIL,MOTRIN) 600 MG tablet, Take 1 tablet by mouth Every 6 (Six) Hours As Needed for Mild Pain., Disp: , Rfl:     loratadine (CLARITIN) 10 MG tablet, Take 1 tablet by mouth At Night As Needed for Allergies., Disp: , Rfl:     loratadine (CLARITIN) 10 MG tablet, Take 1 tablet by mouth Daily., Disp: 30 tablet, Rfl: 10    montelukast (SINGULAIR) 10 MG tablet, Take 1 tablet by mouth Every Night., Disp: 90 tablet, Rfl: 3    nystatin (MYCOSTATIN) 100,000 unit/mL suspension, Take  by mouth Daily., Disp: , Rfl:     olopatadine (PATANOL) 0.1 % ophthalmic solution, Administer 1 drop to both eyes 2 (Two) Times a Day., Disp: , Rfl:     omeprazole (priLOSEC) 40 MG capsule, Take 1 capsule by mouth Daily., Disp: 90 capsule, Rfl: 3    Omeprazole Magnesium (PRILOSEC PO), , Disp: , Rfl:     oxyCODONE-acetaminophen (PERCOCET) 5-325 MG per tablet, Take 1 tablet by mouth Every 6 (Six) Hours As Needed for Severe Pain., Disp: 12 tablet, Rfl: 0    promethazine (PHENERGAN) 25 MG tablet, Take 1 tablet by mouth Every 6 (Six) Hours As  "Needed for nausea and vomiting, Disp: 60 tablet, Rfl: 3    rosuvastatin (CRESTOR) 10 MG tablet, Take 1 tablet by mouth Daily., Disp: 90 tablet, Rfl: 3    Semaglutide,0.25 or 0.5MG/DOS, (Ozempic, 0.25 or 0.5 MG/DOSE,) 2 MG/3ML solution pen-injector, Inject 0.5 mg under the skin into the appropriate area as directed 1 (One) Time Per Week., Disp: 3 mL, Rfl: 3    sildenafil (VIAGRA) 100 MG tablet, Take 1 tablet by mouth Daily As Needed for Erectile Dysfunction., Disp: , Rfl:     silodosin (RAPAFLO) 8 MG capsule capsule, Take 1 capsule by mouth Daily., Disp: 90 capsule, Rfl: 3    tadalafil (CIALIS) 20 MG tablet, Take 1 tablet by mouth Daily As Needed for Erectile Dysfunction., Disp: , Rfl:     terbinafine (LamISIL) 250 MG tablet, Take 1 tablet by mouth Daily., Disp: 30 tablet, Rfl: 2    Testosterone (Testopel) 75 MG implant pellet, Testopel 75 mg implant pellet, Disp: , Rfl:     Current Facility-Administered Medications:     Semaglutide(0.25 or 0.5MG/DOS) (OZEMPIC) solution pen-injector 0.25 mg, 0.25 mg, Subcutaneous, Weekly, Fransico Ngo PA    Allergies: No Known Allergies    I reviewed the patient's chief complaint, history of present illness, review of systems, past medical history, surgical history, family history, social history, medications and allergy list.     Objective    Vital Signs:   Vitals:    09/18/23 0829   BP: 158/88   Weight: 108 kg (238 lb 6.8 oz)   Height: 163.8 cm (64.5\")     Body mass index is 40.29 kg/m².   Class 3 Severe Obesity (BMI >=40). Obesity-related health conditions include the following: hypertension, coronary heart disease, diabetes mellitus, and dyslipidemias. Obesity is newly identified. BMI is is above average; BMI management plan is completed. We discussed portion control and increasing exercise.     Patient reports that he is a former smoker. He quit smoking in 1988.  He has not resumed smoking since that time.  This behavior was applauded and she was encouraged to " continue in smoking cessation.  We will continue to monitor at subsequent visits.    Ortho Exam:  Constitutional: General Appearance: healthy-appearing, NAD, and normal body habitus.   Psychiatric: Orientation: oriented to time, place, and person. Mood and Affect: normal affect and mood and active and alert.   Gait and Station: Appearance: antalgic gait.   Cardiovascular System: Arterial Pulses Right: dorsalis pedis pulse normal. Varicosities Right: capillary refill test normal.   Lumbar Spine: Inspection: normal alignment.   Hip/Pelvis Appearance: Inspection: normal axial alignment.   Hips: Soft Tissue Palpation Left: tenderness of the hip flexor muscles. Active Range of Motion Left: limited (secondary to pain especially flexion and rotation). Strength Right: normal 5/5. Strength Left: normal 5/5.   Skin: Right Lower Extremity: normal. Left Lower Extremity: normal.   Neurologic: Sensation on the Right: L1 normal, L2 normal, L3 normal, L4 normal, and S2 normal. Sensation on the Left: L1 normal, L2 normal, L3 normal, L4 normal, and S2 normal.    Results Review:   Imaging Results (Last 24 Hours)       ** No results found for the last 24 hours. **        I personally reviewed the radiographs of the left hip.  No acute fracture or dislocation.  There is severe narrowing of the hip joint space with subchondral sclerosis and osteophyte formation.  Hardware is intact from previous right total hip arthroplasty.    Procedures    Assessment / Plan    Assessment/Plan:   Diagnoses and all orders for this visit:    1. Left hip pain (Primary)  -     XR Hip With or Without Pelvis 2 - 3 View Left; Future    2. Primary osteoarthritis of left hip    3. Obesity, Class III, BMI 40-49.9 (morbid obesity)    Other orders  -     - Large Joint Arthrocentesis: L hip joint      Worsening left hip pain localized to the anterior hip and groin over recent weeks to months.  Does have previous history of right hip arthritis and had his hip  replaced in 2017.  Pain feels similar today on the left.  His radiographs show severe narrowing of the hip joint space with osteophyte formation and subchondral sclerosis.  I provided him with a copy of these images and we discussed them in detail.  He has interest in total hip arthroplasty would like to wait a few months if possible.  Therefore, I discussed with him treatment options including oral medications for pain, physical therapy, and an ultrasound-guided corticosteroid injection.  He is already been through physical therapy but has interested in the injection today.  Risk and benefits were discussed and he elected to proceed.  Tolerated procedure well.  See procedure note.  I will have him follow-up with Dr. Matthews for surgical evaluation.  His current BMI is just greater than 40 at 40.29.  Encouraged him as his pain improves to be more active to keep his weight down is much as possible.  He is also on Ozempic. He will follow with me on an as-needed basis.    Previous documentation reviewed: 9/14/2023-emergency department-Rick Haider MD.    Previous laboratory results reviewed: 8/28/2023-hemoglobin A1c 6.1%.  5/8/2023-creatinine 1.23, EGFR 66.8.      Follow Up:   Return for F/U with Byron.      Addy Lewis MD  [facility]

## 2023-09-18 NOTE — PROGRESS NOTES
Procedure   - Large Joint Arthrocentesis: L hip joint on 9/18/2023 9:25 AM  Indications: pain  Details: 25 G needle, ultrasound-guided anterior approach  Medications: 2 mL bupivacaine (PF) 0.5 %; 2 mL lidocaine PF 1% 1 %; 2 mL triamcinolone acetonide 40 MG/ML  Outcome: tolerated well, no immediate complications  Procedure, treatment alternatives, risks and benefits explained, specific risks discussed. Consent was given by the patient. Immediately prior to procedure a time out was called to verify the correct patient, procedure, equipment, support staff and site/side marked as required. Patient was prepped and draped in the usual sterile fashion.

## 2023-09-19 RX ORDER — SEMAGLUTIDE 0.68 MG/ML
0.5 INJECTION, SOLUTION SUBCUTANEOUS WEEKLY
Qty: 3 ML | Refills: 3 | Status: SHIPPED | OUTPATIENT
Start: 2023-09-19

## 2023-09-19 NOTE — TELEPHONE ENCOUNTER
Caller: Sebastian Liao    Relationship: Self    Best call back number: [phone]     Requested Prescriptions:   Requested Prescriptions     Pending Prescriptions Disp Refills    Semaglutide,0.25 or 0.5MG/DOS, (Ozempic, 0.25 or 0.5 MG/DOSE,) 2 MG/3ML solution pen-injector 3 mL 3     Sig: Inject 0.5 mg under the skin into the appropriate area as directed 1 (One) Time Per Week.        Pharmacy where request should be sent: [facility]     Last office visit with prescribing clinician: 8/28/2023   Last telemedicine visit with prescribing clinician: Visit date not found   Next office visit with prescribing clinician: 1/22/2024     Additional details provided by patient: PATIENT WILL BE TAKING HIS LAST DOSE ON SATURDAY 9/23/23    Does the patient have less than a 3 day supply:  [] Yes  [x] No    Would you like a call back once the refill request has been completed: [] Yes [x] No    If the office needs to give you a call back, can they leave a voicemail: [] Yes [x] No    Melissa Boswell Rep   09/19/23 12:20 EDT

## 2023-10-18 ENCOUNTER — OFFICE VISIT (OUTPATIENT)
Dept: ORTHOPEDIC SURGERY | Facility: CLINIC | Age: 63
End: 2023-10-18
Payer: COMMERCIAL

## 2023-10-18 VITALS
SYSTOLIC BLOOD PRESSURE: 142 MMHG | BODY MASS INDEX: 40.08 KG/M2 | DIASTOLIC BLOOD PRESSURE: 90 MMHG | WEIGHT: 234.8 LBS | HEIGHT: 64 IN

## 2023-10-18 DIAGNOSIS — M16.12 PRIMARY OSTEOARTHRITIS OF LEFT HIP: Primary | ICD-10-CM

## 2023-10-18 PROBLEM — M16.9 DEGENERATIVE ARTHRITIS OF HIP: Status: ACTIVE | Noted: 2023-10-18

## 2023-10-18 PROCEDURE — 99214 OFFICE O/P EST MOD 30 MIN: CPT | Performed by: ORTHOPAEDIC SURGERY

## 2023-10-18 RX ORDER — PREGABALIN 150 MG/1
150 CAPSULE ORAL ONCE
OUTPATIENT
Start: 2023-10-18 | End: 2023-10-18

## 2023-10-18 RX ORDER — MELOXICAM 7.5 MG/1
15 TABLET ORAL ONCE
OUTPATIENT
Start: 2023-10-18 | End: 2023-10-18

## 2023-10-18 RX ORDER — ACETAMINOPHEN 325 MG/1
1000 TABLET ORAL ONCE
OUTPATIENT
Start: 2023-10-18 | End: 2023-10-18

## 2023-10-18 NOTE — PROGRESS NOTES
[facility] Note    Subjective     Chief Complaint   Patient presents with    Left Hip - Pain        HPI    Sebastian Liao is a 63 y.o. male who presents with new problem of: left hip pain.  Onset: atraumatic and gradual in nature. The issue has been ongoing for 7 year(s). Pain is a 7/10 on the pain scale. Pain is described as throbbing, stabbing, and shooting. Associated symptoms include pain and stiffness. The pain is worse with walking, working, and rising from seated position; resting and pain medication and/or NSAID improve the pain. Previous treatments have included: physical therapy, weight loss, and steroid injection (last injection 9/18/23).  He is continue to work on weight loss, and is on Ozempic.  He would like to proceed with left total hip arthroplasty surgery.  He did have an injection back in September and he understands that he will need to wait at least 3 months after that.  No history of clots or clotting disorders.  No blood thinners.  He does have diabetes, and hemoglobin A1c was 6.  Of note, he did well following right total hip arthroplasty surgery with Dr. Suleiman Schwartz in 2016 timeframe.    I have reviewed the following portions of the patient's history and agree with: History of Present Illness and Review of Systems    Patient Active Problem List   Diagnosis    Testicular hypofunction    Essential hypertension, benign    Type 2 diabetes mellitus with hyperglycemia, without long-term current use of insulin    Hyperlipidemia    Obesity, Class III, BMI 40-49.9 (morbid obesity)    Dyspnea on exertion    History of COVID-19 pneumonia (10/2020)    Former smoker (None x 35 years)    GERD    RONIT (obstructive sleep apnea) - severe    DDD (degenerative disc disease), cervical    Seasonal allergic rhinitis    Screen for colon cancer    Carpal tunnel syndrome of right wrist    Degenerative arthritis of hip     Past Medical History:   Diagnosis Date    Acute bronchitis      Arthritis     Asthma     Cervicalgia     COVID-19     CPAP (continuous positive airway pressure) dependence     Diabetes mellitus     Edema     GERD (gastroesophageal reflux disease)     HTN (hypertension)     Hyperlipidemia     IBS (irritable bowel syndrome)     IBS (irritable bowel syndrome)     Low testosterone     Prostate disease     Sleep apnea     Wears glasses       Past Surgical History:   Procedure Laterality Date    CARPAL TUNNEL RELEASE Right 2022    Procedure: CARPAL TUNNEL RELEASE RIGHT;  Surgeon: Nj Bliss MD;  Location: [facility];  Service: Neurosurgery;  Laterality: Right;    CERVICAL FUSION      Dr. Nickerson, . [place], [place]    COLONOSCOPY  2012    repeat in ten yrs    HIP SURGERY Right 2016    Dr. Schwartz    NECK SURGERY      cervical spine fusion    TONSILLECTOMY        Family History   Problem Relation Age of Onset    Cancer Mother         Ovarian,     Stroke Father     Hypertension Father     Hypertension Sister     Diabetes Brother     Heart attack Brother     Hypertension Maternal Grandmother     Diabetes Maternal Grandmother      Social History     Socioeconomic History    Marital status:    Tobacco Use    Smoking status: Former     Packs/day: 0.50     Years: 10.00     Additional pack years: 0.00     Total pack years: 5.00     Types: Cigarettes     Quit date:      Years since quittin.8    Smokeless tobacco: Former     Types: Chew     Quit date: 1986   Vaping Use    Vaping Use: Never used   Substance and Sexual Activity    Alcohol use: Yes     Comment: 2 drinks per month    Drug use: Yes     Types: Marijuana     Comment: Quit in     Sexual activity: Defer      Current Outpatient Medications on File Prior to Visit   Medication Sig Dispense Refill    albuterol sulfate  (90 Base) MCG/ACT inhaler Inhale 2 puffs Every 4 (Four) Hours As Needed for wheezing 54 g 3    alfuzosin (UROXATRAL) 10 MG 24 hr tablet Take 1 tablet by mouth Daily. 90  tablet 3    amLODIPine (NORVASC) 10 MG tablet Take 1 tablet by mouth Daily. 90 tablet 3    carisoprodol (Soma) 350 MG tablet Take 1 tablet by mouth 2 (Two) Times a Day As Needed for Muscle Spasms.      cyclobenzaprine (FLEXERIL) 10 MG tablet Take 0.5-1 tablet by mouth At Night As Needed for Muscle Spasms. 90 tablet 1    cyclobenzaprine (FLEXERIL) 10 MG tablet Take 1 tablet by mouth up to 3 Times a Day As Needed for Muscle Spasms. 30 tablet 1    dicyclomine (BENTYL) 20 MG tablet Take 1 tablet by mouth Every 6 (Six) Hours. 180 tablet 3    fluticasone (Flonase) 50 MCG/ACT nasal spray 2 sprays into the nostril(s) as directed by provider Daily As Needed for Allergies.      fluticasone (FLONASE) 50 MCG/ACT nasal spray Use 2 sprays in each nostril as directed by provider Daily. 16 g 3    fluticasone-salmeterol (Advair Diskus) 100-50 MCG/DOSE DISKUS Inhale 1 puff 2 (Two) Times a Day. 180 each 3    Fluticasone-Salmeterol (ADVAIR/WIXELA) 100-50 MCG/ACT DISKUS Inhale 1 puff 2 (Two) Times a Day. 60 each 5    glyBURIDE-metFORMIN (GLUCOVANCE) 5-500 MG per tablet Take 1 tablet by mouth 2 (Two) Times a Day With Meals. 180 tablet 3    ibuprofen (ADVIL,MOTRIN) 600 MG tablet Take 1 tablet by mouth Every 6 (Six) Hours As Needed for Mild Pain.      loratadine (CLARITIN) 10 MG tablet Take 1 tablet by mouth At Night As Needed for Allergies.      loratadine (CLARITIN) 10 MG tablet Take 1 tablet by mouth Daily. 30 tablet 10    Magic mouthwash Radonc Swish and spit 10 mL by mouth 4 (Four) Times a Day After Meals & at Bedtime. 480 mL 0    montelukast (SINGULAIR) 10 MG tablet Take 1 tablet by mouth Every Night. 90 tablet 3    nystatin (MYCOSTATIN) 100,000 unit/mL suspension Take  by mouth Daily.      olopatadine (PATANOL) 0.1 % ophthalmic solution Administer 1 drop to both eyes 2 (Two) Times a Day.      omeprazole (priLOSEC) 40 MG capsule Take 1 capsule by mouth Daily. 90 capsule 3    Omeprazole Magnesium (PRILOSEC PO)        oxyCODONE-acetaminophen (PERCOCET) 5-325 MG per tablet Take 1 tablet by mouth Every 6 (Six) Hours As Needed for Severe Pain. 12 tablet 0    promethazine (PHENERGAN) 25 MG tablet Take 1 tablet by mouth Every 6 (Six) Hours As Needed for nausea and vomiting 60 tablet 3    rosuvastatin (CRESTOR) 10 MG tablet Take 1 tablet by mouth Daily. 90 tablet 3    Semaglutide,0.25 or 0.5MG/DOS, (Ozempic, 0.25 or 0.5 MG/DOSE,) 2 MG/3ML solution pen-injector Inject 0.5 mg under the skin into the appropriate area as directed 1 (One) Time Per Week. 3 mL 3    sildenafil (VIAGRA) 100 MG tablet Take 1 tablet by mouth Daily As Needed for Erectile Dysfunction.      silodosin (RAPAFLO) 8 MG capsule capsule Take 1 capsule by mouth Daily. 90 capsule 3    tadalafil (CIALIS) 20 MG tablet Take 1 tablet by mouth Daily As Needed for Erectile Dysfunction.      terbinafine (LamISIL) 250 MG tablet Take 1 tablet by mouth Daily. 30 tablet 2    Testosterone (Testopel) 75 MG implant pellet Testopel 75 mg implant pellet       No current facility-administered medications on file prior to visit.      No Known Allergies     Review of Systems   Constitutional:  Negative for activity change, appetite change, chills, diaphoresis, fatigue, fever and unexpected weight change.   HENT:  Negative for congestion, dental problem, drooling, ear discharge, ear pain, facial swelling, hearing loss, mouth sores, nosebleeds, postnasal drip, rhinorrhea, sinus pressure, sneezing, sore throat, tinnitus, trouble swallowing and voice change.    Eyes:  Negative for photophobia, pain, discharge, redness, itching and visual disturbance.   Respiratory:  Negative for apnea, cough, choking, chest tightness, shortness of breath, wheezing and stridor.    Cardiovascular:  Negative for chest pain, palpitations and leg swelling.   Gastrointestinal:  Negative for abdominal distention, abdominal pain, anal bleeding, blood in stool, constipation, diarrhea, nausea, rectal pain and vomiting.  "  Endocrine: Negative for cold intolerance, heat intolerance, polydipsia, polyphagia and polyuria.   Genitourinary:  Negative for decreased urine volume, difficulty urinating, dysuria, enuresis, flank pain, frequency, genital sores, hematuria and urgency.   Musculoskeletal:  Positive for arthralgias. Negative for back pain, gait problem, joint swelling, myalgias, neck pain and neck stiffness.   Skin:  Negative for color change, pallor, rash and wound.   Allergic/Immunologic: Negative for environmental allergies, food allergies and immunocompromised state.   Neurological:  Negative for dizziness, tremors, seizures, syncope, facial asymmetry, speech difficulty, weakness, light-headedness, numbness and headaches.   Hematological:  Negative for adenopathy. Does not bruise/bleed easily.   Psychiatric/Behavioral:  Negative for agitation, behavioral problems, confusion, decreased concentration, dysphoric mood, hallucinations, self-injury, sleep disturbance and suicidal ideas. The patient is not nervous/anxious and is not hyperactive.         Objective      Physical Exam  /90   Ht 163.8 cm (64.49\")   Wt 107 kg (234 lb 12.8 oz)   BMI 39.70 kg/m²     Body mass index is 39.7 kg/m².    General:   Mental Status:  Alert   Appearance: Cooperative, in no acute distress   Build and Nutrition: Obese by BMI male   Orientation: Alert and oriented to person, place and time   Posture: Normal   Gait: Nonantalgic    Integument:   Left hip: No skin lesions, no rash, no ecchymosis    Neurologic:   Motor:  Left lower extremity: 5/5 quadriceps, hamstrings, ankle dorsiflexors, and ankle plantar flexors    Lower Extremity:   Left Hip:    Tenderness:  None    Swelling:  None    Crepitus:  None    Atrophy:  None    Range of motion:  External Rotation: 30°       Internal Rotation: 0°       Flexion:  100°       Extension:  0°   Instability:  None  Deformities:  None  Functional testing: [facility]    No leg length " discrepancy      Imaging/Studies      Imaging Results (Last 24 Hours)       Procedure Component Value Units Date/Time    XR Hip With or Without Pelvis 2 - 3 View Left [[id number]] Resulted: 10/18/23 0922     Updated: 10/18/23 0923    Narrative:      Left Hip Radiographs  Indication: left hip pain  Views: low AP pelvis and lateral of the left hip    Comparison: 9/18/2023    Findings:   Bone-on-bone contact, acetabular and femoral neck osteophytes, no acute   bony abnormalities.  No unusual bony features.  Hip arthritis.              Assessment and Plan     Diagnoses and all orders for this visit:    1. Primary osteoarthritis of left hip (Primary)  -     XR Hip With or Without Pelvis 2 - 3 View Left  -     Case Request; Standing  -     Instructions on coughing, deep breathing, and incentive spirometry.; Future  -     CBC and Differential; Future  -     Basic metabolic panel; Future  -     Protime-INR; Future  -     APTT; Future  -     Hemoglobin A1c; Future  -     Sedimentation rate; Future  -     C-reactive protein; Future  -     Tranexamic Acid 1,000 mg in sodium chloride 0.9 % 100 mL  -     Tranexamic Acid 1,000 mg in sodium chloride 0.9 % 100 mL  -     ethyl alcohol 62 % 2 each  -     ceFAZolin (ANCEF) 2 g in sodium chloride 0.9 % 100 mL IVPB  -     acetaminophen (TYLENOL) tablet 975 mg  -     meloxicam (MOBIC) tablet 15 mg  -     pregabalin (LYRICA) capsule 150 mg  -     Case Request    Other orders  -     Outpatient In A Bed; Standing  -     Follow Anesthesia Guidelines / Protocol; Future  -     Follow Anesthesia Guidelines / Protocol; Standing  -     Verify NPO Status; Standing  -     Verify The Time Patient Completed ERAS Hydration Drink; Standing  -     SCD (sequential compression device)- to be placed on patient in Pre-op; Standing  -     Clip operative site; Standing  -     Obtain informed consent (if not collected inpatient or PAT); Standing  -     Obtain informed consent  -     Provide instructions to  patient regarding NPO status  -     Chlorhexidine Skin Prep - Educate and Review With Patient; Future  -     Provide Patient With ERAS Hydration Instructions  -     Provide Patient With Enhanced Recovery Booklet(s) or Handout  -     Provide Instructions/Handout For Benzoyl Peroxide 5% Wash If Having Shoulder/Arm Surgery (If Prescribed)  -     Provide Instructions/Handout For Bactroban And Chlorhexidine Shower (If Prescribed)  -     Perform A Memory Screening On All Hip/Knee Replacement Patients >Or Equal To 65 Years Or Older  -     Complete A PROMIS And HOOS Or KOOS Survey If Having Hip Or Knee Replacement  -     Notify Physician - Standard; Standing  -     Provide Patient With Carbo Loading Instructions  -     Provide Patient With ERAS Booklet(s)/Handout  -     Chlorhexidine Gluconate 4 % solution; Apply 1 application topically to the appropriate area as directed Daily 5 days prior to surgery.  Dispense: 237 mL; Refill: 0        1. Primary osteoarthritis of left hip        I reviewed my findings with the patient.  We discussed treatment options for his left hip, and he has reached a point he would like to proceed with left total hip arthroplasty surgery.  He just had an intra-articular injection in September, and needs to wait at least 3 months.  He was thinking about early next year.  Risks, benefits, alternatives were discussed.  Please see my counseling note for details.    Surgical Counseling     I have informed the patient of the diagnosis and the prognosis.  Exhaustive conservative treatment modalities have not resulted in long term pain relief.  The symptoms have progressed to the point of daily pain and inability to perform activities of daily living without significant pain.  The patient has reached the point of desiring to proceed with total hip arthroplasty after discussing the risks, benefits and alternatives to the procedure.  The surgical procedure itself was discussed in detail.  Risks of the  procedure were discussed, which included but are not limited to, bleeding, infection, damage to blood vessels and nerves, incomplete pain relief, loosening of the prosthesis (early or late), deep infection (early or late), need for further surgery, leg length discrepancy, hip dislocation, loss of limb, deep venous thrombosis, pulmonary embolus, death, heart attack, stroke, kidney failure, liver failure, and anesthetic complications.  In addition, the potential for deep infection developing in the future was discussed, which could require further surgery.  The hip would have to be re-opened, debrided, and potentially remove the prosthesis, which may or may not be replaced in the future.  Also, the possibility for loosening of the prosthesis has been mentioned.  If the prosthesis loosened, a revision arthroplasty could be performed, with results that are not as predictable compared to the original procedure.  The typical rehabilitative course has also been discussed, and full recovery may take up to a year to see the maximum benefit.  The importance of patient cooperation in the rehabilitative efforts has also been discussed.  No guarantees were given.  The patient understands the potential risks versus the benefits and desires to proceed with total hip arthroplasty at a mutually convenient time.     Return for surgery.      Zane Matthews MD  10/18/23  09:27 EDT

## 2023-11-05 ENCOUNTER — HOSPITAL ENCOUNTER (EMERGENCY)
Facility: HOSPITAL | Age: 63
Discharge: HOME | End: 2023-11-05
Attending: EMERGENCY MEDICINE | Admitting: EMERGENCY MEDICINE
Payer: COMMERCIAL

## 2023-11-05 ENCOUNTER — APPOINTMENT (OUTPATIENT)
Dept: CT IMAGING | Facility: HOSPITAL | Age: 63
End: 2023-11-05
Payer: COMMERCIAL

## 2023-11-05 VITALS
HEART RATE: 100 BPM | TEMPERATURE: 98.3 F | DIASTOLIC BLOOD PRESSURE: 79 MMHG | BODY MASS INDEX: 39.99 KG/M2 | OXYGEN SATURATION: 92 % | SYSTOLIC BLOOD PRESSURE: 124 MMHG | WEIGHT: 240 LBS | RESPIRATION RATE: 18 BRPM | HEIGHT: 65 IN

## 2023-11-05 DIAGNOSIS — R11.2 NAUSEA AND VOMITING, UNSPECIFIED VOMITING TYPE: ICD-10-CM

## 2023-11-05 DIAGNOSIS — R10.84 GENERALIZED ABDOMINAL PAIN: Primary | ICD-10-CM

## 2023-11-05 LAB
ALBUMIN SERPL-MCNC: 4.2 G/DL (ref 3.5–5.2)
ALBUMIN/GLOB SERPL: 1.2 G/DL
ALP SERPL-CCNC: 72 U/L (ref 39–117)
ALT SERPL W P-5'-P-CCNC: 24 U/L (ref 1–41)
ANION GAP SERPL CALCULATED.3IONS-SCNC: 11 MMOL/L (ref 5–15)
AST SERPL-CCNC: 41 U/L (ref 1–40)
BASOPHILS # BLD AUTO: 0.02 10*3/MM3 (ref 0–0.2)
BASOPHILS NFR BLD AUTO: 0.2 % (ref 0–1.5)
BILIRUB SERPL-MCNC: 0.4 MG/DL (ref 0–1.2)
BILIRUB UR QL STRIP: NEGATIVE
BUN SERPL-MCNC: 16 MG/DL (ref 8–23)
BUN/CREAT SERPL: 12.4 (ref 7–25)
CALCIUM SPEC-SCNC: 9 MG/DL (ref 8.6–10.5)
CHLORIDE SERPL-SCNC: 98 MMOL/L (ref 98–107)
CLARITY UR: CLEAR
CO2 SERPL-SCNC: 27 MMOL/L (ref 22–29)
COLOR UR: YELLOW
CREAT SERPL-MCNC: 1.29 MG/DL (ref 0.76–1.27)
D-LACTATE SERPL-SCNC: 2.1 MMOL/L (ref 0.5–2)
DEPRECATED RDW RBC AUTO: 46.4 FL (ref 37–54)
EGFRCR SERPLBLD CKD-EPI 2021: 63.1 ML/MIN/1.73
EOSINOPHIL # BLD AUTO: 0.04 10*3/MM3 (ref 0–0.4)
EOSINOPHIL NFR BLD AUTO: 0.4 % (ref 0.3–6.2)
ERYTHROCYTE [DISTWIDTH] IN BLOOD BY AUTOMATED COUNT: 14.4 % (ref 12.3–15.4)
GLOBULIN UR ELPH-MCNC: 3.4 GM/DL
GLUCOSE SERPL-MCNC: 150 MG/DL (ref 65–99)
GLUCOSE UR STRIP-MCNC: NEGATIVE MG/DL
HCT VFR BLD AUTO: 45 % (ref 37.5–51)
HGB BLD-MCNC: 14.5 G/DL (ref 13–17.7)
HGB UR QL STRIP.AUTO: NEGATIVE
IMM GRANULOCYTES # BLD AUTO: 0.04 10*3/MM3 (ref 0–0.05)
IMM GRANULOCYTES NFR BLD AUTO: 0.4 % (ref 0–0.5)
KETONES UR QL STRIP: NEGATIVE
LEUKOCYTE ESTERASE UR QL STRIP.AUTO: NEGATIVE
LIPASE SERPL-CCNC: 26 U/L (ref 13–60)
LYMPHOCYTES # BLD AUTO: 0.36 10*3/MM3 (ref 0.7–3.1)
LYMPHOCYTES NFR BLD AUTO: 3.7 % (ref 19.6–45.3)
MCH RBC QN AUTO: 28.4 PG (ref 26.6–33)
MCHC RBC AUTO-ENTMCNC: 32.2 G/DL (ref 31.5–35.7)
MCV RBC AUTO: 88.2 FL (ref 79–97)
MONOCYTES # BLD AUTO: 0.59 10*3/MM3 (ref 0.1–0.9)
MONOCYTES NFR BLD AUTO: 6.1 % (ref 5–12)
NEUTROPHILS NFR BLD AUTO: 8.69 10*3/MM3 (ref 1.7–7)
NEUTROPHILS NFR BLD AUTO: 89.2 % (ref 42.7–76)
NITRITE UR QL STRIP: NEGATIVE
NRBC BLD AUTO-RTO: 0 /100 WBC (ref 0–0.2)
PH UR STRIP.AUTO: 5.5 [PH] (ref 5–8)
PLATELET # BLD AUTO: 291 10*3/MM3 (ref 140–450)
PMV BLD AUTO: 10 FL (ref 6–12)
POTASSIUM SERPL-SCNC: 4.2 MMOL/L (ref 3.5–5.2)
PROT SERPL-MCNC: 7.6 G/DL (ref 6–8.5)
PROT UR QL STRIP: ABNORMAL
RBC # BLD AUTO: 5.1 10*6/MM3 (ref 4.14–5.8)
SODIUM SERPL-SCNC: 136 MMOL/L (ref 136–145)
SP GR UR STRIP: 1.04 (ref 1–1.03)
UROBILINOGEN UR QL STRIP: ABNORMAL
WBC NRBC COR # BLD: 9.74 10*3/MM3 (ref 3.4–10.8)

## 2023-11-05 PROCEDURE — 25010000002 ONDANSETRON PER 1 MG

## 2023-11-05 PROCEDURE — 81003 URINALYSIS AUTO W/O SCOPE: CPT | Performed by: EMERGENCY MEDICINE

## 2023-11-05 PROCEDURE — 99285 EMERGENCY DEPT VISIT HI MDM: CPT

## 2023-11-05 PROCEDURE — 25810000003 LACTATED RINGERS SOLUTION: Performed by: EMERGENCY MEDICINE

## 2023-11-05 PROCEDURE — 25510000001 IOPAMIDOL 61 % SOLUTION: Performed by: EMERGENCY MEDICINE

## 2023-11-05 PROCEDURE — 25010000002 METOCLOPRAMIDE PER 10 MG: Performed by: EMERGENCY MEDICINE

## 2023-11-05 PROCEDURE — 25010000002 ONDANSETRON PER 1 MG: Performed by: EMERGENCY MEDICINE

## 2023-11-05 PROCEDURE — 25010000002 MORPHINE PER 10 MG

## 2023-11-05 PROCEDURE — 74177 CT ABD & PELVIS W/CONTRAST: CPT

## 2023-11-05 PROCEDURE — 83690 ASSAY OF LIPASE: CPT | Performed by: EMERGENCY MEDICINE

## 2023-11-05 PROCEDURE — 83605 ASSAY OF LACTIC ACID: CPT | Performed by: EMERGENCY MEDICINE

## 2023-11-05 PROCEDURE — 96374 THER/PROPH/DIAG INJ IV PUSH: CPT

## 2023-11-05 PROCEDURE — 85025 COMPLETE CBC W/AUTO DIFF WBC: CPT | Performed by: EMERGENCY MEDICINE

## 2023-11-05 PROCEDURE — 80053 COMPREHEN METABOLIC PANEL: CPT | Performed by: EMERGENCY MEDICINE

## 2023-11-05 PROCEDURE — 96375 TX/PRO/DX INJ NEW DRUG ADDON: CPT

## 2023-11-05 PROCEDURE — 96376 TX/PRO/DX INJ SAME DRUG ADON: CPT

## 2023-11-05 RX ORDER — DICYCLOMINE HYDROCHLORIDE 10 MG/1
20 CAPSULE ORAL ONCE
Status: COMPLETED | OUTPATIENT
Start: 2023-11-05 | End: 2023-11-05

## 2023-11-05 RX ORDER — SODIUM CHLORIDE 0.9 % (FLUSH) 0.9 %
10 SYRINGE (ML) INJECTION AS NEEDED
Status: DISCONTINUED | OUTPATIENT
Start: 2023-11-05 | End: 2023-11-05 | Stop reason: HOSPADM

## 2023-11-05 RX ORDER — MORPHINE SULFATE 4 MG/ML
INJECTION, SOLUTION INTRAMUSCULAR; INTRAVENOUS
Status: DISCONTINUED
Start: 2023-11-05 | End: 2023-11-05 | Stop reason: HOSPADM

## 2023-11-05 RX ORDER — ONDANSETRON 2 MG/ML
4 INJECTION INTRAMUSCULAR; INTRAVENOUS ONCE
Status: COMPLETED | OUTPATIENT
Start: 2023-11-05 | End: 2023-11-05

## 2023-11-05 RX ORDER — ONDANSETRON 2 MG/ML
INJECTION INTRAMUSCULAR; INTRAVENOUS
Status: COMPLETED
Start: 2023-11-05 | End: 2023-11-05

## 2023-11-05 RX ORDER — DICYCLOMINE HCL 20 MG
20 TABLET ORAL EVERY 8 HOURS PRN
Qty: 21 TABLET | Refills: 0 | Status: SHIPPED | OUTPATIENT
Start: 2023-11-05 | End: 2023-11-09

## 2023-11-05 RX ORDER — METOCLOPRAMIDE HYDROCHLORIDE 5 MG/ML
10 INJECTION INTRAMUSCULAR; INTRAVENOUS ONCE
Status: COMPLETED | OUTPATIENT
Start: 2023-11-05 | End: 2023-11-05

## 2023-11-05 RX ORDER — MORPHINE SULFATE 4 MG/ML
INJECTION, SOLUTION INTRAMUSCULAR; INTRAVENOUS
Status: COMPLETED
Start: 2023-11-05 | End: 2023-11-05

## 2023-11-05 RX ORDER — ONDANSETRON 4 MG/1
4 TABLET, ORALLY DISINTEGRATING ORAL EVERY 6 HOURS PRN
Qty: 9 TABLET | Refills: 0 | Status: SHIPPED | OUTPATIENT
Start: 2023-11-05 | End: 2023-11-09 | Stop reason: SDUPTHER

## 2023-11-05 RX ORDER — MORPHINE SULFATE 4 MG/ML
4 INJECTION, SOLUTION INTRAMUSCULAR; INTRAVENOUS ONCE
Status: COMPLETED | OUTPATIENT
Start: 2023-11-05 | End: 2023-11-05

## 2023-11-05 RX ADMIN — MORPHINE SULFATE 4 MG: 4 INJECTION, SOLUTION INTRAMUSCULAR; INTRAVENOUS at 03:33

## 2023-11-05 RX ADMIN — ONDANSETRON 4 MG: 2 INJECTION INTRAMUSCULAR; INTRAVENOUS at 03:31

## 2023-11-05 RX ADMIN — ONDANSETRON 4 MG: 2 INJECTION INTRAMUSCULAR; INTRAVENOUS at 01:43

## 2023-11-05 RX ADMIN — SODIUM CHLORIDE, POTASSIUM CHLORIDE, SODIUM LACTATE AND CALCIUM CHLORIDE 1000 ML: 600; 310; 30; 20 INJECTION, SOLUTION INTRAVENOUS at 04:34

## 2023-11-05 RX ADMIN — METOCLOPRAMIDE 10 MG: 5 INJECTION, SOLUTION INTRAMUSCULAR; INTRAVENOUS at 05:09

## 2023-11-05 RX ADMIN — DICYCLOMINE HYDROCHLORIDE 20 MG: 10 CAPSULE ORAL at 04:35

## 2023-11-05 RX ADMIN — SODIUM CHLORIDE, POTASSIUM CHLORIDE, SODIUM LACTATE AND CALCIUM CHLORIDE 1000 ML: 600; 310; 30; 20 INJECTION, SOLUTION INTRAVENOUS at 01:42

## 2023-11-05 RX ADMIN — IOPAMIDOL 100 ML: 612 INJECTION, SOLUTION INTRAVENOUS at 01:52

## 2023-11-05 NOTE — DISCHARGE INSTRUCTIONS
I recommend you take Tylenol 650 mg every 6 hours and ibuprofen 400 mg every 6 hours as needed for pain unless you have a contraindication to these medications  Take prescribed Zofran as needed for nausea and vomiting.  Take prescribed Bentyl for abdominal cramping.  Follow-up with your primary doctor.  Return to the ER as needed for new or worsening symptoms

## 2023-11-05 NOTE — ED PROVIDER NOTES
Subjective   History of Present Illness  Patient presents for evaluation of mid abdominal pain that is bilateral, gradual onset starting today, aching in nature, no identifiable aggravating or alleviating factors.  He subsequently developed nausea with multiple episodes of vomiting and states he is feeling dry mouth and dehydrated.  He has a history of GERD and irritable bowel syndrome but states he has not had symptoms like this in the past.    Patient states that he received his COVID and RSV vaccinations today but is not sure if that is related.  He has no history of prior abdominal surgeries    History provided by:  Patient      Review of Systems    Past Medical History:   Diagnosis Date    Acute bronchitis     Arthritis     Asthma     Cervicalgia     COVID-19     CPAP (continuous positive airway pressure) dependence     Diabetes mellitus     Edema     GERD (gastroesophageal reflux disease)     HTN (hypertension)     Hyperlipidemia     IBS (irritable bowel syndrome)     IBS (irritable bowel syndrome)     Low testosterone     Prostate disease     Sleep apnea     Wears glasses        No Known Allergies    Past Surgical History:   Procedure Laterality Date    CARPAL TUNNEL RELEASE Right 12/12/2022    Procedure: CARPAL TUNNEL RELEASE RIGHT;  Surgeon: Nj Bliss MD;  Location: [facility];  Service: Neurosurgery;  Laterality: Right;    CERVICAL FUSION  1978    Dr. Nickerson, . [place], [place]    COLONOSCOPY  07/2012    repeat in ten yrs    HIP SURGERY Right 01/26/2016    Dr. Schwartz    NECK SURGERY      cervical spine fusion    TONSILLECTOMY         Family History   Problem Relation Age of Onset    Cancer Mother         Ovarian,     Stroke Father     Hypertension Father     Hypertension Sister     Diabetes Brother     Heart attack Brother     Hypertension Maternal Grandmother     Diabetes Maternal Grandmother        Social History     Socioeconomic History    Marital status:    Tobacco Use    Smoking status:  Former     Packs/day: 0.50     Years: 10.00     Additional pack years: 0.00     Total pack years: 5.00     Types: Cigarettes     Quit date:      Years since quittin.8    Smokeless tobacco: Former     Types: Chew     Quit date: 1986   Vaping Use    Vaping Use: Never used   Substance and Sexual Activity    Alcohol use: Yes     Comment: 2 drinks per month    Drug use: Yes     Types: Marijuana     Comment: Quit in     Sexual activity: Defer           Objective   Physical Exam  Constitutional:       General: He is not in acute distress.     Appearance: He is obese.   HENT:      Head: Normocephalic and atraumatic.   Eyes:      Conjunctiva/sclera: Conjunctivae normal.      Pupils: Pupils are equal, round, and reactive to light.   Cardiovascular:      Rate and Rhythm: Normal rate and regular rhythm.      Pulses: Normal pulses.      Heart sounds: No murmur heard.     No gallop.   Pulmonary:      Effort: Pulmonary effort is normal. No respiratory distress.   Abdominal:      General: Abdomen is flat. There is no distension.      Tenderness: There is abdominal tenderness.      Comments: Diffuse abdominal tenderness without focality.  No guarding or rebound tenderness   Musculoskeletal:         General: No swelling or deformity. Normal range of motion.   Skin:     General: Skin is warm and dry.      Capillary Refill: Capillary refill takes less than 2 seconds.   Neurological:      General: No focal deficit present.      Mental Status: He is alert and oriented to person, place, and time.   Psychiatric:         Mood and Affect: Mood normal.         Behavior: Behavior normal.         Procedures           ED Course                                           Medical Decision Making  Differential diagnosis includes gastroenteritis, acute cholecystitis, pancreatitis, bowel obstruction.  Lab and imaging studies were conducted.  1 L IV fluid bolus and 4 mg IV Zofran were given.    Patient reassessed on multiple occasions  in the emergency room.  Overall well-appearing.  A clear cause for patient's symptoms were not identified.  At this time I believe he is appropriate for outpatient management.  He is able to treat follow-up with his primary doctor, he was counseled on return precautions, he feels comfortable with this plan.  Discharged in good condition    Problems Addressed:  Generalized abdominal pain: complicated acute illness or injury  Nausea and vomiting, unspecified vomiting type: complicated acute illness or injury    Amount and/or Complexity of Data Reviewed  Labs: ordered.     Details: Laboratory work-up independently interpreted by myself notable only for elevated creatinine not significantly changed from prior lab studies, marginal elevation in lactic acid level  Radiology: ordered and independent interpretation performed.     Details: CT scan of the abdomen and pelvis independently interpreted by myself demonstrates no evidence of bowel obstruction or other acute abnormality    Risk  Prescription drug management.        Final diagnoses:   Generalized abdominal pain   Nausea and vomiting, unspecified vomiting type       ED Disposition  ED Disposition       ED Disposition   Discharge    Condition   Stable    Comment   --             Recent Results (from the past 24 hour(s))   Urinalysis With Microscopic If Indicated (No Culture) - Urine, Clean Catch    Collection Time: 11/05/23 12:58 AM    Specimen: Urine, Clean Catch   Result Value Ref Range    Color, UA Yellow Yellow, Straw    Appearance, UA Clear Clear    pH, UA 5.5 5.0 - 8.0    Specific Gravity, UA 1.037 (H) 1.001 - 1.030    Glucose, UA Negative Negative    Ketones, UA Negative Negative    Bilirubin, UA Negative Negative    Blood, UA Negative Negative    Protein, UA Trace (A) Negative    Leuk Esterase, UA Negative Negative    Nitrite, UA Negative Negative    Urobilinogen, UA 1.0 E.U./dL 0.2 - 1.0 E.U./dL   Comprehensive Metabolic Panel    Collection Time: 11/05/23   1:39 AM    Specimen: Blood   Result Value Ref Range    Glucose 150 (H) 65 - 99 mg/dL    BUN 16 8 - 23 mg/dL    Creatinine 1.29 (H) 0.76 - 1.27 mg/dL    Sodium 136 136 - 145 mmol/L    Potassium 4.2 3.5 - 5.2 mmol/L    Chloride 98 98 - 107 mmol/L    CO2 27.0 22.0 - 29.0 mmol/L    Calcium 9.0 8.6 - 10.5 mg/dL    Total Protein 7.6 6.0 - 8.5 g/dL    Albumin 4.2 3.5 - 5.2 g/dL    ALT (SGPT) 24 1 - 41 U/L    AST (SGOT) 41 (H) 1 - 40 U/L    Alkaline Phosphatase 72 39 - 117 U/L    Total Bilirubin 0.4 0.0 - 1.2 mg/dL    Globulin 3.4 gm/dL    A/G Ratio 1.2 g/dL    BUN/Creatinine Ratio 12.4 7.0 - 25.0    Anion Gap 11.0 5.0 - 15.0 mmol/L    eGFR 63.1 >60.0 mL/min/1.73   Lipase    Collection Time: 11/05/23  1:39 AM    Specimen: Blood   Result Value Ref Range    Lipase 26 13 - 60 U/L   Lactic Acid, Plasma    Collection Time: 11/05/23  1:39 AM    Specimen: Blood   Result Value Ref Range    Lactate 2.1 (C) 0.5 - 2.0 mmol/L   CBC Auto Differential    Collection Time: 11/05/23  1:39 AM    Specimen: Blood   Result Value Ref Range    WBC 9.74 3.40 - 10.80 10*3/mm3    RBC 5.10 4.14 - 5.80 10*6/mm3    Hemoglobin 14.5 13.0 - 17.7 g/dL    Hematocrit 45.0 37.5 - 51.0 %    MCV 88.2 79.0 - 97.0 fL    MCH 28.4 26.6 - 33.0 pg    MCHC 32.2 31.5 - 35.7 g/dL    RDW 14.4 12.3 - 15.4 %    RDW-SD 46.4 37.0 - 54.0 fl    MPV 10.0 6.0 - 12.0 fL    Platelets 291 140 - 450 10*3/mm3    Neutrophil % 89.2 (H) 42.7 - 76.0 %    Lymphocyte % 3.7 (L) 19.6 - 45.3 %    Monocyte % 6.1 5.0 - 12.0 %    Eosinophil % 0.4 0.3 - 6.2 %    Basophil % 0.2 0.0 - 1.5 %    Immature Grans % 0.4 0.0 - 0.5 %    Neutrophils, Absolute 8.69 (H) 1.70 - 7.00 10*3/mm3    Lymphocytes, Absolute 0.36 (L) 0.70 - 3.10 10*3/mm3    Monocytes, Absolute 0.59 0.10 - 0.90 10*3/mm3    Eosinophils, Absolute 0.04 0.00 - 0.40 10*3/mm3    Basophils, Absolute 0.02 0.00 - 0.20 10*3/mm3    Immature Grans, Absolute 0.04 0.00 - 0.05 10*3/mm3    nRBC 0.0 0.0 - 0.2 /100 WBC     Note: In addition to lab  "results from this visit, the labs listed above may include labs taken at another facility or during a different encounter within the last 24 hours. Please correlate lab times with ED admission and discharge times for further clarification of the services performed during this visit.    CT Abdomen Pelvis With Contrast   Final Result   Impression:   1.No acute intra-abdominal or intrapelvic process.   2.Right inguinal hernia containing a portion of the bladder.   3.Hepatic steatosis.   4.Moderate to large stool burden likely related to constipation.   5.Ancillary findings as described above.            Electronically Signed: Yohana Kelly MD     11/5/2023 1:58 AM EST     Workstation ID: [id number]        Vitals:    11/05/23 0031 11/05/23 0143 11/05/23 0439   BP: 155/83  129/74   Patient Position:   Lying   Pulse: 117  105   Resp: 20  20   Temp: 98.3 °F (36.8 °C)     TempSrc: Oral     SpO2: 93%  91%   Weight:  109 kg (240 lb)    Height:  165.1 cm (65\")      Medications   sodium chloride 0.9 % flush 10 mL (has no administration in time range)   lactated ringers bolus 1,000 mL (0 mL Intravenous Stopped 11/5/23 0328)   ondansetron (ZOFRAN) injection 4 mg (4 mg Intravenous Given 11/5/23 0143)   iopamidol (ISOVUE-300) 61 % injection 100 mL (100 mL Intravenous Given 11/5/23 0152)   ondansetron (ZOFRAN) injection 4 mg (4 mg Intravenous Given During Downtime 11/5/23 0331)   Morphine sulfate (PF) injection 4 mg (4 mg Intravenous Given 11/5/23 0333)   lactated ringers bolus 1,000 mL (1,000 mL Intravenous New Bag 11/5/23 0434)   metoclopramide (REGLAN) injection 10 mg (10 mg Intravenous Given 11/5/23 0509)   dicyclomine (BENTYL) capsule 20 mg (20 mg Oral Given 11/5/23 0435)     ECG/EMG Results (last 24 hours)       ** No results found for the last 24 hours. **          No orders to display           No follow-up provider specified.       Medication List        New Prescriptions      ondansetron ODT 4 MG disintegrating " tablet  Commonly known as: ZOFRAN-ODT  Place 1 tablet on the tongue Every 6 (Six) Hours As Needed for Nausea or Vomiting.            Changed      * dicyclomine 20 MG tablet  Commonly known as: BENTYL  Take 1 tablet by mouth Every 6 (Six) Hours.  What changed: Another medication with the same name was added. Make sure you understand how and when to take each.     * dicyclomine 20 MG tablet  Commonly known as: BENTYL  Take 1 tablet by mouth Every 8 (Eight) Hours As Needed for Abdominal Cramping for up to 7 days.  What changed: You were already taking a medication with the same name, and this prescription was added. Make sure you understand how and when to take each.           * This list has 2 medication(s) that are the same as other medications prescribed for you. Read the directions carefully, and ask your doctor or other care provider to review them with you.                   Where to Get Your Medications        These medications were sent to [facility] [place] [address]      Hours: Monday to Friday 7 AM to 5:30 PM, Saturday & Sunday 8 AM to 4:30 PM Phone: [phone]   dicyclomine 20 MG tablet  ondansetron ODT 4 MG disintegrating tablet            Nahun Jimenez MD  11/05/23 0506

## 2023-11-09 ENCOUNTER — OFFICE VISIT (OUTPATIENT)
Dept: FAMILY MEDICINE CLINIC | Facility: CLINIC | Age: 63
End: 2023-11-09
Payer: COMMERCIAL

## 2023-11-09 VITALS
WEIGHT: 233.4 LBS | BODY MASS INDEX: 38.89 KG/M2 | HEART RATE: 94 BPM | OXYGEN SATURATION: 99 % | RESPIRATION RATE: 16 BRPM | DIASTOLIC BLOOD PRESSURE: 76 MMHG | SYSTOLIC BLOOD PRESSURE: 122 MMHG | HEIGHT: 65 IN

## 2023-11-09 DIAGNOSIS — K58.9 IRRITABLE BOWEL SYNDROME WITHOUT DIARRHEA: Primary | ICD-10-CM

## 2023-11-09 DIAGNOSIS — M51.36 DDD (DEGENERATIVE DISC DISEASE), LUMBAR: ICD-10-CM

## 2023-11-09 DIAGNOSIS — E11.9 CONTROLLED TYPE 2 DIABETES MELLITUS WITHOUT COMPLICATION, WITHOUT LONG-TERM CURRENT USE OF INSULIN: ICD-10-CM

## 2023-11-09 DIAGNOSIS — E78.49 OTHER HYPERLIPIDEMIA: ICD-10-CM

## 2023-11-09 RX ORDER — DICYCLOMINE HCL 20 MG
20 TABLET ORAL EVERY 6 HOURS
Qty: 180 TABLET | Refills: 3 | Status: SHIPPED | OUTPATIENT
Start: 2023-11-09

## 2023-11-09 RX ORDER — ROSUVASTATIN CALCIUM 10 MG/1
10 TABLET, COATED ORAL DAILY
Qty: 90 TABLET | Refills: 3 | Status: SHIPPED | OUTPATIENT
Start: 2023-11-09

## 2023-11-09 RX ORDER — CYCLOBENZAPRINE HCL 10 MG
10 TABLET ORAL 3 TIMES DAILY PRN
Qty: 90 TABLET | Refills: 1 | Status: SHIPPED | OUTPATIENT
Start: 2023-11-09

## 2023-11-09 RX ORDER — ONDANSETRON 4 MG/1
4 TABLET, ORALLY DISINTEGRATING ORAL EVERY 6 HOURS PRN
Qty: 30 TABLET | Refills: 0 | Status: SHIPPED | OUTPATIENT
Start: 2023-11-09

## 2023-11-09 NOTE — PROGRESS NOTES
Subjective   Sebastian Liao is a 63 y.o. male  Hypertension (RF amlodipine), Diabetes (F/U. FBS 62, A1C 6.3%), Hyperlipidemia (RF rosuvastatin #90), Irritable Bowel Syndrome (RF dicyclomine #180. Needs GI referral), Nausea (RF Zofran- 90day supply), and Back Pain (RF Flexeril )      Hypertension  Pertinent negatives include no chest pain, headaches, palpitations or shortness of breath.   Diabetes  Pertinent negatives for hypoglycemia include no dizziness or headaches. Pertinent negatives for diabetes include no chest pain, no fatigue, no polydipsia, no polyphagia, no polyuria and no weakness.   Hyperlipidemia  Pertinent negatives include no chest pain or shortness of breath.   Irritable Bowel Syndrome  Associated symptoms include nausea. Pertinent negatives include no chest pain, coughing, diaphoresis, fatigue, headaches, numbness, rash, vomiting or weakness.   Nausea  Associated symptoms include nausea. Pertinent negatives include no chest pain, coughing, diaphoresis, fatigue, headaches, numbness, rash, vomiting or weakness.   Back Pain  Pertinent negatives include no chest pain, headaches, numbness or weakness.   Patient is a pleasant 63-year-old white male who comes in for multiple chronic conditions first is type 2 diabetes patient has been placed on Ozempic has lost 33 pounds A1c today is 6.3 he has been having low blood sugars and dizziness along with some GI discomfort.    Patient comes in for follow-up of blood hypertension symptoms are controlled no problems or complaints with medication    Follow-up of hyperlipidemia lipid panel within normal limits takes Crestor meds working well no muscle aches    Patient has chronic bloating upset stomach, does not know his diabetes medication, worsening of symptoms would like to see GI doctor    The following portions of the patient's history were reviewed and updated as appropriate: allergies, current medications, past social history and problem list    Review of  Systems   Constitutional:  Negative for appetite change, diaphoresis, fatigue and unexpected weight change.   Eyes:  Negative for visual disturbance.   Respiratory:  Negative for cough, chest tightness and shortness of breath.    Cardiovascular:  Negative for chest pain, palpitations and leg swelling.   Gastrointestinal:  Positive for nausea. Negative for diarrhea and vomiting.   Endocrine: Negative for polydipsia, polyphagia and polyuria.   Musculoskeletal:  Positive for back pain.   Skin:  Negative for color change and rash.   Neurological:  Negative for dizziness, syncope, weakness, light-headedness, numbness and headaches.       Objective     Vitals:    11/09/23 0936   BP: 122/76   Pulse: 94   Resp: 16   SpO2: 99%       Physical Exam  Vitals and nursing note reviewed.   Constitutional:       General: He is not in acute distress.     Appearance: Normal appearance. He is well-developed. He is not ill-appearing, toxic-appearing or diaphoretic.   Neck:      Vascular: No carotid bruit or JVD.   Cardiovascular:      Rate and Rhythm: Normal rate and regular rhythm.      Pulses: Normal pulses.      Heart sounds: Normal heart sounds. No murmur heard.  Pulmonary:      Effort: Pulmonary effort is normal. No respiratory distress.      Breath sounds: Normal breath sounds.   Abdominal:      Palpations: Abdomen is soft.      Tenderness: There is no abdominal tenderness.   Skin:     General: Skin is warm and dry.   Neurological:      Mental Status: He is alert.       Assessment & Plan     Diagnoses and all orders for this visit:    1. Irritable bowel syndrome without diarrhea (Primary)  -     dicyclomine (BENTYL) 20 MG tablet; Take 1 tablet by mouth Every 6 (Six) Hours.  Dispense: 180 tablet; Refill: 3  -     ondansetron ODT (ZOFRAN-ODT) 4 MG disintegrating tablet; Place 1 tablet on the tongue Every 6 (Six) Hours As Needed for Nausea or Vomiting.  Dispense: 30 tablet; Refill: 0    2. Other hyperlipidemia  -     rosuvastatin  (CRESTOR) 10 MG tablet; Take 1 tablet by mouth Daily.  Dispense: 90 tablet; Refill: 3    3. Controlled type 2 diabetes mellitus without complication, without long-term current use of insulin    4. DDD (degenerative disc disease), lumbar  -     cyclobenzaprine (FLEXERIL) 10 MG tablet; Take 1 tablet by mouth up to 3 Times a Day As Needed for Muscle Spasms.  Dispense: 90 tablet; Refill: 1    #1 DC Glucovance continue Ozempic discussed with patient the possibility of diabetes medication causing stomach issues patient would like referral to GI    2.  Refill meds    3.  Monitor blood pressure blood sugar recheck in 1 month    I spent 15 minutes in patient care: Reviewing records prior to the visit, examining the patient, entering orders and documentation    Part of this note may be an electronic transcription/translation of spoken language to printed text using the Dragon Dictation System.

## 2023-11-17 DIAGNOSIS — R06.2 WHEEZING: ICD-10-CM

## 2023-11-17 RX ORDER — ALBUTEROL SULFATE 90 UG/1
2 AEROSOL, METERED RESPIRATORY (INHALATION) EVERY 4 HOURS PRN
Qty: 54 G | Refills: 3 | Status: SHIPPED | OUTPATIENT
Start: 2023-11-17

## 2023-11-19 DIAGNOSIS — R06.02 SHORTNESS OF BREATH: ICD-10-CM

## 2023-11-20 RX ORDER — FLUTICASONE PROPIONATE AND SALMETEROL 100; 50 UG/1; UG/1
1 POWDER RESPIRATORY (INHALATION)
Qty: 60 EACH | Refills: 5 | Status: SHIPPED | OUTPATIENT
Start: 2023-11-20

## 2023-11-29 ENCOUNTER — OFFICE VISIT (OUTPATIENT)
Dept: PULMONOLOGY | Facility: CLINIC | Age: 63
End: 2023-11-29
Payer: COMMERCIAL

## 2023-11-29 VITALS
TEMPERATURE: 98.4 F | HEART RATE: 101 BPM | DIASTOLIC BLOOD PRESSURE: 62 MMHG | HEIGHT: 65 IN | WEIGHT: 238 LBS | BODY MASS INDEX: 39.65 KG/M2 | OXYGEN SATURATION: 95 % | SYSTOLIC BLOOD PRESSURE: 140 MMHG

## 2023-11-29 DIAGNOSIS — R06.09 DYSPNEA ON EXERTION: ICD-10-CM

## 2023-11-29 DIAGNOSIS — G47.33 OSA (OBSTRUCTIVE SLEEP APNEA): Primary | ICD-10-CM

## 2023-11-29 DIAGNOSIS — Z87.891 FORMER SMOKER: ICD-10-CM

## 2023-11-29 DIAGNOSIS — K21.9 CHRONIC GERD: ICD-10-CM

## 2023-11-29 DIAGNOSIS — Z86.16 HISTORY OF COVID-19: ICD-10-CM

## 2023-11-29 RX ORDER — FLUTICASONE PROPIONATE AND SALMETEROL 100; 50 UG/1; UG/1
1 POWDER RESPIRATORY (INHALATION)
Qty: 60 EACH | Refills: 11 | Status: SHIPPED | OUTPATIENT
Start: 2023-11-29

## 2023-11-29 NOTE — PROGRESS NOTES
PULMONARY  NOTE    Chief Complaint     Dyspnea on exertion, chronic fatigue, history of COVID-19, class II obesity, severe obstructive sleep apnea, allergic rhinitis    History of Present Illness     63-year-old male returns today for follow-up  I last saw him 5/23/2023    He has a history of obstructive sleep apnea and uses CPAP on a nightly basis  He is using it regularly and feels that he benefits from it    He experienced COVID-19 again, about 2 months ago  He did not require hospitalization  I think it sounds as though he took Paxlovid  He feels that he has recovered to a large degree although still has some forgetfulness    He has been on Advair with albuterol  He feels that the inhalers help with his dyspnea  Insurance is changing his location, however    Patient Active Problem List   Diagnosis    Testicular hypofunction    Essential hypertension, benign    Type 2 diabetes mellitus with hyperglycemia, without long-term current use of insulin    Hyperlipidemia    Obesity, Class III, BMI 40-49.9 (morbid obesity)    Dyspnea on exertion    History of COVID-19 pneumonia (10/2020)    Former smoker (None x 35 years)    GERD    RONIT (obstructive sleep apnea) - severe    DDD (degenerative disc disease), cervical    Seasonal allergic rhinitis    Screen for colon cancer    Carpal tunnel syndrome of right wrist    Degenerative arthritis of hip      No Known Allergies    Current Outpatient Medications:     albuterol sulfate  (90 Base) MCG/ACT inhaler, Inhale 2 puffs Every 4 (Four) Hours As Needed for wheezing, Disp: 54 g, Rfl: 3    alfuzosin (UROXATRAL) 10 MG 24 hr tablet, Take 1 tablet by mouth Daily., Disp: 90 tablet, Rfl: 3    amLODIPine (NORVASC) 10 MG tablet, Take 1 tablet by mouth Daily., Disp: 90 tablet, Rfl: 3    carisoprodol (Soma) 350 MG tablet, Take 1 tablet by mouth 2 (Two) Times a Day As Needed for Muscle Spasms., Disp: , Rfl:     cevimeline (Evoxac) 30 MG capsule, Take 1 capsule by mouth 3 (Three)  Times a Day As Needed., Disp: 90 capsule, Rfl: 12    Chlorhexidine Gluconate 4 % solution, Shower daily with solution as directed Daily for 5 days prior to surgery., Disp: 237 mL, Rfl: 0    cyclobenzaprine (FLEXERIL) 10 MG tablet, Take 1 tablet by mouth up to 3 Times a Day As Needed for Muscle Spasms., Disp: 90 tablet, Rfl: 1    dicyclomine (BENTYL) 20 MG tablet, Take 1 tablet by mouth Every 6 (Six) Hours., Disp: 180 tablet, Rfl: 3    DPH-Lido-AlHydr-MgHydr-Simeth (First Mouthwash, Magic Mouthwash,) suspension, Swish and spit 10 mL Every 6 (Six) Hours., Disp: 237 mL, Rfl: 0    fluticasone (FLONASE) 50 MCG/ACT nasal spray, Use 2 sprays in each nostril as directed by provider Daily., Disp: 16 g, Rfl: 3    Fluticasone-Salmeterol (Advair Diskus) 100-50 MCG/ACT DISKUS, Inhale 1 puff 2 (Two) Times a Day., Disp: 60 each, Rfl: 5    fluticasone-salmeterol (Advair Diskus) 100-50 MCG/DOSE DISKUS, Inhale 1 puff 2 (Two) Times a Day., Disp: 180 each, Rfl: 3    ibuprofen (ADVIL,MOTRIN) 600 MG tablet, Take 1 tablet by mouth Every 6 (Six) Hours As Needed for Mild Pain., Disp: , Rfl:     loratadine (CLARITIN) 10 MG tablet, Take 1 tablet by mouth Daily., Disp: 30 tablet, Rfl: 10    montelukast (SINGULAIR) 10 MG tablet, Take 1 tablet by mouth Every Night., Disp: 90 tablet, Rfl: 3    olopatadine (PATANOL) 0.1 % ophthalmic solution, Administer 1 drop to both eyes 2 (Two) Times a Day., Disp: , Rfl:     omeprazole (priLOSEC) 40 MG capsule, Take 1 capsule by mouth Daily., Disp: 90 capsule, Rfl: 3    Omeprazole Magnesium (PRILOSEC PO), , Disp: , Rfl:     ondansetron ODT (ZOFRAN-ODT) 4 MG disintegrating tablet, Place 1 tablet on the tongue Every 6 (Six) Hours As Needed for Nausea or Vomiting., Disp: 30 tablet, Rfl: 0    oxyCODONE-acetaminophen (PERCOCET) 5-325 MG per tablet, Take 1 tablet by mouth Every 6 (Six) Hours As Needed for Severe Pain., Disp: 12 tablet, Rfl: 0    promethazine (PHENERGAN) 25 MG tablet, Take 1 tablet by mouth Every 6  (Six) Hours As Needed for nausea and vomiting, Disp: 60 tablet, Rfl: 3    rosuvastatin (CRESTOR) 10 MG tablet, Take 1 tablet by mouth Daily., Disp: 90 tablet, Rfl: 3    Semaglutide,0.25 or 0.5MG/DOS, (Ozempic, 0.25 or 0.5 MG/DOSE,) 2 MG/3ML solution pen-injector, Inject 0.5 mg under the skin into the appropriate area as directed 1 (One) Time Per Week., Disp: 3 mL, Rfl: 3    sildenafil (VIAGRA) 100 MG tablet, Take 1 tablet by mouth Daily As Needed for Erectile Dysfunction., Disp: , Rfl:     silodosin (RAPAFLO) 8 MG capsule capsule, Take 1 capsule by mouth Daily., Disp: 90 capsule, Rfl: 3    tadalafil (CIALIS) 20 MG tablet, Take 1 tablet by mouth Daily As Needed for Erectile Dysfunction., Disp: , Rfl:     terbinafine (LamISIL) 250 MG tablet, Take 1 tablet by mouth Daily., Disp: 30 tablet, Rfl: 2    Testosterone (Testopel) 75 MG implant pellet, Testopel 75 mg implant pellet, Disp: , Rfl:   MEDICATION LIST AND ALLERGIES REVIEWED.    Family History   Problem Relation Age of Onset    Cancer Mother         Ovarian,     Stroke Father     Hypertension Father     Hypertension Sister     Diabetes Brother     Heart attack Brother     Hypertension Maternal Grandmother     Diabetes Maternal Grandmother      Social History     Tobacco Use    Smoking status: Former     Packs/day: 0.50     Years: 10.00     Additional pack years: 0.00     Total pack years: 5.00     Types: Cigarettes     Quit date:      Years since quittin.9    Smokeless tobacco: Former     Types: Chew     Quit date: 1986   Vaping Use    Vaping Use: Never used   Substance Use Topics    Alcohol use: Yes     Comment: 2 drinks per month    Drug use: Yes     Types: Marijuana     Comment: Quit in      Social History     Social History Narrative    Left hand dominant, , Exercises daily.     Former smoker, has not smoked for 35 years     FAMILY AND SOCIAL HISTORY REVIEWED.    Review of Systems  IF PRESENT REFER TO SCANNED ROS SHEET FROM SAME  "DATE  OTHERWISE ROS OBTAINED AND NON-CONTRIBUTORY OVER HPI.    /62 (BP Location: Right arm, Patient Position: Sitting, Cuff Size: Adult)   Pulse 101   Temp 98.4 °F (36.9 °C) (Infrared)   Ht 165.1 cm (65\")   Wt 108 kg (238 lb)   SpO2 95% Comment: room air at rest  BMI 39.61 kg/m²   Physical Exam  Vitals and nursing note reviewed.   Constitutional:       General: He is not in acute distress.     Appearance: He is well-developed. He is not diaphoretic.   HENT:      Head: Normocephalic and atraumatic.   Neck:      Thyroid: No thyromegaly.   Cardiovascular:      Rate and Rhythm: Normal rate and regular rhythm.      Heart sounds: Murmur heard.   Pulmonary:      Effort: Pulmonary effort is normal.      Breath sounds: Normal breath sounds. No stridor.   Musculoskeletal:      Comments: Trace ankle edema   Lymphadenopathy:      Cervical: No cervical adenopathy.      Upper Body:      Right upper body: No supraclavicular or epitrochlear adenopathy.      Left upper body: No supraclavicular or epitrochlear adenopathy.   Skin:     General: Skin is warm and dry.   Neurological:      Mental Status: He is alert and oriented to person, place, and time.   Psychiatric:         Behavior: Behavior normal.         Results     PAP data was reviewed  Compliance is excellent, effective AHI less than 1 with acceptable leak and acceptable pressures    Immunization History   Administered Date(s) Administered    ABRYSVO (RSV, pregnant 32-36 wks) 11/04/2023    COVID-19 (PFIZER) BIVALENT 12+YRS 01/04/2023    COVID-19 (PFIZER) Purple Cap Monovalent 01/09/2021, 02/01/2021, 10/04/2021    COVID-19 F23 (MODERNA) 12YRS+ (SPIKEVAX) 11/04/2023    Flu Vaccine Intradermal Quad 18-64YR 10/05/2022    Flu Vaccine Quad PF 6-35MO 10/02/2021    Flu Vaccine Quad PF >36MO 10/02/2021    Flublok 18+yrs 10/02/2021    Fluzone (or Fluarix & Flulaval for VFC) >6mos 10/12/2020, 09/27/2023    Hep B, Unspecified 02/05/1996    Hepatitis A 11/19/2018, 05/20/2019    " Hepatitis B Adult/Adolescent IM 02/05/1996    Pneumococcal Conjugate 13-Valent (PCV13) 11/07/2016    Pneumococcal Conjugate 20-Valent (PCV20) 11/10/2022    Pneumococcal, Unspecified 04/25/2017    Rabies 12/05/2014, 12/08/2014, 12/19/2014, 12/19/2014    Rabies IM 2 12/19/2014    Tetanus 04/25/2017     Problem List       ICD-10-CM ICD-9-CM   1. RONIT (obstructive sleep apnea) - severe  G47.33 327.23   2. History of COVID-19 pneumonia (10/2020)  Z86.16 V12.09   3. Former smoker (None x 35 years)  Z87.891 V15.82   4. GERD  K21.9 530.81   5. Dyspnea on exertion  R06.09 786.09       Discussion     We discussed his CPAP data  His compliance is excellent and he has benefited from it    He appears to have recovered from his most recent episode of COVID-19    He remains on Advair with albuterol but his insurance is going to change him to a generic form of albuterol which we will prescribe    I encouraged efforts at regular exercise and weight loss    From a pulmonary standpoint there is no contraindication to him undergoing general anesthesia or orthopedic procedures such as a hip replacement    I will see him back in 6 months or earlier if there are any problems in the meantime    Moderate level of Medical Decision Making complexity based on 2 or more chronic stable illnesses and an independent review of test results and/or prescription drug management.    Dontae Manzo MD  Note electronically signed    CC: Fransico Ngo PA

## 2023-12-11 ENCOUNTER — OFFICE VISIT (OUTPATIENT)
Dept: FAMILY MEDICINE CLINIC | Facility: CLINIC | Age: 63
End: 2023-12-11
Payer: COMMERCIAL

## 2023-12-11 VITALS
RESPIRATION RATE: 16 BRPM | DIASTOLIC BLOOD PRESSURE: 78 MMHG | HEART RATE: 93 BPM | BODY MASS INDEX: 39.58 KG/M2 | WEIGHT: 237.6 LBS | OXYGEN SATURATION: 98 % | HEIGHT: 65 IN | SYSTOLIC BLOOD PRESSURE: 128 MMHG

## 2023-12-11 DIAGNOSIS — R11.0 NAUSEA: Primary | ICD-10-CM

## 2023-12-11 DIAGNOSIS — I10 ESSENTIAL HYPERTENSION, BENIGN: ICD-10-CM

## 2023-12-11 DIAGNOSIS — E11.65 TYPE 2 DIABETES MELLITUS WITH HYPERGLYCEMIA, WITHOUT LONG-TERM CURRENT USE OF INSULIN: ICD-10-CM

## 2023-12-11 DIAGNOSIS — E78.49 OTHER HYPERLIPIDEMIA: ICD-10-CM

## 2023-12-11 DIAGNOSIS — M51.36 DDD (DEGENERATIVE DISC DISEASE), LUMBAR: ICD-10-CM

## 2023-12-11 LAB
EXPIRATION DATE: ABNORMAL
GLUCOSE BLDC GLUCOMTR-MCNC: 153 MG/DL (ref 70–130)
HBA1C MFR BLD: 6.4 % (ref 4.5–5.7)
Lab: ABNORMAL

## 2023-12-11 RX ORDER — AMLODIPINE BESYLATE 10 MG/1
10 TABLET ORAL DAILY
Qty: 90 TABLET | Refills: 3 | Status: SHIPPED | OUTPATIENT
Start: 2023-12-11

## 2023-12-11 RX ORDER — CYCLOBENZAPRINE HCL 10 MG
10 TABLET ORAL 3 TIMES DAILY PRN
Qty: 90 TABLET | Refills: 3 | Status: SHIPPED | OUTPATIENT
Start: 2023-12-11

## 2023-12-11 RX ORDER — PROMETHAZINE HYDROCHLORIDE 25 MG/1
25 TABLET ORAL EVERY 6 HOURS PRN
Qty: 60 TABLET | Refills: 3 | Status: SHIPPED | OUTPATIENT
Start: 2023-12-11

## 2023-12-11 RX ORDER — ROSUVASTATIN CALCIUM 10 MG/1
10 TABLET, COATED ORAL DAILY
Qty: 90 TABLET | Refills: 3 | Status: SHIPPED | OUTPATIENT
Start: 2023-12-11

## 2023-12-11 NOTE — PROGRESS NOTES
Subjective   Sebastian Liao is a 63 y.o. male  Nausea (RF promethazine), Hypertension (RF amlodipine), Hyperlipidemia (RF rosuvastatin), DDD (RF Flexeril ), and Diabetes (Hasn't taken metformin in 4-6 weeks because FBS was too low-still taking Ozempic)      Nausea  Associated symptoms include nausea. Pertinent negatives include no chest pain, coughing, fatigue, headaches, rash or weakness.   Hypertension  Pertinent negatives include no chest pain, headaches, palpitations or shortness of breath.   Hyperlipidemia  Pertinent negatives include no chest pain or shortness of breath.   Diabetes  Pertinent negatives for hypoglycemia include no dizziness or headaches. Pertinent negatives for diabetes include no chest pain, no fatigue and no weakness.   Patient is a pleasant 63-year-old black male who comes in for type 2 diabetes patient stopped his metformin A1c was 6.4 patient also has nausea hypertension hyperlipidemia all chronic conditions are stable A1c is better    The following portions of the patient's history were reviewed and updated as appropriate: allergies, current medications, past social history and problem list    Review of Systems   Constitutional:  Negative for fatigue and unexpected weight change.   Respiratory:  Negative for cough, chest tightness and shortness of breath.    Cardiovascular:  Negative for chest pain, palpitations and leg swelling.   Gastrointestinal:  Positive for nausea.   Skin:  Negative for color change and rash.   Neurological:  Negative for dizziness, syncope, weakness and headaches.       Objective     Vitals:    12/11/23 0934   BP: 128/78   Pulse: 93   Resp: 16   SpO2: 98%       Physical Exam  Vitals and nursing note reviewed.   Constitutional:       General: He is not in acute distress.     Appearance: Normal appearance. He is well-developed. He is not ill-appearing, toxic-appearing or diaphoretic.   Neck:      Vascular: No carotid bruit or JVD.   Cardiovascular:      Rate and  Rhythm: Normal rate and regular rhythm.      Pulses: Normal pulses.      Heart sounds: Normal heart sounds. No murmur heard.  Pulmonary:      Effort: Pulmonary effort is normal. No respiratory distress.      Breath sounds: Normal breath sounds.   Abdominal:      Palpations: Abdomen is soft.      Tenderness: There is no abdominal tenderness.   Skin:     General: Skin is warm and dry.   Neurological:      Mental Status: He is alert.       Assessment & Plan     Diagnoses and all orders for this visit:    1. Nausea (Primary)  -     promethazine (PHENERGAN) 25 MG tablet; Take 1 tablet by mouth Every 6 (Six) Hours As Needed for nausea and vomiting  Dispense: 60 tablet; Refill: 3    2. Essential hypertension, benign  -     amLODIPine (NORVASC) 10 MG tablet; Take 1 tablet by mouth Daily.  Dispense: 90 tablet; Refill: 3    3. DDD (degenerative disc disease), lumbar  -     cyclobenzaprine (FLEXERIL) 10 MG tablet; Take 1 tablet by mouth up to 3 Times a Day As Needed for Muscle Spasms.  Dispense: 90 tablet; Refill: 3    4. Other hyperlipidemia  -     rosuvastatin (CRESTOR) 10 MG tablet; Take 1 tablet by mouth Daily.  Dispense: 90 tablet; Refill: 3     I spent 15 minutes in patient care: Reviewing records prior to the visit, examining the patient, entering orders and documentation    Part of this note may be an electronic transcription/translation of spoken language to printed text using the Dragon Dictation System.

## 2023-12-26 RX ORDER — SEMAGLUTIDE 0.68 MG/ML
0.5 INJECTION, SOLUTION SUBCUTANEOUS WEEKLY
Qty: 9 ML | Refills: 0 | Status: SHIPPED | OUTPATIENT
Start: 2023-12-26

## 2023-12-26 NOTE — TELEPHONE ENCOUNTER
Caller: Sebastian Liao    Relationship: Self    Best call back number: [phone]     Requested Prescriptions:   Requested Prescriptions     Pending Prescriptions Disp Refills    Semaglutide,0.25 or 0.5MG/DOS, (Ozempic, 0.25 or 0.5 MG/DOSE,) 2 MG/3ML solution pen-injector 3 mL 3     Sig: Inject 0.5 mg under the skin into the appropriate area as directed 1 (One) Time Per Week.        Pharmacy where request should be sent: [facility]     Last office visit with prescribing clinician: 12/11/2023   Last telemedicine visit with prescribing clinician: Visit date not found   Next office visit with prescribing clinician: 1/22/2024     Additional details provided by patient: 90 DAY SUPPLY REQUESTED.    Does the patient have less than a 3 day supply:  [x] Yes  [] No    Would you like a call back once the refill request has been completed: [x] Yes [] No    If the office needs to give you a call back, can they leave a voicemail: [x] Yes [] No    Melissa Fernandez Rep   12/26/23 10:44 EST

## 2024-01-22 ENCOUNTER — OFFICE VISIT (OUTPATIENT)
Dept: FAMILY MEDICINE CLINIC | Facility: CLINIC | Age: 64
End: 2024-01-22
Payer: COMMERCIAL

## 2024-01-22 ENCOUNTER — LAB (OUTPATIENT)
Dept: FAMILY MEDICINE CLINIC | Facility: CLINIC | Age: 64
End: 2024-01-22
Payer: COMMERCIAL

## 2024-01-22 VITALS
RESPIRATION RATE: 14 BRPM | TEMPERATURE: 97.9 F | DIASTOLIC BLOOD PRESSURE: 76 MMHG | HEIGHT: 65 IN | BODY MASS INDEX: 39.39 KG/M2 | OXYGEN SATURATION: 99 % | SYSTOLIC BLOOD PRESSURE: 126 MMHG | HEART RATE: 89 BPM | WEIGHT: 236.4 LBS

## 2024-01-22 DIAGNOSIS — Z00.00 ROUTINE GENERAL MEDICAL EXAMINATION AT A HEALTH CARE FACILITY: Primary | ICD-10-CM

## 2024-01-22 DIAGNOSIS — E11.65 TYPE 2 DIABETES MELLITUS WITH HYPERGLYCEMIA, WITHOUT LONG-TERM CURRENT USE OF INSULIN: ICD-10-CM

## 2024-01-22 LAB
POC CREATININE URINE: 8.8
POC MICROALBUMIN URINE: 80

## 2024-01-22 PROCEDURE — 80053 COMPREHEN METABOLIC PANEL: CPT | Performed by: PHYSICIAN ASSISTANT

## 2024-01-22 PROCEDURE — 80061 LIPID PANEL: CPT | Performed by: PHYSICIAN ASSISTANT

## 2024-01-22 PROCEDURE — 84443 ASSAY THYROID STIM HORMONE: CPT | Performed by: PHYSICIAN ASSISTANT

## 2024-01-22 PROCEDURE — 83036 HEMOGLOBIN GLYCOSYLATED A1C: CPT | Performed by: PHYSICIAN ASSISTANT

## 2024-01-22 PROCEDURE — 99396 PREV VISIT EST AGE 40-64: CPT | Performed by: PHYSICIAN ASSISTANT

## 2024-01-22 PROCEDURE — 82044 UR ALBUMIN SEMIQUANTITATIVE: CPT | Performed by: PHYSICIAN ASSISTANT

## 2024-01-22 PROCEDURE — 93000 ELECTROCARDIOGRAM COMPLETE: CPT | Performed by: PHYSICIAN ASSISTANT

## 2024-01-22 PROCEDURE — 36415 COLL VENOUS BLD VENIPUNCTURE: CPT | Performed by: PHYSICIAN ASSISTANT

## 2024-01-22 RX ORDER — SEMAGLUTIDE 0.68 MG/ML
0.5 INJECTION, SOLUTION SUBCUTANEOUS WEEKLY
Qty: 9 ML | Refills: 0 | Status: SHIPPED | OUTPATIENT
Start: 2024-01-22

## 2024-01-22 NOTE — PROGRESS NOTES
Subjective   Sebastian Liao is a 63 y.o. male  Annual Exam (Fasting for labs. UTD on colorectal screening (Cologuard 2022)) and Diabetes (RF Ozempic )      History of Present Illness  Patient is a pleasant 63-year-old male who comes in complaining of preventive medical examination, no shortness of breath no chest pain no SI/HI concentration is focus is good.  The following portions of the patient's history were reviewed and updated as appropriate: allergies, current medications, past social history and problem list    Review of Systems   Constitutional: Negative.    HENT: Negative.     Eyes: Negative.    Respiratory: Negative.     Cardiovascular: Negative.    Gastrointestinal: Negative.    Endocrine: Negative.    Genitourinary: Negative.    Musculoskeletal: Negative.    Skin: Negative.    Allergic/Immunologic: Negative.    Neurological: Negative.    Hematological: Negative.    Psychiatric/Behavioral: Negative.     All other systems reviewed and are negative.      Objective     Vitals:    01/22/24 0954   BP: 126/76   Pulse: 89   Resp: 14   Temp: 97.9 °F (36.6 °C)   SpO2: 99%       Physical Exam  Vitals and nursing note reviewed.   Constitutional:       General: He is not in acute distress.     Appearance: Normal appearance. He is well-developed. He is not ill-appearing, toxic-appearing or diaphoretic.   HENT:      Head: Normocephalic and atraumatic.      Right Ear: External ear normal.      Left Ear: External ear normal.   Eyes:      Conjunctiva/sclera: Conjunctivae normal.      Pupils: Pupils are equal, round, and reactive to light.   Neck:      Thyroid: No thyromegaly.      Vascular: No carotid bruit.   Cardiovascular:      Rate and Rhythm: Normal rate and regular rhythm.      Pulses: Normal pulses.      Heart sounds: Normal heart sounds. No murmur heard.  Pulmonary:      Effort: Pulmonary effort is normal. No respiratory distress.      Breath sounds: Normal breath sounds.   Abdominal:      General: Bowel sounds  are normal.      Palpations: Abdomen is soft. There is no mass.      Tenderness: There is no abdominal tenderness.   Musculoskeletal:         General: No swelling. Normal range of motion.      Cervical back: Normal range of motion and neck supple.   Lymphadenopathy:      Cervical: No cervical adenopathy.   Skin:     General: Skin is warm and dry.      Findings: No lesion or rash.   Neurological:      Mental Status: He is alert and oriented to person, place, and time.      Cranial Nerves: No cranial nerve deficit.      Sensory: No sensory deficit.      Motor: No weakness.      Coordination: Coordination normal.      Gait: Gait normal.      Deep Tendon Reflexes: Reflexes are normal and symmetric.   Psychiatric:         Mood and Affect: Mood normal.         Behavior: Behavior normal.         Thought Content: Thought content normal.         Judgment: Judgment normal.         ECG 12 Lead    Date/Time: 1/22/2024 5:20 PM  Performed by: Fransico Ngo PA    Authorized by: Fransico Ngo PA  Comparison: not compared with previous ECG   Rhythm: sinus rhythm  Rate: normal  ST Segments: ST segments normal  T Waves: T waves normal  QRS axis: normal    Clinical impression: normal ECG         Assessment & Plan     Diagnoses and all orders for this visit:    1. Routine general medical examination at a health care facility (Primary)  -     Comprehensive Metabolic Panel; Future  -     Lipid Panel; Future  -     TSH; Future  -     Hemoglobin A1c; Future  -     Comprehensive Metabolic Panel  -     Lipid Panel  -     TSH  -     Hemoglobin A1c    Other orders  -     Semaglutide,0.25 or 0.5MG/DOS, (Ozempic, 0.25 or 0.5 MG/DOSE,) 2 MG/3ML solution pen-injector; Inject 0.5 mg under the skin into the appropriate area as directed 1 (One) Time Per Week.  Dispense: 9 mL; Refill: 0     Preventive medicine discussed, diet, exercise, healthy living discussed at length.  Discussed nutrition, physical activity, healthy weight,  injury prevention, misuse of tobacco, alcohol and drugs, dental health, mental health, immunizations, screening    Part of this note may be an electronic transcription/translation of spoken language to printed text using the Dragon Dictation System.

## 2024-01-23 LAB
ALBUMIN SERPL-MCNC: 4.2 G/DL (ref 3.5–5.2)
ALBUMIN/GLOB SERPL: 1.1 G/DL
ALP SERPL-CCNC: 81 U/L (ref 39–117)
ALT SERPL W P-5'-P-CCNC: 21 U/L (ref 1–41)
ANION GAP SERPL CALCULATED.3IONS-SCNC: 12.4 MMOL/L (ref 5–15)
AST SERPL-CCNC: 41 U/L (ref 1–40)
BILIRUB SERPL-MCNC: 0.2 MG/DL (ref 0–1.2)
BUN SERPL-MCNC: 12 MG/DL (ref 8–23)
BUN/CREAT SERPL: 9.2 (ref 7–25)
CALCIUM SPEC-SCNC: 9.8 MG/DL (ref 8.6–10.5)
CHLORIDE SERPL-SCNC: 101 MMOL/L (ref 98–107)
CHOLEST SERPL-MCNC: 150 MG/DL (ref 0–200)
CO2 SERPL-SCNC: 26.6 MMOL/L (ref 22–29)
CREAT SERPL-MCNC: 1.31 MG/DL (ref 0.76–1.27)
EGFRCR SERPLBLD CKD-EPI 2021: 61.9 ML/MIN/1.73
GLOBULIN UR ELPH-MCNC: 3.8 GM/DL
GLUCOSE SERPL-MCNC: 118 MG/DL (ref 65–99)
HBA1C MFR BLD: 7 % (ref 4.8–5.6)
HDLC SERPL-MCNC: 45 MG/DL (ref 40–60)
LDLC SERPL CALC-MCNC: 87 MG/DL (ref 0–100)
LDLC/HDLC SERPL: 1.9 {RATIO}
POTASSIUM SERPL-SCNC: 4.3 MMOL/L (ref 3.5–5.2)
PROT SERPL-MCNC: 8 G/DL (ref 6–8.5)
SODIUM SERPL-SCNC: 140 MMOL/L (ref 136–145)
TRIGL SERPL-MCNC: 98 MG/DL (ref 0–150)
TSH SERPL DL<=0.05 MIU/L-ACNC: 1.39 UIU/ML (ref 0.27–4.2)
VLDLC SERPL-MCNC: 18 MG/DL (ref 5–40)

## 2024-01-24 DIAGNOSIS — R06.02 SHORTNESS OF BREATH: ICD-10-CM

## 2024-01-24 RX ORDER — FLUTICASONE PROPIONATE AND SALMETEROL 100; 50 UG/1; UG/1
1 POWDER RESPIRATORY (INHALATION)
Qty: 180 EACH | Refills: 3 | Status: SHIPPED | OUTPATIENT
Start: 2024-01-24

## 2024-01-24 NOTE — TELEPHONE ENCOUNTER
PT CALLED NEEDING 90 DAY SUPPLY SENT IN FOR GENERIC ADVAIR, PER PT THAT'S HOW THEY WILL PAY FOR INHALER

## 2024-01-29 DIAGNOSIS — K21.9 GASTROESOPHAGEAL REFLUX DISEASE WITHOUT ESOPHAGITIS: ICD-10-CM

## 2024-01-29 RX ORDER — OMEPRAZOLE 40 MG/1
40 CAPSULE, DELAYED RELEASE ORAL DAILY
Qty: 90 CAPSULE | Refills: 3 | Status: SHIPPED | OUTPATIENT
Start: 2024-01-29

## 2024-02-08 ENCOUNTER — PRE-ADMISSION TESTING (OUTPATIENT)
Dept: PREADMISSION TESTING | Facility: HOSPITAL | Age: 64
End: 2024-02-08
Payer: COMMERCIAL

## 2024-02-08 VITALS — WEIGHT: 238.21 LBS | BODY MASS INDEX: 43.84 KG/M2 | HEIGHT: 62 IN

## 2024-02-08 DIAGNOSIS — M16.12 PRIMARY OSTEOARTHRITIS OF LEFT HIP: ICD-10-CM

## 2024-02-08 LAB
ANION GAP SERPL CALCULATED.3IONS-SCNC: 7 MMOL/L (ref 5–15)
APTT PPP: 31.5 SECONDS (ref 22–39)
BASOPHILS # BLD AUTO: 0.06 10*3/MM3 (ref 0–0.2)
BASOPHILS NFR BLD AUTO: 0.8 % (ref 0–1.5)
BUN SERPL-MCNC: 12 MG/DL (ref 8–23)
BUN/CREAT SERPL: 10.6 (ref 7–25)
CALCIUM SPEC-SCNC: 9.3 MG/DL (ref 8.6–10.5)
CHLORIDE SERPL-SCNC: 99 MMOL/L (ref 98–107)
CO2 SERPL-SCNC: 29 MMOL/L (ref 22–29)
CREAT SERPL-MCNC: 1.13 MG/DL (ref 0.76–1.27)
CRP SERPL-MCNC: 0.39 MG/DL (ref 0–0.5)
DEPRECATED RDW RBC AUTO: 43.8 FL (ref 37–54)
EGFRCR SERPLBLD CKD-EPI 2021: 73.9 ML/MIN/1.73
EOSINOPHIL # BLD AUTO: 0.68 10*3/MM3 (ref 0–0.4)
EOSINOPHIL NFR BLD AUTO: 9 % (ref 0.3–6.2)
ERYTHROCYTE [DISTWIDTH] IN BLOOD BY AUTOMATED COUNT: 13.4 % (ref 12.3–15.4)
ERYTHROCYTE [SEDIMENTATION RATE] IN BLOOD: 32 MM/HR (ref 0–20)
GLUCOSE SERPL-MCNC: 113 MG/DL (ref 65–99)
HBA1C MFR BLD: 6.8 % (ref 4.8–5.6)
HCT VFR BLD AUTO: 44.9 % (ref 37.5–51)
HGB BLD-MCNC: 14.9 G/DL (ref 13–17.7)
IMM GRANULOCYTES # BLD AUTO: 0.02 10*3/MM3 (ref 0–0.05)
IMM GRANULOCYTES NFR BLD AUTO: 0.3 % (ref 0–0.5)
INR PPP: 0.97 (ref 0.89–1.12)
LYMPHOCYTES # BLD AUTO: 2.41 10*3/MM3 (ref 0.7–3.1)
LYMPHOCYTES NFR BLD AUTO: 31.9 % (ref 19.6–45.3)
MCH RBC QN AUTO: 29.7 PG (ref 26.6–33)
MCHC RBC AUTO-ENTMCNC: 33.2 G/DL (ref 31.5–35.7)
MCV RBC AUTO: 89.6 FL (ref 79–97)
MONOCYTES # BLD AUTO: 0.85 10*3/MM3 (ref 0.1–0.9)
MONOCYTES NFR BLD AUTO: 11.3 % (ref 5–12)
NEUTROPHILS NFR BLD AUTO: 3.53 10*3/MM3 (ref 1.7–7)
NEUTROPHILS NFR BLD AUTO: 46.7 % (ref 42.7–76)
NRBC BLD AUTO-RTO: 0 /100 WBC (ref 0–0.2)
PLATELET # BLD AUTO: 317 10*3/MM3 (ref 140–450)
PMV BLD AUTO: 9.8 FL (ref 6–12)
POTASSIUM SERPL-SCNC: 3.8 MMOL/L (ref 3.5–5.2)
PROTHROMBIN TIME: 13 SECONDS (ref 12.2–14.5)
RBC # BLD AUTO: 5.01 10*6/MM3 (ref 4.14–5.8)
SODIUM SERPL-SCNC: 135 MMOL/L (ref 136–145)
WBC NRBC COR # BLD AUTO: 7.55 10*3/MM3 (ref 3.4–10.8)

## 2024-02-08 PROCEDURE — 85652 RBC SED RATE AUTOMATED: CPT

## 2024-02-08 PROCEDURE — 36415 COLL VENOUS BLD VENIPUNCTURE: CPT

## 2024-02-08 PROCEDURE — 83036 HEMOGLOBIN GLYCOSYLATED A1C: CPT

## 2024-02-08 PROCEDURE — 85730 THROMBOPLASTIN TIME PARTIAL: CPT

## 2024-02-08 PROCEDURE — 85610 PROTHROMBIN TIME: CPT

## 2024-02-08 PROCEDURE — 86140 C-REACTIVE PROTEIN: CPT

## 2024-02-08 PROCEDURE — 85025 COMPLETE CBC W/AUTO DIFF WBC: CPT

## 2024-02-08 PROCEDURE — 80048 BASIC METABOLIC PNL TOTAL CA: CPT

## 2024-02-08 NOTE — PAT
An arrival time for procedure was not provided during PAT visit. If patient had any questions or concerns about their arrival time, they were instructed to contact their surgeon/physician.  Additionally, if the patient referred to an arrival time that was acquired from their my chart account, patient was encouraged to verify that time with their surgeon/physician. Arrival times are NOT provided in Pre Admission Testing Department.    Patient viewed general PAT education video as instructed in their preoperative information received from their surgeon.  Patient stated the general PAT education video was viewed in its entirety and survey completed.  Copies of PAT general education handouts (Incentive Spirometry, Meds to Beds Program, Patient Belongings, Pre-op skin preparation instructions, Blood Glucose testing, Visitor policy, Surgery FAQ, Code H) distributed to patient if not printed. Education related to the PAT pass and skin preparation for surgery (if applicable) completed in PAT as a reinforcement to PAT education video. Patient instructed to return PAT pass provided today as well as completed skin preparation sheet (if applicable) on the day of procedure.     Additionally if patient had not viewed video yet but intended to view it at home or in our waiting area, then referred them to the handout with QR code/link provided during PAT visit.  Instructed patient to complete survey after viewing the video in its entirety.  Encouraged patient/family to read PAT general education handouts thoroughly and notify PAT staff with any questions or concerns. Patient verbalized understanding of all information and priority content.    Patient instructed to drink 20 ounces of Gatorade or Gatorlyte (if diabetic) and it needs to be completed 1 hour (for Main OR patients) or 2 hours (scheduled  section & [facility] patients) before given arrival time for procedure (NO RED Gatorade and NO Gatorade Zero).    Patient  verbalized understanding.    Prescription for Chlorhexidine shower called into patient's pharmacy or [facility] pharmacy by patient's surgeon.  Reinforced with patient to  the prescription from applicable pharmacy if they haven't already.  Verbal and written instructions given regarding proper use of Chlorhexidine body wash to patient and/or famlily during PAT visit. Patient/family also instructed to complete checklist and return it to Pre-op on the day of surgery.  Patient and/or family verbalized understanding.    Patient denies any current skin issues.     Patient to apply Chlorhexadine wipes  to surgical area (as instructed) the night before procedure and the AM of procedure. Wipes provided.    Clean catch urinalysis not indicated because patient denied recent urinary frequency, urinary urgency, burning/pain upon urination, or flank pain. No recent UTIs.    Discussed with patient options for receiving total joint replacement education and assessed patient's ability and preference. Joint Replacement Guide given to patient during PAT visit since not received a copy within the last year. Encouraged patient/family to read guide thoroughly and notify PAT staff with any questions or concerns. Handout provided directing patient to links to watch online videos related to joint replacement surgery on the [facility] website. The handout gives detailed instructions for joining an online joint replacement class through Zoom or phone conference offered on Thursdays. Patient agreed to participate by watching videos online. Patient verbalized understanding of instructions and to complete the online learning tool survey. Encouraged to share information with family and/or . An overview of the joint replacement education was provided during the visit including general perioperative instructions that are routine for all surgical patients (PAT PASS, wipes, directions to pre-op, etc.).      EKG IN CHART FROM 1/22/24

## 2024-02-15 DIAGNOSIS — M16.12 PRIMARY OSTEOARTHRITIS OF LEFT HIP: Primary | ICD-10-CM

## 2024-02-22 ENCOUNTER — APPOINTMENT (OUTPATIENT)
Dept: GENERAL RADIOLOGY | Facility: HOSPITAL | Age: 64
End: 2024-02-22
Payer: COMMERCIAL

## 2024-02-22 ENCOUNTER — HOSPITAL ENCOUNTER (OUTPATIENT)
Facility: HOSPITAL | Age: 64
Discharge: HOME | End: 2024-02-25
Attending: ORTHOPAEDIC SURGERY | Admitting: ORTHOPAEDIC SURGERY
Payer: COMMERCIAL

## 2024-02-22 ENCOUNTER — ANESTHESIA EVENT (OUTPATIENT)
Dept: PERIOP | Facility: HOSPITAL | Age: 64
End: 2024-02-22
Payer: COMMERCIAL

## 2024-02-22 ENCOUNTER — ANESTHESIA (OUTPATIENT)
Dept: PERIOP | Facility: HOSPITAL | Age: 64
End: 2024-02-22
Payer: COMMERCIAL

## 2024-02-22 DIAGNOSIS — Z96.642 STATUS POST TOTAL REPLACEMENT OF LEFT HIP: Primary | ICD-10-CM

## 2024-02-22 DIAGNOSIS — M16.12 PRIMARY OSTEOARTHRITIS OF LEFT HIP: ICD-10-CM

## 2024-02-22 PROBLEM — N40.0 BPH (BENIGN PROSTATIC HYPERPLASIA): Status: ACTIVE | Noted: 2024-02-22

## 2024-02-22 LAB
GLUCOSE BLDC GLUCOMTR-MCNC: 144 MG/DL (ref 70–130)
GLUCOSE BLDC GLUCOMTR-MCNC: 228 MG/DL (ref 70–130)
GLUCOSE BLDC GLUCOMTR-MCNC: 278 MG/DL (ref 70–130)

## 2024-02-22 PROCEDURE — 25010000002 CEFAZOLIN PER 500 MG: Performed by: ORTHOPAEDIC SURGERY

## 2024-02-22 PROCEDURE — 25010000002 PROPOFOL 10 MG/ML EMULSION: Performed by: ANESTHESIOLOGY

## 2024-02-22 PROCEDURE — 25010000002 DROPERIDOL PER 5 MG

## 2024-02-22 PROCEDURE — 94799 UNLISTED PULMONARY SVC/PX: CPT

## 2024-02-22 PROCEDURE — 94660 CPAP INITIATION&MGMT: CPT

## 2024-02-22 PROCEDURE — 97530 THERAPEUTIC ACTIVITIES: CPT

## 2024-02-22 PROCEDURE — 25010000002 PROCHLORPERAZINE 10 MG/2ML SOLUTION: Performed by: INTERNAL MEDICINE

## 2024-02-22 PROCEDURE — C1776 JOINT DEVICE (IMPLANTABLE): HCPCS | Performed by: ORTHOPAEDIC SURGERY

## 2024-02-22 PROCEDURE — 27130 TOTAL HIP ARTHROPLASTY: CPT | Performed by: ORTHOPAEDIC SURGERY

## 2024-02-22 PROCEDURE — 25010000002 HYDROMORPHONE 1 MG/ML SOLUTION

## 2024-02-22 PROCEDURE — 25810000003 LACTATED RINGERS PER 1000 ML: Performed by: ANESTHESIOLOGY

## 2024-02-22 PROCEDURE — 97161 PT EVAL LOW COMPLEX 20 MIN: CPT

## 2024-02-22 PROCEDURE — 25010000002 FENTANYL CITRATE (PF) 100 MCG/2ML SOLUTION: Performed by: ANESTHESIOLOGY

## 2024-02-22 PROCEDURE — 63710000001 INSULIN DETEMIR PER 5 UNITS: Performed by: INTERNAL MEDICINE

## 2024-02-22 PROCEDURE — S0260 H&P FOR SURGERY: HCPCS | Performed by: PHYSICIAN ASSISTANT

## 2024-02-22 PROCEDURE — 25810000003 SODIUM CHLORIDE 0.9 % SOLUTION: Performed by: ORTHOPAEDIC SURGERY

## 2024-02-22 PROCEDURE — 25010000002 ONDANSETRON PER 1 MG: Performed by: ANESTHESIOLOGY

## 2024-02-22 PROCEDURE — 25010000002 GLYCOPYRROLATE 0.4 MG/2ML SOLUTION: Performed by: ANESTHESIOLOGY

## 2024-02-22 PROCEDURE — 76000 FLUOROSCOPY <1 HR PHYS/QHP: CPT

## 2024-02-22 PROCEDURE — 27130 TOTAL HIP ARTHROPLASTY: CPT | Performed by: PHYSICIAN ASSISTANT

## 2024-02-22 PROCEDURE — C1713 ANCHOR/SCREW BN/BN,TIS/BN: HCPCS | Performed by: ORTHOPAEDIC SURGERY

## 2024-02-22 PROCEDURE — 25010000002 NEOSTIGMINE 10 MG/10ML SOLUTION: Performed by: ANESTHESIOLOGY

## 2024-02-22 PROCEDURE — 82948 REAGENT STRIP/BLOOD GLUCOSE: CPT

## 2024-02-22 PROCEDURE — 94640 AIRWAY INHALATION TREATMENT: CPT

## 2024-02-22 PROCEDURE — 73502 X-RAY EXAM HIP UNI 2-3 VIEWS: CPT

## 2024-02-22 PROCEDURE — 25010000002 FENTANYL CITRATE (PF) 50 MCG/ML SOLUTION

## 2024-02-22 PROCEDURE — 25010000002 ONDANSETRON PER 1 MG: Performed by: ORTHOPAEDIC SURGERY

## 2024-02-22 PROCEDURE — 25010000002 ROPIVACAINE PER 1 MG: Performed by: ORTHOPAEDIC SURGERY

## 2024-02-22 PROCEDURE — 63710000001 INSULIN LISPRO (HUMAN) PER 5 UNITS: Performed by: INTERNAL MEDICINE

## 2024-02-22 DEVICE — IMPLANTABLE DEVICE: Type: IMPLANTABLE DEVICE | Site: HIP | Status: FUNCTIONAL

## 2024-02-22 DEVICE — DEV CONTRL TISS STRATAFIX SYMM PDS PLUS VIL CT-1 45CM: Type: IMPLANTABLE DEVICE | Site: HIP | Status: FUNCTIONAL

## 2024-02-22 DEVICE — DEV CONTRL TISS STRATAFIX SPIRAL MNCRYL UD 3/0 PLS 60CM: Type: IMPLANTABLE DEVICE | Site: HIP | Status: FUNCTIONAL

## 2024-02-22 DEVICE — SCRW SPH HD REFLECTION 6.5X25MM: Type: IMPLANTABLE DEVICE | Site: HIP | Status: FUNCTIONAL

## 2024-02-22 RX ORDER — SODIUM CHLORIDE 9 MG/ML
120 INJECTION, SOLUTION INTRAVENOUS CONTINUOUS
Status: DISCONTINUED | OUTPATIENT
Start: 2024-02-22 | End: 2024-02-25 | Stop reason: HOSPADM

## 2024-02-22 RX ORDER — SODIUM CHLORIDE 0.9 % (FLUSH) 0.9 %
3-10 SYRINGE (ML) INJECTION AS NEEDED
Status: DISCONTINUED | OUTPATIENT
Start: 2024-02-22 | End: 2024-02-22 | Stop reason: HOSPADM

## 2024-02-22 RX ORDER — SODIUM CHLORIDE 0.9 % (FLUSH) 0.9 %
1-10 SYRINGE (ML) INJECTION AS NEEDED
Status: DISCONTINUED | OUTPATIENT
Start: 2024-02-22 | End: 2024-02-25 | Stop reason: HOSPADM

## 2024-02-22 RX ORDER — FENTANYL CITRATE 50 UG/ML
50 INJECTION, SOLUTION INTRAMUSCULAR; INTRAVENOUS
Status: DISCONTINUED | OUTPATIENT
Start: 2024-02-22 | End: 2024-02-22 | Stop reason: HOSPADM

## 2024-02-22 RX ORDER — FENTANYL CITRATE 50 UG/ML
INJECTION, SOLUTION INTRAMUSCULAR; INTRAVENOUS
Status: COMPLETED
Start: 2024-02-22 | End: 2024-02-22

## 2024-02-22 RX ORDER — ACETAMINOPHEN 500 MG
1000 TABLET ORAL ONCE
Status: COMPLETED | OUTPATIENT
Start: 2024-02-22 | End: 2024-02-22

## 2024-02-22 RX ORDER — PROMETHAZINE HYDROCHLORIDE 25 MG/1
25 SUPPOSITORY RECTAL ONCE AS NEEDED
Status: DISCONTINUED | OUTPATIENT
Start: 2024-02-22 | End: 2024-02-22 | Stop reason: HOSPADM

## 2024-02-22 RX ORDER — DROPERIDOL 2.5 MG/ML
0.62 INJECTION, SOLUTION INTRAMUSCULAR; INTRAVENOUS
Status: DISCONTINUED | OUTPATIENT
Start: 2024-02-22 | End: 2024-02-22 | Stop reason: HOSPADM

## 2024-02-22 RX ORDER — ONDANSETRON 2 MG/ML
4 INJECTION INTRAMUSCULAR; INTRAVENOUS EVERY 6 HOURS PRN
Status: DISCONTINUED | OUTPATIENT
Start: 2024-02-22 | End: 2024-02-22

## 2024-02-22 RX ORDER — PHENYLEPHRINE HCL IN 0.9% NACL 1 MG/10 ML
SYRINGE (ML) INTRAVENOUS AS NEEDED
Status: DISCONTINUED | OUTPATIENT
Start: 2024-02-22 | End: 2024-02-22 | Stop reason: SURG

## 2024-02-22 RX ORDER — PROMETHAZINE HYDROCHLORIDE 25 MG/1
25 TABLET ORAL ONCE AS NEEDED
Status: DISCONTINUED | OUTPATIENT
Start: 2024-02-22 | End: 2024-02-22 | Stop reason: HOSPADM

## 2024-02-22 RX ORDER — IPRATROPIUM BROMIDE AND ALBUTEROL SULFATE 2.5; .5 MG/3ML; MG/3ML
3 SOLUTION RESPIRATORY (INHALATION) ONCE AS NEEDED
Status: DISCONTINUED | OUTPATIENT
Start: 2024-02-22 | End: 2024-02-22 | Stop reason: HOSPADM

## 2024-02-22 RX ORDER — DEXTROSE MONOHYDRATE 25 G/50ML
25 INJECTION, SOLUTION INTRAVENOUS
Status: DISCONTINUED | OUTPATIENT
Start: 2024-02-22 | End: 2024-02-25 | Stop reason: HOSPADM

## 2024-02-22 RX ORDER — ROSUVASTATIN CALCIUM 10 MG/1
10 TABLET, COATED ORAL DAILY
Status: DISCONTINUED | OUTPATIENT
Start: 2024-02-23 | End: 2024-02-25 | Stop reason: HOSPADM

## 2024-02-22 RX ORDER — TAMSULOSIN HYDROCHLORIDE 0.4 MG/1
0.4 CAPSULE ORAL DAILY
Status: DISCONTINUED | OUTPATIENT
Start: 2024-02-22 | End: 2024-02-25 | Stop reason: HOSPADM

## 2024-02-22 RX ORDER — DROPERIDOL 2.5 MG/ML
0.62 INJECTION, SOLUTION INTRAMUSCULAR; INTRAVENOUS ONCE AS NEEDED
Status: DISCONTINUED | OUTPATIENT
Start: 2024-02-22 | End: 2024-02-22 | Stop reason: HOSPADM

## 2024-02-22 RX ORDER — LIDOCAINE HYDROCHLORIDE 10 MG/ML
0.5 INJECTION, SOLUTION EPIDURAL; INFILTRATION; INTRACAUDAL; PERINEURAL ONCE AS NEEDED
Status: COMPLETED | OUTPATIENT
Start: 2024-02-22 | End: 2024-02-22

## 2024-02-22 RX ORDER — NALOXONE HCL 0.4 MG/ML
0.4 VIAL (ML) INJECTION AS NEEDED
Status: DISCONTINUED | OUTPATIENT
Start: 2024-02-22 | End: 2024-02-22 | Stop reason: HOSPADM

## 2024-02-22 RX ORDER — GLYCOPYRROLATE 0.2 MG/ML
INJECTION INTRAMUSCULAR; INTRAVENOUS AS NEEDED
Status: DISCONTINUED | OUTPATIENT
Start: 2024-02-22 | End: 2024-02-22 | Stop reason: SURG

## 2024-02-22 RX ORDER — SODIUM CHLORIDE, SODIUM LACTATE, POTASSIUM CHLORIDE, CALCIUM CHLORIDE 600; 310; 30; 20 MG/100ML; MG/100ML; MG/100ML; MG/100ML
9 INJECTION, SOLUTION INTRAVENOUS CONTINUOUS PRN
Status: DISCONTINUED | OUTPATIENT
Start: 2024-02-22 | End: 2024-02-22 | Stop reason: HOSPADM

## 2024-02-22 RX ORDER — NEOSTIGMINE METHYLSULFATE 1 MG/ML
INJECTION, SOLUTION INTRAVENOUS AS NEEDED
Status: DISCONTINUED | OUTPATIENT
Start: 2024-02-22 | End: 2024-02-22 | Stop reason: SURG

## 2024-02-22 RX ORDER — MAGNESIUM HYDROXIDE 1200 MG/15ML
LIQUID ORAL AS NEEDED
Status: DISCONTINUED | OUTPATIENT
Start: 2024-02-22 | End: 2024-02-22 | Stop reason: HOSPADM

## 2024-02-22 RX ORDER — HYDROMORPHONE HYDROCHLORIDE 1 MG/ML
0.5 INJECTION, SOLUTION INTRAMUSCULAR; INTRAVENOUS; SUBCUTANEOUS
Status: DISCONTINUED | OUTPATIENT
Start: 2024-02-22 | End: 2024-02-25 | Stop reason: HOSPADM

## 2024-02-22 RX ORDER — TRANEXAMIC ACID 10 MG/ML
1000 INJECTION, SOLUTION INTRAVENOUS ONCE
Status: COMPLETED | OUTPATIENT
Start: 2024-02-22 | End: 2024-02-22

## 2024-02-22 RX ORDER — SODIUM CHLORIDE 9 MG/ML
40 INJECTION, SOLUTION INTRAVENOUS AS NEEDED
Status: DISCONTINUED | OUTPATIENT
Start: 2024-02-22 | End: 2024-02-22 | Stop reason: HOSPADM

## 2024-02-22 RX ORDER — ONDANSETRON 2 MG/ML
4 INJECTION INTRAMUSCULAR; INTRAVENOUS ONCE AS NEEDED
Status: DISCONTINUED | OUTPATIENT
Start: 2024-02-22 | End: 2024-02-22 | Stop reason: HOSPADM

## 2024-02-22 RX ORDER — SODIUM CHLORIDE 0.9 % (FLUSH) 0.9 %
10 SYRINGE (ML) INJECTION AS NEEDED
Status: DISCONTINUED | OUTPATIENT
Start: 2024-02-22 | End: 2024-02-22 | Stop reason: HOSPADM

## 2024-02-22 RX ORDER — ROCURONIUM BROMIDE 10 MG/ML
INJECTION, SOLUTION INTRAVENOUS AS NEEDED
Status: DISCONTINUED | OUTPATIENT
Start: 2024-02-22 | End: 2024-02-22 | Stop reason: SURG

## 2024-02-22 RX ORDER — SODIUM CHLORIDE 0.9 % (FLUSH) 0.9 %
3 SYRINGE (ML) INJECTION EVERY 12 HOURS SCHEDULED
Status: DISCONTINUED | OUTPATIENT
Start: 2024-02-22 | End: 2024-02-22 | Stop reason: HOSPADM

## 2024-02-22 RX ORDER — ONDANSETRON 4 MG/1
4 TABLET, ORALLY DISINTEGRATING ORAL EVERY 6 HOURS PRN
Status: DISCONTINUED | OUTPATIENT
Start: 2024-02-22 | End: 2024-02-22

## 2024-02-22 RX ORDER — NALOXONE HCL 0.4 MG/ML
0.4 VIAL (ML) INJECTION
Status: DISCONTINUED | OUTPATIENT
Start: 2024-02-22 | End: 2024-02-25 | Stop reason: HOSPADM

## 2024-02-22 RX ORDER — FENTANYL CITRATE 50 UG/ML
INJECTION, SOLUTION INTRAMUSCULAR; INTRAVENOUS AS NEEDED
Status: DISCONTINUED | OUTPATIENT
Start: 2024-02-22 | End: 2024-02-22 | Stop reason: SURG

## 2024-02-22 RX ORDER — SODIUM CHLORIDE 0.9 % (FLUSH) 0.9 %
10 SYRINGE (ML) INJECTION EVERY 12 HOURS SCHEDULED
Status: DISCONTINUED | OUTPATIENT
Start: 2024-02-22 | End: 2024-02-25 | Stop reason: HOSPADM

## 2024-02-22 RX ORDER — HYDROMORPHONE HYDROCHLORIDE 1 MG/ML
0.5 INJECTION, SOLUTION INTRAMUSCULAR; INTRAVENOUS; SUBCUTANEOUS
Status: DISCONTINUED | OUTPATIENT
Start: 2024-02-22 | End: 2024-02-22 | Stop reason: HOSPADM

## 2024-02-22 RX ORDER — ASPIRIN 81 MG/1
81 TABLET ORAL EVERY 12 HOURS SCHEDULED
Status: DISCONTINUED | OUTPATIENT
Start: 2024-02-23 | End: 2024-02-25 | Stop reason: HOSPADM

## 2024-02-22 RX ORDER — KETOTIFEN FUMARATE 0.35 MG/ML
1 SOLUTION/ DROPS OPHTHALMIC 2 TIMES DAILY
Status: DISCONTINUED | OUTPATIENT
Start: 2024-02-22 | End: 2024-02-25 | Stop reason: HOSPADM

## 2024-02-22 RX ORDER — HYDRALAZINE HYDROCHLORIDE 20 MG/ML
5 INJECTION INTRAMUSCULAR; INTRAVENOUS
Status: DISCONTINUED | OUTPATIENT
Start: 2024-02-22 | End: 2024-02-22 | Stop reason: HOSPADM

## 2024-02-22 RX ORDER — FAMOTIDINE 20 MG/1
20 TABLET, FILM COATED ORAL
Status: COMPLETED | OUTPATIENT
Start: 2024-02-22 | End: 2024-02-22

## 2024-02-22 RX ORDER — MELOXICAM 15 MG/1
15 TABLET ORAL DAILY
Status: DISCONTINUED | OUTPATIENT
Start: 2024-02-22 | End: 2024-02-25 | Stop reason: HOSPADM

## 2024-02-22 RX ORDER — HYDROCODONE BITARTRATE AND ACETAMINOPHEN 5; 325 MG/1; MG/1
1 TABLET ORAL ONCE AS NEEDED
Status: DISCONTINUED | OUTPATIENT
Start: 2024-02-22 | End: 2024-02-22 | Stop reason: HOSPADM

## 2024-02-22 RX ORDER — BUDESONIDE AND FORMOTEROL FUMARATE DIHYDRATE 160; 4.5 UG/1; UG/1
1 AEROSOL RESPIRATORY (INHALATION)
Status: DISCONTINUED | OUTPATIENT
Start: 2024-02-22 | End: 2024-02-25 | Stop reason: HOSPADM

## 2024-02-22 RX ORDER — SODIUM CHLORIDE 0.9 % (FLUSH) 0.9 %
10 SYRINGE (ML) INJECTION EVERY 12 HOURS SCHEDULED
Status: DISCONTINUED | OUTPATIENT
Start: 2024-02-22 | End: 2024-02-22 | Stop reason: HOSPADM

## 2024-02-22 RX ORDER — AMLODIPINE BESYLATE 10 MG/1
10 TABLET ORAL DAILY
Status: DISCONTINUED | OUTPATIENT
Start: 2024-02-23 | End: 2024-02-25 | Stop reason: HOSPADM

## 2024-02-22 RX ORDER — CARISOPRODOL 350 MG/1
350 TABLET ORAL 2 TIMES DAILY PRN
Status: DISCONTINUED | OUTPATIENT
Start: 2024-02-22 | End: 2024-02-22 | Stop reason: SDUPTHER

## 2024-02-22 RX ORDER — DROPERIDOL 2.5 MG/ML
INJECTION, SOLUTION INTRAMUSCULAR; INTRAVENOUS
Status: COMPLETED
Start: 2024-02-22 | End: 2024-02-22

## 2024-02-22 RX ORDER — NICOTINE POLACRILEX 4 MG
15 LOZENGE BUCCAL
Status: DISCONTINUED | OUTPATIENT
Start: 2024-02-22 | End: 2024-02-25 | Stop reason: HOSPADM

## 2024-02-22 RX ORDER — PROCHLORPERAZINE EDISYLATE 5 MG/ML
5 INJECTION INTRAMUSCULAR; INTRAVENOUS EVERY 6 HOURS PRN
Status: DISCONTINUED | OUTPATIENT
Start: 2024-02-22 | End: 2024-02-25 | Stop reason: HOSPADM

## 2024-02-22 RX ORDER — OXYCODONE HYDROCHLORIDE 5 MG/1
5 TABLET ORAL EVERY 4 HOURS PRN
Status: DISCONTINUED | OUTPATIENT
Start: 2024-02-22 | End: 2024-02-25 | Stop reason: HOSPADM

## 2024-02-22 RX ORDER — DICYCLOMINE HCL 20 MG
20 TABLET ORAL EVERY 6 HOURS SCHEDULED
Status: DISCONTINUED | OUTPATIENT
Start: 2024-02-22 | End: 2024-02-25 | Stop reason: HOSPADM

## 2024-02-22 RX ORDER — MIDAZOLAM HYDROCHLORIDE 1 MG/ML
1 INJECTION INTRAMUSCULAR; INTRAVENOUS
Status: DISCONTINUED | OUTPATIENT
Start: 2024-02-22 | End: 2024-02-22 | Stop reason: HOSPADM

## 2024-02-22 RX ORDER — PROPOFOL 10 MG/ML
VIAL (ML) INTRAVENOUS AS NEEDED
Status: DISCONTINUED | OUTPATIENT
Start: 2024-02-22 | End: 2024-02-22 | Stop reason: SURG

## 2024-02-22 RX ORDER — PREGABALIN 150 MG/1
150 CAPSULE ORAL ONCE
Status: COMPLETED | OUTPATIENT
Start: 2024-02-22 | End: 2024-02-22

## 2024-02-22 RX ORDER — CYCLOBENZAPRINE HCL 10 MG
10 TABLET ORAL 3 TIMES DAILY PRN
Status: DISCONTINUED | OUTPATIENT
Start: 2024-02-22 | End: 2024-02-25 | Stop reason: HOSPADM

## 2024-02-22 RX ORDER — LABETALOL HYDROCHLORIDE 5 MG/ML
5 INJECTION, SOLUTION INTRAVENOUS
Status: DISCONTINUED | OUTPATIENT
Start: 2024-02-22 | End: 2024-02-22 | Stop reason: HOSPADM

## 2024-02-22 RX ORDER — LIDOCAINE HYDROCHLORIDE 10 MG/ML
INJECTION, SOLUTION EPIDURAL; INFILTRATION; INTRACAUDAL; PERINEURAL AS NEEDED
Status: DISCONTINUED | OUTPATIENT
Start: 2024-02-22 | End: 2024-02-22 | Stop reason: SURG

## 2024-02-22 RX ORDER — SODIUM CHLORIDE 9 MG/ML
40 INJECTION, SOLUTION INTRAVENOUS AS NEEDED
Status: DISCONTINUED | OUTPATIENT
Start: 2024-02-22 | End: 2024-02-25 | Stop reason: HOSPADM

## 2024-02-22 RX ORDER — INSULIN LISPRO 100 [IU]/ML
2-7 INJECTION, SOLUTION INTRAVENOUS; SUBCUTANEOUS
Status: DISCONTINUED | OUTPATIENT
Start: 2024-02-22 | End: 2024-02-25 | Stop reason: HOSPADM

## 2024-02-22 RX ORDER — ROPIVACAINE HYDROCHLORIDE 5 MG/ML
INJECTION, SOLUTION EPIDURAL; INFILTRATION; PERINEURAL AS NEEDED
Status: DISCONTINUED | OUTPATIENT
Start: 2024-02-22 | End: 2024-02-22 | Stop reason: HOSPADM

## 2024-02-22 RX ORDER — ALBUTEROL SULFATE 2.5 MG/3ML
2.5 SOLUTION RESPIRATORY (INHALATION) EVERY 6 HOURS PRN
Status: DISCONTINUED | OUTPATIENT
Start: 2024-02-22 | End: 2024-02-25 | Stop reason: HOSPADM

## 2024-02-22 RX ORDER — LABETALOL HYDROCHLORIDE 5 MG/ML
10 INJECTION, SOLUTION INTRAVENOUS EVERY 4 HOURS PRN
Status: DISCONTINUED | OUTPATIENT
Start: 2024-02-22 | End: 2024-02-25 | Stop reason: HOSPADM

## 2024-02-22 RX ORDER — PANTOPRAZOLE SODIUM 40 MG/1
40 TABLET, DELAYED RELEASE ORAL
Status: DISCONTINUED | OUTPATIENT
Start: 2024-02-23 | End: 2024-02-25 | Stop reason: HOSPADM

## 2024-02-22 RX ORDER — NALOXONE HCL 0.4 MG/ML
0.1 VIAL (ML) INJECTION
Status: DISCONTINUED | OUTPATIENT
Start: 2024-02-22 | End: 2024-02-25 | Stop reason: HOSPADM

## 2024-02-22 RX ORDER — MELOXICAM 15 MG/1
15 TABLET ORAL ONCE
Status: COMPLETED | OUTPATIENT
Start: 2024-02-22 | End: 2024-02-22

## 2024-02-22 RX ORDER — IBUPROFEN 600 MG/1
1 TABLET ORAL
Status: DISCONTINUED | OUTPATIENT
Start: 2024-02-22 | End: 2024-02-25 | Stop reason: HOSPADM

## 2024-02-22 RX ORDER — ONDANSETRON 2 MG/ML
INJECTION INTRAMUSCULAR; INTRAVENOUS AS NEEDED
Status: DISCONTINUED | OUTPATIENT
Start: 2024-02-22 | End: 2024-02-22 | Stop reason: SURG

## 2024-02-22 RX ADMIN — HYDROMORPHONE HYDROCHLORIDE 0.5 MG: 1 INJECTION, SOLUTION INTRAMUSCULAR; INTRAVENOUS; SUBCUTANEOUS at 12:38

## 2024-02-22 RX ADMIN — FENTANYL CITRATE 50 MCG: 50 INJECTION, SOLUTION INTRAMUSCULAR; INTRAVENOUS at 11:50

## 2024-02-22 RX ADMIN — LIDOCAINE HYDROCHLORIDE 50 MG: 10 INJECTION, SOLUTION EPIDURAL; INFILTRATION; INTRACAUDAL; PERINEURAL at 07:25

## 2024-02-22 RX ADMIN — LIDOCAINE HYDROCHLORIDE 0.5 ML: 10 INJECTION, SOLUTION EPIDURAL; INFILTRATION; INTRACAUDAL; PERINEURAL at 06:52

## 2024-02-22 RX ADMIN — TRANEXAMIC ACID 1000 MG: 10 INJECTION, SOLUTION INTRAVENOUS at 10:16

## 2024-02-22 RX ADMIN — PROPOFOL 250 MG: 10 INJECTION, EMULSION INTRAVENOUS at 07:25

## 2024-02-22 RX ADMIN — ACETAMINOPHEN 1000 MG: 500 TABLET ORAL at 07:03

## 2024-02-22 RX ADMIN — Medication 100 MCG: at 07:45

## 2024-02-22 RX ADMIN — ROCURONIUM BROMIDE 100 MG: 10 INJECTION INTRAVENOUS at 07:25

## 2024-02-22 RX ADMIN — HYDROMORPHONE HYDROCHLORIDE 0.5 MG: 1 INJECTION, SOLUTION INTRAMUSCULAR; INTRAVENOUS; SUBCUTANEOUS at 11:55

## 2024-02-22 RX ADMIN — OXYCODONE HYDROCHLORIDE 5 MG: 5 TABLET ORAL at 18:22

## 2024-02-22 RX ADMIN — PROCHLORPERAZINE EDISYLATE 5 MG: 5 INJECTION INTRAMUSCULAR; INTRAVENOUS at 21:00

## 2024-02-22 RX ADMIN — SODIUM CHLORIDE 2 G: 900 INJECTION INTRAVENOUS at 23:06

## 2024-02-22 RX ADMIN — Medication 100 MCG: at 07:33

## 2024-02-22 RX ADMIN — PREGABALIN 150 MG: 150 CAPSULE ORAL at 07:03

## 2024-02-22 RX ADMIN — DICYCLOMINE HYDROCHLORIDE 20 MG: 20 TABLET ORAL at 17:20

## 2024-02-22 RX ADMIN — Medication 100 MCG: at 07:25

## 2024-02-22 RX ADMIN — INSULIN DETEMIR 15 UNITS: 100 INJECTION, SOLUTION SUBCUTANEOUS at 21:00

## 2024-02-22 RX ADMIN — FENTANYL CITRATE 50 MCG: 50 INJECTION, SOLUTION INTRAMUSCULAR; INTRAVENOUS at 10:24

## 2024-02-22 RX ADMIN — FAMOTIDINE 20 MG: 20 TABLET, FILM COATED ORAL at 07:03

## 2024-02-22 RX ADMIN — TRANEXAMIC ACID 1000 MG: 10 INJECTION, SOLUTION INTRAVENOUS at 07:35

## 2024-02-22 RX ADMIN — MELOXICAM 15 MG: 15 TABLET ORAL at 07:03

## 2024-02-22 RX ADMIN — FENTANYL CITRATE 50 MCG: 50 INJECTION, SOLUTION INTRAMUSCULAR; INTRAVENOUS at 11:17

## 2024-02-22 RX ADMIN — HYDROMORPHONE HYDROCHLORIDE 0.5 MG: 1 INJECTION, SOLUTION INTRAMUSCULAR; INTRAVENOUS; SUBCUTANEOUS at 11:23

## 2024-02-22 RX ADMIN — FENTANYL CITRATE 50 MCG: 50 INJECTION, SOLUTION INTRAMUSCULAR; INTRAVENOUS at 10:49

## 2024-02-22 RX ADMIN — ONDANSETRON 4 MG: 2 INJECTION INTRAMUSCULAR; INTRAVENOUS at 07:31

## 2024-02-22 RX ADMIN — OXYCODONE HYDROCHLORIDE 5 MG: 5 TABLET ORAL at 23:05

## 2024-02-22 RX ADMIN — SODIUM CHLORIDE 120 ML/HR: 9 INJECTION, SOLUTION INTRAVENOUS at 15:44

## 2024-02-22 RX ADMIN — FENTANYL CITRATE 50 MCG: 50 INJECTION, SOLUTION INTRAMUSCULAR; INTRAVENOUS at 12:10

## 2024-02-22 RX ADMIN — FENTANYL CITRATE 50 MCG: 50 INJECTION, SOLUTION INTRAMUSCULAR; INTRAVENOUS at 10:36

## 2024-02-22 RX ADMIN — OXYCODONE HYDROCHLORIDE 5 MG: 5 TABLET ORAL at 14:25

## 2024-02-22 RX ADMIN — ONDANSETRON 4 MG: 2 INJECTION INTRAMUSCULAR; INTRAVENOUS at 18:35

## 2024-02-22 RX ADMIN — BUDESONIDE AND FORMOTEROL FUMARATE DIHYDRATE 1 PUFF: 160; 4.5 AEROSOL RESPIRATORY (INHALATION) at 20:08

## 2024-02-22 RX ADMIN — SODIUM CHLORIDE 2 G: 900 INJECTION INTRAVENOUS at 07:29

## 2024-02-22 RX ADMIN — KETOTIFEN FUMARATE 1 DROP: 0.35 SOLUTION/ DROPS OPHTHALMIC at 20:30

## 2024-02-22 RX ADMIN — SODIUM CHLORIDE, POTASSIUM CHLORIDE, SODIUM LACTATE AND CALCIUM CHLORIDE: 600; 310; 30; 20 INJECTION, SOLUTION INTRAVENOUS at 10:23

## 2024-02-22 RX ADMIN — Medication 10 ML: at 20:34

## 2024-02-22 RX ADMIN — TAMSULOSIN HYDROCHLORIDE 0.4 MG: 0.4 CAPSULE ORAL at 17:20

## 2024-02-22 RX ADMIN — SODIUM CHLORIDE 2 G: 900 INJECTION INTRAVENOUS at 15:44

## 2024-02-22 RX ADMIN — NEOSTIGMINE 3 MG: 1 INJECTION INTRAVENOUS at 10:30

## 2024-02-22 RX ADMIN — SODIUM CHLORIDE, POTASSIUM CHLORIDE, SODIUM LACTATE AND CALCIUM CHLORIDE 9 ML/HR: 600; 310; 30; 20 INJECTION, SOLUTION INTRAVENOUS at 06:52

## 2024-02-22 RX ADMIN — GLYCOPYRROLATE 0.4 MG: 0.4 INJECTION INTRAMUSCULAR; INTRAVENOUS at 10:30

## 2024-02-22 RX ADMIN — FENTANYL CITRATE 50 MCG: 50 INJECTION, SOLUTION INTRAMUSCULAR; INTRAVENOUS at 07:23

## 2024-02-22 RX ADMIN — DROPERIDOL 0.62 MG: 2.5 INJECTION, SOLUTION INTRAMUSCULAR; INTRAVENOUS at 12:00

## 2024-02-22 RX ADMIN — DICYCLOMINE HYDROCHLORIDE 20 MG: 20 TABLET ORAL at 23:05

## 2024-02-22 RX ADMIN — INSULIN LISPRO 4 UNITS: 100 INJECTION, SOLUTION INTRAVENOUS; SUBCUTANEOUS at 20:59

## 2024-02-22 RX ADMIN — Medication 10 ML: at 15:47

## 2024-02-22 NOTE — ANESTHESIA PREPROCEDURE EVALUATION
Anesthesia Evaluation     Patient summary reviewed and Nursing notes reviewed   no history of anesthetic complications:   NPO Solid Status: > 8 hours  NPO Liquid Status: > 2 hours           Airway   Mallampati: II  TM distance: >3 FB  Neck ROM: full  No difficulty expected  Dental - normal exam     Pulmonary     breath sounds clear to auscultation  (+) a smoker Former, Abstained day of surgery, cigarettes, COPD mild, asthma,shortness of breath, sleep apnea  Cardiovascular   Exercise tolerance: good (4-7 METS)    ECG reviewed  Rhythm: regular  Rate: normal    (+) hypertension well controlled 2 medications or greater, valvular problems/murmurs AS, murmur, hyperlipidemia    ROS comment: 3/21: Interpretation Summary    · Left ventricular wall thickness is consistent with borderline concentric hypertrophy.  · Mild aortic valve stenosis is present.  · Aortic valve mean pressure gradient is 10 mmHg.  · Trace mitral valve regurgitation is present with a centrally-directed jet noted.  · Trace tricuspid valve regurgitation is present.  · Calculated right ventricular systolic pressure from tricuspid regurgitation is 22 mmHg.         Neuro/Psych  GI/Hepatic/Renal/Endo    (+) morbid obesity, GERD, diabetes mellitus type 2 well controlled  (-)  obesity    Musculoskeletal     (+) neck pain  Abdominal   (+) obese    Abdomen: soft.   Substance History      OB/GYN          Other   arthritis,                 Anesthesia Plan    ASA 3     general     intravenous induction     Anesthetic plan, risks, benefits, and alternatives have been provided, discussed and informed consent has been obtained with: patient.    Plan discussed with CRNA.    CODE STATUS:

## 2024-02-22 NOTE — H&P
"Patient Name: Sebastian Liao  MRN: [id number]  : 1960  DOS: 2024    Attending: Zane Matthews MD    Primary Care Provider: Fransico Ngo PA      Chief complaint: Left hip pain    Subjective   Patient is a pleasant 63 y.o. male presented for scheduled surgery by Dr. Matthews.    Per his note (The patient is a 63 y.o. male with a history of debilitating left hip pain secondary to osteoarthritis, that failed to improve in spite of conservative treatment. The patient opted for a left total hip arthroplasty at this time and consented for the procedure. Please see my office notes for details with regard to preoperative counseling and operative rationale.).    Patient underwent right total hip arthroplasty under general anesthesia, tolerated surgery well, is admitted for further management.    I saw him in his room postoperatively where he is drowsy but appropriate, no nausea, vomiting, or shortness of breath.    He had some difficulty with his first session with PT, at this point inpatient rehab is recommended.    He has no history of DVT or PE but has history of COVID infection living him with \"long COVID\"    Patient with extensive past medical history that has been reviewed, medications noted.    Allergies:  No Known Allergies    Meds:  Medications Prior to Admission   Medication Sig Dispense Refill Last Dose    alfuzosin (UROXATRAL) 10 MG 24 hr tablet Take 1 tablet by mouth Daily. 90 tablet 3 2024    amLODIPine (NORVASC) 10 MG tablet Take 1 tablet by mouth Daily. 90 tablet 3 2024 at 0400    carisoprodol (Soma) 350 MG tablet Take 1 tablet by mouth 2 (Two) Times a Day As Needed for Muscle Spasms.   Past Week    cevimeline (Evoxac) 30 MG capsule Take 1 capsule by mouth 3 (Three) Times a Day As Needed. 90 capsule 12 2024    Chlorhexidine Gluconate 4 % solution Shower daily with solution as directed Daily for 5 days prior to surgery. 237 mL 0 2024    cyclobenzaprine (FLEXERIL) 10 MG " tablet Take 1 tablet by mouth up to 3 Times a Day As Needed for Muscle Spasms. 90 tablet 3 Past Month    dicyclomine (BENTYL) 20 MG tablet Take 1 tablet by mouth Every 6 (Six) Hours. 180 tablet 3 2/22/2024 at 0400    fluticasone (FLONASE) 50 MCG/ACT nasal spray Use 2 sprays in each nostril as directed by provider Daily. 16 g 3 2/21/2024    Fluticasone-Salmeterol (Advair Diskus) 100-50 MCG/ACT DISKUS Inhale 1 puff 2 (Two) Times a Day. 180 each 3 2/22/2024 at 0400    ibuprofen (ADVIL,MOTRIN) 600 MG tablet Take 1 tablet by mouth Every 6 (Six) Hours As Needed for Mild Pain.   Past Week    loratadine (CLARITIN) 10 MG tablet Take 1 tablet by mouth Daily. 30 tablet 10 Past Week    montelukast (SINGULAIR) 10 MG tablet Take 1 tablet by mouth Every Night. 90 tablet 3 Past Month    olopatadine (PATANOL) 0.1 % ophthalmic solution Administer 1 drop to both eyes 2 (Two) Times a Day.   2/22/2024 at 0400    omeprazole (priLOSEC) 40 MG capsule Take 1 capsule by mouth Daily. 90 capsule 3 2/21/2024    ondansetron ODT (ZOFRAN-ODT) 4 MG disintegrating tablet Place 1 tablet on the tongue Every 6 (Six) Hours As Needed for Nausea or Vomiting. 30 tablet 0 Past Week    promethazine (PHENERGAN) 25 MG tablet Take 1 tablet by mouth Every 6 (Six) Hours As Needed for nausea and vomiting 60 tablet 3 Past Week    rosuvastatin (CRESTOR) 10 MG tablet Take 1 tablet by mouth Daily. 90 tablet 3 2/22/2024 at 0400    Semaglutide,0.25 or 0.5MG/DOS, (Ozempic, 0.25 or 0.5 MG/DOSE,) 2 MG/3ML solution pen-injector Inject 0.5 mg under the skin into the appropriate area as directed 1 (One) Time Per Week. 9 mL 0 2/17/2024    silodosin (RAPAFLO) 8 MG capsule capsule Take 1 capsule by mouth Daily. 90 capsule 3 2/21/2024    albuterol sulfate  (90 Base) MCG/ACT inhaler Inhale 2 puffs Every 4 (Four) Hours As Needed for wheezing 54 g 3 More than a month    sildenafil (VIAGRA) 100 MG tablet Take 1 tablet by mouth Daily As Needed for Erectile Dysfunction.   More  than a month    tadalafil (CIALIS) 20 MG tablet Take 1 tablet by mouth Daily As Needed for Erectile Dysfunction.   More than a month    Testosterone (Testopel) 75 MG implant pellet Testopel 75 mg implant pellet   More than a month          Past Medical History:   Diagnosis Date    Acute bronchitis     Arthritis     Asthma     Cervicalgia     COVID-19     CPAP (continuous positive airway pressure) dependence     Diabetes mellitus     Edema     GERD (gastroesophageal reflux disease)     HTN (hypertension)     Hyperlipidemia     IBS (irritable bowel syndrome)     IBS (irritable bowel syndrome)     Low testosterone     Prostate disease     Sleep apnea     Wears glasses      Past Surgical History:   Procedure Laterality Date    CARPAL TUNNEL RELEASE Right 2022    Procedure: CARPAL TUNNEL RELEASE RIGHT;  Surgeon: Nj Bliss MD;  Location: [facility];  Service: Neurosurgery;  Laterality: Right;    CERVICAL FUSION      Dr. Nickerson, . [place], [place]    COLONOSCOPY  2012    repeat in ten yrs    HIP SURGERY Right 2016    Dr. Schwartz    NECK SURGERY      cervical spine fusion    TONSILLECTOMY       Family History   Problem Relation Age of Onset    Cancer Mother         Ovarian,     Stroke Father     Hypertension Father     Hypertension Sister     Diabetes Brother     Heart attack Brother     Hypertension Maternal Grandmother     Diabetes Maternal Grandmother      Social History     Tobacco Use    Smoking status: Former     Packs/day: 0.50     Years: 10.00     Additional pack years: 0.00     Total pack years: 5.00     Types: Cigarettes     Quit date:      Years since quittin.1    Smokeless tobacco: Former     Types: Chew     Quit date: 1986   Vaping Use    Vaping Use: Never used   Substance Use Topics    Alcohol use: Yes     Comment: 2 drinks per month    Drug use: Yes     Types: Marijuana     Comment: Quit in        Review of Systems  Pertinent items are noted in HPI    Vital  "Signs  /91 (BP Location: Left arm, Patient Position: Sitting)   Pulse 99   Temp 98.2 °F (36.8 °C) (Oral)   Resp 18   Ht 162.6 cm (64\")   Wt 108 kg (238 lb)   SpO2 95%   BMI 40.85 kg/m²     Physical Exam:    General Appearance:    Alert, cooperative, in no acute distress   Head:    Normocephalic, without obvious abnormality, atraumatic   Eyes:            Lids and lashes normal, conjunctivae and sclerae normal, no   icterus, no pallor, corneas clear    Ears:    Ears appear intact with no abnormalities noted   Throat:   No oral lesions, no thrush, oral mucosa moist   Neck:   No adenopathy, supple, trachea midline, no thyromegaly         Lungs:     Clear to auscultation,respirations regular, even and   unlabored. No wheezes or rales.    Heart:    Regular rhythm and normal rate, normal S1 and S2, 2/6 murmur, no gallop   Abdomen:     Normal bowel sounds, no masses, no organomegaly, soft        non-tender, non-distended, no guarding, no rebound                 tenderness   Genitalia:    Deferred   Extremities: Left LE, CDI dressing over left hip.  No clubbing, cyanosis, or edema   Pulses:   Pulses palpable and equal bilaterally   Skin:   No bleeding, bruising or rash   Neurologic:   Cranial nerves 2 - 12 grossly intact, intact flexion and dorsiflexion bilateral feet      I reviewed the patient's new clinical results.             Invalid input(s): \"NEUTOPHILPCT\"        Invalid input(s): \"LABALBU\", \"PROT\"  Lab Results   Component Value Date    HGBA1C 6.80 (H) 02/08/2024        Latest Reference Range & Units 02/08/24 08:37   Sodium 136 - 145 mmol/L 135 (L)   Potassium 3.5 - 5.2 mmol/L 3.8   Chloride 98 - 107 mmol/L 99   CO2 22.0 - 29.0 mmol/L 29.0   Anion Gap 5.0 - 15.0 mmol/L 7.0   BUN 8 - 23 mg/dL 12   Creatinine 0.76 - 1.27 mg/dL 1.13   BUN/Creatinine Ratio 7.0 - 25.0  10.6   eGFR >60.0 mL/min/1.73 73.9   Glucose 65 - 99 mg/dL 113 (H)   Calcium 8.6 - 10.5 mg/dL 9.3   (L): Data is abnormally low  (H): Data is " abnormally high   Latest Reference Range & Units 02/08/24 08:37   WBC 3.40 - 10.80 10*3/mm3 7.55   RBC 4.14 - 5.80 10*6/mm3 5.01   Hemoglobin 13.0 - 17.7 g/dL 14.9   Hematocrit 37.5 - 51.0 % 44.9   Platelets 140 - 450 10*3/mm3 317   RDW 12.3 - 15.4 % 13.4   MCV 79.0 - 97.0 fL 89.6   MCH 26.6 - 33.0 pg 29.7   MCHC 31.5 - 35.7 g/dL 33.2   MPV 6.0 - 12.0 fL 9.8   RDW-SD 37.0 - 54.0 fl 43.8     Assessment and Plan:       Status post total replacement of left hip    Essential hypertension, benign    Type 2 diabetes mellitus with hyperglycemia, without long-term current use of insulin    Hyperlipidemia    Obesity, Class III, BMI 40-49.9 (morbid obesity)    GERD    RONIT (obstructive sleep apnea) - severe    Degenerative arthritis of hip    BPH (benign prostatic hyperplasia)       Plan:    1. PT/OT,  Weight bearing as tolerated left LE.  Total hip precautions  2. Pain control-prns  3. IS-encourage  4. DVT proph- Mechanicals and aspirin  5. Bowel regimen  6. Resume home medications as appropriate  7. Monitor post-op labs  8. DC planning .  Depending on progress with PT and OT    - Hypertension:  Resume home medications as appropriate, formulary substitution when indicated.  Holding parameters.  Prn medications for elevated blood pressure.    -DM type 2  Hgb A1C 6.8  Hold OHA as appropriate  FSBG AC/HS, ( q 6 when NPO), along with correction humalog.  Long acting insulin scheduled     -GERD:  Resume PPI.  Formulary substitution when indicated.    -BPH.:  Monitor for spontaneous voiding.  Resume home medication with formulary substitution as indicated.    -RONIT:  Continue CPAP.   Monitor O2 sats.    -Obesity: Complicates all aspects of care.      Dragon disclaimer:  Part of this encounter note is an electronic transcription/translation of spoken language to printed text. The electronic translation of spoken language may permit erroneous, or at times, nonsensical words or phrases to be inadvertently transcribed; Although I have  reviewed the note for such errors, some may still exist.    Jen Colmenares MD  02/22/24  20:24 EST

## 2024-02-22 NOTE — CASE MANAGEMENT/SOCIAL WORK
Continued Stay Note  [facility]     Patient Name: Sebastian Liao  MRN: [id number]  Today's Date: 2/22/2024    Admit Date: 2/22/2024    Plan: home with outpt PT   Discharge Plan       Row Name 02/22/24 1408       Plan    Plan home with outpt PT    Patient/Family in Agreement with Plan yes    Plan Comments Pt lives with his wife in [facility]. He was independent with ADLs prior to admit and reports he owns a rolling walker. He is followed by his PCP and has drug coverage. At this time his plan for discharge is to return home and he has an outpt PT appt with [facility] for 2/23 at 0930. CM has confirmed this. No discharge needs at this time    Final Discharge Disposition Code 01 - home or self-care                   Discharge Codes    No documentation.                       Gracy Mohan RN

## 2024-02-22 NOTE — ANESTHESIA POSTPROCEDURE EVALUATION
Patient: Sebastian Liao    Procedure Summary       Date: 02/22/24 Room / [facility]:  [facility]    Anesthesia Start: 0721 Anesthesia Stop: 1056    Procedure: MODIFIED ANTERIOR TOTAL HIP ARTHROPLASTY - LEFT (Left: Hip) Diagnosis:       Primary osteoarthritis of left hip      (Primary osteoarthritis of left hip [M16.12])    Surgeons: Zane Matthews MD Provider: Hari Belle MD    Anesthesia Type: general ASA Status: 3            Anesthesia Type: general    Vitals  Vitals Value Taken Time   /83 02/22/24 1056   Temp 99.1 °F (37.3 °C) 02/22/24 1056   Pulse 87 02/22/24 1056   Resp 12 02/22/24 1056   SpO2 94 % 02/22/24 1056           Post Anesthesia Care and Evaluation    Patient location during evaluation: PACU  Patient participation: complete - patient participated  Level of consciousness: awake and alert  Pain management: adequate    Airway patency: patent  Anesthetic complications: No anesthetic complications  PONV Status: none  Cardiovascular status: hemodynamically stable and acceptable  Respiratory status: nonlabored ventilation, acceptable, nasal cannula and spontaneous ventilation  Hydration status: acceptable  No anesthesia care post op

## 2024-02-22 NOTE — OP NOTE
DATE OF PROCEDURE:  02/22/24    PREOPERATIVE DIAGNOSIS: left hip arthritis    POSTOPERATIVE DIAGNOSIS: left hip arthritis    PROCEDURE PERFORMED: left total hip arthroplasty with [employer] Nephew components, anterior modified Carrasco-Koenig approach    Surgical Approach: Hip Modified Anterior (Carrasco-Koenig)    IMPLANTS: # 48 press-fit R3 cup, 25 screw, neutral polyethylene liner, # 1 standard offset press-fit Polar stem, -3 x 32 Oxinium B head    SURGEON: Zane Matthews MD    ASSISTANT: Teresa Nuñez PA-C  (Teresa Nuñez PA-C was present and necessary for positioning, draping, retraction, instrumentation and closure.)    SPECIMENS: None    IMPLANTS:   Implant Name Type Inv. Item Serial No.  Lot No. LRB No. Used Action   DEV CONTRL TISS STRATAFIX SPIRAL MNCRYL UD 3/0 PLS 60CM - [id number] Implant DEV CONTRL TISS STRATAFIX SPIRAL MNCRYL UD 3/0 PLS 60CM  [employer]  DIV OF J AND J TLBEAM Left 1 Implanted   DEV CONTRL TISS STRATAFIX SYMM PDS PLUS ROXANNA CT-1 45CM - [id number] Implant DEV CONTRL TISS STRATAFIX SYMM PDS PLUS ROXANNA CT-1 45CM  [employer]  DIV OF J AND J TGMAMP Left 1 Implanted   SHLL ACET R3 3H STD 48MM - [id number] Implant SHLL ACET R3 3H STD 48MM  [employer] [id number] Left 1 Implanted   LINER ACET R3 XLPE 0D [id number] - [id number] Implant LINER ACET R3 XLPE 0D [id number]  [employer] [id number] Left 1 Implanted   SCRW SPH HD REFLECTION 6.5X25MM - [id number] Implant SCRW SPH HD REFLECTION 6.5X25MM  [employer] [id number] Left 1 Implanted   LINER ACET R3 XLPE 0D [id number] - [id number] Implant LINER ACET R3 XLPE 0D [id number]  [employer] [id number] Left 1 Implanted   STEM FEM/HIP POLARSTEM W/COLR STD SZ1 - [id number] Implant STEM FEM/HIP POLARSTEM W/COLR STD SZ1  [employer] [id number] Left 1 Implanted   HD FEM/HIP OXINIUM TPR 12/14 32MM MIN3 - [id number] Implant HD FEM/HIP OXINIUM TPR 12/14 32MM MIN3  [employer] [id number] Left 1 Implanted         ANESTHESIA:   General    STAFF:  Circulator: Brennon Riggs RN; Venecia Amaya RN  Radiology Technologist: Shy Bettencourt R.T.(R)  Scrub Person: Sunitha Murray  Vendor Representative: Kenny Gurrola ([employer]); Farhan Tobar  Nursing Assistant: Benradine Wright  Assistant: Teresa Nuñez PA-C    ESTIMATED BLOOD LOSS: 200ml     COMPLICATIONS: None    PREOPERATIVE ANTIBIOTICS: Ancef 2 g    INDICATIONS: The patient is a 63 y.o. male with a history of debilitating left hip pain secondary to osteoarthritis, that failed to improve in spite of conservative treatment. The patient opted for a left total hip arthroplasty at this time and consented for the procedure. Please see my office notes for details with regard to preoperative counseling and operative rationale.     DESCRIPTION OF PROCEDURE: The patient was positively identified in the preoperative holding area, brought to the operative suite, and placed in a supine position. After adequate general anesthetic had been achieved (history of aortic stenosis and usage of Ozempic), the patient was placed in the supine position with a well padded folded blanket bolster under the left flank area, leaving the buttock free. After sterile prep and drape of the left hip and lower extremity, as well as draping the non-operative extremity to allow access for limb length assessment, a timeout procedure was performed to confirm the operative site, as well as the other parameters.     After placing a bump under the knee, a skin incision was made over the lateral border of the tensor fascia latae from the level of the anterior superior iliac spine distally on the anterior aspect of the hip for a modified Carrasco-Koenig approach. Following a sharp skin incision, dissection was carried down to the level of the fascia, ensuring clear identification of the interval between the tensor and the fascia laterally.  Fascia was then incised from proximal to distal, leaving a good cuff of  "tissue for later repair, then working form distal to proximal perforating vessels were identified and cauterized, including the perforating vessels along the anterior edge of the gluteus medius.  With the anterior edge of the gluteus medius identified, a Cobra retractor was placed on the capsule over the superior aspect of the femoral neck.  After identifying the superior edge of the vastus lateralis distally, a second Cobra retractor was placed over the capsule overlying the inferior femoral neck.  The reflected head of the rectus femoris was identified, and the fascia released enough to allow placement of a single prong acetabular retractor. The knee bump was then removed, leg externally rotated, and anterior capsule excised first laterally then medially, and the labrum incised superiorly.  Cobra retractors were repositioned on the exposed femoral neck both superiorly and inferiorly.  Description of femoral head: Complete eburnation.  A neck cut was then made at the head and neck junction with the aid of an oscillating saw blade, followed by a second cut at the base of the femoral neck.  The \"napkin ring\" femoral neck fragment was removed, as well as the femoral head after repositioning the posterior Cobra retractor just outside the hip capsule.    Acetabular exposure was then addressed by repositioning the anterior Cobra retractor between the capsule and the psoas tendon.  The labrum was then removed from the rim of the acetabulum anteriorly, superiorly and posteriorly, preserving the transverse acetabular ligament. The anterior Cobra retractor was replaced over the transverse acetabular ligament, and a Wagner retractor placed over the posterior wall of the acetabulum.  Description of acetabulum: Diffuse degeneration, with osteophytes superiorly and anteriorly with thickening of the labrum posteriorly. With the transverse acetabular ligament as a reference for cup positioning, the acetabulum was sequentially " reamed up to 48 to accommodate a 48 press-fit R3 cup, which had excellent press-fit characteristics.  A single 25 mm screw was placed which had excellent purchase, followed by a neutral polyethylene liner.    Attention was then redirected towards the femoral aspect. The non-operative limb was then placed on a padded Knight stand, to allow for appropriate positioning of the operative limb.  Using the bone hook for traction in the calcar region of the proximal femur, the posterior capsule was released adjacent to the greater trochanter to allow for delivery of the proximal femur with a double fang retractor.  With appropriate external rotation of the proximal femur and further adduction of the leg following double fang retractor placement and removal of the single-toothed fang anteriorly, a Wagner retractor was placed in the region of the calcar and femoral preparations were made to accommodate the polar stem.  Box osteotome was used to prepare the lateral aspect, followed by the T-handle canal finder, then sequential broaching of the femur to accommodate a # 1 broach, standard offset neck, with a -3 x 32 head.      Trial reduction was performed, full arc of motion noted, with no instability.  Intraoperative fluoroscopy showed appropriate implant alignment, with possible slightly longer leg length on the left compared to the right, but no further resection of the femoral neck was indicated for concern of instability.  The hip was again dislocated and the final # 1 standard offset press-fit Polar stem placed, followed by -3 x 32 Oxinium head, with the same reduction characteristics were noted as with the trial with no dislocation throughout the full arc of motion.      Therefore, the hip was copiously irrigated and attention directed towards closure. Fascia latae was closed with #1 Vicryl in an interrupted figure-of-eight fashion in 3 strategic locations both proximally, distally and in the central portion, followed by  oversewing this from distal to proximal with a #1 StrataFix symmetric, which nicely sealed that layer, followed by closure of the subcutaneous layer with 2-0 Vicryl and the skin with 3-0 StrataFix in a running subcuticular fashion.  Adhesive wound closure dressing was applied followed by a sterile dressing with 4 x 4s secured with micropore tape.  The patient tolerated the procedure well and was brought to the recovery room in good condition.     POSTOPERATIVE PLAN:  1. The patient will begin early range of motion and weight-bearing per the post anterior total hip arthroplasty protocol.   2. I anticipate brief hospitalization for initial rehabilitation and pain control followed by continued rehabilitation home health/outpatient physical therapy setting.  Patient likely ready for discharge tomorrow as long as he is cleared medically and by physical therapy.  Follow-up in 3 weeks as planned.   3. Postoperative medical management with Dr. Colmenares.  4. Postoperative DVT prophylaxis with aspirin.  5. Postoperative IV antibiotics with Ancef.      Zane Matthews MD  02/22/24  10:41 EST

## 2024-02-22 NOTE — H&P
"  Patient Care Team:      Chief complaint left hip pain    Subjective:    Patient is a 63 y.o.male presents with a history of left hip pain for about 7 years.   He has had hip steroid injections, PT, weight loss and NSAIDs with limited improvement.  He presents today for left hip replacement    Review of Systems:  General ROS: negative  Cardiovascular ROS: no chest pain or dyspnea on exertion  known heart murmur since childhood  Respiratory ROS: positive for - COVID and \"long haulers\", on several inhalers prn,       Allergies: No Known Allergies       Latex: neg   Contrast Dyeneg    Home Meds    Medications Prior to Admission   Medication Sig Dispense Refill Last Dose    alfuzosin (UROXATRAL) 10 MG 24 hr tablet Take 1 tablet by mouth Daily. 90 tablet 3 2/21/2024    amLODIPine (NORVASC) 10 MG tablet Take 1 tablet by mouth Daily. 90 tablet 3 2/22/2024 at 0400    carisoprodol (Soma) 350 MG tablet Take 1 tablet by mouth 2 (Two) Times a Day As Needed for Muscle Spasms.   Past Week    cevimeline (Evoxac) 30 MG capsule Take 1 capsule by mouth 3 (Three) Times a Day As Needed. 90 capsule 12 2/21/2024    Chlorhexidine Gluconate 4 % solution Shower daily with solution as directed Daily for 5 days prior to surgery. 237 mL 0 2/21/2024    cyclobenzaprine (FLEXERIL) 10 MG tablet Take 1 tablet by mouth up to 3 Times a Day As Needed for Muscle Spasms. 90 tablet 3 Past Month    dicyclomine (BENTYL) 20 MG tablet Take 1 tablet by mouth Every 6 (Six) Hours. 180 tablet 3 2/22/2024 at 0400    fluticasone (FLONASE) 50 MCG/ACT nasal spray Use 2 sprays in each nostril as directed by provider Daily. 16 g 3 2/21/2024    Fluticasone-Salmeterol (Advair Diskus) 100-50 MCG/ACT DISKUS Inhale 1 puff 2 (Two) Times a Day. 180 each 3 2/22/2024 at 0400    ibuprofen (ADVIL,MOTRIN) 600 MG tablet Take 1 tablet by mouth Every 6 (Six) Hours As Needed for Mild Pain.   Past Week    loratadine (CLARITIN) 10 MG tablet Take 1 tablet by mouth Daily. 30 tablet 10 " Past Week    montelukast (SINGULAIR) 10 MG tablet Take 1 tablet by mouth Every Night. 90 tablet 3 Past Month    olopatadine (PATANOL) 0.1 % ophthalmic solution Administer 1 drop to both eyes 2 (Two) Times a Day.   2/22/2024 at 0400    omeprazole (priLOSEC) 40 MG capsule Take 1 capsule by mouth Daily. 90 capsule 3 2/21/2024    ondansetron ODT (ZOFRAN-ODT) 4 MG disintegrating tablet Place 1 tablet on the tongue Every 6 (Six) Hours As Needed for Nausea or Vomiting. 30 tablet 0 Past Week    promethazine (PHENERGAN) 25 MG tablet Take 1 tablet by mouth Every 6 (Six) Hours As Needed for nausea and vomiting 60 tablet 3 Past Week    rosuvastatin (CRESTOR) 10 MG tablet Take 1 tablet by mouth Daily. 90 tablet 3 2/22/2024 at 0400    Semaglutide,0.25 or 0.5MG/DOS, (Ozempic, 0.25 or 0.5 MG/DOSE,) 2 MG/3ML solution pen-injector Inject 0.5 mg under the skin into the appropriate area as directed 1 (One) Time Per Week. 9 mL 0 2/17/2024    silodosin (RAPAFLO) 8 MG capsule capsule Take 1 capsule by mouth Daily. 90 capsule 3 2/21/2024    albuterol sulfate  (90 Base) MCG/ACT inhaler Inhale 2 puffs Every 4 (Four) Hours As Needed for wheezing 54 g 3 More than a month    sildenafil (VIAGRA) 100 MG tablet Take 1 tablet by mouth Daily As Needed for Erectile Dysfunction.   More than a month    tadalafil (CIALIS) 20 MG tablet Take 1 tablet by mouth Daily As Needed for Erectile Dysfunction.   More than a month    Testosterone (Testopel) 75 MG implant pellet Testopel 75 mg implant pellet   More than a month     PMH:   Past Medical History:   Diagnosis Date    Acute bronchitis     Arthritis     Asthma     Cervicalgia     COVID-19     CPAP (continuous positive airway pressure) dependence     Diabetes mellitus     Edema     GERD (gastroesophageal reflux disease)     HTN (hypertension)     Hyperlipidemia     IBS (irritable bowel syndrome)     IBS (irritable bowel syndrome)     Low testosterone     Prostate disease     Sleep apnea     Wears  "glasses      PSH:    Past Surgical History:   Procedure Laterality Date    CARPAL TUNNEL RELEASE Right 12/12/2022    Procedure: CARPAL TUNNEL RELEASE RIGHT;  Surgeon: Nj Bliss MD;  Location: [facility];  Service: Neurosurgery;  Laterality: Right;    CERVICAL FUSION  1978    Dr. Nickerson, . [place], [place]    COLONOSCOPY  07/2012    repeat in ten yrs    HIP SURGERY Right 01/26/2016    Dr. Schwartz    NECK SURGERY      cervical spine fusion    TONSILLECTOMY       Immunization History: pneumo up to date    Flu  2023  Tetanus  about 5 years    COVID multiple  Social History:   Tobacco quit in'88   Alcohol oiccasional      Physical Exam:/86 (BP Location: Right arm, Patient Position: Lying)   Pulse 85   Temp 98.6 °F (37 °C) (Temporal)   Resp 20   Ht 162.6 cm (64\")   Wt 108 kg (238 lb)   SpO2 98%   BMI 40.85 kg/m²       General Appearance:    Alert, cooperative, no distress, appears stated age   Head:    Normocephalic, without obvious abnormality, atraumatic   Lungs:     Clear to auscultation bilaterally, respirations unlabored    Heart: Regular rate and rhythm, S1 and S2 normal, no murmur, rub    or gallop    Abdomen:    Soft without tenderness   Breast Exam:    deferred   Genitalia:    deferred   Extremities:   Extremities normal, atraumatic, no cyanosis or edema   Skin:   Skin color, texture, turgor normal, no rashes or lesions   Neurologic:   Grossly intact     Results Review:   LABS:  Lab Results   Component Value Date    WBC 7.55 02/08/2024    HGB 14.9 02/08/2024    HCT 44.9 02/08/2024    MCV 89.6 02/08/2024     02/08/2024    NEUTROABS 3.53 02/08/2024    GLUCOSE 113 (H) 02/08/2024    BUN 12 02/08/2024    CREATININE 1.13 02/08/2024    EGFRIFAFRI 77 01/17/2022     (L) 02/08/2024    K 3.8 02/08/2024    CL 99 02/08/2024    CO2 29.0 02/08/2024    MG 2.2 10/08/2020    CALCIUM 9.3 02/08/2024    ALBUMIN 4.2 01/22/2024    AST 41 (H) 01/22/2024    ALT 21 01/22/2024    BILITOT 0.2 01/22/2024 "       RADIOLOGY:  Imaging Results (Last 72 Hours)       ** No results found for the last 72 hours. **                 Cancer Patient: __ yes __no __unknown; If yes, clinical stage T:__ N:__M:__, stage group    Impression:primary osteoarthritis left hip    Plan: left modified anterior total hip  Teresa Nuñez PA-C 2/22/2024 07:00 EST

## 2024-02-22 NOTE — THERAPY EVALUATION
Patient Name: Sebastian Liao  : 1960    MRN: [id number]                              Today's Date: 2024       Admit Date: 2024    Visit Dx:     ICD-10-CM ICD-9-CM   1. Primary osteoarthritis of left hip  M16.12 715.15     Patient Active Problem List   Diagnosis    Testicular hypofunction    Essential hypertension, benign    Type 2 diabetes mellitus with hyperglycemia, without long-term current use of insulin    Hyperlipidemia    Obesity, Class III, BMI 40-49.9 (morbid obesity)    Dyspnea on exertion    History of COVID-19 pneumonia (10/2020)    Former smoker (None x 35 years)    GERD    RONIT (obstructive sleep apnea) - severe    DDD (degenerative disc disease), cervical    Seasonal allergic rhinitis    Screen for colon cancer    Carpal tunnel syndrome of right wrist    Degenerative arthritis of hip     Past Medical History:   Diagnosis Date    Acute bronchitis     Arthritis     Asthma     Cervicalgia     COVID-19     CPAP (continuous positive airway pressure) dependence     Diabetes mellitus     Edema     GERD (gastroesophageal reflux disease)     HTN (hypertension)     Hyperlipidemia     IBS (irritable bowel syndrome)     IBS (irritable bowel syndrome)     Low testosterone     Prostate disease     Sleep apnea     Wears glasses      Past Surgical History:   Procedure Laterality Date    CARPAL TUNNEL RELEASE Right 2022    Procedure: CARPAL TUNNEL RELEASE RIGHT;  Surgeon: Nj Bliss MD;  Location: [facility];  Service: Neurosurgery;  Laterality: Right;    CERVICAL FUSION      Dr. Nickerson, . [place], [place]    COLONOSCOPY  2012    repeat in ten yrs    HIP SURGERY Right 2016    Dr. Schwartz    NECK SURGERY      cervical spine fusion    TONSILLECTOMY        General Information       Row Name 24 1521 24 1258       Physical Therapy Time and Intention    Document Type evaluation  -SS --  -AB    Mode of Treatment physical therapy  -SS --  -AB      Row Name 24 1521  02/22/24 1258       General Information    Patient Profile Reviewed yes  -SS --  -AB    Prior Level of Function independent:;all household mobility;community mobility;gait;transfer;bed mobility;work;driving  -SS --  -AB    Existing Precautions/Restrictions fall;left;hip, anterior  -SS --  adductor nerve cath, RLE WBAT  -AB    Barriers to Rehab medically complex  -SS --  -AB      Row Name 02/22/24 1521 02/22/24 1258       Living Environment    People in Home spouse  -SS --  -AB      Row Name 02/22/24 1521 02/22/24 1258       Home Main Entrance    Number of Stairs, Main Entrance two  -SS --  15  -AB    Stair Railings, Main Entrance none  -SS --  -AB      Row Name 02/22/24 1521 02/22/24 1258       Stairs Within Home, Primary    Number of Stairs, Within Home, Primary none  -SS --  -AB      Row Name 02/22/24 1521 02/22/24 1258       Cognition    Orientation Status (Cognition) oriented x 4  -SS --  -AB      Row Name 02/22/24 1521 02/22/24 1258       Safety Issues, Functional Mobility    Safety Issues Affecting Function (Mobility) awareness of need for assistance;insight into deficits/self-awareness;judgment;positioning of assistive device;problem-solving;safety precaution awareness;safety precautions follow-through/compliance;sequencing abilities  -SS --  -AB    Impairments Affecting Function (Mobility) balance;endurance/activity tolerance;pain;postural/trunk control;range of motion (ROM);strength  -SS --  -AB              User Key  (r) = Recorded By, (t) = Taken By, (c) = Cosigned By      Initials Name Provider Type    SS Desiree Merritt, PT Physical Therapist    AB Shy Bal, PT Physical Therapist                   Mobility       Row Name 02/22/24 1523          Bed Mobility    Bed Mobility scooting/bridging;supine-sit  -     Scooting/Bridging [place] (Bed Mobility) 2 person assist;verbal cues;nonverbal cues (demo/gesture);maximum assist (25% patient effort)  -     Supine-Sit [place] (Bed Mobility) 2  person assist;verbal cues;nonverbal cues (demo/gesture);maximum assist (25% patient effort)  -     Assistive Device (Bed Mobility) bed rails;draw sheet;head of bed elevated  -     Comment, (Bed Mobility) VC for sequencing, pt. asymptomatic  -       Row Name 02/22/24 1523          Sit-Stand Transfer    Sit-Stand [place] (Transfers) minimum assist (75% patient effort);2 person assist;verbal cues  -     Assistive Device (Sit-Stand Transfers) walker, front-wheeled  -     Comment, (Sit-Stand Transfer) VC for hand placement, appropriate alignment, stepping out LLE, lowering with eccentric control  -       Row Name 02/22/24 1523          Gait/Stairs (Locomotion)    [place] Level (Gait) moderate assist (50% patient effort);1 person assist;1 person to manage equipment;verbal cues;nonverbal cues (demo/gesture)  -     Assistive Device (Gait) walker, front-wheeled  -     Distance in Feet (Gait) 10  -SS     Deviations/Abnormal Patterns (Gait) bilateral deviations;base of support, wide;raphael decreased;gait speed decreased;stride length decreased;weight shifting decreased  -     Bilateral Gait Deviations forward flexed posture;heel strike decreased  -     Comment, (Gait/Stairs) Pt. ambulated with a step through gait pattern. VC for body positioning within walker, continuous forward progression of AD, increased hip/knee flexion w/heel strike. Activity limited by fatigue, weakness. He required 2x standing rest breaks.  -       Row Name 02/22/24 1523          Mobility    Extremity Weight-bearing Status left lower extremity  -     Left Lower Extremity (Weight-bearing Status) weight-bearing as tolerated (WBAT)  -               User Key  (r) = Recorded By, (t) = Taken By, (c) = Cosigned By      Initials Name Provider Type     Desiree Merritt PT Physical Therapist                   Obj/Interventions       Row Name 02/22/24 1533          Range of Motion Comprehensive    General Range of Motion  bilateral lower extremity ROM WFL  -       Row Name 02/22/24 1533          Strength Comprehensive (MMT)    Comment, General Manual Muscle Testing (MMT) Assessment LLE gross 3/5, RLE gross 4-/5  -       Row Name 02/22/24 1533          Motor Skills    Therapeutic Exercise hip;knee;ankle  -       Row Name 02/22/24 1533          Hip (Therapeutic Exercise)    Hip (Therapeutic Exercise) isometric exercises  -     Hip Isometrics (Therapeutic Exercise) bilateral;gluteal sets;10 repetitions  -       Row Name 02/22/24 1533          Knee (Therapeutic Exercise)    Knee (Therapeutic Exercise) isometric exercises;strengthening exercise  -     Knee Isometrics (Therapeutic Exercise) bilateral;quad sets;10 repetitions  -     Knee Strengthening (Therapeutic Exercise) left;LAQ (long arc quad);3 repetitions  -       Row Name 02/22/24 1533          Ankle (Therapeutic Exercise)    Ankle (Therapeutic Exercise) AROM (active range of motion)  -     Ankle AROM (Therapeutic Exercise) bilateral;dorsiflexion;plantarflexion;10 repetitions  -       Row Name 02/22/24 1533          Balance    Balance Assessment sitting static balance;sitting dynamic balance;standing static balance;sit to stand dynamic balance;standing dynamic balance  -     Static Sitting Balance standby assist  -     Dynamic Sitting Balance contact guard  -     Position, Sitting Balance unsupported;sitting edge of bed  -     Sit to Stand Dynamic Balance minimal assist;2-person assist  -     Static Standing Balance minimal assist  -     Dynamic Standing Balance moderate assist  -     Position/Device Used, Standing Balance supported;walker, front-wheeled  -     Balance Interventions sitting;standing;sit to stand;supported;static;dynamic  -       Row Name 02/22/24 1533          Sensory Assessment (Somatosensory)    Sensory Assessment (Somatosensory) LE sensation intact  -               User Key  (r) = Recorded By, (t) = Taken By, (c) = Cosigned  By      Initials Name Provider Type    SS Desiree Merritt, PT Physical Therapist                   Goals/Plan       Row Name 02/22/24 1537          Bed Mobility Goal 1 (PT)    Activity/Assistive Device (Bed Mobility Goal 1, PT) bed mobility activities, all  -SS     [place] Level/Cues Needed (Bed Mobility Goal 1, PT) moderate assist (50-74% patient effort)  -SS     Time Frame (Bed Mobility Goal 1, PT) long term goal (LTG);3 days  -SS       Row Name 02/22/24 1537          Transfer Goal 1 (PT)    Activity/Assistive Device (Transfer Goal 1, PT) sit-to-stand/stand-to-sit;bed-to-chair/chair-to-bed  -SS     [place] Level/Cues Needed (Transfer Goal 1, PT) modified independence  -SS     Time Frame (Transfer Goal 1, PT) long term goal (LTG);3 days  -SS       Row Name 02/22/24 1537          Gait Training Goal 1 (PT)    Activity/Assistive Device (Gait Training Goal 1, PT) gait (walking locomotion);assistive device use;walker, rolling  -SS     [place] Level (Gait Training Goal 1, PT) modified independence  -SS     Distance (Gait Training Goal 1, PT) 150  -SS     Time Frame (Gait Training Goal 1, PT) long term goal (LTG);3 days  -       Row Name 02/22/24 1537          Stairs Goal 1 (PT)    Activity/Assistive Device (Stairs Goal 1, PT) ascending stairs;descending stairs;cane, straight;other (see comments)  if pt. declines IPR  -SS     [place] Level/Cues Needed (Stairs Goal 1, PT) modified independence  -SS     Number of Stairs (Stairs Goal 1, PT) 2  -SS     Time Frame (Stairs Goal 1, PT) long term goal (LTG);3 days  -       Row Name 02/22/24 1537          Therapy Assessment/Plan (PT)    Planned Therapy Interventions (PT) balance training;bed mobility training;gait training;home exercise program;neuromuscular re-education;patient/family education;postural re-education;ROM (range of motion);stair training;strengthening;stretching;transfer training  -SS               User Key  (r) = Recorded By, (t) = Taken By,  (c) = Cosigned By      Initials Name Provider Type     Desiree Merritt, PT Physical Therapist                   Clinical Impression       Row Name 02/22/24 8956          Pain    Pretreatment Pain Rating 10/10  -SS     Posttreatment Pain Rating 10/10  -     Pain Location - Side/Orientation Left  -     Pain Location incisional  -     Pain Location - hip  -     Pain Intervention(s) Cold applied;Repositioned;Ambulation/increased activity;Elevated  -     Additional Documentation Pain Scale: Numbers Pre/Post-Treatment (Group)  -       Row Name 02/22/24 8283          Plan of Care Review    Plan of Care Reviewed With patient  -SS     Outcome Evaluation Pt. presents below baseline function w/decreased strength and AROM of LLE, difficulty w/sequencing tasks, and acute pain affecting his ability to safely participate in functional mobility. He performed bed mobility w/max of 2, transfers w/min of 2 and ambulated 10' w/front wheeled walker, mod assist of 1 + close chair follow. Activity limited by fatigue, weakness. Pt. required significant cueing for tasks. Pt. would benefit from IPPT to address stated deficits.  -       Row Name 02/22/24 2726          Therapy Assessment/Plan (PT)    Rehab Potential (PT) good, to achieve stated therapy goals  -     Criteria for Skilled Interventions Met (PT) yes;meets criteria;skilled treatment is necessary  -     Therapy Frequency (PT) 2 times/day  -       Row Name 02/22/24 1506          Vital Signs    Pre Systolic BP Rehab 155  -SS     Pre Treatment Diastolic BP 78  -SS     Pretreatment Heart Rate (beats/min) 91  -SS     Pre SpO2 (%) 97  -SS     O2 Delivery Pre Treatment room air  -     Pre Patient Position Supine  -       Row Name 02/22/24 1750          Positioning and Restraints    Pre-Treatment Position in bed  -SS     Post Treatment Position chair  -SS     In Chair notified nsg;reclined;call light within reach;encouraged to call for assist;exit alarm on;waffle  cushion;legs elevated;compression device  -               User Key  (r) = Recorded By, (t) = Taken By, (c) = Cosigned By      Initials Name Provider Type     Desiree Merritt PT Physical Therapist                   Outcome Measures       Row Name 02/22/24 1538 02/22/24 1330       How much help from another person do you currently need...    Turning from your back to your side while in flat bed without using bedrails? 2  -SS 3  -SJ    Moving from lying on back to sitting on the side of a flat bed without bedrails? 2  -SS 3  -SJ    Moving to and from a bed to a chair (including a wheelchair)? 2  -SS 3  -SJ    Standing up from a chair using your arms (e.g., wheelchair, bedside chair)? 3  -SS 2  -SJ    Climbing 3-5 steps with a railing? 2  -SS 2  -SJ    To walk in hospital room? 2  -SS 2  -SJ    AM-PAC 6 Clicks Score (PT) 13  -SS 15  -SJ    Highest Level of Mobility Goal 4 --> Transfer to chair/commode  -SS 4 --> Transfer to chair/commode  -      Row Name 02/22/24 1538          PADD    Diagnosis 2  -SS     Gender 2  -SS     Age Group 2  -SS     Gait Distance 0  -SS     Assist Level 0  -SS     Home Support 0  -SS     PADD Score 6  -SS     Patient Preference home with home health  -     Prediction by PADD Score extended rehabilitation  -       Row Name 02/22/24 1538          Functional Assessment    Outcome Measure Options AM-PAC 6 Clicks Basic Mobility (PT);PADD  -SS               User Key  (r) = Recorded By, (t) = Taken By, (c) = Cosigned By      Initials Name Provider Type     Desiree Liao RN Registered Nurse    Desiree Vasquez PT Physical Therapist                                 Physical Therapy Education       Title: PT OT SLP Therapies (In Progress)       Topic: Physical Therapy (Done)       Point: Mobility training (Done)       Learning Progress Summary             Patient Geo, E,H, GILBERT,DU,NR by  at 2/22/2024 1540    Comment: Educated pt. safety/technique w/bed mobility, transfers,  ambulation, HEP, PT POC                         Point: Home exercise program (Done)       Learning Progress Summary             Patient Mayeliner, E,H, VU,DU,NR by  at 2/22/2024 1540    Comment: Educated pt. safety/technique w/bed mobility, transfers, ambulation, HEP, PT POC                         Point: Body mechanics (Done)       Learning Progress Summary             Patient Eager, E,H, VU,DU,NR by  at 2/22/2024 1540    Comment: Educated pt. safety/technique w/bed mobility, transfers, ambulation, HEP, PT POC                         Point: Precautions (Done)       Learning Progress Summary             Patient Eager, E,H, VU,DU,NR by  at 2/22/2024 1540    Comment: Educated pt. safety/technique w/bed mobility, transfers, ambulation, HEP, PT POC                                         User Key       Initials Effective Dates Name Provider Type Discipline     06/01/21 -  Desiree Merritt, PT Physical Therapist PT                  PT Recommendation and Plan  Planned Therapy Interventions (PT): balance training, bed mobility training, gait training, home exercise program, neuromuscular re-education, patient/family education, postural re-education, ROM (range of motion), stair training, strengthening, stretching, transfer training  Plan of Care Reviewed With: patient  Outcome Evaluation: Pt. presents below baseline function w/decreased strength and AROM of LLE, difficulty w/sequencing tasks, and acute pain affecting his ability to safely participate in functional mobility. He performed bed mobility w/max of 2, transfers w/min of 2 and ambulated 10' w/front wheeled walker, mod assist of 1 + close chair follow. Activity limited by fatigue, weakness. Pt. required significant cueing for tasks. Pt. would benefit from IPPT to address stated deficits.     Time Calculation:   PT Evaluation Complexity  History, PT Evaluation Complexity: 3 or more personal factors and/or comorbidities  Examination of Body Systems (PT Eval  Complexity): total of 4 or more elements  Clinical Presentation (PT Evaluation Complexity): evolving  Clinical Decision Making (PT Evaluation Complexity): low complexity  Overall Complexity (PT Evaluation Complexity): low complexity     PT Charges       Row Name 02/22/24 1541             Time Calculation    Start Time 1451  -SS      PT Received On 02/22/24  -SS      PT Goal Re-Cert Due Date 03/03/24  -SS         Timed Charges    73482 - PT Therapeutic Exercise Minutes 3  -SS      02781 - PT Therapeutic Activity Minutes 10  -SS         Untimed Charges    PT Eval/Re-eval Minutes 49  -SS         Total Minutes    Timed Charges Total Minutes 13  -SS      Untimed Charges Total Minutes 49  -SS       Total Minutes 62  -SS                User Key  (r) = Recorded By, (t) = Taken By, (c) = Cosigned By      Initials Name Provider Type     Desiree Merritt, PT Physical Therapist                  Therapy Charges for Today       Code Description Service Date Service Provider Modifiers Qty    08978585864 HC PT THERAPEUTIC ACT EA 15 MIN 2/22/2024 Desiree Merritt, PT GP 1    91122844061 HC PT EVAL LOW COMPLEXITY 4 2/22/2024 Desiree Merritt, PT GP 1    94507821079 HC PT THER SUPP EA 15 MIN 2/22/2024 Desriee Merritt, PT GP 2            PT G-Codes  Outcome Measure Options: AM-PAC 6 Clicks Basic Mobility (PT), PADD  AM-PAC 6 Clicks Score (PT): 13  PT Discharge Summary  Anticipated Discharge Disposition (PT): inpatient rehabilitation facility    Desiree Merritt PT  2/22/2024

## 2024-02-22 NOTE — ANESTHESIA PROCEDURE NOTES
Airway  Urgency: elective    Date/Time: 2/22/2024 7:27 AM  Airway not difficult    General Information and Staff    Patient location during procedure: OR  SRNA: Keke Raygoza, GABBY  Indications and Patient Condition  Indications for airway management: airway protection    Preoxygenated: yes  MILS not maintained throughout  Mask difficulty assessment: 1 - vent by mask    Final Airway Details  Final airway type: endotracheal airway      Successful airway: ETT  Cuffed: yes   Successful intubation technique: direct laryngoscopy  Endotracheal tube insertion site: oral  Blade: Maite  Blade size: 4  ETT size (mm): 7.5  Cormack-Lehane Classification: grade IIa - partial view of glottis  Placement verified by: chest auscultation and capnometry   Cuff volume (mL): 9  Measured from: lips  ETT/EBT  to lips (cm): 23  Number of attempts at approach: 1  Assessment: lips, teeth, and gum same as pre-op and atraumatic intubation    Additional Comments  Negative epigastric sounds, Breath sound equal bilaterally with symmetric chest rise and fall

## 2024-02-22 NOTE — PLAN OF CARE
Goal Outcome Evaluation:  Plan of Care Reviewed With: patient           Outcome Evaluation: Pt. presents below baseline function w/decreased strength and AROM of LLE, difficulty w/sequencing tasks, and acute pain affecting his ability to safely participate in functional mobility. He performed bed mobility w/max of 2, transfers w/min of 2 and ambulated 10' w/front wheeled walker, mod assist of 1 + close chair follow. Activity limited by fatigue, weakness. Pt. required significant cueing for tasks. Pt. would benefit from IPPT to address stated deficits.      Anticipated Discharge Disposition (PT): inpatient rehabilitation facility

## 2024-02-23 LAB
ANION GAP SERPL CALCULATED.3IONS-SCNC: 10 MMOL/L (ref 5–15)
BUN SERPL-MCNC: 9 MG/DL (ref 8–23)
BUN/CREAT SERPL: 8.9 (ref 7–25)
CALCIUM SPEC-SCNC: 8.6 MG/DL (ref 8.6–10.5)
CHLORIDE SERPL-SCNC: 99 MMOL/L (ref 98–107)
CO2 SERPL-SCNC: 25 MMOL/L (ref 22–29)
CREAT SERPL-MCNC: 1.01 MG/DL (ref 0.76–1.27)
DEPRECATED RDW RBC AUTO: 43.8 FL (ref 37–54)
EGFRCR SERPLBLD CKD-EPI 2021: 84.6 ML/MIN/1.73
ERYTHROCYTE [DISTWIDTH] IN BLOOD BY AUTOMATED COUNT: 13.5 % (ref 12.3–15.4)
GLUCOSE BLDC GLUCOMTR-MCNC: 125 MG/DL (ref 70–130)
GLUCOSE BLDC GLUCOMTR-MCNC: 164 MG/DL (ref 70–130)
GLUCOSE BLDC GLUCOMTR-MCNC: 174 MG/DL (ref 70–130)
GLUCOSE BLDC GLUCOMTR-MCNC: 191 MG/DL (ref 70–130)
GLUCOSE BLDC GLUCOMTR-MCNC: 195 MG/DL (ref 70–130)
GLUCOSE SERPL-MCNC: 148 MG/DL (ref 65–99)
HCT VFR BLD AUTO: 42.2 % (ref 37.5–51)
HGB BLD-MCNC: 13.8 G/DL (ref 13–17.7)
MCH RBC QN AUTO: 29.4 PG (ref 26.6–33)
MCHC RBC AUTO-ENTMCNC: 32.7 G/DL (ref 31.5–35.7)
MCV RBC AUTO: 90 FL (ref 79–97)
PLATELET # BLD AUTO: 286 10*3/MM3 (ref 140–450)
PMV BLD AUTO: 9.7 FL (ref 6–12)
POTASSIUM SERPL-SCNC: 4.1 MMOL/L (ref 3.5–5.2)
RBC # BLD AUTO: 4.69 10*6/MM3 (ref 4.14–5.8)
SODIUM SERPL-SCNC: 134 MMOL/L (ref 136–145)
WBC NRBC COR # BLD AUTO: 16.01 10*3/MM3 (ref 3.4–10.8)

## 2024-02-23 PROCEDURE — 97110 THERAPEUTIC EXERCISES: CPT

## 2024-02-23 PROCEDURE — 97530 THERAPEUTIC ACTIVITIES: CPT

## 2024-02-23 PROCEDURE — 85027 COMPLETE CBC AUTOMATED: CPT | Performed by: ORTHOPAEDIC SURGERY

## 2024-02-23 PROCEDURE — 94799 UNLISTED PULMONARY SVC/PX: CPT

## 2024-02-23 PROCEDURE — 63710000001 INSULIN DETEMIR PER 5 UNITS: Performed by: INTERNAL MEDICINE

## 2024-02-23 PROCEDURE — 94660 CPAP INITIATION&MGMT: CPT

## 2024-02-23 PROCEDURE — 97166 OT EVAL MOD COMPLEX 45 MIN: CPT

## 2024-02-23 PROCEDURE — 99024 POSTOP FOLLOW-UP VISIT: CPT | Performed by: ORTHOPAEDIC SURGERY

## 2024-02-23 PROCEDURE — 63710000001 INSULIN LISPRO (HUMAN) PER 5 UNITS: Performed by: INTERNAL MEDICINE

## 2024-02-23 PROCEDURE — 97116 GAIT TRAINING THERAPY: CPT

## 2024-02-23 PROCEDURE — 82948 REAGENT STRIP/BLOOD GLUCOSE: CPT

## 2024-02-23 PROCEDURE — 97535 SELF CARE MNGMENT TRAINING: CPT

## 2024-02-23 PROCEDURE — 80048 BASIC METABOLIC PNL TOTAL CA: CPT | Performed by: INTERNAL MEDICINE

## 2024-02-23 RX ADMIN — ROSUVASTATIN 10 MG: 10 TABLET, FILM COATED ORAL at 08:21

## 2024-02-23 RX ADMIN — OXYCODONE HYDROCHLORIDE 5 MG: 5 TABLET ORAL at 12:38

## 2024-02-23 RX ADMIN — KETOTIFEN FUMARATE 1 DROP: 0.35 SOLUTION/ DROPS OPHTHALMIC at 08:24

## 2024-02-23 RX ADMIN — INSULIN LISPRO 2 UNITS: 100 INJECTION, SOLUTION INTRAVENOUS; SUBCUTANEOUS at 20:15

## 2024-02-23 RX ADMIN — BUDESONIDE AND FORMOTEROL FUMARATE DIHYDRATE 1 PUFF: 160; 4.5 AEROSOL RESPIRATORY (INHALATION) at 19:10

## 2024-02-23 RX ADMIN — PANTOPRAZOLE SODIUM 40 MG: 40 TABLET, DELAYED RELEASE ORAL at 04:00

## 2024-02-23 RX ADMIN — TAMSULOSIN HYDROCHLORIDE 0.4 MG: 0.4 CAPSULE ORAL at 08:21

## 2024-02-23 RX ADMIN — OXYCODONE HYDROCHLORIDE 5 MG: 5 TABLET ORAL at 22:12

## 2024-02-23 RX ADMIN — DICYCLOMINE HYDROCHLORIDE 20 MG: 20 TABLET ORAL at 23:08

## 2024-02-23 RX ADMIN — OXYCODONE HYDROCHLORIDE 5 MG: 5 TABLET ORAL at 17:41

## 2024-02-23 RX ADMIN — KETOTIFEN FUMARATE 1 DROP: 0.35 SOLUTION/ DROPS OPHTHALMIC at 20:17

## 2024-02-23 RX ADMIN — BUDESONIDE AND FORMOTEROL FUMARATE DIHYDRATE 1 PUFF: 160; 4.5 AEROSOL RESPIRATORY (INHALATION) at 08:48

## 2024-02-23 RX ADMIN — MELOXICAM 15 MG: 15 TABLET ORAL at 08:21

## 2024-02-23 RX ADMIN — CYCLOBENZAPRINE 10 MG: 10 TABLET, FILM COATED ORAL at 03:57

## 2024-02-23 RX ADMIN — INSULIN LISPRO 2 UNITS: 100 INJECTION, SOLUTION INTRAVENOUS; SUBCUTANEOUS at 12:38

## 2024-02-23 RX ADMIN — DICYCLOMINE HYDROCHLORIDE 20 MG: 20 TABLET ORAL at 04:00

## 2024-02-23 RX ADMIN — Medication 10 ML: at 20:16

## 2024-02-23 RX ADMIN — INSULIN LISPRO 2 UNITS: 100 INJECTION, SOLUTION INTRAVENOUS; SUBCUTANEOUS at 08:21

## 2024-02-23 RX ADMIN — OXYCODONE HYDROCHLORIDE 5 MG: 5 TABLET ORAL at 08:21

## 2024-02-23 RX ADMIN — Medication 10 ML: at 08:25

## 2024-02-23 RX ADMIN — DICYCLOMINE HYDROCHLORIDE 20 MG: 20 TABLET ORAL at 12:38

## 2024-02-23 RX ADMIN — OXYCODONE HYDROCHLORIDE 5 MG: 5 TABLET ORAL at 03:57

## 2024-02-23 RX ADMIN — INSULIN DETEMIR 15 UNITS: 100 INJECTION, SOLUTION SUBCUTANEOUS at 20:15

## 2024-02-23 RX ADMIN — ASPIRIN 81 MG: 81 TABLET, COATED ORAL at 20:15

## 2024-02-23 RX ADMIN — ASPIRIN 81 MG: 81 TABLET, COATED ORAL at 08:21

## 2024-02-23 RX ADMIN — DICYCLOMINE HYDROCHLORIDE 20 MG: 20 TABLET ORAL at 17:41

## 2024-02-23 RX ADMIN — AMLODIPINE BESYLATE 10 MG: 10 TABLET ORAL at 08:21

## 2024-02-23 NOTE — PLAN OF CARE
Goal Outcome Evaluation:           Progress: no change  Outcome Evaluation: Patient has had difficulty urinating through night. Pt has frequency, bladder fullness, discomfort, and had required straight cath twice during night and once yesterday afternoon. Patient mobility is unchanged from previous update, pt is walking to toilet (approx. 40 feet) with assist x1 gait belt and walker. Patients pain has been controlled with oxycodone q4 hours PO, and pt requested a muscle relaxer this morning for left hip pain/tightness.

## 2024-02-23 NOTE — THERAPY EVALUATION
Patient Name: Sebastian Liao  : 1960    MRN: [id number]                              Today's Date: 2024       Admit Date: 2024    Visit Dx:     ICD-10-CM ICD-9-CM   1. Primary osteoarthritis of left hip  M16.12 715.15     Patient Active Problem List   Diagnosis    Testicular hypofunction    Essential hypertension, benign    Type 2 diabetes mellitus with hyperglycemia, without long-term current use of insulin    Hyperlipidemia    Obesity, Class III, BMI 40-49.9 (morbid obesity)    Dyspnea on exertion    History of COVID-19 pneumonia (10/2020)    Former smoker (None x 35 years)    GERD    RONIT (obstructive sleep apnea) - severe    DDD (degenerative disc disease), cervical    Seasonal allergic rhinitis    Screen for colon cancer    Carpal tunnel syndrome of right wrist    Degenerative arthritis of hip    BPH (benign prostatic hyperplasia)    Status post total replacement of left hip     Past Medical History:   Diagnosis Date    Acute bronchitis     Arthritis     Asthma     Cervicalgia     COVID-19     CPAP (continuous positive airway pressure) dependence     Diabetes mellitus     Edema     GERD (gastroesophageal reflux disease)     HTN (hypertension)     Hyperlipidemia     IBS (irritable bowel syndrome)     IBS (irritable bowel syndrome)     Low testosterone     Prostate disease     Sleep apnea     Wears glasses      Past Surgical History:   Procedure Laterality Date    CARPAL TUNNEL RELEASE Right 2022    Procedure: CARPAL TUNNEL RELEASE RIGHT;  Surgeon: Nj Bliss MD;  Location: [facility];  Service: Neurosurgery;  Laterality: Right;    CERVICAL FUSION      Dr. Nickerson, . [place], [place]    COLONOSCOPY  2012    repeat in ten yrs    HIP SURGERY Right 2016    Dr. Schwartz    NECK SURGERY      cervical spine fusion    TONSILLECTOMY        General Information       Row Name 24 1040          OT Time and Intention    Document Type evaluation;therapy note (daily note)  Pt seen  in 2 sessions to complete assessment and teaching.  -TB     Mode of Treatment occupational therapy  -TB       Row Name 02/23/24 1040          General Information    Patient Profile Reviewed yes  -TB     Prior Level of Function independent:;all household mobility;community mobility;transfer;bed mobility;ADL's;driving;work  -TB     Existing Precautions/Restrictions fall;left;hip, anterior  -TB     Barriers to Rehab medically complex  -TB       Row Name 02/23/24 1040          Occupational Profile    Reason for Services/Referral (Occupational Profile) Occupational decline  -TB       Row Name 02/23/24 1040          Living Environment    People in Home spouse  -TB       Row Name 02/23/24 1040          Home Main Entrance    Number of Stairs, Main Entrance two  -TB     Stair Railings, Main Entrance none  -TB       Row Name 02/23/24 1040          Stairs Within Home, Primary    Number of Stairs, Within Home, Primary none  -TB       Row Name 02/23/24 1040          Cognition    Orientation Status (Cognition) oriented x 4  -TB       Row Name 02/23/24 1040          Safety Issues, Functional Mobility    Safety Issues Affecting Function (Mobility) insight into deficits/self-awareness;awareness of need for assistance;safety precaution awareness;safety precautions follow-through/compliance;sequencing abilities;positioning of assistive device  -TB     Impairments Affecting Function (Mobility) balance;endurance/activity tolerance;pain;strength;range of motion (ROM);postural/trunk control  -TB     Comment, Safety Issues/Impairments (Mobility) Pt up with RW support and Min Ax2. Mod Ax2 STS.  -TB               User Key  (r) = Recorded By, (t) = Taken By, (c) = Cosigned By      Initials Name Provider Type    TB Cristina Main OT Occupational Therapist                     Mobility/ADL's       Row Name 02/23/24 1041          Bed Mobility    Comment, (Bed Mobility) UI  -TB       Row Name 02/23/24 1041          Transfers    Transfers  sit-stand transfer;stand-sit transfer;toilet transfer;bed-chair transfer  -TB     Comment, (Transfers) Education, cues, and assist for hand placement, sequencing, and safety all transfers.  -TB       Row Name 02/23/24 1041          Bed-Chair Transfer    Bed-Chair [place] (Transfers) minimum assist (75% patient effort);2 person assist;verbal cues  -TB     Assistive Device (Bed-Chair Transfers) walker, front-wheeled  -TB       Row Name 02/23/24 1041          Sit-Stand Transfer    Sit-Stand [place] (Transfers) moderate assist (50% patient effort);2 person assist;verbal cues  -TB     Assistive Device (Sit-Stand Transfers) walker, front-wheeled  -TB       Row Name 02/23/24 1041          Stand-Sit Transfer    Stand-Sit [place] (Transfers) moderate assist (50% patient effort);2 person assist;verbal cues  -TB     Assistive Device (Stand-Sit Transfers) walker, front-wheeled  -TB       Row Name 02/23/24 1041          Toilet Transfer    Type (Toilet Transfer) sit-stand;stand-sit  -TB     [place] Level (Toilet Transfer) moderate assist (50% patient effort);2 person assist;verbal cues  -TB     Assistive Device (Toilet Transfer) raised toilet seat;grab bars/safety frame;walker, front-wheeled  -TB       Row Name 02/23/24 1041          Functional Mobility    Functional Mobility- Ind. Level minimum assist (75% patient effort);2 person assist required;verbal cues required  -TB     Functional Mobility- Device walker, front-wheeled  -TB     Functional Mobility-Distance (Feet) 30  -TB     Functional Mobility- Safety Issues step length decreased;weight-shifting ability decreased;sequencing ability decreased;balance decreased during turns  -TB     Functional Mobility- Comment Pt up in room/bathroom with RW support and assist x2. Good effort. Slow pace. Assist to manage RW.  -TB     Patient was able to Ambulate yes  -TB       Row Name 02/23/24 1041          Activities of Daily Living    BADL Assessment/Intervention  bathing;lower body dressing;toileting;feeding  -TB       Row Name 02/23/24 1041          Mobility    Extremity Weight-bearing Status left lower extremity  -TB     Left Lower Extremity (Weight-bearing Status) weight-bearing as tolerated (WBAT)  -TB       Row Name 02/23/24 1041          Bathing Assessment/Intervention    [place] Level (Bathing) bathing skills  -TB     Comment, (Bathing) Education/demonstration initiated for precautions and ADL retraining to maintain.  -TB       Row Name 02/23/24 1041          Lower Body Dressing Assessment/Training    [place] Level (Lower Body Dressing) lower body dressing skills;maximum assist (25% patient effort)  -TB     Comment, (Lower Body Dressing) Education/demonstration initiated for precautions and ADL retraining to maintain. Follow up teaching recommended. Pt used AE with remote hip surgery >7 yrs ago.  -TB       Row Name 02/23/24 1041          Toileting Assessment/Training    [place] Level (Toileting) dependent (less than 25% patient effort);adjust/manage clothing;independent;perform perineal hygiene  -TB     Position (Toileting) supported sitting;supported standing  -TB       Row Name 02/23/24 1041          Self-Feeding Assessment/Training    [place] Level (Feeding) independent;liquids to mouth  -TB     Position (Self-Feeding) supported sitting  -TB               User Key  (r) = Recorded By, (t) = Taken By, (c) = Cosigned By      Initials Name Provider Type    Cristina Salidvar OT Occupational Therapist                   Obj/Interventions       Row Name 02/23/24 1116          Sensory Assessment (Somatosensory)    Sensory Assessment (Somatosensory) UE sensation intact  -TB       Row Name 02/23/24 1116          Vision Assessment/Intervention    Visual Impairment/Limitations corrective lenses full-time  -       Row Name 02/23/24 1116          Range of Motion Comprehensive    General Range of Motion bilateral upper extremity ROM WFL  -TB      Comment, General Range of Motion BUE AROM WFL for self-care  -TB       Row Name 02/23/24 1116          Strength Comprehensive (MMT)    Comment, General Manual Muscle Testing (MMT) Assessment BUE intact, 5/5  -TB       Row Name 02/23/24 1116          Balance    Balance Assessment sitting dynamic balance;sit to stand dynamic balance;standing dynamic balance  -TB     Dynamic Sitting Balance contact guard;verbal cues  -TB     Position, Sitting Balance supported  arm rests of chair  -TB     Sit to Stand Dynamic Balance moderate assist;2-person assist;verbal cues  -TB     Dynamic Standing Balance minimal assist;2-person assist;verbal cues  -TB     Position/Device Used, Standing Balance supported;walker, front-wheeled  -TB     Balance Interventions sitting;standing;sit to stand;supported;dynamic;dynamic reaching;occupation based/functional task;UE activity with balance activity  -TB     Comment, Balance Slow gait, short steps. Unsteady without LOB.  -TB               User Key  (r) = Recorded By, (t) = Taken By, (c) = Cosigned By      Initials Name Provider Type    TB Cristina Main OT Occupational Therapist                   Goals/Plan       Row Name 02/23/24 1124          Transfer Goal 1 (OT)    Activity/Assistive Device (Transfer Goal 1, OT) toilet  -TB     [place] Level/Cues Needed (Transfer Goal 1, OT) minimum assist (75% or more patient effort)  -TB     Time Frame (Transfer Goal 1, OT) by discharge  -TB     Progress/Outcome (Transfer Goal 1, OT) goal ongoing  -TB       Row Name 02/23/24 1124          Dressing Goal 1 (OT)    Activity/Device (Dressing Goal 1, OT) lower body dressing  -TB     [place]/Cues Needed (Dressing Goal 1, OT) moderate assist (50-74% patient effort);verbal cues required  -TB     Time Frame (Dressing Goal 1, OT) by discharge  -TB     Progress/Outcome (Dressing Goal 1, OT) goal ongoing  -TB       Row Name 02/23/24 1124          Toileting Goal 1 (OT)    Activity/Device (Toileting  Goal 1, OT) toileting skills, all  -TB     [place] Level/Cues Needed (Toileting Goal 1, OT) standby assist  -TB     Time Frame (Toileting Goal 1, OT) by discharge  -TB     Progress/Outcome (Toileting Goal 1, OT) goal ongoing  -TB               User Key  (r) = Recorded By, (t) = Taken By, (c) = Cosigned By      Initials Name Provider Type    TB Cristina Main, OT Occupational Therapist                   Clinical Impression       Row Name 02/23/24 1118          Pain Assessment    Pretreatment Pain Rating 10/10  -TB     Posttreatment Pain Rating 10/10  -TB     Pain Location - Side/Orientation Left  -TB     Pain Location incisional  -TB     Pain Location - hip  -TB     Pre/Posttreatment Pain Comment Pt tolerates RW level activity  -TB     Pain Intervention(s) Ambulation/increased activity;Repositioned;Elevated;Cold applied  -TB       Row Name 02/23/24 1118          Plan of Care Review    Plan of Care Reviewed With patient  -TB     Progress --  IE  -TB     Outcome Evaluation OT IE completed. Pt is A/Ox4 and motivated to work with therapy. BUE AROM, strength, and sensation are intact. Pt presents below his occupational baseline with acute pain, LLE weakness, balance and functional endurance deficits limiting mobility and self-care performance. Pt is able to stand with Mod Ax2. Up in room/bathroom with RW support and Min Ax2. Good effort, slow pace. Education initiated for precautions and ADL retraining to maintain. Follow up teaching needed. OT will follow IP. Recommend IRF at d/c for best outcome.  -TB       Row Name 02/23/24 1118          Therapy Assessment/Plan (OT)    Rehab Potential (OT) good, to achieve stated therapy goals  -TB     Criteria for Skilled Therapeutic Interventions Met (OT) yes;meets criteria;skilled treatment is necessary  -TB     Therapy Frequency (OT) daily  -TB       Row Name 02/23/24 1118          Therapy Plan Review/Discharge Plan (OT)    Equipment Needs Upon Discharge (OT) tub bench   -TB     Anticipated Discharge Disposition (OT) inpatient rehabilitation facility  -TB       Row Name 02/23/24 1118          Vital Signs    Pre Systolic BP Rehab --  RN cleared OT  -TB     Pre SpO2 (%) 95  -TB     O2 Delivery Pre Treatment room air  -TB     Pre Patient Position Sitting  -TB     Intra Patient Position Standing  -TB     Post Patient Position Sitting  -TB       Row Name 02/23/24 1118          Positioning and Restraints    Pre-Treatment Position sitting in chair/recliner  -TB     Post Treatment Position chair  -TB     In Chair notified nsg;reclined;call light within reach;encouraged to call for assist;exit alarm on;waffle cushion;legs elevated  -TB               User Key  (r) = Recorded By, (t) = Taken By, (c) = Cosigned By      Initials Name Provider Type    TB Cristina Main, NIA Occupational Therapist                   Outcome Measures       Row Name 02/23/24 1125          How much help from another is currently needed...    Putting on and taking off regular lower body clothing? 2  -TB     Bathing (including washing, rinsing, and drying) 2  -TB     Toileting (which includes using toilet bed pan or urinal) 2  -TB     Putting on and taking off regular upper body clothing 3  -TB     Taking care of personal grooming (such as brushing teeth) 3  -TB     Eating meals 4  -TB     AM-PAC 6 Clicks Score (OT) 16  -TB       Row Name 02/23/24 1008 02/23/24 0000       How much help from another person do you currently need...    Turning from your back to your side while in flat bed without using bedrails? 2  - 2  -CH    Moving from lying on back to sitting on the side of a flat bed without bedrails? 2  - 2  -CH    Moving to and from a bed to a chair (including a wheelchair)? 2  - 2  -CH    Standing up from a chair using your arms (e.g., wheelchair, bedside chair)? 2  - 3  -CH    Climbing 3-5 steps with a railing? 2  - 2  -CH    To walk in hospital room? 3  -LH 3  -CH    AM-PAC 6 Clicks Score (PT)  13  -LH 14  -CH    Highest Level of Mobility Goal 4 --> Transfer to chair/commode  - 4 --> Transfer to chair/commode  -      Row Name 02/23/24 1125 02/23/24 1008       Functional Assessment    Outcome Measure Options AM-PAC 6 Clicks Daily Activity (OT)  - AM-PAC 6 Clicks Basic Mobility (PT);PADD  -              User Key  (r) = Recorded By, (t) = Taken By, (c) = Cosigned By      Initials Name Provider Type    TB Cristina Main, OT Occupational Therapist     Rita Dobson, PT Physical Therapist     Leo Nj, RN Registered Nurse                    Occupational Therapy Education       Title: PT OT SLP Therapies (In Progress)       Topic: Occupational Therapy (In Progress)       Point: ADL training (Done)       Description:   Instruct learner(s) on proper safety adaptation and remediation techniques during self care or transfers.   Instruct in proper use of assistive devices.                  Learning Progress Summary             Patient Acceptance, E,D, VU,NR by  at 2/23/2024 1126                         Point: Home exercise program (Not Started)       Description:   Instruct learner(s) on appropriate technique for monitoring, assisting and/or progressing therapeutic exercises/activities.                  Learner Progress:  Not documented in this visit.              Point: Precautions (Done)       Description:   Instruct learner(s) on prescribed precautions during self-care and functional transfers.                  Learning Progress Summary             Patient Acceptance, E,D, VU,NR by  at 2/23/2024 1126                         Point: Body mechanics (Not Started)       Description:   Instruct learner(s) on proper positioning and spine alignment during self-care, functional mobility activities and/or exercises.                  Learner Progress:  Not documented in this visit.                              User Key       Initials Effective Dates Name Provider Type Discipline     07/11/23 -   Cristina Main OT Occupational Therapist OT                  OT Recommendation and Plan  Therapy Frequency (OT): daily  Plan of Care Review  Plan of Care Reviewed With: patient  Progress:  (IE)  Outcome Evaluation: OT IE completed. Pt is A/Ox4 and motivated to work with therapy. BUE AROM, strength, and sensation are intact. Pt presents below his occupational baseline with acute pain, LLE weakness, balance and functional endurance deficits limiting mobility and self-care performance. Pt is able to stand with Mod Ax2. Up in room/bathroom with RW support and Min Ax2. Good effort, slow pace. Education initiated for precautions and ADL retraining to maintain. Follow up teaching needed. OT will follow IP. Recommend IRF at d/c for best outcome.     Time Calculation:   Evaluation Complexity (OT)  Review Occupational Profile/Medical/Therapy History Complexity: expanded/moderate complexity  Assessment, Occupational Performance/Identification of Deficit Complexity: 3-5 performance deficits  Clinical Decision Making Complexity (OT): detailed assessment/moderate complexity  Overall Complexity of Evaluation (OT): moderate complexity     Time Calculation- OT       Row Name 02/23/24 1009 02/23/24 0846          Time Calculation- OT    OT Start Time -- 0846  -TB     OT Received On -- 02/23/24  -TB     OT Goal Re-Cert Due Date -- 03/04/24  -TB        Timed Charges    17176 - Gait Training Minutes  10  -LH --     72654 - OT Self Care/Mgmt Minutes -- 15  -TB        Untimed Charges    OT Eval/Re-eval Minutes -- 45  -TB        Total Minutes    Timed Charges Total Minutes 10  -LH 15  -TB     Untimed Charges Total Minutes -- 45  -TB      Total Minutes 10  -LH 60  -TB               User Key  (r) = Recorded By, (t) = Taken By, (c) = Cosigned By      Initials Name Provider Type    TB Cristina Main OT Occupational Therapist    LH Rita Dobson, TRACI Physical Therapist                  Therapy Charges for Today       Code  Description Service Date Service Provider Modifiers Qty    22377379088 HC OT SELF CARE/MGMT/TRAIN EA 15 MIN 2/23/2024 Cristina Main OT GO 1    03970674645 HC OT EVAL MOD COMPLEXITY 3 2/23/2024 Cristina Main OT GO 1                 Cristina Main OT  2/23/2024

## 2024-02-23 NOTE — PLAN OF CARE
Problem: Adult Inpatient Plan of Care  Goal: Plan of Care Review  Recent Flowsheet Documentation  Taken 2/23/2024 1118 by Cristina Main OT  Progress: (IE) --  Plan of Care Reviewed With: patient  Outcome Evaluation: OT IE completed. Pt is A/Ox4 and motivated to work with therapy. BUE AROM, strength, and sensation are intact. Pt presents below his occupational baseline with acute pain, LLE weakness, balance and functional endurance deficits limiting mobility and self-care performance. Pt is able to stand with Mod Ax2. Up in room/bathroom with RW support and Min Ax2. Good effort, slow pace. Education initiated for precautions and ADL retraining to maintain. Follow up teaching needed. OT will follow IP. Recommend IRF at d/c for best outcome.

## 2024-02-23 NOTE — PLAN OF CARE
Goal Outcome Evaluation:  Plan of Care Reviewed With: patient        Progress: no change  Outcome Evaluation: Pt continues to present below baseline function d/t acute pain and deficits in L LE strength, balance, and activity tolerance. Pt required modA x2 for STS from chair and progressed to [place] x2 for STS from commode. He ambulated 15+15 ft w/ FWW, [place] x2. No LOB or knee buckling noted. Pt would continue to benefit from skilled IP PT. Continue to recommend IRF at d/c for best functional outcome.      Anticipated Discharge Disposition (PT): inpatient rehabilitation facility

## 2024-02-23 NOTE — PROGRESS NOTES
"IM progress note      Sebastian Liao  [id number]  1960     LOS: 0 days     Attending: Zane Matthews MD    Primary Care Provider: Fransico Ngo PA      Chief Complaint/Reason for visit:  No chief complaint on file.      Subjective   Feels a bit better today.  Better pain control.  Voiding spontaneously after requiring in and out cath overnight.  No nausea or vomiting.  Does not feel hungry.    Objective        Visit Vitals  /77 (BP Location: Left arm, Patient Position: Sitting)   Pulse (!) 125   Temp 99 °F (37.2 °C) (Oral)   Resp 18   Ht 162.6 cm (64\")   Wt 108 kg (238 lb)   SpO2 95%   BMI 40.85 kg/m²     Temp (24hrs), Av.2 °F (36.8 °C), Min:97.6 °F (36.4 °C), Max:99.5 °F (37.5 °C)      Intake/Output:    Intake/Output Summary (Last 24 hours) at 2024 1232  Last data filed at 2024 1122  Gross per 24 hour   Intake 1600 ml   Output 3625 ml   Net -202 ml        Physical Therapy:     Signed         Goal Outcome Evaluation:  Plan of Care Reviewed With: patient  Progress: no change  Outcome Evaluation: Pt continues to present below baseline function d/t acute pain and deficits in L LE strength, balance, and activity tolerance. Pt required modA x2 for STS from chair and progressed to José Luis x2 for STS from commode. He ambulated 15+15 ft w/ FWW, José Luis x2. No LOB or knee buckling noted. Pt would continue to benefit from skilled IP PT. Continue to recommend IRF at d/c for best functional outcome.        Anticipated Discharge Disposition (PT): inpatient rehabilitation facility              Physical Exam:     General Appearance:    Alert, cooperative, in no acute distress   Head:    Normocephalic, without obvious abnormality, atraumatic    Lungs:     Normal effort, symmetric chest rise,  clear to      auscultation bilaterally              Heart:    Regular rhythm and normal rate, normal S1 and S2    Abdomen:     Normal bowel sounds, no masses, no organomegaly, soft        non-tender, " non-distended, no guarding, no rebound                tenderness   Extremities: Clean dry intact dressing over hip incision.  No clubbing, cyanosis   No deformities.   Intact flexion and dorsiflexion bilateral feet.   Pulses:   Pulses palpable and equal bilaterally   Skin:   No bleeding, bruising or rash          Results Review:     I reviewed the patient's new clinical results.   Results from last 7 days   Lab Units 02/23/24  0414   WBC 10*3/mm3 16.01*   HEMOGLOBIN g/dL 13.8   HEMATOCRIT % 42.2   PLATELETS 10*3/mm3 286     Results from last 7 days   Lab Units 02/23/24  0414   SODIUM mmol/L 134*   POTASSIUM mmol/L 4.1   CHLORIDE mmol/L 99   CO2 mmol/L 25.0   BUN mg/dL 9   CREATININE mg/dL 1.01   CALCIUM mg/dL 8.6   GLUCOSE mg/dL 148*      Latest Reference Range & Units 02/22/24 20:44 02/23/24 04:14 02/23/24 07:36 02/23/24 11:24   Glucose 70 - 130 mg/dL 278 (H) 148 (H) 164 (H) 195 (H)   (H): Data is abnormally high  I reviewed the patient's new imaging including images and reports.    All medications reviewed.   amLODIPine, 10 mg, Oral, Daily  aspirin, 81 mg, Oral, Q12H  budesonide-formoterol, 1 puff, Inhalation, BID - RT  dicyclomine, 20 mg, Oral, Q6H  insulin detemir, 15 Units, Subcutaneous, Nightly  insulin lispro, 2-7 Units, Subcutaneous, 4x Daily AC & at Bedtime  Ketotifen Fumarate, 1 drop, Both Eyes, BID  meloxicam, 15 mg, Oral, Daily  pantoprazole, 40 mg, Oral, Q AM  rosuvastatin, 10 mg, Oral, Daily  sodium chloride, 10 mL, Intravenous, Q12H  tamsulosin, 0.4 mg, Oral, Daily      albuterol, 2.5 mg, Q6H PRN  cyclobenzaprine, 10 mg, TID PRN  dextrose, 25 g, Q15 Min PRN  dextrose, 15 g, Q15 Min PRN  glucagon (human recombinant), 1 mg, Q15 Min PRN  HYDROmorphone, 0.5 mg, Q2H PRN   And  naloxone, 0.1 mg, Q5 Min PRN  labetalol, 10 mg, Q4H PRN  Morphine, 4 mg, Q2H PRN   And  naloxone, 0.4 mg, Q5 Min PRN  oxyCODONE, 5 mg, Q4H PRN  prochlorperazine, 5 mg, Q6H PRN  sodium chloride, 500 mL, TID PRN  sodium chloride, 1-10  mL, PRN  sodium chloride, 40 mL, PRN        Assessment & Plan       Status post total replacement of left hip    Essential hypertension, benign    Type 2 diabetes mellitus with hyperglycemia, without long-term current use of insulin    Hyperlipidemia    Obesity, Class III, BMI 40-49.9 (morbid obesity)    GERD    RONIT (obstructive sleep apnea) - severe    Degenerative arthritis of hip    BPH (benign prostatic hyperplasia)         Plan     1. PT/OT,  Weight bearing as tolerated left LE.  Total hip precautions  2. Pain control-prns  3. IS-encourage  4. DVT proph- Mechanicals and aspirin  5. Bowel regimen  6. Resume home medications as appropriate  7. Monitor post-op labs  8. DC planning .  Depending on progress with PT and OT.  Hopefully he will make enough progress for home discharge with home health PT this weekend       - Hypertension:  Resume home medications as appropriate, formulary substitution when indicated.  Holding parameters.  Prn medications for elevated blood pressure.     -DM type 2  Hgb A1C 6.8  Hold OHA as appropriate  FSBG AC/HS, ( q 6 when NPO), along with correction humalog.  Long acting insulin scheduled      -GERD:  Resume PPI.  Formulary substitution when indicated.     -BPH.:  Monitor for spontaneous voiding.  Resume home medication with formulary substitution as indicated.     -RONIT:  Continue CPAP.   Monitor O2 sats.     -Obesity: Complicates all aspects of care.    Jen Colmenares MD  02/23/24  12:32 EST

## 2024-02-23 NOTE — PLAN OF CARE
Goal Outcome Evaluation:  Plan of Care Reviewed With: patient, spouse, son        Progress: improving  Outcome Evaluation: Pt demonstrating improvements in afternoon session by ambulating 60 ft w/ FWW, José Luis x1 + close chair follow. Pt continues to present below baseline function d/t acute pain and deficits in L LE strength, balance, and activity tolerance. Pt would continue to benefit from skilled IP PT. Continue to recommend IRF at d/c pending progress w/ mobility and stair training.      Anticipated Discharge Disposition (PT): inpatient rehabilitation facility

## 2024-02-23 NOTE — CASE MANAGEMENT/SOCIAL WORK
Continued Stay Note  [facility]     Patient Name: Sebastian Liao  MRN: [id number]  Today's Date: 2/23/2024    Admit Date: 2/22/2024    Plan: Home vs IPR   Discharge Plan       Row Name 02/23/24 1419       Plan    Plan Home vs IPR    Patient/Family in Agreement with Plan yes    Plan Comments I have met with Mr. Liao at his bedside to discuss PT/OT recommendations for inpatient rehab.  He is hopeful that his mobility will improve in the next 48hrs and he will be able to return home with planned OPPT.  He requests CM to f/u with him on Sunday.  CM will cont to follow the plan of care, monitor progress with PT/OT and assist with discharge needs as recommendations become available.    Final Discharge Disposition Code 30 - still a patient                   Discharge Codes    No documentation.                       Ruthy Valenzuela RN

## 2024-02-23 NOTE — THERAPY TREATMENT NOTE
Patient Name: Sebastian Liao  : 1960    MRN: [id number]                              Today's Date: 2024       Admit Date: 2024    Visit Dx:     ICD-10-CM ICD-9-CM   1. Primary osteoarthritis of left hip  M16.12 715.15     Patient Active Problem List   Diagnosis    Testicular hypofunction    Essential hypertension, benign    Type 2 diabetes mellitus with hyperglycemia, without long-term current use of insulin    Hyperlipidemia    Obesity, Class III, BMI 40-49.9 (morbid obesity)    Dyspnea on exertion    History of COVID-19 pneumonia (10/2020)    Former smoker (None x 35 years)    GERD    RONIT (obstructive sleep apnea) - severe    DDD (degenerative disc disease), cervical    Seasonal allergic rhinitis    Screen for colon cancer    Carpal tunnel syndrome of right wrist    Degenerative arthritis of hip    BPH (benign prostatic hyperplasia)    Status post total replacement of left hip     Past Medical History:   Diagnosis Date    Acute bronchitis     Arthritis     Asthma     Cervicalgia     COVID-19     CPAP (continuous positive airway pressure) dependence     Diabetes mellitus     Edema     GERD (gastroesophageal reflux disease)     HTN (hypertension)     Hyperlipidemia     IBS (irritable bowel syndrome)     IBS (irritable bowel syndrome)     Low testosterone     Prostate disease     Sleep apnea     Wears glasses      Past Surgical History:   Procedure Laterality Date    CARPAL TUNNEL RELEASE Right 2022    Procedure: CARPAL TUNNEL RELEASE RIGHT;  Surgeon: Nj Bliss MD;  Location: [facility];  Service: Neurosurgery;  Laterality: Right;    CERVICAL FUSION      Dr. Nickerson, . [place], [place]    COLONOSCOPY  2012    repeat in ten yrs    HIP SURGERY Right 2016    Dr. Schwartz    NECK SURGERY      cervical spine fusion    TONSILLECTOMY        General Information       Row Name 24 0958          Physical Therapy Time and Intention    Document Type therapy note (daily note)  -      Mode of Treatment physical therapy  -       Row Name 02/23/24 0958          General Information    Patient Profile Reviewed yes  -     Existing Precautions/Restrictions fall;left;hip, anterior  -     Barriers to Rehab medically complex  -[facility] Name 02/23/24 0958          Cognition    Orientation Status (Cognition) oriented x 4  -[facility] Name 02/23/24 0958          Safety Issues, Functional Mobility    Safety Issues Affecting Function (Mobility) awareness of need for assistance;insight into deficits/self-awareness;safety precaution awareness;safety precautions follow-through/compliance;sequencing abilities;judgment;positioning of assistive device;problem-solving  -     Impairments Affecting Function (Mobility) balance;endurance/activity tolerance;pain;postural/trunk control;range of motion (ROM);strength  -               User Key  (r) = Recorded By, (t) = Taken By, (c) = Cosigned By      Initials Name Provider Type     Rita Dobson PT Physical Therapist                   Mobility       [facility] Name 02/23/24 0958          Bed Mobility    Comment, (Bed Mobility) Pt received and left UIC  -[facility] Name 02/23/24 0958          Transfers    Comment, (Transfers) VCs for hand placement and sequencing. Cues to step out L LE prior to transfers  -[facility] Name 02/23/24 0958          Sit-Stand Transfer    Sit-Stand [place] (Transfers) moderate assist (50% patient effort);2 person assist;verbal cues  -     Assistive Device (Sit-Stand Transfers) walker, front-wheeled  -     Comment, (Sit-Stand Transfer) STS from chair required modA x2, and STS from commode required José Luis x2. Cues to push up from chair/grab bars and for eccentric lowering  -[facility] Name 02/23/24 0958          Gait/Stairs (Locomotion)    [place] Level (Gait) minimum assist (75% patient effort);2 person assist;verbal cues  -     Assistive Device (Gait) walker, front-wheeled  -     Patient was able to Ambulate  yes  -     Distance in Feet (Gait) 15+15  -     Deviations/Abnormal Patterns (Gait) bilateral deviations;base of support, wide;raphael decreased;gait speed decreased;stride length decreased;weight shifting decreased  -     Bilateral Gait Deviations forward flexed posture;heel strike decreased  -     [place] Level (Stairs) not tested  -     Comment, (Gait/Stairs) Pt demo step through gait pattern w/ decreased speed. VCs for sequencing, walker positioning, continuous forward progression of walker, and increased hip and knee flexion d/t decreased foot clearance. No LOB or knee buckling noted. Distance limited by weakness and fatigue  -[facility] Name 02/23/24 0958          Mobility    Extremity Weight-bearing Status left lower extremity  -     Left Lower Extremity (Weight-bearing Status) weight-bearing as tolerated (WBAT)  -               User Key  (r) = Recorded By, (t) = Taken By, (c) = Cosigned By      Initials Name Provider Type     Rita Dobson PT Physical Therapist                   Obj/Interventions       [facility] Name 02/23/24 1003          Motor Skills    Therapeutic Exercise hip;knee;ankle  -[facility] Name 02/23/24 1003          Hip (Therapeutic Exercise)    Hip (Therapeutic Exercise) isometric exercises;strengthening exercise  -     Hip Isometrics (Therapeutic Exercise) bilateral;gluteal sets;10 repetitions  -     Hip Strengthening (Therapeutic Exercise) left;aBduction;heel slides;10 repetitions  -[facility] Name 02/23/24 1003          Knee (Therapeutic Exercise)    Knee (Therapeutic Exercise) isometric exercises;strengthening exercise  -     Knee Isometrics (Therapeutic Exercise) left;quad sets;10 repetitions  -     Knee Strengthening (Therapeutic Exercise) left;SLR (straight leg raise);LAQ (long arc quad);10 repetitions  José Luis w/ SLR  -[facility] Name 02/23/24 1003          Ankle (Therapeutic Exercise)    Ankle (Therapeutic Exercise) AROM (active range of motion)  -      Ankle AROM (Therapeutic Exercise) bilateral;dorsiflexion;plantarflexion;10 repetitions  -       Row Name 02/23/24 1003          Balance    Balance Assessment sitting static balance;sitting dynamic balance;sit to stand dynamic balance;standing static balance;standing dynamic balance  -     Static Sitting Balance standby assist  -     Dynamic Sitting Balance contact guard  -     Position, Sitting Balance unsupported;sitting edge of bed  -     Sit to Stand Dynamic Balance moderate assist;minimal assist;2-person assist;verbal cues  -     Static Standing Balance minimal assist;1-person assist;verbal cues  -     Dynamic Standing Balance minimal assist;2-person assist;verbal cues  -     Balance Interventions sitting;standing;sit to stand;supported;static;dynamic  -     Comment, Balance No overt LOB noted  -               User Key  (r) = Recorded By, (t) = Taken By, (c) = Cosigned By      Initials Name Provider Type     Rita Dobson PT Physical Therapist                   Goals/Plan    No documentation.                  Clinical Impression       Row Name 02/23/24 1005          Pain    Pretreatment Pain Rating 10/10  -     Posttreatment Pain Rating 10/10  [facility]     Pain Location - Side/Orientation Left  -     Pain Location incisional  -     Pain Location - hip  -     Pain Intervention(s) Cold applied;Repositioned;Ambulation/increased activity;Elevated;Cold pack  -       Row Name 02/23/24 1005          Plan of Care Review    Plan of Care Reviewed With patient  -     Progress no change  -     Outcome Evaluation Pt continues to present below baseline function d/t acute pain and deficits in L LE strength, balance, and activity tolerance. Pt required modA x2 for STS from chair and progressed to José Luis x2 for STS from commode. He ambulated 15+15 ft w/ FWW, José Luis x2. No LOB or knee buckling noted. Pt would continue to benefit from skilled IP PT. Continue to recommend IRF at d/c for best  functional outcome.  -       Row Name 02/23/24 1005          Therapy Assessment/Plan (PT)    Rehab Potential (PT) good, to achieve stated therapy goals  -     Criteria for Skilled Interventions Met (PT) yes;meets criteria;skilled treatment is necessary  -     Therapy Frequency (PT) 2 times/day  -       Row Name 02/23/24 1005          Vital Signs    Pre Systolic BP Rehab 117  -     Pre Treatment Diastolic BP 71  -     Pretreatment Heart Rate (beats/min) 110  -     Posttreatment Heart Rate (beats/min) 109  -     Pre SpO2 (%) 95  -     O2 Delivery Pre Treatment room air  -     O2 Delivery Intra Treatment room air  -     Post SpO2 (%) 95  -LH     O2 Delivery Post Treatment room air  -     Pre Patient Position Sitting  -     Intra Patient Position Standing  -     Post Patient Position Sitting  -[facility] Name 02/23/24 1005          Positioning and Restraints    Pre-Treatment Position sitting in chair/recliner  -     Post Treatment Position chair  -     In Chair reclined;sitting;call light within reach;encouraged to call for assist;exit alarm on;waffle cushion;compression device;legs elevated;notified nsg  cold pack applied  -               User Key  (r) = Recorded By, (t) = Taken By, (c) = Cosigned By      Initials Name Provider Type     Rita Dobson, PT Physical Therapist                   Outcome Measures       [facility] Name 02/23/24 1008 02/23/24 0000       How much help from another person do you currently need...    Turning from your back to your side while in flat bed without using bedrails? 2  - 2  -CH    Moving from lying on back to sitting on the side of a flat bed without bedrails? 2  - 2  -CH    Moving to and from a bed to a chair (including a wheelchair)? 2  - 2  -CH    Standing up from a chair using your arms (e.g., wheelchair, bedside chair)? 2  - 3  -CH    Climbing 3-5 steps with a railing? 2  - 2  -CH    To walk in hospital room? 3  - 3  -CH    AM-PAC 6  Clicks Score (PT) 13  - 14  -CH    Highest Level of Mobility Goal 4 --> Transfer to chair/commode  - 4 --> Transfer to chair/commode  -      Row Name 02/23/24 1008          Functional Assessment    Outcome Measure Options AM-PAC 6 Clicks Basic Mobility (PT);PADD  -               User Key  (r) = Recorded By, (t) = Taken By, (c) = Cosigned By      Initials Name Provider Type     Rita Dobson, PT Physical Therapist    Leo Dalton, RN Registered Nurse                                 Physical Therapy Education       Title: PT OT SLP Therapies (In Progress)       Topic: Physical Therapy (Done)       Point: Mobility training (Done)       Learning Progress Summary             Patient Acceptance, E, VU,DU,NR by  at 2/23/2024 1009    Eager, E,H, VU,DU,NR by  at 2/22/2024 1540    Comment: Educated pt. safety/technique w/bed mobility, transfers, ambulation, HEP, PT POC                         Point: Home exercise program (Done)       Learning Progress Summary             Patient Acceptance, E, VU,DU,NR by  at 2/23/2024 1009    Eager, E,H, VU,DU,NR by  at 2/22/2024 1540    Comment: Educated pt. safety/technique w/bed mobility, transfers, ambulation, HEP, PT POC                         Point: Body mechanics (Done)       Learning Progress Summary             Patient Acceptance, E, VU,DU,NR by  at 2/23/2024 1009    Eager, E,H, VU,DU,NR by  at 2/22/2024 1540    Comment: Educated pt. safety/technique w/bed mobility, transfers, ambulation, HEP, PT POC                         Point: Precautions (Done)       Learning Progress Summary             Patient Acceptance, E, VU,DU,NR by  at 2/23/2024 1009    Eager, E,H, VU,DU,NR by  at 2/22/2024 1540    Comment: Educated pt. safety/technique w/bed mobility, transfers, ambulation, HEP, PT POC                                         User Key       Initials Effective Dates Name Provider Type [facility] 06/01/21 -  Desiree Merritt, PT Physical Therapist PT      09/21/23 -  Rita Dobson PT Physical Therapist PT                  PT Recommendation and Plan     Plan of Care Reviewed With: patient  Progress: no change  Outcome Evaluation: Pt continues to present below baseline function d/t acute pain and deficits in L LE strength, balance, and activity tolerance. Pt required modA x2 for STS from chair and progressed to José Luis x2 for STS from commode. He ambulated 15+15 ft w/ FWW, José Luis x2. No LOB or knee buckling noted. Pt would continue to benefit from skilled IP PT. Continue to recommend IRF at d/c for best functional outcome.     Time Calculation:         PT Charges       Row Name 02/23/24 1009             Time Calculation    Start Time 0848  -      PT Received On 02/23/24  -      PT Goal Re-Cert Due Date 03/03/24  -         Timed Charges    92333 - PT Therapeutic Exercise Minutes 10  -      52581 - Gait Training Minutes  10  -      66399 - PT Therapeutic Activity Minutes 6  -         Total Minutes    Timed Charges Total Minutes 26  -       Total Minutes 26  -                User Key  (r) = Recorded By, (t) = Taken By, (c) = Cosigned By      Initials Name Provider Type     Rita Dobson, PT Physical Therapist                  Therapy Charges for Today       Code Description Service Date Service Provider Modifiers Qty    83682465627 HC PT THER PROC EA 15 MIN 2/23/2024 Rita Dobson, PT GP 1    11743943962 HC GAIT TRAINING EA 15 MIN 2/23/2024 Rita Dobson, PT GP 1            PT G-Codes  Outcome Measure Options: AM-PAC 6 Clicks Basic Mobility (PT), PADD  AM-PAC 6 Clicks Score (PT): 13  PT Discharge Summary  Anticipated Discharge Disposition (PT): inpatient rehabilitation facility    Rita Dobson PT  2/23/2024

## 2024-02-23 NOTE — PLAN OF CARE
Patient alert and oriented, voiding every couple hrs around 200ml each time. Patient states he is voiding more easily today, he said it is more his normal than yesterday. Bladder scanned this evening 310 in bladder, then voided 150ml. Dr. Matthews changed dressing to left hip, optiofoam c/d/I. Prn oxycodone given q4hrs as needed for pain.

## 2024-02-23 NOTE — THERAPY TREATMENT NOTE
Patient Name: Sebastian Liao  : 1960    MRN: [id number]                              Today's Date: 2024       Admit Date: 2024    Visit Dx:     ICD-10-CM ICD-9-CM   1. Primary osteoarthritis of left hip  M16.12 715.15     Patient Active Problem List   Diagnosis    Testicular hypofunction    Essential hypertension, benign    Type 2 diabetes mellitus with hyperglycemia, without long-term current use of insulin    Hyperlipidemia    Obesity, Class III, BMI 40-49.9 (morbid obesity)    Dyspnea on exertion    History of COVID-19 pneumonia (10/2020)    Former smoker (None x 35 years)    GERD    RONIT (obstructive sleep apnea) - severe    DDD (degenerative disc disease), cervical    Seasonal allergic rhinitis    Screen for colon cancer    Carpal tunnel syndrome of right wrist    Degenerative arthritis of hip    BPH (benign prostatic hyperplasia)    Status post total replacement of left hip     Past Medical History:   Diagnosis Date    Acute bronchitis     Arthritis     Asthma     Cervicalgia     COVID-19     CPAP (continuous positive airway pressure) dependence     Diabetes mellitus     Edema     GERD (gastroesophageal reflux disease)     HTN (hypertension)     Hyperlipidemia     IBS (irritable bowel syndrome)     IBS (irritable bowel syndrome)     Low testosterone     Prostate disease     Sleep apnea     Wears glasses      Past Surgical History:   Procedure Laterality Date    CARPAL TUNNEL RELEASE Right 2022    Procedure: CARPAL TUNNEL RELEASE RIGHT;  Surgeon: Nj Bliss MD;  Location: [facility];  Service: Neurosurgery;  Laterality: Right;    CERVICAL FUSION      Dr. Nickerson, . [place], [place]    COLONOSCOPY  2012    repeat in ten yrs    HIP SURGERY Right 2016    Dr. Schwartz    NECK SURGERY      cervical spine fusion    TONSILLECTOMY      TOTAL HIP ARTHROPLASTY Left 2024    Procedure: MODIFIED ANTERIOR TOTAL HIP ARTHROPLASTY - LEFT;  Surgeon: Zane Matthews MD;  Location:   [facility];  Service: Orthopedics;  Laterality: Left;      General Information       Row Name 02/23/24 1445 02/23/24 0958       Physical Therapy Time and Intention    Document Type therapy note (daily note)  - therapy note (daily note)  -    Mode of Treatment physical therapy  - physical therapy  -      Row Name 02/23/24 1445 02/23/24 0958       General Information    Patient Profile Reviewed yes  - yes  -    Existing Precautions/Restrictions fall;left;hip, anterior  -LH fall;left;hip, anterior  -LH    Barriers to Rehab medically complex  - medically complex  -      Row Name 02/23/24 1445 02/23/24 0958       Cognition    Orientation Status (Cognition) oriented x 4  - oriented x 4  -      Row Name 02/23/24 1445 02/23/24 0958       Safety Issues, Functional Mobility    Safety Issues Affecting Function (Mobility) awareness of need for assistance;insight into deficits/self-awareness;safety precaution awareness;safety precautions follow-through/compliance;sequencing abilities;positioning of assistive device;judgment  - awareness of need for assistance;insight into deficits/self-awareness;safety precaution awareness;safety precautions follow-through/compliance;sequencing abilities;judgment;positioning of assistive device;problem-solving  -    Impairments Affecting Function (Mobility) balance;endurance/activity tolerance;pain;strength;range of motion (ROM);postural/trunk control  - balance;endurance/activity tolerance;pain;postural/trunk control;range of motion (ROM);strength  -              User Key  (r) = Recorded By, (t) = Taken By, (c) = Cosigned By      Initials Name Provider Type     Rita Dobson PT Physical Therapist                   Mobility       Row Name 02/23/24 1446 02/23/24 0958       Bed Mobility    Comment, (Bed Mobility) UIC at beginning and end of session  - Pt received and left UIC  -      Row Name 02/23/24 1446 02/23/24 0958       Transfers    Comment, (Transfers) VCs  for hand placement and sequencing w/ FWW. Cues to step out L LE  - VCs for hand placement and sequencing. Cues to step out L LE prior to transfers  -      Row Name 02/23/24 1446 02/23/24 0958       Sit-Stand Transfer    Sit-Stand [place] (Transfers) minimum assist (75% patient effort);1 person assist;verbal cues  - moderate assist (50% patient effort);2 person assist;verbal cues  -    Assistive Device (Sit-Stand Transfers) walker, front-wheeled  - walker, front-wheeled  -    Comment, (Sit-Stand Transfer) STS x1 from chair. Cues to push up from chair  - STS from chair required modA x2, and STS from commode required José Luis x2. Cues to push up from chair/grab bars and for eccentric lowering  -      Row Name 02/23/24 1446 02/23/24 0958       Gait/Stairs (Locomotion)    [place] Level (Gait) minimum assist (75% patient effort);1 person assist;verbal cues;1 person to manage equipment  - minimum assist (75% patient effort);2 person assist;verbal cues  -    Assistive Device (Gait) walker, front-wheeled  - walker, front-wheeled  -    Patient was able to Ambulate yes  -LH yes  -    Distance in Feet (Gait) 60  -LH 15+15  -    Deviations/Abnormal Patterns (Gait) bilateral deviations;base of support, wide;raphael decreased;gait speed decreased;stride length decreased;weight shifting decreased  - bilateral deviations;base of support, wide;raphael decreased;gait speed decreased;stride length decreased;weight shifting decreased  -    Bilateral Gait Deviations forward flexed posture;heel strike decreased  - forward flexed posture;heel strike decreased  -    [place] Level (Stairs) not tested  -LH not tested  -    Comment, (Gait/Stairs) Pt ambulated further distance in hallway w/ close chair follow in afternoon session w/ improved gait mechanics. Pt demo step to, progressing to step through gait pattern w/ decreased speed. VCs for sequencing, upright posture, increased step length,  walker positioning, and continuous forward progression of walker. No LOB or knee buckling noted. Distance limited by fatigue  - Pt demo step through gait pattern w/ decreased speed. VCs for sequencing, walker positioning, continuous forward progression of walker, and increased hip and knee flexion d/t decreased foot clearance. No LOB or knee buckling noted. Distance limited by weakness and fatigue  -      Row Name 02/23/24 1446 02/23/24 0958       Mobility    Extremity Weight-bearing Status left lower extremity  - left lower extremity  -    Left Lower Extremity (Weight-bearing Status) weight-bearing as tolerated (WBAT)  - weight-bearing as tolerated (WBAT)  -              User Key  (r) = Recorded By, (t) = Taken By, (c) = Cosigned By      Initials Name Provider Type     Rita Dobson PT Physical Therapist                   Obj/Interventions       Row Name 02/23/24 1449 02/23/24 1003       Motor Skills    Therapeutic Exercise hip;knee;ankle  - hip;knee;ankle  -      Row Name 02/23/24 1449 02/23/24 1003       Hip (Therapeutic Exercise)    Hip (Therapeutic Exercise) isometric exercises;strengthening exercise  - isometric exercises;strengthening exercise  -    Hip Isometrics (Therapeutic Exercise) bilateral;gluteal sets;10 repetitions  - bilateral;gluteal sets;10 repetitions  -    Hip Strengthening (Therapeutic Exercise) left;aBduction;heel slides;10 repetitions  - left;aBduction;heel slides;10 repetitions  -      Row Name 02/23/24 1449 02/23/24 1003       Knee (Therapeutic Exercise)    Knee (Therapeutic Exercise) isometric exercises;strengthening exercise  - isometric exercises;strengthening exercise  -    Knee Isometrics (Therapeutic Exercise) left;quad sets;10 repetitions  - left;quad sets;10 repetitions  -    Knee Strengthening (Therapeutic Exercise) left;SLR (straight leg raise);LAQ (long arc quad);10 repetitions  José Luis w/ SLR  - left;SLR (straight leg raise);LAQ (long arc  quad);10 repetitions  José Luis w/ SLR  -      Row Name 02/23/24 1449 02/23/24 1003       Ankle (Therapeutic Exercise)    Ankle (Therapeutic Exercise) AROM (active range of motion)  - AROM (active range of motion)  -    Ankle AROM (Therapeutic Exercise) bilateral;dorsiflexion;plantarflexion;10 repetitions  - bilateral;dorsiflexion;plantarflexion;10 repetitions  -      Row Name 02/23/24 1449 02/23/24 1003       Balance    Balance Assessment sitting static balance;sitting dynamic balance;sit to stand dynamic balance;standing static balance;standing dynamic balance  - sitting static balance;sitting dynamic balance;sit to stand dynamic balance;standing static balance;standing dynamic balance  -    Static Sitting Balance standby assist  - standby assist  -    Dynamic Sitting Balance contact guard  - contact guard  -    Position, Sitting Balance unsupported;sitting in chair  - unsupported;sitting edge of bed  -    Sit to Stand Dynamic Balance -- moderate assist;minimal assist;2-person assist;verbal cues  -    Static Standing Balance minimal assist;1-person assist;verbal cues  - minimal assist;1-person assist;verbal cues  -    Dynamic Standing Balance minimal assist;1-person assist;verbal cues;1 person to manage equipment  - minimal assist;2-person assist;verbal cues  -    Position/Device Used, Standing Balance supported;walker, front-wheeled  - --    Balance Interventions sitting;standing;sit to stand;supported;static;dynamic  - sitting;standing;sit to stand;supported;static;dynamic  -    Comment, Balance -- No overt LOB noted  -              User Key  (r) = Recorded By, (t) = Taken By, (c) = Cosigned By      Initials Name Provider Type     Rita Dobson PT Physical Therapist                   Goals/Plan    No documentation.                  Clinical Impression       [facility] Name 02/23/24 1451 02/23/24 1005       Pain    Pretreatment Pain Rating 8/10  - 10/10  -    Posttreatment  Pain Rating 8/10  -LH 10/10  -    Pain Location - Side/Orientation Left  -LH Left  -LH    Pain Location incisional  -LH incisional  -LH    Pain Location - hip  -LH hip  -LH    Pain Intervention(s) Repositioned;Ambulation/increased activity;Elevated;Cold applied;Cold pack  - Cold applied;Repositioned;Ambulation/increased activity;Elevated;Cold pack  -      Row Name 02/23/24 1451 02/23/24 1005       Plan of Care Review    Plan of Care Reviewed With patient;spouse;son  - patient  -    Progress improving  - no change  -    Outcome Evaluation Pt demonstrating improvements in afternoon session by ambulating 60 ft w/ FWW, José Luis x1 + close chair follow. Pt continues to present below baseline function d/t acute pain and deficits in L LE strength, balance, and activity tolerance. Pt would continue to benefit from skilled IP PT. Continue to recommend IRF at d/c pending progress w/ mobility and stair training.  - Pt continues to present below baseline function d/t acute pain and deficits in L LE strength, balance, and activity tolerance. Pt required modA x2 for STS from chair and progressed to José Luis x2 for STS from commode. He ambulated 15+15 ft w/ FWW, José Luis x2. No LOB or knee buckling noted. Pt would continue to benefit from skilled IP PT. Continue to recommend IRF at d/c for best functional outcome.  -      Row Name 02/23/24 1451 02/23/24 1005       Therapy Assessment/Plan (PT)    Rehab Potential (PT) good, to achieve stated therapy goals  - good, to achieve stated therapy goals  -    Criteria for Skilled Interventions Met (PT) yes;meets criteria;skilled treatment is necessary  - yes;meets criteria;skilled treatment is necessary  -    Therapy Frequency (PT) 2 times/day  - 2 times/day  -      Row Name 02/23/24 1451 02/23/24 1005       Vital Signs    Pre Systolic BP Rehab 141  -  -LH    Pre Treatment Diastolic BP 71  -LH 71  -LH    Post Systolic BP Rehab 136  -LH --    Post Treatment Diastolic  BP 75  -LH --    Pretreatment Heart Rate (beats/min) 115  -  -LH    Posttreatment Heart Rate (beats/min) 113  -  -LH    Pre SpO2 (%) 94  -LH 95  -LH    O2 Delivery Pre Treatment room air  -LH room air  -LH    O2 Delivery Intra Treatment room air  -LH room air  -LH    Post SpO2 (%) 94  -LH 95  -LH    O2 Delivery Post Treatment room air  -LH room air  -LH    Pre Patient Position Sitting  -LH Sitting  -LH    Intra Patient Position Standing  -LH Standing  -LH    Post Patient Position Sitting  -LH Sitting  -LH      Row Name 02/23/24 1451 02/23/24 1005       Positioning and Restraints    Pre-Treatment Position sitting in chair/recliner  -LH sitting in chair/recliner  -LH    Post Treatment Position chair  -LH chair  -LH    In Chair reclined;sitting;call light within reach;exit alarm on;encouraged to call for assist;with family/caregiver;compression device;waffle cushion;legs elevated;notified nsg  cold pack applied  - reclined;sitting;call light within reach;encouraged to call for assist;exit alarm on;waffle cushion;compression device;legs elevated;notified nsg  cold pack applied  -              User Key  (r) = Recorded By, (t) = Taken By, (c) = Cosigned By      Initials Name Provider Type     Rita Dobson, PT Physical Therapist                   Outcome Measures       Row Name 02/23/24 1454 02/23/24 1008       How much help from another person do you currently need...    Turning from your back to your side while in flat bed without using bedrails? 2  - 2  -LH    Moving from lying on back to sitting on the side of a flat bed without bedrails? 2  - 2  -LH    Moving to and from a bed to a chair (including a wheelchair)? 3  - 2  -LH    Standing up from a chair using your arms (e.g., wheelchair, bedside chair)? 3  - 2  -LH    Climbing 3-5 steps with a railing? 2  - 2  -LH    To walk in hospital room? 3  - 3  -LH    AM-PAC 6 Clicks Score (PT) 15  -LH 13  -LH    Highest Level of Mobility Goal 4  --> Transfer to chair/commode  - 4 --> Transfer to chair/commode  -      Row Name 02/23/24 1454 02/23/24 1125       Functional Assessment    Outcome Measure Options AM-PAC 6 Clicks Basic Mobility (PT)  - AM-PAC 6 Clicks Daily Activity (OT)  -      Row Name 02/23/24 1008          Functional Assessment    Outcome Measure Options AM-PAC 6 Clicks Basic Mobility (PT);PADD  -               User Key  (r) = Recorded By, (t) = Taken By, (c) = Cosigned By      Initials Name Provider Type    TB Cristina Main, OT Occupational Therapist     Rita Dobson, PT Physical Therapist                                 Physical Therapy Education       Title: PT OT SLP Therapies (In Progress)       Topic: Physical Therapy (Done)       Point: Mobility training (Done)       Learning Progress Summary             Patient Acceptance, E, VU,DU,NR by  at 2/23/2024 1454    Acceptance, E, VU,DU,NR by  at 2/23/2024 1009    Eager, E,H, VU,DU,NR by  at 2/22/2024 1540    Comment: Educated pt. safety/technique w/bed mobility, transfers, ambulation, HEP, PT POC   Family Acceptance, E, VU,DU,NR by  at 2/23/2024 1454                         Point: Home exercise program (Done)       Learning Progress Summary             Patient Acceptance, E, VU,DU,NR by  at 2/23/2024 1454    Acceptance, E, VU,DU,NR by  at 2/23/2024 1009    Eager, E,H, VU,DU,NR by  at 2/22/2024 1540    Comment: Educated pt. safety/technique w/bed mobility, transfers, ambulation, HEP, PT POC   Family Acceptance, E, VU,DU,NR by  at 2/23/2024 1454                         Point: Body mechanics (Done)       Learning Progress Summary             Patient Acceptance, E, VU,DU,NR by  at 2/23/2024 1454    Acceptance, E, VU,DU,NR by  at 2/23/2024 1009    Eager, E,H, VU,DU,NR by  at 2/22/2024 1540    Comment: Educated pt. safety/technique w/bed mobility, transfers, ambulation, HEP, PT POC   Family Acceptance, E, VU,DU,NR by  at 2/23/2024 7221                          Point: Precautions (Done)       Learning Progress Summary             Patient Acceptance, E, VU,DU,NR by  at 2/23/2024 1454    Acceptance, E, VU,DU,NR by  at 2/23/2024 1009    Eager, E,H, VU,DU,NR by  at 2/22/2024 1540    Comment: Educated pt. safety/technique w/bed mobility, transfers, ambulation, HEP, PT POC   Family Acceptance, E, VU,DU,NR by  at 2/23/2024 1454                                         User Key       Initials Effective Dates Name Provider Type Discipline     06/01/21 -  Desiree Merritt, PT Physical Therapist PT     09/21/23 -  Rita Dobson, PT Physical Therapist PT                  PT Recommendation and Plan     Plan of Care Reviewed With: patient, spouse, son  Progress: improving  Outcome Evaluation: Pt demonstrating improvements in afternoon session by ambulating 60 ft w/ FWW, [place] x1 + close chair follow. Pt continues to present below baseline function d/t acute pain and deficits in L LE strength, balance, and activity tolerance. Pt would continue to benefit from skilled IP PT. Continue to recommend IRF at d/c pending progress w/ mobility and stair training.     Time Calculation:         PT Charges       Row Name 02/23/24 1454 02/23/24 1009          Time Calculation    Start Time 1322  - 0848  -     PT Received On 02/23/24  - 02/23/24  -     PT Goal Re-Cert Due Date 03/03/24  - 03/03/24  -        Timed Charges    67601 - PT Therapeutic Exercise Minutes 15  - 10  -     68334 - Gait Training Minutes  15  - 10  -     22994 - PT Therapeutic Activity Minutes 8  - 6  -        Total Minutes    Timed Charges Total Minutes 38  - 26  -      Total Minutes 38  - 26  -               User Key  (r) = Recorded By, (t) = Taken By, (c) = Cosigned By      Initials Name Provider Type     Rita Dobson, PT Physical Therapist                  Therapy Charges for Today       Code Description Service Date Service Provider Modifiers Qty    14024638194  PT  THER PROC EA 15 MIN 2/23/2024 Rita Dobson, PT GP 1    36351108424 HC GAIT TRAINING EA 15 MIN 2/23/2024 Rita Dobson, PT GP 1    02985402461 HC PT THER PROC EA 15 MIN 2/23/2024 Rita Dobson, PT GP 1    64893292489 HC GAIT TRAINING EA 15 MIN 2/23/2024 Rita Dobson, PT GP 1    91133675001 HC PT THERAPEUTIC ACT EA 15 MIN 2/23/2024 Rita Dobson, PT GP 1            PT G-Codes  Outcome Measure Options: AM-PAC 6 Clicks Basic Mobility (PT)  AM-PAC 6 Clicks Score (PT): 15  AM-PAC 6 Clicks Score (OT): 16  PT Discharge Summary  Anticipated Discharge Disposition (PT): inpatient rehabilitation facility    Rita Dobson, PT  2/23/2024

## 2024-02-23 NOTE — PROGRESS NOTES
"      [facility] [place] [facility] Progress Note    Subjective      LOS: 0 days   Patient Care Team:  Fransico Ngo PA as PCP - General (Family Medicine)  Dontae Manzo MD as Emergency Attending (Pulmonary Disease)    CC: Left hip pain    Interval History:   Patient sitting comfortably in a chair.  Slow progress with therapy so far.    Objective      Vital Signs  Temp (24hrs), Av.3 °F (36.8 °C), Min:97.6 °F (36.4 °C), Max:99.5 °F (37.5 °C)      /77 (BP Location: Left arm, Patient Position: Sitting)   Pulse (!) 125   Temp 99 °F (37.2 °C) (Oral)   Resp 18   Ht 162.6 cm (64\")   Wt 108 kg (238 lb)   SpO2 95%   BMI 40.85 kg/m²     Examination:   Examination of the left hip: The wound is clean, dry, and intact.  Knee flexion, knee extension, ankle dorsiflexion, ankle plantar flexion, and EHL are intact.  Sensation intact in the foot to light touch.   Thigh is soft and nontender.      Labs:  Results from last 7 days   Lab Units 24  0414   WBC 10*3/mm3 16.01*   HEMOGLOBIN g/dL 13.8   HEMATOCRIT % 42.2   MCV fL 90.0   PLATELETS 10*3/mm3 286       Radiology:  Imaging Results (Last 24 Hours)       ** No results found for the last 24 hours. **            PT:  Physical Therapy - Plan of Care Review - Outcome Summary:  Outcome Evaluation: Pt continues to present below baseline function d/t acute pain and deficits in L LE strength, balance, and activity tolerance. Pt required modA x2 for STS from chair and progressed to [place] x2 for STS from commode. He ambulated 15+15 ft w/ FWW, José Luis x2. No LOB or knee buckling noted. Pt would continue to benefit from skilled IP PT. Continue to recommend IRF at d/c for best functional outcome. (24 1005)]       Results Review:     I reviewed the patient's new clinical results.    Assessment and Plan     Assessment:   1 Day Post-Op status post left total hip arthroplasty    Slow progress with therapy so far.  Still determining best rehabilitation plan " based on his progress.  Encouraged patient to make additional efforts this afternoon in hopes of discharge to home if stable.      Status post total replacement of left hip    Essential hypertension, benign    Type 2 diabetes mellitus with hyperglycemia, without long-term current use of insulin    Hyperlipidemia    Obesity, Class III, BMI 40-49.9 (morbid obesity)    GERD    RONIT (obstructive sleep apnea) - severe    Degenerative arthritis of hip    BPH (benign prostatic hyperplasia)      Plan for disposition: Plan for discharge home or to rehab facility depending on his progress with therapy.  Follow-up in 3 weeks as planned.      Future Appointments   Date Time Provider Department Center   2/26/2024  8:00 AM Dee Zhang PT [facility]   3/18/2024 10:50 AM Torri Escalona PA-C [facility]   4/22/2024  9:00 AM Fransico Ngo PA [facility]           Zane Matthews MD  02/23/24  13:11 EST

## 2024-02-24 LAB
DEPRECATED RDW RBC AUTO: 44.2 FL (ref 37–54)
ERYTHROCYTE [DISTWIDTH] IN BLOOD BY AUTOMATED COUNT: 13.4 % (ref 12.3–15.4)
GLUCOSE BLDC GLUCOMTR-MCNC: 103 MG/DL (ref 70–130)
GLUCOSE BLDC GLUCOMTR-MCNC: 123 MG/DL (ref 70–130)
GLUCOSE BLDC GLUCOMTR-MCNC: 127 MG/DL (ref 70–130)
GLUCOSE BLDC GLUCOMTR-MCNC: 167 MG/DL (ref 70–130)
HCT VFR BLD AUTO: 37.5 % (ref 37.5–51)
HGB BLD-MCNC: 12.2 G/DL (ref 13–17.7)
MCH RBC QN AUTO: 29.3 PG (ref 26.6–33)
MCHC RBC AUTO-ENTMCNC: 32.5 G/DL (ref 31.5–35.7)
MCV RBC AUTO: 89.9 FL (ref 79–97)
PLATELET # BLD AUTO: 285 10*3/MM3 (ref 140–450)
PMV BLD AUTO: 10.4 FL (ref 6–12)
RBC # BLD AUTO: 4.17 10*6/MM3 (ref 4.14–5.8)
WBC NRBC COR # BLD AUTO: 15.11 10*3/MM3 (ref 3.4–10.8)

## 2024-02-24 PROCEDURE — 94660 CPAP INITIATION&MGMT: CPT

## 2024-02-24 PROCEDURE — 25010000002 HYDROMORPHONE PER 4 MG: Performed by: ORTHOPAEDIC SURGERY

## 2024-02-24 PROCEDURE — 94799 UNLISTED PULMONARY SVC/PX: CPT

## 2024-02-24 PROCEDURE — 63710000001 INSULIN LISPRO (HUMAN) PER 5 UNITS: Performed by: INTERNAL MEDICINE

## 2024-02-24 PROCEDURE — 85027 COMPLETE CBC AUTOMATED: CPT | Performed by: ORTHOPAEDIC SURGERY

## 2024-02-24 PROCEDURE — 97110 THERAPEUTIC EXERCISES: CPT

## 2024-02-24 PROCEDURE — 82948 REAGENT STRIP/BLOOD GLUCOSE: CPT

## 2024-02-24 PROCEDURE — 97116 GAIT TRAINING THERAPY: CPT

## 2024-02-24 PROCEDURE — 63710000001 INSULIN DETEMIR PER 5 UNITS: Performed by: INTERNAL MEDICINE

## 2024-02-24 RX ORDER — SIMETHICONE 80 MG
80 TABLET,CHEWABLE ORAL 4 TIMES DAILY PRN
Status: DISCONTINUED | OUTPATIENT
Start: 2024-02-24 | End: 2024-02-25 | Stop reason: HOSPADM

## 2024-02-24 RX ORDER — CALCIUM CARBONATE 500 MG/1
2 TABLET, CHEWABLE ORAL 3 TIMES DAILY PRN
Status: DISCONTINUED | OUTPATIENT
Start: 2024-02-24 | End: 2024-02-25 | Stop reason: HOSPADM

## 2024-02-24 RX ADMIN — DICYCLOMINE HYDROCHLORIDE 20 MG: 20 TABLET ORAL at 05:42

## 2024-02-24 RX ADMIN — KETOTIFEN FUMARATE 1 DROP: 0.35 SOLUTION/ DROPS OPHTHALMIC at 20:12

## 2024-02-24 RX ADMIN — DICYCLOMINE HYDROCHLORIDE 20 MG: 20 TABLET ORAL at 23:01

## 2024-02-24 RX ADMIN — Medication 10 ML: at 20:12

## 2024-02-24 RX ADMIN — ASPIRIN 81 MG: 81 TABLET, COATED ORAL at 09:45

## 2024-02-24 RX ADMIN — CALCIUM CARBONATE (ANTACID) CHEW TAB 500 MG 2 TABLET: 500 CHEW TAB at 17:03

## 2024-02-24 RX ADMIN — OXYCODONE HYDROCHLORIDE 5 MG: 5 TABLET ORAL at 02:14

## 2024-02-24 RX ADMIN — DICYCLOMINE HYDROCHLORIDE 20 MG: 20 TABLET ORAL at 17:03

## 2024-02-24 RX ADMIN — CYCLOBENZAPRINE 10 MG: 10 TABLET, FILM COATED ORAL at 09:38

## 2024-02-24 RX ADMIN — KETOTIFEN FUMARATE 1 DROP: 0.35 SOLUTION/ DROPS OPHTHALMIC at 09:54

## 2024-02-24 RX ADMIN — HYDROMORPHONE HYDROCHLORIDE 0.5 MG: 1 INJECTION, SOLUTION INTRAMUSCULAR; INTRAVENOUS; SUBCUTANEOUS at 05:43

## 2024-02-24 RX ADMIN — SIMETHICONE 80 MG: 80 TABLET, CHEWABLE ORAL at 10:42

## 2024-02-24 RX ADMIN — OXYCODONE HYDROCHLORIDE 5 MG: 5 TABLET ORAL at 12:07

## 2024-02-24 RX ADMIN — OXYCODONE HYDROCHLORIDE 5 MG: 5 TABLET ORAL at 17:03

## 2024-02-24 RX ADMIN — Medication 10 ML: at 10:02

## 2024-02-24 RX ADMIN — AMLODIPINE BESYLATE 10 MG: 10 TABLET ORAL at 09:45

## 2024-02-24 RX ADMIN — MELOXICAM 15 MG: 15 TABLET ORAL at 09:45

## 2024-02-24 RX ADMIN — BUDESONIDE AND FORMOTEROL FUMARATE DIHYDRATE 1 PUFF: 160; 4.5 AEROSOL RESPIRATORY (INHALATION) at 09:24

## 2024-02-24 RX ADMIN — TAMSULOSIN HYDROCHLORIDE 0.4 MG: 0.4 CAPSULE ORAL at 09:45

## 2024-02-24 RX ADMIN — PANTOPRAZOLE SODIUM 40 MG: 40 TABLET, DELAYED RELEASE ORAL at 05:42

## 2024-02-24 RX ADMIN — INSULIN DETEMIR 15 UNITS: 100 INJECTION, SOLUTION SUBCUTANEOUS at 20:11

## 2024-02-24 RX ADMIN — OXYCODONE HYDROCHLORIDE 5 MG: 5 TABLET ORAL at 22:54

## 2024-02-24 RX ADMIN — BUDESONIDE AND FORMOTEROL FUMARATE DIHYDRATE 1 PUFF: 160; 4.5 AEROSOL RESPIRATORY (INHALATION) at 19:53

## 2024-02-24 RX ADMIN — INSULIN LISPRO 2 UNITS: 100 INJECTION, SOLUTION INTRAVENOUS; SUBCUTANEOUS at 20:11

## 2024-02-24 RX ADMIN — DICYCLOMINE HYDROCHLORIDE 20 MG: 20 TABLET ORAL at 12:07

## 2024-02-24 RX ADMIN — ASPIRIN 81 MG: 81 TABLET, COATED ORAL at 20:11

## 2024-02-24 RX ADMIN — SIMETHICONE 80 MG: 80 TABLET, CHEWABLE ORAL at 22:55

## 2024-02-24 RX ADMIN — ROSUVASTATIN 10 MG: 10 TABLET, FILM COATED ORAL at 09:44

## 2024-02-24 NOTE — THERAPY TREATMENT NOTE
Patient Name: Sebastian Liao  : 1960    MRN: [id number]                              Today's Date: 2024       Admit Date: 2024    Visit Dx:     ICD-10-CM ICD-9-CM   1. Primary osteoarthritis of left hip  M16.12 715.15     Patient Active Problem List   Diagnosis    Testicular hypofunction    Essential hypertension, benign    Type 2 diabetes mellitus with hyperglycemia, without long-term current use of insulin    Hyperlipidemia    Obesity, Class III, BMI 40-49.9 (morbid obesity)    Dyspnea on exertion    History of COVID-19 pneumonia (10/2020)    Former smoker (None x 35 years)    GERD    RONIT (obstructive sleep apnea) - severe    DDD (degenerative disc disease), cervical    Seasonal allergic rhinitis    Screen for colon cancer    Carpal tunnel syndrome of right wrist    Degenerative arthritis of hip    BPH (benign prostatic hyperplasia)    Status post total replacement of left hip     Past Medical History:   Diagnosis Date    Acute bronchitis     Arthritis     Asthma     Cervicalgia     COVID-19     CPAP (continuous positive airway pressure) dependence     Diabetes mellitus     Edema     GERD (gastroesophageal reflux disease)     HTN (hypertension)     Hyperlipidemia     IBS (irritable bowel syndrome)     IBS (irritable bowel syndrome)     Low testosterone     Prostate disease     Sleep apnea     Wears glasses      Past Surgical History:   Procedure Laterality Date    CARPAL TUNNEL RELEASE Right 2022    Procedure: CARPAL TUNNEL RELEASE RIGHT;  Surgeon: Nj Bliss MD;  Location: [facility];  Service: Neurosurgery;  Laterality: Right;    CERVICAL FUSION      Dr. Nickerson, . [place], [place]    COLONOSCOPY  2012    repeat in ten yrs    HIP SURGERY Right 2016    Dr. Schwartz    NECK SURGERY      cervical spine fusion    TONSILLECTOMY      TOTAL HIP ARTHROPLASTY Left 2024    Procedure: MODIFIED ANTERIOR TOTAL HIP ARTHROPLASTY - LEFT;  Surgeon: Zane Matthews MD;  Location:   [facility];  Service: Orthopedics;  Laterality: Left;      General Information       [facility] Name 02/24/24 0919          Physical Therapy Time and Intention    Document Type therapy note (daily note)  -SC     Mode of Treatment physical therapy  -SC       Row Name 02/24/24 0919          General Information    Patient Profile Reviewed yes  -SC       Row Name 02/24/24 0919          Safety Issues, Functional Mobility    Impairments Affecting Function (Mobility) balance;endurance/activity tolerance;pain;strength;range of motion (ROM);postural/trunk control  -SC     Comment, Safety Issues/Impairments (Mobility) alert, following commands  -SC               User Key  (r) = Recorded By, (t) = Taken By, (c) = Cosigned By      Initials Name Provider Type    SC Yolanda Crowell PT Physical Therapist                   Mobility       Row Name 02/24/24 0920          Transfers    Comment, (Transfers) STS from recliner with cues for hand placement. Demonstrated slow careful transfers  -[facility] Name 02/24/24 0920          Sit-Stand Transfer    Sit-Stand [place] (Transfers) 1 person assist;contact guard;verbal cues  -SC     Assistive Device (Sit-Stand Transfers) walker, front-wheeled  -[facility] Name 02/24/24 0920          Gait/Stairs (Locomotion)    [place] Level (Gait) 1 person assist;contact guard  -SC     Assistive Device (Gait) walker, front-wheeled  -SC     Distance in Feet (Gait) 120  -SC     Deviations/Abnormal Patterns (Gait) left sided deviations;antalgic;weight shifting decreased;stride length decreased  -SC     Bilateral Gait Deviations forward flexed posture;heel strike decreased  -SC     Handrail Location (Stairs) both sides  -SC     Number of Steps (Stairs) 3  -SC     Comment, (Gait/Stairs) Gt training focused on achieving step through gait pattern. Encouraged full wt shifting over L leg. On stairs, patient given cues for sequencing. Demonstrated good technique  -[facility] Name 02/24/24 0920           Mobility    Extremity Weight-bearing Status left lower extremity  -SC     Left Lower Extremity (Weight-bearing Status) weight-bearing as tolerated (WBAT)  -SC               User Key  (r) = Recorded By, (t) = Taken By, (c) = Cosigned By      Initials Name Provider Type    SC Yolanda Crowell PT Physical Therapist                   Obj/Interventions       Row Name 02/24/24 0923          Motor Skills    Therapeutic Exercise knee;ankle  -SC       Row Name 02/24/24 0923          Knee (Therapeutic Exercise)    Knee (Therapeutic Exercise) AROM (active range of motion)  -SC     Knee Isometrics (Therapeutic Exercise) gluteal sets;bilateral;quad sets;10 repetitions  -SC     Knee Strengthening (Therapeutic Exercise) left;extension;flexion;LAQ (long arc quad);sitting;10 repetitions  -SC       Row Name 02/24/24 0923          Ankle (Therapeutic Exercise)    Ankle (Therapeutic Exercise) AROM (active range of motion)  -SC     Ankle AROM (Therapeutic Exercise) bilateral;dorsiflexion;plantarflexion  -SC       Row Name 02/24/24 0923          Balance    Balance Assessment standing dynamic balance  -SC     Dynamic Standing Balance 1-person assist;contact guard  -SC     Position/Device Used, Standing Balance walker, front-wheeled  -SC     Comment, Balance slow gait , no LOB  -SC               User Key  (r) = Recorded By, (t) = Taken By, (c) = Cosigned By      Initials Name Provider Type    SC Yolanda Crowell PT Physical Therapist                   Goals/Plan    No documentation.                  Clinical Impression       Row Name 02/24/24 0924          Pain    Pain Intervention(s) Repositioned  -SC     Additional Documentation Pain Scale: FACES Pre/Post-Treatment (Group)  -SC       Row Name 02/24/24 0924          Pain Scale: FACES Pre/Post-Treatment    Pain: FACES Scale, Pretreatment 4-->hurts little more  -SC     Posttreatment Pain Rating 6-->hurts even more  -SC     Pain Location - Side/Orientation Left  -SC     Pain Location - hip   -[facility] Name 02/24/24 0924          Plan of Care Review    Plan of Care Reviewed With patient  -SC     Progress improving  -SC     Outcome Evaluation Patient able to increase his ambulation distance today and attempt stairs. Good progress  -SC       Row Name 02/24/24 0924          Therapy Assessment/Plan (PT)    Patient/Family Therapy Goals Statement (PT) go home  -SC     Rehab Potential (PT) good, to achieve stated therapy goals  -SC     Criteria for Skilled Interventions Met (PT) yes;meets criteria;skilled treatment is necessary  -SC     Therapy Frequency (PT) 2 times/day  -[facility] Name 02/24/24 0924          Positioning and Restraints    Pre-Treatment Position sitting in chair/recliner  -SC     Post Treatment Position chair  -SC     In Chair notified nsg;reclined;sitting;call light within reach;encouraged to call for assist;exit alarm on  -SC               User Key  (r) = Recorded By, (t) = Taken By, (c) = Cosigned By      Initials Name Provider Type    SC Yolanda Crowell PT Physical Therapist                   Outcome Measures       [facility] Name 02/24/24 0925          How much help from another person do you currently need...    Turning from your back to your side while in flat bed without using bedrails? 3  -SC     Moving from lying on back to sitting on the side of a flat bed without bedrails? 2  -SC     Moving to and from a bed to a chair (including a wheelchair)? 3  -SC     Standing up from a chair using your arms (e.g., wheelchair, bedside chair)? 3  -SC     Climbing 3-5 steps with a railing? 3  -SC     To walk in hospital room? 3  -SC     AM-PAC 6 Clicks Score (PT) 17  -SC     Highest Level of Mobility Goal 5 --> Static standing  -SC       Row Name 02/24/24 0925          Functional Assessment    Outcome Measure Options AM-PAC 6 Clicks Basic Mobility (PT)  -SC               User Key  (r) = Recorded By, (t) = Taken By, (c) = Cosigned By      Initials Name Provider Type    Yolanda Manrique PT  Physical Therapist                                 Physical Therapy Education       Title: PT OT SLP Therapies (In Progress)       Topic: Physical Therapy (Done)       Point: Mobility training (Done)       Learning Progress Summary             Patient Eager, E, VU by SC at 2/24/2024 0925    Comment: reviewed HEP    Acceptance, E, VU,DU,NR by  at 2/23/2024 1454    Acceptance, E, VU,DU,NR by  at 2/23/2024 1009    Eager, E,H, VU,DU,NR by  at 2/22/2024 1540    Comment: Educated pt. safety/technique w/bed mobility, transfers, ambulation, HEP, PT POC   Family Acceptance, E, VU,DU,NR by  at 2/23/2024 1454                         Point: Home exercise program (Done)       Learning Progress Summary             Patient Eager, E, VU by SC at 2/24/2024 0925    Comment: reviewed HEP    Acceptance, E, VU,DU,NR by  at 2/23/2024 1454    Acceptance, E, VU,DU,NR by  at 2/23/2024 1009    Eager, E,H, VU,DU,NR by  at 2/22/2024 1540    Comment: Educated pt. safety/technique w/bed mobility, transfers, ambulation, HEP, PT POC   Family Acceptance, E, VU,DU,NR by  at 2/23/2024 1454                         Point: Body mechanics (Done)       Learning Progress Summary             Patient Eager, E, VU by SC at 2/24/2024 0925    Comment: reviewed HEP    Acceptance, E, VU,DU,NR by  at 2/23/2024 1454    Acceptance, E, VU,DU,NR by  at 2/23/2024 1009    Eager, E,H, VU,DU,NR by  at 2/22/2024 1540    Comment: Educated pt. safety/technique w/bed mobility, transfers, ambulation, HEP, PT POC   Family Acceptance, E, VU,DU,NR by  at 2/23/2024 1454                         Point: Precautions (Done)       Learning Progress Summary             Patient Eager, E, VU by SC at 2/24/2024 0925    Comment: reviewed HEP    Acceptance, E, VU,DU,NR by  at 2/23/2024 1454    Acceptance, E, VU,DU,NR by  at 2/23/2024 1009    Eager, E,H, GILBERT,DU,NR by SS at 2/22/2024 1540    Comment: Educated pt. safety/technique w/bed mobility, transfers, ambulation,  HEP, PT POC   Family Acceptance, E, VU,DU,NR by  at 2/23/2024 1454                                         User Key       Initials Effective Dates Name Provider Type Discipline    SC 02/03/23 -  Yolanda Crowell PT Physical Therapist PT     06/01/21 -  Desiree Merritt, PT Physical Therapist PT     09/21/23 -  Rita Dobson, PT Physical Therapist PT                  PT Recommendation and Plan     Plan of Care Reviewed With: patient  Progress: improving  Outcome Evaluation: Patient able to increase his ambulation distance today and attempt stairs. Good progress     Time Calculation:         PT Charges       Row Name 02/24/24 0835             Time Calculation    Start Time 0835  -SC      PT Received On 02/24/24  -SC      PT Goal Re-Cert Due Date 03/03/24  -SC         Timed Charges    80375 - PT Therapeutic Exercise Minutes 15  -SC      30398 - Gait Training Minutes  25  -SC         Total Minutes    Timed Charges Total Minutes 40  -SC       Total Minutes 40  -SC                User Key  (r) = Recorded By, (t) = Taken By, (c) = Cosigned By      Initials Name Provider Type    SC Yolanda Crowell, PT Physical Therapist                  Therapy Charges for Today       Code Description Service Date Service Provider Modifiers Qty    10497019823 HC PT THER PROC EA 15 MIN 2/24/2024 Yolanda Crowell, PT GP 1    75630553534 HC GAIT TRAINING EA 15 MIN 2/24/2024 Yolanda Crowell, PT GP 2            PT G-Codes  Outcome Measure Options: AM-PAC 6 Clicks Basic Mobility (PT)  AM-PAC 6 Clicks Score (PT): 17  AM-PAC 6 Clicks Score (OT): 16       Yolanda Crowell PT  2/24/2024

## 2024-02-24 NOTE — PROGRESS NOTES
"Orthopedic Daily Progress Note      CC: Doing better today    Pain controlled  General: no fevers, chills  Abdomen: no nausea, vomiting, or diarrhea    No other complaints    Physical Exam:  I have reviewed the vital signs.  Temp:  [98 °F (36.7 °C)-99 °F (37.2 °C)] 98.1 °F (36.7 °C)  Heart Rate:  [] 110  Resp:  [18] 18  BP: (120-148)/(71-97) 140/80    Objective:  Vital signs: (most recent): Blood pressure 140/80, pulse 110, temperature 98.1 °F (36.7 °C), temperature source Oral, resp. rate 18, height 162.6 cm (64\"), weight 108 kg (238 lb), SpO2 97%.              General Appearance:    Alert, cooperative, no distress  Extremities: No clubbing, cyanosis, or edema to lower extremities  Pulses:  2+ in distal surgical extremity  Skin: Dressing Clean/dry/intact      Results Review:    I have reviewed the labs, radiology results and diagnostic studies:yes    Results from last 7 days   Lab Units 02/24/24  0407   WBC 10*3/mm3 15.11*   HEMOGLOBIN g/dL 12.2*   PLATELETS 10*3/mm3 285     Results from last 7 days   Lab Units 02/23/24  0414   SODIUM mmol/L 134*   POTASSIUM mmol/L 4.1   CO2 mmol/L 25.0   CREATININE mg/dL 1.01   GLUCOSE mg/dL 148*       I have reviewed the medications.    Assessment/Problem List  POD# 2 S/p Left NANCY    Plan  PT/OT  Hopefully home tomorrow      Gagandeep Wylie MD  02/24/24  10:36 EST            "

## 2024-02-24 NOTE — PROGRESS NOTES
"IM progress note      Sebastian Liao  [id number]  1960     LOS: 0 days     Attending: Zane Matthews MD    Primary Care Provider: Fransico Ngo PA      Chief Complaint/Reason for visit:  No chief complaint on file.      Subjective   Doing ok. Good pain control. No n/vom/sob.   Some pressure over bladder, but feels that he's voiding well.      Objective        Visit Vitals  /73 (BP Location: Left arm, Patient Position: Lying)   Pulse 94   Temp 98.1 °F (36.7 °C) (Oral)   Resp 18   Ht 162.6 cm (64\")   Wt 108 kg (238 lb)   SpO2 97%   BMI 40.85 kg/m²     Temp (24hrs), Av.4 °F (36.9 °C), Min:98 °F (36.7 °C), Max:99 °F (37.2 °C)      Intake/Output:    Intake/Output Summary (Last 24 hours) at 2024 0849  Last data filed at 2024 0651  Gross per 24 hour   Intake 1050 ml   Output 3325 ml   Net -2275 ml        Physical Therapy:   Pending today.     Physical Exam:     General Appearance:    Alert, cooperative, in no acute distress   Head:    Normocephalic, without obvious abnormality, atraumatic    Lungs:     Normal effort, symmetric chest rise,  clear to      auscultation bilaterally              Heart:    Regular rhythm and normal rate, normal S1 and S2    Abdomen:     Normal bowel sounds, no masses, no organomegaly, soft        non-tender, non-distended, no guarding, no rebound                tenderness   Extremities: Clean dry intact dressing over hip incision.  No clubbing, cyanosis   No deformities.   Intact flexion and dorsiflexion bilateral feet.   Pulses:   Pulses palpable and equal bilaterally   Skin:   No bleeding, bruising or rash          Results Review:     I reviewed the patient's new clinical results.   Results from last 7 days   Lab Units 24  0407 24  0414   WBC 10*3/mm3 15.11* 16.01*   HEMOGLOBIN g/dL 12.2* 13.8   HEMATOCRIT % 37.5 42.2   PLATELETS 10*3/mm3 285 286     Results from last 7 days   Lab Units 24  0414   SODIUM mmol/L 134*   POTASSIUM mmol/L 4.1 "   CHLORIDE mmol/L 99   CO2 mmol/L 25.0   BUN mg/dL 9   CREATININE mg/dL 1.01   CALCIUM mg/dL 8.6   GLUCOSE mg/dL 148*       Latest Reference Range & Units 02/23/24 18:50 02/23/24 20:07 02/24/24 07:40   Glucose 70 - 130 mg/dL 174 (H) 191 (H) 123   (H): Data is abnormally high  All medications reviewed.   amLODIPine, 10 mg, Oral, Daily  aspirin, 81 mg, Oral, Q12H  budesonide-formoterol, 1 puff, Inhalation, BID - RT  dicyclomine, 20 mg, Oral, Q6H  insulin detemir, 15 Units, Subcutaneous, Nightly  insulin lispro, 2-7 Units, Subcutaneous, 4x Daily AC & at Bedtime  Ketotifen Fumarate, 1 drop, Both Eyes, BID  meloxicam, 15 mg, Oral, Daily  pantoprazole, 40 mg, Oral, Q AM  rosuvastatin, 10 mg, Oral, Daily  sodium chloride, 10 mL, Intravenous, Q12H  tamsulosin, 0.4 mg, Oral, Daily      albuterol, 2.5 mg, Q6H PRN  cyclobenzaprine, 10 mg, TID PRN  dextrose, 25 g, Q15 Min PRN  dextrose, 15 g, Q15 Min PRN  glucagon (human recombinant), 1 mg, Q15 Min PRN  HYDROmorphone, 0.5 mg, Q2H PRN   And  naloxone, 0.1 mg, Q5 Min PRN  labetalol, 10 mg, Q4H PRN  Morphine, 4 mg, Q2H PRN   And  naloxone, 0.4 mg, Q5 Min PRN  oxyCODONE, 5 mg, Q4H PRN  prochlorperazine, 5 mg, Q6H PRN  sodium chloride, 500 mL, TID PRN  sodium chloride, 1-10 mL, PRN  sodium chloride, 40 mL, PRN        Assessment & Plan       Status post total replacement of left hip    Essential hypertension, benign    Type 2 diabetes mellitus with hyperglycemia, without long-term current use of insulin    Hyperlipidemia    Obesity, Class III, BMI 40-49.9 (morbid obesity)    GERD    RONIT (obstructive sleep apnea) - severe    Degenerative arthritis of hip    BPH (benign prostatic hyperplasia)         Plan     1. PT/OT,  Weight bearing as tolerated left LE.  Total hip precautions  2. Pain control-prns  3. IS-encourage  4. DVT proph- Mechanicals and aspirin  5. Bowel regimen  6. Resume home medications as appropriate  7. Monitor post-op labs  8. DC planning .  Depending on progress with  PT and OT.  Hopefully he will make enough progress for home discharge with home health PT this weekend       - Hypertension:  Resume home medications as appropriate, formulary substitution when indicated.  Holding parameters.  Prn medications for elevated blood pressure.     -DM type 2  Hgb A1C 6.8  Hold OHA as appropriate  FSBG AC/HS, ( q 6 when NPO), along with correction humalog.  Long acting insulin scheduled      -GERD:  Resume PPI.  Formulary substitution when indicated.     -BPH.:  Monitor for spontaneous voiding.  Resume home medication with formulary substitution as indicated.     -RONIT:  Continue CPAP.   Monitor O2 sats.     -Obesity: Complicates all aspects of care.    Jen Colmenares MD  02/24/24  08:49 EST

## 2024-02-24 NOTE — THERAPY TREATMENT NOTE
Patient Name: Sebastian Liao  : 1960    MRN: [id number]                              Today's Date: 2024       Admit Date: 2024    Visit Dx:     ICD-10-CM ICD-9-CM   1. Primary osteoarthritis of left hip  M16.12 715.15     Patient Active Problem List   Diagnosis    Testicular hypofunction    Essential hypertension, benign    Type 2 diabetes mellitus with hyperglycemia, without long-term current use of insulin    Hyperlipidemia    Obesity, Class III, BMI 40-49.9 (morbid obesity)    Dyspnea on exertion    History of COVID-19 pneumonia (10/2020)    Former smoker (None x 35 years)    GERD    RONIT (obstructive sleep apnea) - severe    DDD (degenerative disc disease), cervical    Seasonal allergic rhinitis    Screen for colon cancer    Carpal tunnel syndrome of right wrist    Degenerative arthritis of hip    BPH (benign prostatic hyperplasia)    Status post total replacement of left hip     Past Medical History:   Diagnosis Date    Acute bronchitis     Arthritis     Asthma     Cervicalgia     COVID-19     CPAP (continuous positive airway pressure) dependence     Diabetes mellitus     Edema     GERD (gastroesophageal reflux disease)     HTN (hypertension)     Hyperlipidemia     IBS (irritable bowel syndrome)     IBS (irritable bowel syndrome)     Low testosterone     Prostate disease     Sleep apnea     Wears glasses      Past Surgical History:   Procedure Laterality Date    CARPAL TUNNEL RELEASE Right 2022    Procedure: CARPAL TUNNEL RELEASE RIGHT;  Surgeon: Nj Bliss MD;  Location: [facility];  Service: Neurosurgery;  Laterality: Right;    CERVICAL FUSION      Dr. Nickerson, . [place], [place]    COLONOSCOPY  2012    repeat in ten yrs    HIP SURGERY Right 2016    Dr. Schwartz    NECK SURGERY      cervical spine fusion    TONSILLECTOMY      TOTAL HIP ARTHROPLASTY Left 2024    Procedure: MODIFIED ANTERIOR TOTAL HIP ARTHROPLASTY - LEFT;  Surgeon: Zane Matthews MD;  Location:   [facility];  Service: Orthopedics;  Laterality: Left;      General Information       Row Name 02/24/24 1409 02/24/24 0919       Physical Therapy Time and Intention    Document Type therapy note (daily note)  -SC therapy note (daily note)  -SC    Mode of Treatment physical therapy  -SC physical therapy  -SC      Row Name 02/24/24 1409 02/24/24 0919       General Information    Patient Profile Reviewed yes  -SC yes  -SC    Existing Precautions/Restrictions fall;left;hip, anterior  -SC --      Row Name 02/24/24 1409          Cognition    Orientation Status (Cognition) oriented x 4  -[facility] Name 02/24/24 1409 02/24/24 0919       Safety Issues, Functional Mobility    Impairments Affecting Function (Mobility) balance;endurance/activity tolerance;pain;strength;range of motion (ROM);postural/trunk control  -SC balance;endurance/activity tolerance;pain;strength;range of motion (ROM);postural/trunk control  -SC    Comment, Safety Issues/Impairments (Mobility) alert following commands  -SC alert, following commands  -SC              User Key  (r) = Recorded By, (t) = Taken By, (c) = Cosigned By      Initials Name Provider Type    SC Yolanda Crowell PT Physical Therapist                   Mobility       [facility] Name 02/24/24 1409          Bed Mobility    Comment, (Bed Mobility) uic  -[facility] Name 02/24/24 0920          Transfers    Comment, (Transfers) STS from recliner with cues for hand placement. Demonstrated slow careful transfers  -[facility] Name 02/24/24 1409 02/24/24 0920       Sit-Stand Transfer    Sit-Stand [place] (Transfers) 1 person assist;contact guard;standby assist  -SC 1 person assist;contact guard;verbal cues  -SC    Assistive Device (Sit-Stand Transfers) walker, front-wheeled  -SC walker, front-wheeled  -SC    Comment, (Sit-Stand Transfer) demonstrated good technique  -SC --      [facility] Name 02/24/24 1409 02/24/24 0920       Gait/Stairs (Locomotion)    [place] Level (Gait) 1 person assist;standby  assist  -SC 1 person assist;contact guard  -SC    Assistive Device (Gait) walker, front-wheeled  -SC walker, front-wheeled  -SC    Distance in Feet (Gait) 160  -  -SC    Deviations/Abnormal Patterns (Gait) left sided deviations;antalgic;weight shifting decreased;stride length decreased  -SC left sided deviations;antalgic;weight shifting decreased;stride length decreased  -SC    Bilateral Gait Deviations forward flexed posture;heel strike decreased  -SC forward flexed posture;heel strike decreased  -SC    Assistive Device (Stairs) cane, straight  -SC --    Handrail Location (Stairs) right side (ascending)  -SC both sides  -SC    Number of Steps (Stairs) 3  -SC 3  -SC    Comment, (Gait/Stairs) Gt training focused on achieving step through gait pattern. Encouraged full wt shifting over L leg. Cues on stair training to sequence cane. St cane issued today  -SC Gt training focused on achieving step through gait pattern. Encouraged full wt shifting over L leg. On stairs, patient given cues for sequencing. Demonstrated good technique  -SC      Row Name 02/24/24 0920          Mobility    Extremity Weight-bearing Status left lower extremity  -SC     Left Lower Extremity (Weight-bearing Status) weight-bearing as tolerated (WBAT)  -SC               User Key  (r) = Recorded By, (t) = Taken By, (c) = Cosigned By      Initials Name Provider Type    SC Yolanda Crowell, PT Physical Therapist                   Obj/Interventions       Row Name 02/24/24 1411 02/24/24 0923       Motor Skills    Therapeutic Exercise hip  -SC knee;ankle  -SC      Row Name 02/24/24 1411          Hip (Therapeutic Exercise)    Hip (Therapeutic Exercise) AROM (active range of motion)  -SC     Hip Strengthening (Therapeutic Exercise) left;flexion;extension;15 repititions  -SC       Row Name 02/24/24 1411 02/24/24 0923       Knee (Therapeutic Exercise)    Knee (Therapeutic Exercise) AROM (active range of motion)  -SC AROM (active range of motion)  -SC     Knee AROM (Therapeutic Exercise) left;flexion;extension;10 repetitions;LAQ (long arc quad)  -SC --    Knee Isometrics (Therapeutic Exercise) -- gluteal sets;bilateral;quad sets;10 repetitions  -SC    Knee Strengthening (Therapeutic Exercise) -- left;extension;flexion;LAQ (long arc quad);sitting;10 repetitions  -[facility] Name 02/24/24 0923          Ankle (Therapeutic Exercise)    Ankle (Therapeutic Exercise) AROM (active range of motion)  -SC     Ankle AROM (Therapeutic Exercise) bilateral;dorsiflexion;plantarflexion  -[facility] Name 02/24/24 1411 02/24/24 0923       Balance    Balance Assessment standing dynamic balance  -SC standing dynamic balance  -SC    Dynamic Standing Balance standby assist  -SC 1-person assist;contact guard  -SC    Position/Device Used, Standing Balance supported;walker, rolling  -SC walker, front-wheeled  -SC    Comment, Balance slow gait , no lob  -SC slow gait , no LOB  -SC              User Key  (r) = Recorded By, (t) = Taken By, (c) = Cosigned By      Initials Name Provider Type    SC Yolanda Crowell PT Physical Therapist                   Goals/Plan    No documentation.                  Clinical Impression       [facility] Name 02/24/24 1412 02/24/24 0924       Pain    Pain Intervention(s) Repositioned;Cold applied  -SC Repositioned  -SC    Additional Documentation -- Pain Scale: FACES Pre/Post-Treatment (Group)  -[facility] Name 02/24/24 1412 02/24/24 0924       Pain Scale: FACES Pre/Post-Treatment    Pain: FACES Scale, Pretreatment 4-->hurts little more  -SC 4-->hurts little more  -SC    Posttreatment Pain Rating 4-->hurts little more  -SC 6-->hurts even more  -SC    Pain Location - Side/Orientation Left  -SC Left  -SC    Pain Location - hip  -SC hip  -[facility] Name 02/24/24 1412 02/24/24 0924       Plan of Care Review    Plan of Care Reviewed With patient  -SC patient  -SC    Progress improving  -SC improving  -SC    Outcome Evaluation Patient demonstrating improving overall  strength and endurance  -SC Patient able to increase his ambulation distance today and attempt stairs. Good progress  -SC      Row Name 02/24/24 1412 02/24/24 0924       Therapy Assessment/Plan (PT)    Patient/Family Therapy Goals Statement (PT) go home  -SC go home  -SC    Rehab Potential (PT) good, to achieve stated therapy goals  -SC good, to achieve stated therapy goals  -SC    Criteria for Skilled Interventions Met (PT) yes;meets criteria;skilled treatment is necessary  -SC yes;meets criteria;skilled treatment is necessary  -SC    Therapy Frequency (PT) 2 times/day  -SC 2 times/day  -SC      Row Name 02/24/24 1412 02/24/24 0924       Positioning and Restraints    Pre-Treatment Position sitting in chair/recliner  -SC sitting in chair/recliner  -SC    Post Treatment Position chair  -SC chair  -SC    In Chair notified nsg;reclined;sitting;call light within reach;exit alarm on;encouraged to call for assist  -SC notified nsg;reclined;sitting;call light within reach;encouraged to call for assist;exit alarm on  -SC              User Key  (r) = Recorded By, (t) = Taken By, (c) = Cosigned By      Initials Name Provider Type    SC Yolanda Crowell, PT Physical Therapist                   Outcome Measures       Row Name 02/24/24 1413 02/24/24 0925       How much help from another person do you currently need...    Turning from your back to your side while in flat bed without using bedrails? 3  -SC 3  -SC    Moving from lying on back to sitting on the side of a flat bed without bedrails? 2  -SC 2  -SC    Moving to and from a bed to a chair (including a wheelchair)? 3  -SC 3  -SC    Standing up from a chair using your arms (e.g., wheelchair, bedside chair)? 3  -SC 3  -SC    Climbing 3-5 steps with a railing? 3  -SC 3  -SC    To walk in hospital room? 3  -SC 3  -SC    AM-PAC 6 Clicks Score (PT) 17  -SC 17  -SC    Highest Level of Mobility Goal 5 --> Static standing  -SC 5 --> Static standing  -SC      Row Name 02/24/24 1413  02/24/24 0925       Functional Assessment    Outcome Measure Options AM-PAC 6 Clicks Basic Mobility (PT)  -SC AM-PAC 6 Clicks Basic Mobility (PT)  -SC              User Key  (r) = Recorded By, (t) = Taken By, (c) = Cosigned By      Initials Name Provider Type    Yolanda Manrique PT Physical Therapist                                 Physical Therapy Education       Title: PT OT SLP Therapies (In Progress)       Topic: Physical Therapy (Done)       Point: Mobility training (Done)       Learning Progress Summary             Patient Eager, E, VU by SC at 2/24/2024 1414    Comment: reviewed safety on stairs with cane    Eager, E, VU by SC at 2/24/2024 0925    Comment: reviewed HEP    Acceptance, E, VU,DU,NR by  at 2/23/2024 1454    Acceptance, E, VU,DU,NR by  at 2/23/2024 1009    Eager, E,H, VU,DU,NR by  at 2/22/2024 1540    Comment: Educated pt. safety/technique w/bed mobility, transfers, ambulation, HEP, PT POC   Family Acceptance, E, VU,DU,NR by  at 2/23/2024 1454                         Point: Home exercise program (Done)       Learning Progress Summary             Patient Eager, E, VU by SC at 2/24/2024 1414    Comment: reviewed safety on stairs with cane    Eager, E, VU by SC at 2/24/2024 0925    Comment: reviewed HEP    Acceptance, E, VU,DU,NR by  at 2/23/2024 1454    Acceptance, E, VU,DU,NR by  at 2/23/2024 1009    Eager, E,H, VU,DU,NR by  at 2/22/2024 1540    Comment: Educated pt. safety/technique w/bed mobility, transfers, ambulation, HEP, PT POC   Family Acceptance, E, VU,DU,NR by  at 2/23/2024 1454                         Point: Body mechanics (Done)       Learning Progress Summary             Patient Eager, E, VU by SC at 2/24/2024 1414    Comment: reviewed safety on stairs with cane    Eager, E, VU by SC at 2/24/2024 0925    Comment: reviewed HEP    Acceptance, E, VU,DU,NR by  at 2/23/2024 1454    Acceptance, E, VU,DU,NR by  at 2/23/2024 1009    FELIPE Guardado H, VU, DU,NR by  at 2/22/2024  1540    Comment: Educated pt. safety/technique w/bed mobility, transfers, ambulation, HEP, PT POC   Family Acceptance, E, VU,DU,NR by  at 2/23/2024 1454                         Point: Precautions (Done)       Learning Progress Summary             Patient Eager, E, VU by SC at 2/24/2024 1414    Comment: reviewed safety on stairs with cane    Eager, E, VU by SC at 2/24/2024 0925    Comment: reviewed HEP    Acceptance, E, VU,DU,NR by  at 2/23/2024 1454    Acceptance, E, VU,DU,NR by  at 2/23/2024 1009    Eager, E,H, VU,DU,NR by  at 2/22/2024 1540    Comment: Educated pt. safety/technique w/bed mobility, transfers, ambulation, HEP, PT POC   Family Acceptance, E, VU,DU,NR by  at 2/23/2024 1454                                         User Key       Initials Effective Dates Name Provider Type Discipline    SC 02/03/23 -  Yolanda Crowell, PT Physical Therapist PT     06/01/21 -  Desiree Merritt, PT Physical Therapist PT     09/21/23 -  Rita Dobson, PT Physical Therapist PT                  PT Recommendation and Plan     Plan of Care Reviewed With: patient  Progress: improving  Outcome Evaluation: Patient demonstrating improving overall strength and endurance     Time Calculation:         PT Charges       Row Name 02/24/24 1330 02/24/24 0835          Time Calculation    Start Time 1330  -SC 0835  -SC     PT Received On 02/24/24  -SC 02/24/24  -SC     PT Goal Re-Cert Due Date 03/03/24  -SC 03/03/24  -SC        Timed Charges    62896 - PT Therapeutic Exercise Minutes 5  -SC 15  -SC     68717 - Gait Training Minutes  25  -SC 25  -SC        Total Minutes    Timed Charges Total Minutes 30  -SC 40  -SC      Total Minutes 30  -SC 40  -SC               User Key  (r) = Recorded By, (t) = Taken By, (c) = Cosigned By      Initials Name Provider Type    Yolanda Manrique, PT Physical Therapist                  Therapy Charges for Today       Code Description Service Date Service Provider Modifiers Qty    82525791566   PT THER PROC EA 15 MIN 2/24/2024 Yolanda Crowell, PT GP 1    37362103420 HC GAIT TRAINING EA 15 MIN 2/24/2024 Yolanda Crowell, PT GP 2    [id number] HC GAIT TRAINING EA 15 MIN 2/24/2024 Yolanda Crowell, PT GP 2            PT G-Codes  Outcome Measure Options: AM-PAC 6 Clicks Basic Mobility (PT)  AM-PAC 6 Clicks Score (PT): 17  AM-PAC 6 Clicks Score (OT): 16  PT Discharge Summary  Anticipated Discharge Disposition (PT): home with assist, home with home health    Yolanda Crowell, PT  2/24/2024

## 2024-02-24 NOTE — PLAN OF CARE
"Assumed care at 19:00.  Pt awake in chair. A/O, on RA and CPAP for HOS. Pt choosing to remain in recliner during the night. complaints of L hip pain, PRN medication given. Ax1 with walker to the bathroom, also uses urinal. Pt refused SCD's at 4 am.  Left in chair with alarm on and call light within reach.    /76 (BP Location: Left arm, Patient Position: Lying)   Pulse 91   Temp 98 °F (36.7 °C) (Oral)   Resp 18   Ht 162.6 cm (64\")   Wt 108 kg (238 lb)   SpO2 96%   BMI 40.85 kg/m²         Problem: Adult Inpatient Plan of Care  Goal: Plan of Care Review  Outcome: Ongoing, Progressing  Flowsheets (Taken 2/24/2024 0232)  Progress: no change  Plan of Care Reviewed With: patient  Outcome Evaluation: Pt A/O, on RA and CPAP for HOS.  Pt choosing to remain in recliner during the night.  complaints of L hip pain, PRN medication given. Ax1 with walker to the bathroom, also uses urinal.  Goal: Patient-Specific Goal (Individualized)  Outcome: Ongoing, Progressing  Goal: Absence of Hospital-Acquired Illness or Injury  Outcome: Ongoing, Progressing  Intervention: Identify and Manage Fall Risk  Recent Flowsheet Documentation  Taken 2/24/2024 0214 by Hortencia Tarango, RN  Safety Promotion/Fall Prevention:   activity supervised   assistive device/personal items within reach   safety round/check completed  Taken 2/24/2024 0000 by Hortencia Tarango, RN  Safety Promotion/Fall Prevention:   activity supervised   assistive device/personal items within reach   safety round/check completed  Taken 2/23/2024 2212 by Hortencia Tarango, RN  Safety Promotion/Fall Prevention:   activity supervised   assistive device/personal items within reach   safety round/check completed  Taken 2/23/2024 2015 by Hortencia Tarango, RN  Safety Promotion/Fall Prevention:   activity supervised   assistive device/personal items within reach   safety round/check completed  Intervention: Prevent Skin Injury  Recent Flowsheet Documentation  Taken " 2/24/2024 0214 by Hortencia Tarango RN  Body Position: (up in chair) other (see comments)  Taken 2/24/2024 0000 by Hortencia Tarango RN  Body Position: (in chair) other (see comments)  Taken 2/23/2024 2212 by Hortencia Tarango RN  Body Position: (in chair) other (see comments)  Taken 2/23/2024 2015 by Hortencia Tarango RN  Body Position: (up in hair)   other (see comments)   tilted  Intervention: Prevent and Manage VTE (Venous Thromboembolism) Risk  Recent Flowsheet Documentation  Taken 2/24/2024 0214 by Hortencia Tarango RN  Activity Management: up in chair  Taken 2/24/2024 0000 by Hortencia Tarango RN  Activity Management: activity minimized  Taken 2/23/2024 2212 by Hortencia Tarango RN  Activity Management: activity minimized  Taken 2/23/2024 2015 by Hortencia Tarango RN  Activity Management: up in chair  VTE Prevention/Management:   bilateral   foot pump device on  Range of Motion: active ROM (range of motion) encouraged  Intervention: Prevent Infection  Recent Flowsheet Documentation  Taken 2/24/2024 0214 by Hortencia Tarango RN  Infection Prevention:   environmental surveillance performed   rest/sleep promoted  Taken 2/24/2024 0000 by Hortencia Tarango RN  Infection Prevention:   environmental surveillance performed   rest/sleep promoted  Taken 2/23/2024 2212 by Hortencia Tarango RN  Infection Prevention:   environmental surveillance performed   rest/sleep promoted  Taken 2/23/2024 2015 by Hortencia Tarango RN  Infection Prevention:   environmental surveillance performed   rest/sleep promoted  Goal: Optimal Comfort and Wellbeing  Outcome: Ongoing, Progressing  Intervention: Monitor Pain and Promote Comfort  Recent Flowsheet Documentation  Taken 2/24/2024 0000 by Hortencia Tarango RN  Pain Management Interventions: position adjusted  Taken 2/23/2024 2015 by Hortencia Tarango RN  Pain Management Interventions: position adjusted  Intervention: Provide Person-Centered Care  Recent Flowsheet  Documentation  Taken 2/23/2024 2015 by Hortencia Tarango RN  Trust Relationship/Rapport:   care explained   choices provided   empathic listening provided   questions answered   thoughts/feelings acknowledged  Goal: Readiness for Transition of Care  Outcome: Ongoing, Progressing     Problem: Pain Acute  Goal: Acceptable Pain Control and Functional Ability  Outcome: Ongoing, Progressing  Intervention: Prevent or Manage Pain  Recent Flowsheet Documentation  Taken 2/24/2024 0000 by Hortencia Tarango RN  Medication Review/Management: medications reviewed  Taken 2/23/2024 2015 by Hortencia Tarango RN  Sleep/Rest Enhancement:   awakenings minimized   regular sleep/rest pattern promoted  Medication Review/Management: medications reviewed  Intervention: Develop Pain Management Plan  Recent Flowsheet Documentation  Taken 2/24/2024 0000 by Hortencia Tarango RN  Pain Management Interventions: position adjusted  Taken 2/23/2024 2015 by Hortencia Tarango RN  Pain Management Interventions: position adjusted  Intervention: Optimize Psychosocial Wellbeing  Recent Flowsheet Documentation  Taken 2/23/2024 2015 by Hortencia Tarango RN  Diversional Activities: television     Problem: Fall Injury Risk  Goal: Absence of Fall and Fall-Related Injury  Outcome: Ongoing, Progressing  Intervention: Identify and Manage Contributors  Recent Flowsheet Documentation  Taken 2/24/2024 0214 by Hortencia Tarango RN  Self-Care Promotion:   independence encouraged   BADL personal objects within reach  Taken 2/24/2024 0000 by Hortencia Tarango RN  Medication Review/Management: medications reviewed  Self-Care Promotion:   independence encouraged   BADL personal objects within reach  Taken 2/23/2024 2212 by Hortencia Tarango RN  Self-Care Promotion:   independence encouraged   BADL personal objects within reach  Taken 2/23/2024 2015 by Hortencia Tarango RN  Medication Review/Management: medications reviewed  Intervention: Promote Injury-Free  Environment  Recent Flowsheet Documentation  Taken 2/24/2024 0214 by Hortencia Tarango RN  Safety Promotion/Fall Prevention:   activity supervised   assistive device/personal items within reach   safety round/check completed  Taken 2/24/2024 0000 by Hortencia Tarango RN  Safety Promotion/Fall Prevention:   activity supervised   assistive device/personal items within reach   safety round/check completed  Taken 2/23/2024 2212 by Hortencia Tarango RN  Safety Promotion/Fall Prevention:   activity supervised   assistive device/personal items within reach   safety round/check completed  Taken 2/23/2024 2015 by Hortencia Tarango RN  Safety Promotion/Fall Prevention:   activity supervised   assistive device/personal items within reach   safety round/check completed   Goal Outcome Evaluation:  Plan of Care Reviewed With: patient        Progress: no change  Outcome Evaluation: Pt A/O, on RA and CPAP for HOS.  Pt choosing to remain in recliner during the night.  complaints of L hip pain, PRN medication given. Ax1 with walker to the bathroom, also uses urinal.

## 2024-02-24 NOTE — PLAN OF CARE
Goal Outcome Evaluation:  Plan of Care Reviewed With: patient        Progress: improving  Outcome Evaluation: Patient able to increase his ambulation distance today and attempt stairs. Good progress

## 2024-02-24 NOTE — PLAN OF CARE
Goal Outcome Evaluation:  Plan of Care Reviewed With: patient        Progress: improving  Outcome Evaluation: Patient demonstrating improving overall strength and endurance      Anticipated Discharge Disposition (PT): home with assist, home with home health

## 2024-02-24 NOTE — PLAN OF CARE
Patient alert and oriented x 4. Continues to void every couple of hours, 100-200ml, PVR was 75ml. Ambulating well with nursing and therapy today, patient feels that he will be ready for discharge home tomorrow. Received prn oxycodone for pain.

## 2024-02-25 VITALS
RESPIRATION RATE: 18 BRPM | OXYGEN SATURATION: 89 % | DIASTOLIC BLOOD PRESSURE: 72 MMHG | SYSTOLIC BLOOD PRESSURE: 154 MMHG | HEIGHT: 64 IN | HEART RATE: 94 BPM | BODY MASS INDEX: 40.63 KG/M2 | TEMPERATURE: 98.2 F | WEIGHT: 238 LBS

## 2024-02-25 LAB
DEPRECATED RDW RBC AUTO: 43.1 FL (ref 37–54)
ERYTHROCYTE [DISTWIDTH] IN BLOOD BY AUTOMATED COUNT: 13.2 % (ref 12.3–15.4)
GLUCOSE BLDC GLUCOMTR-MCNC: 122 MG/DL (ref 70–130)
GLUCOSE BLDC GLUCOMTR-MCNC: 175 MG/DL (ref 70–130)
HCT VFR BLD AUTO: 35.4 % (ref 37.5–51)
HGB BLD-MCNC: 11.7 G/DL (ref 13–17.7)
MCH RBC QN AUTO: 29.1 PG (ref 26.6–33)
MCHC RBC AUTO-ENTMCNC: 33.1 G/DL (ref 31.5–35.7)
MCV RBC AUTO: 88.1 FL (ref 79–97)
PLATELET # BLD AUTO: 292 10*3/MM3 (ref 140–450)
PMV BLD AUTO: 10 FL (ref 6–12)
RBC # BLD AUTO: 4.02 10*6/MM3 (ref 4.14–5.8)
WBC NRBC COR # BLD AUTO: 14.48 10*3/MM3 (ref 3.4–10.8)

## 2024-02-25 PROCEDURE — 97110 THERAPEUTIC EXERCISES: CPT

## 2024-02-25 PROCEDURE — 94799 UNLISTED PULMONARY SVC/PX: CPT

## 2024-02-25 PROCEDURE — 97116 GAIT TRAINING THERAPY: CPT

## 2024-02-25 PROCEDURE — 63710000001 INSULIN LISPRO (HUMAN) PER 5 UNITS: Performed by: INTERNAL MEDICINE

## 2024-02-25 PROCEDURE — 94660 CPAP INITIATION&MGMT: CPT

## 2024-02-25 PROCEDURE — 82948 REAGENT STRIP/BLOOD GLUCOSE: CPT

## 2024-02-25 PROCEDURE — 85027 COMPLETE CBC AUTOMATED: CPT | Performed by: ORTHOPAEDIC SURGERY

## 2024-02-25 PROCEDURE — 94664 DEMO&/EVAL PT USE INHALER: CPT

## 2024-02-25 RX ORDER — SEMAGLUTIDE 0.68 MG/ML
0.5 INJECTION, SOLUTION SUBCUTANEOUS WEEKLY
Qty: 9 ML | Refills: 0 | Status: SHIPPED | OUTPATIENT
Start: 2024-03-03

## 2024-02-25 RX ORDER — ACETAMINOPHEN 500 MG
1000 TABLET ORAL EVERY 8 HOURS
Qty: 60 TABLET | Refills: 0 | Status: SHIPPED | OUTPATIENT
Start: 2024-02-25 | End: 2024-03-06

## 2024-02-25 RX ORDER — DOCUSATE SODIUM 100 MG/1
100 CAPSULE, LIQUID FILLED ORAL 2 TIMES DAILY
Qty: 30 CAPSULE | Refills: 0 | Status: SHIPPED | OUTPATIENT
Start: 2024-02-25 | End: 2024-03-11

## 2024-02-25 RX ORDER — NALOXONE HYDROCHLORIDE 4 MG/.1ML
SPRAY NASAL
Qty: 2 EACH | Refills: 0 | Status: SHIPPED | OUTPATIENT
Start: 2024-02-25

## 2024-02-25 RX ORDER — ASPIRIN 81 MG/1
81 TABLET ORAL 2 TIMES DAILY
Qty: 60 TABLET | Refills: 0 | Status: SHIPPED | OUTPATIENT
Start: 2024-02-26

## 2024-02-25 RX ORDER — OXYCODONE HYDROCHLORIDE 5 MG/1
5-10 TABLET ORAL EVERY 4 HOURS PRN
Qty: 30 TABLET | Refills: 0 | Status: SHIPPED | OUTPATIENT
Start: 2024-02-25 | End: 2024-03-03

## 2024-02-25 RX ADMIN — DICYCLOMINE HYDROCHLORIDE 20 MG: 20 TABLET ORAL at 05:11

## 2024-02-25 RX ADMIN — BUDESONIDE AND FORMOTEROL FUMARATE DIHYDRATE 1 PUFF: 160; 4.5 AEROSOL RESPIRATORY (INHALATION) at 08:17

## 2024-02-25 RX ADMIN — INSULIN LISPRO 2 UNITS: 100 INJECTION, SOLUTION INTRAVENOUS; SUBCUTANEOUS at 12:56

## 2024-02-25 RX ADMIN — KETOTIFEN FUMARATE 1 DROP: 0.35 SOLUTION/ DROPS OPHTHALMIC at 08:30

## 2024-02-25 RX ADMIN — MELOXICAM 15 MG: 15 TABLET ORAL at 08:27

## 2024-02-25 RX ADMIN — ROSUVASTATIN 10 MG: 10 TABLET, FILM COATED ORAL at 08:27

## 2024-02-25 RX ADMIN — OXYCODONE HYDROCHLORIDE 5 MG: 5 TABLET ORAL at 14:30

## 2024-02-25 RX ADMIN — ASPIRIN 81 MG: 81 TABLET, COATED ORAL at 08:27

## 2024-02-25 RX ADMIN — Medication 10 ML: at 08:28

## 2024-02-25 RX ADMIN — PANTOPRAZOLE SODIUM 40 MG: 40 TABLET, DELAYED RELEASE ORAL at 05:11

## 2024-02-25 RX ADMIN — TAMSULOSIN HYDROCHLORIDE 0.4 MG: 0.4 CAPSULE ORAL at 08:27

## 2024-02-25 RX ADMIN — DICYCLOMINE HYDROCHLORIDE 20 MG: 20 TABLET ORAL at 12:56

## 2024-02-25 RX ADMIN — AMLODIPINE BESYLATE 10 MG: 10 TABLET ORAL at 08:27

## 2024-02-25 RX ADMIN — OXYCODONE HYDROCHLORIDE 5 MG: 5 TABLET ORAL at 02:57

## 2024-02-25 RX ADMIN — OXYCODONE HYDROCHLORIDE 5 MG: 5 TABLET ORAL at 08:27

## 2024-02-25 NOTE — PROGRESS NOTES
"Orthopedic Daily Progress Note      CC: Ready to go home    Pain controlled  General: no fevers, chills  Abdomen: No nausea, vomiting, or diarrhea    No other complaints    Physical Exam:  I have reviewed the vital signs.  Temp:  [97.7 °F (36.5 °C)-98.4 °F (36.9 °C)] 98.2 °F (36.8 °C)  Heart Rate:  [] 94  Resp:  [18] 18  BP: (140-154)/(72-80) 154/72    Objective:  Vital signs: (most recent): Blood pressure 154/72, pulse 94, temperature 98.2 °F (36.8 °C), temperature source Oral, resp. rate 18, height 162.6 cm (64\"), weight 108 kg (238 lb), SpO2 (!) 89%.              General Appearance:    Alert, cooperative, no distress  Extremities: No clubbing, cyanosis, or edema to lower extremities  Pulses:  2+ in distal surgical extremity  Skin: Dressing Clean/dry/intact      Results Review:    I have reviewed the labs, radiology results and diagnostic studies:    Results from last 7 days   Lab Units 02/25/24  0507   WBC 10*3/mm3 14.48*   HEMOGLOBIN g/dL 11.7*   PLATELETS 10*3/mm3 292     Results from last 7 days   Lab Units 02/23/24  0414   SODIUM mmol/L 134*   POTASSIUM mmol/L 4.1   CO2 mmol/L 25.0   CREATININE mg/dL 1.01   GLUCOSE mg/dL 148*       I have reviewed the medications.    Assessment/Problem List  POD# 3 s/p left NANCY    Plan  PT/OT  DC to home    Gagandeep Wylie MD  02/25/24  10:40 EST            "

## 2024-02-25 NOTE — PLAN OF CARE
Goal Outcome Evaluation:  Plan of Care Reviewed With: patient        Progress: improving  Outcome Evaluation: Patient demonstrating improving strength and pain control. He was able to increse his gait distance and achieve safe stairs. He is going home with family assist and OPPT.      Anticipated Discharge Disposition (PT): home with assist, home with home health

## 2024-02-25 NOTE — DISCHARGE SUMMARY
"Patient Name: Sebastian Liao  MRN: [id number]  : 1960  DOS: 2024    Attending: Zane Matthews MD    Primary Care Provider: Fransico Ngo PA    Date of Admission:.2024  5:18 AM    Date of Discharge:  2024    Discharge Diagnosis:   Status post total replacement of left hip    Essential hypertension, benign    Type 2 diabetes mellitus with hyperglycemia, without long-term current use of insulin    Hyperlipidemia    Obesity, Class III, BMI 40-49.9 (morbid obesity)    GERD    RONIT (obstructive sleep apnea) - severe    Degenerative arthritis of hip    BPH (benign prostatic hyperplasia)      Hospital Course  At admit:  Patient is a pleasant 63 y.o. male presented for scheduled surgery by Dr. Matthews.     Per his note (The patient is a 63 y.o. male with a history of debilitating left hip pain secondary to osteoarthritis, that failed to improve in spite of conservative treatment. The patient opted for a left total hip arthroplasty at this time and consented for the procedure. Please see my office notes for details with regard to preoperative counseling and operative rationale.).     Patient underwent right total hip arthroplasty under general anesthesia, tolerated surgery well, is admitted for further management.     I saw him in his room postoperatively where he is drowsy but appropriate, no nausea, vomiting, or shortness of breath.     He had some difficulty with his first session with PT, at this point inpatient rehab is recommended.     He has no history of DVT or PE but has history of COVID infection living him with \"long COVID\"     Patient with extensive past medical history that has been reviewed, medications noted.     After admit  Patient was provided pain medications as needed for pain control.    Adjustments were made to pain medications to optimize postop pain management when indicated. Risks and benefits of opiate medications discussed with patient. TOMAS report was " "reviewed.    Patient was seen by PT  and has progressed well over his stay.  Initially inpatient rehab was recommended, he however progressed steadily to where home discharge was recommended.    During his stay he used an IS for atelectasis prophylaxis and was placed on aspirin along with mechanicals for DVT prophylaxis.    Home medications were resumed as appropriate, and labs were monitored and remained fairly stable.     With the progress he has made, Mr. Liao is ready for DC home today.      Discussed with patient regarding plan and he shows understanding and agreement.    Patient will have HHPT following discharge.        Procedures Performed  Procedure(s):  MODIFIED ANTERIOR TOTAL HIP ARTHROPLASTY - LEFT       Pertinent Test Results:    I reviewed the patient's new clinical results.   Results from last 7 days   Lab Units 02/25/24  0507 02/24/24  0407 02/23/24  0414   WBC 10*3/mm3 14.48* 15.11* 16.01*   HEMOGLOBIN g/dL 11.7* 12.2* 13.8   HEMATOCRIT % 35.4* 37.5 42.2   PLATELETS 10*3/mm3 292 285 286     Results from last 7 days   Lab Units 02/23/24  0414   SODIUM mmol/L 134*   POTASSIUM mmol/L 4.1   CHLORIDE mmol/L 99   CO2 mmol/L 25.0   BUN mg/dL 9   CREATININE mg/dL 1.01   CALCIUM mg/dL 8.6   GLUCOSE mg/dL 148*     I reviewed the patient's new imaging including images and reports.    Physical therapy  Signed         Goal Outcome Evaluation:  Plan of Care Reviewed With: patient  Progress: improving  Outcome Evaluation: Patient demonstrating improving strength and pain control. He was able to increse his gait distance and achieve safe stairs. He is going home with family assist and OPPT.        Anticipated Discharge Disposition (PT): home with assist, home with home health             Discharge Assessment:      Visit Vitals  /72 (BP Location: Left arm, Patient Position: Sitting)   Pulse 94   Temp 98.2 °F (36.8 °C) (Oral)   Resp 18   Ht 162.6 cm (64\")   Wt 108 kg (238 lb)   SpO2 (!) 89%   BMI 40.85 kg/m² "     Temp (24hrs), Av.1 °F (36.7 °C), Min:97.7 °F (36.5 °C), Max:98.4 °F (36.9 °C)      General Appearance:    Alert, cooperative, in no acute distress   Lungs:     Clear to auscultation,respirations regular, even and                   unlabored    Heart:    Regular rhythm and normal rate, normal S1 and S2   Abdomen:     Normal bowel sounds, no masses, no organomegaly, soft        non-tender, non-distended, no guarding, no rebound                 tenderness   Extremities:   CDI dressing over hip incision   Pulses:   Pulses palpable and equal bilaterally   Skin:   No bleeding, bruising or rash   Neurologic:   Cranial nerves 2 - 12 grossly intact, sensation intact, Flexion and dorsiflexion intact bilateral feet.         Discharge Disposition: Home         Discharge Medications        New Medications        Instructions Start Date   acetaminophen 500 MG tablet  Commonly known as: TYLENOL   1,000 mg, Oral, Every 8 Hours, Take every 8 hours  as needed after 1 week      aspirin 81 MG EC tablet  Commonly known as: ASPIR   81 mg, Oral, 2 Times Daily   Start Date: 2024     docusate sodium 100 MG capsule  Commonly known as: COLACE   100 mg, Oral, 2 Times Daily      naloxone 4 MG/0.1ML nasal spray  Commonly known as: NARCAN   Call 911. Don't prime. [place] in 1 nostril for overdose. Repeat in 2-3 minutes in other nostril if no or minimal breathing/responsiveness.      oxyCODONE 5 MG immediate release tablet  Commonly known as: ROXICODONE   5-10 mg, Oral, Every 4 Hours PRN             Continue These Medications        Instructions Start Date   albuterol sulfate  (90 Base) MCG/ACT inhaler  Commonly known as: PROVENTIL HFA;VENTOLIN HFA;PROAIR HFA   Inhale 2 puffs Every 4 (Four) Hours As Needed for wheezing      alfuzosin 10 MG 24 hr tablet  Commonly known as: UROXATRAL   10 mg, Oral, Daily      amLODIPine 10 MG tablet  Commonly known as: NORVASC   10 mg, Oral, Daily      carisoprodol 350 MG tablet  Commonly  known as: Soma   350 mg, Oral, 2 Times Daily PRN      cevimeline 30 MG capsule  Commonly known as: Evoxac   30 mg, Oral, 3 Times Daily PRN      cyclobenzaprine 10 MG tablet  Commonly known as: FLEXERIL   Take 1 tablet by mouth up to 3 Times a Day As Needed for Muscle Spasms.      dicyclomine 20 MG tablet  Commonly known as: BENTYL   20 mg, Oral, Every 6 Hours      fluticasone 50 MCG/ACT nasal spray  Commonly known as: FLONASE   Use 2 sprays in each nostril as directed by provider Daily.      Fluticasone-Salmeterol 100-50 MCG/ACT DISKUS  Commonly known as: Advair Diskus   1 puff, Inhalation, 2 Times Daily - RT      ibuprofen 600 MG tablet  Commonly known as: ADVIL,MOTRIN   600 mg, Oral, Every 6 Hours PRN      loratadine 10 MG tablet  Commonly known as: CLARITIN   10 mg, Oral, Daily      montelukast 10 MG tablet  Commonly known as: SINGULAIR   10 mg, Oral, Nightly      olopatadine 0.1 % ophthalmic solution  Commonly known as: PATANOL   1 drop, Both Eyes, 2 Times Daily      omeprazole 40 MG capsule  Commonly known as: priLOSEC   40 mg, Oral, Daily      ondansetron ODT 4 MG disintegrating tablet  Commonly known as: ZOFRAN-ODT   4 mg, Translingual, Every 6 Hours PRN      Ozempic (0.25 or 0.5 MG/DOSE) 2 MG/3ML solution pen-injector  Generic drug: Semaglutide(0.25 or 0.5MG/DOS)   0.5 mg, Subcutaneous, Weekly   Start Date: March 3, 2024     promethazine 25 MG tablet  Commonly known as: PHENERGAN   Take 1 tablet by mouth Every 6 (Six) Hours As Needed for nausea and vomiting      rosuvastatin 10 MG tablet  Commonly known as: CRESTOR   10 mg, Oral, Daily      sildenafil 100 MG tablet  Commonly known as: VIAGRA   100 mg, Oral, Daily PRN      silodosin 8 MG capsule capsule  Commonly known as: RAPAFLO   8 mg, Oral, Daily      tadalafil 20 MG tablet  Commonly known as: CIALIS   20 mg, Oral, Daily PRN      Testopel 75 MG implant pellet  Generic drug: Testosterone   Testopel 75 mg implant pellet             Stop These Medications       Antiseptic Skin Cleanser 4 % solution  Generic drug: Chlorhexidine Gluconate              Discharge Diet: Resume prior    Activity at Discharge: Weightbearing as tolerated    Future Appointments   Date Time Provider Department Center   2/26/2024  8:00 AM Dee Zhang PT [facility]   3/18/2024 10:50 AM Torri Escalona PA-C [facility]   4/22/2024  9:00 AM Fransico Ngo PA [facility]        Dragon disclaimer:  Part of this encounter note is an electronic transcription/translation of spoken language to printed text. The electronic translation of spoken language may permit erroneous, or at times, nonsensical words or phrases to be inadvertently transcribed; Although I have reviewed the note for such errors, some may still exist.       Jen Colmenares MD  02/25/24  11:33 EST

## 2024-02-25 NOTE — PLAN OF CARE
Goal Outcome Evaluation:  Plan of Care Reviewed With: patient, spouse        Progress: improving  Outcome Evaluation: Patient discharged home with family. Spouse picked up medication from pharmacy. Surgical dressing C/D/I. PRN PO pain medication administered

## 2024-02-25 NOTE — THERAPY DISCHARGE NOTE
Patient Name: Sebastian Liao  : 1960    MRN: [id number]                              Today's Date: 2024       Admit Date: 2024    Visit Dx:     ICD-10-CM ICD-9-CM   1. Status post total replacement of left hip  Z96.642 V43.64   2. Primary osteoarthritis of left hip  M16.12 715.15     Patient Active Problem List   Diagnosis    Testicular hypofunction    Essential hypertension, benign    Type 2 diabetes mellitus with hyperglycemia, without long-term current use of insulin    Hyperlipidemia    Obesity, Class III, BMI 40-49.9 (morbid obesity)    Dyspnea on exertion    History of COVID-19 pneumonia (10/2020)    Former smoker (None x 35 years)    GERD    RONIT (obstructive sleep apnea) - severe    DDD (degenerative disc disease), cervical    Seasonal allergic rhinitis    Screen for colon cancer    Carpal tunnel syndrome of right wrist    Degenerative arthritis of hip    BPH (benign prostatic hyperplasia)    Status post total replacement of left hip     Past Medical History:   Diagnosis Date    Acute bronchitis     Arthritis     Asthma     Cervicalgia     COVID-19     CPAP (continuous positive airway pressure) dependence     Diabetes mellitus     Edema     GERD (gastroesophageal reflux disease)     HTN (hypertension)     Hyperlipidemia     IBS (irritable bowel syndrome)     IBS (irritable bowel syndrome)     Low testosterone     Prostate disease     Sleep apnea     Wears glasses      Past Surgical History:   Procedure Laterality Date    CARPAL TUNNEL RELEASE Right 2022    Procedure: CARPAL TUNNEL RELEASE RIGHT;  Surgeon: Nj Bliss MD;  Location: [facility];  Service: Neurosurgery;  Laterality: Right;    CERVICAL FUSION      Dr. Nickerson, . [place], [place]    COLONOSCOPY  2012    repeat in ten yrs    HIP SURGERY Right 2016    Dr. Schwartz    NECK SURGERY      cervical spine fusion    TONSILLECTOMY      TOTAL HIP ARTHROPLASTY Left 2024    Procedure: MODIFIED ANTERIOR TOTAL HIP  ARTHROPLASTY - LEFT;  Surgeon: Zane Matthews MD;  [facility];  Service: Orthopedics;  Laterality: Left;      General Information       Row Name 02/25/24 1056          Physical Therapy Time and Intention    Document Type therapy note (daily note);discharge treatment  -SC     Mode of Treatment physical therapy  -SC       Row Name 02/25/24 1056          General Information    Patient Profile Reviewed yes  -SC     Existing Precautions/Restrictions fall;left;hip, anterior  -SC       Row Name 02/25/24 1056          Cognition    Orientation Status (Cognition) oriented x 4  -SC       Row Name 02/25/24 1056          Safety Issues, Functional Mobility    Impairments Affecting Function (Mobility) balance;endurance/activity tolerance;pain;strength;range of motion (ROM);postural/trunk control  -SC     Comment, Safety Issues/Impairments (Mobility) alert, following commands  -SC               User Key  (r) = Recorded By, (t) = Taken By, (c) = Cosigned By      Initials Name Provider Type    SC Yolanda Crowell, PT Physical Therapist                   Mobility       Row Name 02/25/24 1056          Bed Mobility    Comment, (Bed Mobility) uic  -SC       Row Name 02/25/24 1056          Sit-Stand Transfer    Sit-Stand [place] (Transfers) 1 person assist;contact guard;modified independence  -SC     Assistive Device (Sit-Stand Transfers) walker, front-wheeled  -SC     Comment, (Sit-Stand Transfer) goo technique  -SC       Row Name 02/25/24 1056          Gait/Stairs (Locomotion)    [place] Level (Gait) modified independence  -SC     Assistive Device (Gait) walker, front-wheeled  -SC     Distance in Feet (Gait) 300  -SC     Deviations/Abnormal Patterns (Gait) left sided deviations;antalgic;weight shifting decreased;stride length decreased  -SC     Assistive Device (Stairs) cane, straight  -SC     Handrail Location (Stairs) right side (ascending)  -SC     Number of Steps (Stairs) 3  -SC     Comment, (Gait/Stairs) Gt  training focused on achieving step through gait pattern. Encouraged L heel strike and longer step length. No LOB noted. On stairs he recieved cues for sequencing and preformed well  -[facility] Name 02/25/24 1056          Mobility    Extremity Weight-bearing Status left lower extremity  -SC     Left Lower Extremity (Weight-bearing Status) weight-bearing as tolerated (WBAT)  -SC               User Key  (r) = Recorded By, (t) = Taken By, (c) = Cosigned By      Initials Name Provider Type    SC Yolanda Crowell PT Physical Therapist                   Obj/Interventions       [facility] Name 02/25/24 1059          Motor Skills    Therapeutic Exercise hip;knee;ankle  -[facility] Name 02/25/24 1059          Hip (Therapeutic Exercise)    Hip AROM (Therapeutic Exercise) left;extension;aBduction;aDduction;flexion;sitting;10 repetitions  -[facility] Name 02/25/24 1059          Knee (Therapeutic Exercise)    Knee (Therapeutic Exercise) AROM (active range of motion)  -SC     Knee AROM (Therapeutic Exercise) left;10 repetitions;LAQ (long arc quad)  -[facility] Name 02/25/24 1059          Balance    Dynamic Standing Balance modified independence  -SC     Position/Device Used, Standing Balance supported;walker, rolling  -SC     Comment, Balance slow gait , no LOB  -SC               User Key  (r) = Recorded By, (t) = Taken By, (c) = Cosigned By      Initials Name Provider Type    SC Yolanda Crowell, PT Physical Therapist                   Goals/Plan       [facility] Name 02/25/24 1101          Bed Mobility Goal 1 (PT)    Activity/Assistive Device (Bed Mobility Goal 1, PT) bed mobility activities, all  -SC     [place] Level/Cues Needed (Bed Mobility Goal 1, PT) moderate assist (50-74% patient effort)  -SC     Time Frame (Bed Mobility Goal 1, PT) long term goal (LTG);3 days  -SC     Progress/Outcomes (Bed Mobility Goal 1, PT) goal met  -[facility] Name 02/25/24 1101          Transfer Goal 1 (PT)    Activity/Assistive Device (Transfer  Goal 1, PT) sit-to-stand/stand-to-sit;bed-to-chair/chair-to-bed  -SC     [place] Level/Cues Needed (Transfer Goal 1, PT) modified independence  -SC     Time Frame (Transfer Goal 1, PT) long term goal (LTG);3 days  -SC     Progress/Outcome (Transfer Goal 1, PT) goal met  -SC       Row Name 02/25/24 1101          Gait Training Goal 1 (PT)    Activity/Assistive Device (Gait Training Goal 1, PT) gait (walking locomotion);assistive device use;walker, rolling  -SC     [place] Level (Gait Training Goal 1, PT) modified independence  -SC     Distance (Gait Training Goal 1, PT) 150  -SC     Time Frame (Gait Training Goal 1, PT) long term goal (LTG);3 days  -SC     Progress/Outcome (Gait Training Goal 1, PT) goal met  -SC       Row Name 02/25/24 1101          Stairs Goal 1 (PT)    Activity/Assistive Device (Stairs Goal 1, PT) ascending stairs;descending stairs;cane, straight;other (see comments)  -SC     [place] Level/Cues Needed (Stairs Goal 1, PT) modified independence  -SC     Number of Stairs (Stairs Goal 1, PT) 2  -SC     Time Frame (Stairs Goal 1, PT) long term goal (LTG);3 days  -SC     Progress/Outcome (Stairs Goal 1, PT) goal partially met;other (see comments)  needs contact assistance  -SC               User Key  (r) = Recorded By, (t) = Taken By, (c) = Cosigned By      Initials Name Provider Type    SC Yolanda Crowell, PT Physical Therapist                   Clinical Impression       Row Name 02/25/24 1059          Pain    Additional Documentation Pain Scale: FACES Pre/Post-Treatment (Group)  -SC       Row Name 02/25/24 1059          Pain Scale: FACES Pre/Post-Treatment    Pain: FACES Scale, Pretreatment 4-->hurts little more  -SC     Posttreatment Pain Rating 4-->hurts little more  -SC     Pain Location - Side/Orientation Left  -SC     Pain Location - hip  -SC       Row Name 02/25/24 1059          Plan of Care Review    Plan of Care Reviewed With patient  -SC     Progress improving  -SC     Outcome  Evaluation Patient demonstrating improving strength and pain control. He was able to increse his gait distance and achieve safe stairs. He is going home with family assist and OPPT.  -SC       Row Name 02/25/24 1059          Positioning and Restraints    Pre-Treatment Position sitting in chair/recliner  -SC     Post Treatment Position chair  -SC     In Chair notified nsg;reclined;sitting;call light within reach;encouraged to call for assist;exit alarm on  -SC               User Key  (r) = Recorded By, (t) = Taken By, (c) = Cosigned By      Initials Name Provider Type    SC Yolanda Crowell PT Physical Therapist                   Outcome Measures       Row Name 02/25/24 1101          How much help from another person do you currently need...    Turning from your back to your side while in flat bed without using bedrails? 4  -SC     Moving from lying on back to sitting on the side of a flat bed without bedrails? 3  -SC     Moving to and from a bed to a chair (including a wheelchair)? 4  -SC     Standing up from a chair using your arms (e.g., wheelchair, bedside chair)? 4  -SC     Climbing 3-5 steps with a railing? 3  -SC     To walk in hospital room? 4  -SC     AM-PAC 6 Clicks Score (PT) 22  -SC     Highest Level of Mobility Goal 7 --> Walk 25 feet or more  -SC       Row Name 02/25/24 1101          Functional Assessment    Outcome Measure Options AM-PAC 6 Clicks Basic Mobility (PT)  -SC               User Key  (r) = Recorded By, (t) = Taken By, (c) = Cosigned By      Initials Name Provider Type    SC Yolanda Crowell PT Physical Therapist                  Physical Therapy Education       Title: PT OT SLP Therapies (In Progress)       Topic: Physical Therapy (Done)       Point: Mobility training (Done)       Learning Progress Summary             Patient FELIPE Guardado,TB,D,H, DU,GILBERT by SC at 2/25/2024 1102    Comment: reviewed hhi0p precations and HEP    FELIPE Guardado VU by SC at 2/24/2024 1414    Comment: reviewed safety on  stairs with cane    Eager, E, VU by SC at 2/24/2024 0925    Comment: reviewed HEP    Acceptance, E, VU,DU,NR by  at 2/23/2024 1454    Acceptance, E, VU,DU,NR by  at 2/23/2024 1009    Eager, E,H, VU,DU,NR by  at 2/22/2024 1540    Comment: Educated pt. safety/technique w/bed mobility, transfers, ambulation, HEP, PT POC   Family Acceptance, E, VU,DU,NR by  at 2/23/2024 1454                         Point: Home exercise program (Done)       Learning Progress Summary             Patient Eager, E,TB,D,H, DU,VU by SC at 2/25/2024 1102    Comment: reviewed hhi0p precations and HEP    Eager, E, VU by SC at 2/24/2024 1414    Comment: reviewed safety on stairs with cane    Eager, E, VU by SC at 2/24/2024 0925    Comment: reviewed HEP    Acceptance, E, VU,DU,NR by  at 2/23/2024 1454    Acceptance, E, VU,DU,NR by  at 2/23/2024 1009    Eager, E,H, VU,DU,NR by  at 2/22/2024 1540    Comment: Educated pt. safety/technique w/bed mobility, transfers, ambulation, HEP, PT POC   Family Acceptance, E, VU,DU,NR by  at 2/23/2024 1454                         Point: Body mechanics (Done)       Learning Progress Summary             Patient Eager, E,TB,D,H, DU,VU by SC at 2/25/2024 1102    Comment: reviewed hhi0p precations and HEP    Eager, E, VU by SC at 2/24/2024 1414    Comment: reviewed safety on stairs with cane    Eager, E, VU by SC at 2/24/2024 0925    Comment: reviewed HEP    Acceptance, E, VU,DU,NR by  at 2/23/2024 1454    Acceptance, E, VU,DU,NR by  at 2/23/2024 1009    Eager, E,H, VU,DU,NR by  at 2/22/2024 1540    Comment: Educated pt. safety/technique w/bed mobility, transfers, ambulation, HEP, PT POC   Family Socrates, GILBERT WANG,KRISTEN,NR by  at 2/23/2024 1454                         Point: Precautions (Done)       Learning Progress Summary             Patient FELIPE Guardado,TB,D,H, KRISTEN,GILBERT by SC at 2/25/2024 1102    Comment: reviewed hhi0p precations and HEP    FELIPE Guardado VU by SC at 2/24/2024 1414    Comment: reviewed  safety on stairs with cane    Geo, FELIPE, VU by SC at 2/24/2024 0925    Comment: reviewed HEP    Acceptance, E, VU,DU,NR by  at 2/23/2024 1454    Acceptance, E, VU,DU,NR by  at 2/23/2024 1009    Eager, E,H, VU,DU,NR by  at 2/22/2024 1540    Comment: Educated pt. safety/technique w/bed mobility, transfers, ambulation, HEP, PT POC   Family Acceptance, E, VU,DU,NR by  at 2/23/2024 1454                                         User Key       Initials Effective Dates Name Provider Type Discipline    SC 02/03/23 -  Yolanda Crowell, PT Physical Therapist PT     06/01/21 -  Desiree Merritt, PT Physical Therapist PT     09/21/23 -  Rita Dobson, PT Physical Therapist PT                  PT Recommendation and Plan     Plan of Care Reviewed With: patient  Progress: improving  Outcome Evaluation: Patient demonstrating improving strength and pain control. He was able to increse his gait distance and achieve safe stairs. He is going home with family assist and OPPT.     Time Calculation:         PT Charges       Row Name 02/25/24 0915             Time Calculation    Start Time 0915  -SC      PT Received On 02/25/24  -SC      PT Goal Re-Cert Due Date 03/03/24  -SC         Timed Charges    69209 - PT Therapeutic Exercise Minutes 10  -SC      47938 - Gait Training Minutes  25  -SC         Total Minutes    Timed Charges Total Minutes 35  -SC       Total Minutes 35  -SC                User Key  (r) = Recorded By, (t) = Taken By, (c) = Cosigned By      Initials Name Provider Type    SC Yolanda Crowell, PT Physical Therapist                  Therapy Charges for Today       Code Description Service Date Service Provider Modifiers Qty    41215816804 HC PT THER PROC EA 15 MIN 2/24/2024 SocratesKaitlynn delongon A, PT GP 1    26642144768 HC GAIT TRAINING EA 15 MIN 2/24/2024 Kaitlynn Crowellon JOSH, PT GP 2    07034799181 HC GAIT TRAINING EA 15 MIN 2/24/2024 SocratesKaitlynn delongon A, PT GP 2    49917165336 HC PT THER PROC EA 15 MIN 2/25/2024 Kaitlynn Crowellon JOSH,  PT GP 1    08582958994 HC GAIT TRAINING EA 15 MIN 2/25/2024 Yolanda Crowell, PT GP 1            PT G-Codes  Outcome Measure Options: AM-PAC 6 Clicks Basic Mobility (PT)  AM-PAC 6 Clicks Score (PT): 22  AM-PAC 6 Clicks Score (OT): 16    PT Discharge Summary  Anticipated Discharge Disposition (PT): home with assist, home with home health    Yolanda Crowell, PT  2/25/2024

## 2024-02-25 NOTE — PLAN OF CARE
"Assumed care at 19:00.  Pt awake in chair.  A/O, on RA, PAP for HOS, Ax1 with walker to bathroom or to stand with urinal. Hose again to sleep in recliner.  Refused SCDs. Complained of left hip Pain, PRN medication given. Pt states some of the pain was relieved. No acute events.     Blood pressure 140/75, pulse 101, temperature 98.4 °F (36.9 °C), temperature source Oral, resp. rate 18, height 162.6 cm (64\"), weight 108 kg (238 lb), SpO2 95%.       Problem: Adult Inpatient Plan of Care  Goal: Plan of Care Review  Outcome: Ongoing, Progressing  Flowsheets  Taken 2/25/2024 0314 by Hortencia Tarango RN  Progress: improving  Outcome Evaluation: Pt A/O, on RA, PAP for HOS, Ax1 with walker to bathroom or to stand with urinal.  Complained of left hip Pain, PRN medication given.  Pt states some of the pain was relieved. No acute events.  Taken 2/24/2024 1412 by Yoladna Crowell PT  Plan of Care Reviewed With: patient  Goal: Patient-Specific Goal (Individualized)  Outcome: Ongoing, Progressing  Goal: Absence of Hospital-Acquired Illness or Injury  Outcome: Ongoing, Progressing  Intervention: Identify and Manage Fall Risk  Recent Flowsheet Documentation  Taken 2/25/2024 0200 by Hortencia Tarango RN  Safety Promotion/Fall Prevention:   activity supervised   assistive device/personal items within reach   safety round/check completed  Taken 2/25/2024 0000 by Hortencia Tarango RN  Safety Promotion/Fall Prevention:   activity supervised   assistive device/personal items within reach   safety round/check completed  Taken 2/24/2024 2200 by Hortencia Tarango RN  Safety Promotion/Fall Prevention:   activity supervised   assistive device/personal items within reach   safety round/check completed  Taken 2/24/2024 2016 by Hortencia Tarango RN  Safety Promotion/Fall Prevention:   activity supervised   assistive device/personal items within reach   safety round/check completed  Intervention: Prevent Skin Injury  Recent Flowsheet " Documentation  Taken 2/25/2024 0200 by Hortencia Tarango RN  Body Position: (in hair) supine  Taken 2/25/2024 0000 by Hortencia Tarango RN  Body Position: (in chair) sitting up in bed  Taken 2/24/2024 2200 by Hortencia Tarango RN  Body Position: supine, legs elevated  Taken 2/24/2024 2016 by Hortencia Tarango RN  Body Position: (in hair) supine, legs elevated  Intervention: Prevent and Manage VTE (Venous Thromboembolism) Risk  Recent Flowsheet Documentation  Taken 2/25/2024 0200 by Hortencia Tarango RN  Activity Management: up in chair  Taken 2/25/2024 0000 by Hortencia Tarango RN  Activity Management:   up in chair   activity minimized  VTE Prevention/Management:   bilateral   sequential compression devices off   patient refused intervention  Taken 2/24/2024 2200 by Hortencia Tarango RN  Activity Management: up in chair  Taken 2/24/2024 2016 by Hortencia Tarango RN  Activity Management:   up in chair   activity encouraged  VTE Prevention/Management:   bilateral   sequential compression devices off   patient refused intervention  Intervention: Prevent Infection  Recent Flowsheet Documentation  Taken 2/25/2024 0200 by Hortencia Tarango RN  Infection Prevention:   environmental surveillance performed   rest/sleep promoted  Taken 2/24/2024 2200 by Hortencia Tarango RN  Infection Prevention:   environmental surveillance performed   rest/sleep promoted  Taken 2/24/2024 2016 by Hortencia Tarango RN  Infection Prevention:   environmental surveillance performed   rest/sleep promoted  Goal: Optimal Comfort and Wellbeing  Outcome: Ongoing, Progressing  Intervention: Monitor Pain and Promote Comfort  Recent Flowsheet Documentation  Taken 2/25/2024 0000 by Hortencia Tarango RN  Pain Management Interventions: position adjusted  Taken 2/24/2024 2254 by Hortencia Tarango RN  Pain Management Interventions: see MAR  Taken 2/24/2024 2016 by Hortencia Tarango RN  Pain Management Interventions: relaxation techniques  promoted  Intervention: Provide Person-Centered Care  Recent Flowsheet Documentation  Taken 2/25/2024 0000 by Hortencia Tarango RN  Trust Relationship/Rapport:   care explained   choices provided   empathic listening provided   questions answered   thoughts/feelings acknowledged  Taken 2/24/2024 2016 by Hortencia Tarango RN  Trust Relationship/Rapport:   care explained   choices provided   empathic listening provided   questions answered   thoughts/feelings acknowledged  Goal: Readiness for Transition of Care  Outcome: Ongoing, Progressing     Problem: Pain Acute  Goal: Acceptable Pain Control and Functional Ability  Outcome: Ongoing, Progressing  Intervention: Prevent or Manage Pain  Recent Flowsheet Documentation  Taken 2/25/2024 0257 by Hortencia Tarango RN  Sleep/Rest Enhancement: awakenings minimized  Taken 2/24/2024 2016 by Hortencia Tarango RN  Medication Review/Management: medications reviewed  Intervention: Develop Pain Management Plan  Recent Flowsheet Documentation  Taken 2/25/2024 0000 by Hortencia Tarango RN  Pain Management Interventions: position adjusted  Taken 2/24/2024 2254 by Hortencia Tarango RN  Pain Management Interventions: see MAR  Taken 2/24/2024 2016 by Hortencia Tarango RN  Pain Management Interventions: relaxation techniques promoted  Intervention: Optimize Psychosocial Wellbeing  Recent Flowsheet Documentation  Taken 2/25/2024 0000 by Hortencia Tarango RN  Diversional Activities: television  Taken 2/24/2024 2016 by Hortencia Tarango RN  Diversional Activities: television     Problem: Fall Injury Risk  Goal: Absence of Fall and Fall-Related Injury  Outcome: Ongoing, Progressing  Intervention: Identify and Manage Contributors  Recent Flowsheet Documentation  Taken 2/25/2024 0000 by Hortencia Tarango RN  Self-Care Promotion:   independence encouraged   BADL personal objects within reach  Taken 2/24/2024 2016 by Hortencia Tarango RN  Medication Review/Management: medications  reviewed  Intervention: Promote Injury-Free Environment  Recent Flowsheet Documentation  Taken 2/25/2024 0200 by Hortencia Tarango RN  Safety Promotion/Fall Prevention:   activity supervised   assistive device/personal items within reach   safety round/check completed  Taken 2/25/2024 0000 by Hortencia Tarango RN  Safety Promotion/Fall Prevention:   activity supervised   assistive device/personal items within reach   safety round/check completed  Taken 2/24/2024 2200 by Hortencia Tarango RN  Safety Promotion/Fall Prevention:   activity supervised   assistive device/personal items within reach   safety round/check completed  Taken 2/24/2024 2016 by Hortencia Tarango RN  Safety Promotion/Fall Prevention:   activity supervised   assistive device/personal items within reach   safety round/check completed   Goal Outcome Evaluation:  Plan of Care Reviewed With: patient        Progress: improving  Outcome Evaluation: Pt A/O, on RA, PAP for HOS, Ax1 with walker to bathroom or to stand with urinal.  Complained of left hip Pain, PRN medication given.  Pt states some of the pain was relieved. No acute events.

## 2024-02-26 ENCOUNTER — TREATMENT (OUTPATIENT)
Dept: PHYSICAL THERAPY | Facility: CLINIC | Age: 64
End: 2024-02-26
Payer: COMMERCIAL

## 2024-02-26 ENCOUNTER — TELEPHONE (OUTPATIENT)
Dept: ORTHOPEDIC SURGERY | Facility: CLINIC | Age: 64
End: 2024-02-26
Payer: COMMERCIAL

## 2024-02-26 DIAGNOSIS — M25.552 LEFT HIP PAIN: Primary | ICD-10-CM

## 2024-02-26 DIAGNOSIS — Z96.642 STATUS POST TOTAL REPLACEMENT OF LEFT HIP: ICD-10-CM

## 2024-02-26 DIAGNOSIS — Z47.89 ORTHOPEDIC AFTERCARE: ICD-10-CM

## 2024-02-26 PROCEDURE — 97110 THERAPEUTIC EXERCISES: CPT | Performed by: PHYSICAL THERAPIST

## 2024-02-26 PROCEDURE — 97161 PT EVAL LOW COMPLEX 20 MIN: CPT | Performed by: PHYSICAL THERAPIST

## 2024-02-26 NOTE — TELEPHONE ENCOUNTER
Caller: Sebastian Liao    Relationship: Self    Best call back number:     What form or medical record are you requesting: SHORT TERM DISABILITY FORMS FROM [employer]    Who is requesting this form or medical record from you: EMPLOYER      Timeframe paperwork needed: ASAP    Additional notes: PATIENT CALLING BECAUSE HIS OFFICE SAID THEY SENT FORMS AND NEVER GOT THEM BACK

## 2024-02-26 NOTE — PROGRESS NOTES
Physical Therapy Initial Evaluation and Plan of Care      Patient: Sebastian Liao   : 1960  Diagnosis/ICD-10 Code:  Left hip pain [M25.552]  Referring practitioner: Zane Matthews MD    Subjective Evaluation    History of Present Illness  Date of surgery: 2024  Mechanism of injury: S/p left total hip replacement performed on 24.    Currently using bilateral canes (one is straight cane, one is a quad cane)     History of right THR, diabetic           Patient Occupation: [employer] employee Pain  Current pain ratin  Location: left hip  Quality: dull ache and sharp  Aggravating factors: standing, movement, lifting, stairs, ambulation, squatting and sleeping    Patient Goals  Patient goals for therapy: decreased edema, decreased pain, independence with ADLs/IADLs, increased strength, improved balance, return to sport/leisure activities, increased motion and return to work           Objective          Passive Range of Motion   Left Hip   Flexion: 105 degrees   Abduction: 20 degrees   Internal rotation (prone): 0 degrees     Strength/Myotome Testing     Left Hip   Planes of Motion   Flexion: 2  Abduction: 2+    Ambulation   Weight-Bearing Status   Weight-Bearing Status (Left): weight-bearing as tolerated     Additional Weight-Bearing Status Details  Using two canes, one is quad cane and the other is a straight cane. Patient states he does have a walker but the  are broken and he is going to order a new one.     Ambulation: Level Surfaces   Ambulation with assistive device: independent    Observational Gait   Gait: antalgic and circumduction   Decreased walking speed, stride length, left step length and right step length.   Left foot contact pattern: foot flat  Right foot contact pattern: foot flat    Additional Observational Gait Details  Left leg external rotation in standing and walking           Assessment & Plan       Assessment  Impairments: abnormal coordination, abnormal gait, abnormal  muscle firing, abnormal muscle tone, abnormal or restricted ROM, activity intolerance, impaired balance, impaired physical strength, lacks appropriate home exercise program, pain with function and weight-bearing intolerance   Functional limitations: carrying objects, lifting, walking, uncomfortable because of pain, moving in bed, sitting and standing   Assessment details: Patient is a 63 year old male presenting with s/p left NANCY with anterior entry performed on 2/22/24. History of right NANCY and he is diabetic. Signs and symptoms are consistent with this diagnosis and he is using bilateral canes. He was educated on initial HEP, precautions, and use of walker outside of home for safety. He is appropriate for physical therapy to address this issue per surgical protocol.   Barriers to therapy: histroy of right NANCY, obesity, diabetes  Prognosis: good  Prognosis details: Short Term Goals (4 weeks):  1. Patient will be independent with home exercise program.  2. Patient will demonstrate improved hip flexion actively to 100 degrees.   3. Patient will demonstrate heel toe gait pattern for at least 50 feet using LRAD.     Long Term Goals (12 weeks):  1. Patient will be able to walk at least 20 minutes using LRAD.   2. Patient will demonstrate functional squat to sit and stand from a regular chair height without use of hands.   3. Patient will be able to ascend and descend at least 14 steps using LRAD.       Plan  Therapy options: will be seen for skilled therapy services  Planned modality interventions: thermotherapy (hydrocollator packs), TENS, high voltage pulsed current (spasm management), high voltage pulsed current (pain management) and cryotherapy  Planned therapy interventions: therapeutic activities, stretching, strengthening, spinal/joint mobilization, soft tissue mobilization, postural training, neuromuscular re-education, motor coordination training, manual therapy, abdominal trunk stabilization, ADL retraining,  balance/weight-bearing training, body mechanics training, joint mobilization, IADL retraining, home exercise program, gait training, functional ROM exercises, flexibility and fine motor coordination training  Frequency: 2x week  Duration in weeks: 12  Treatment plan discussed with: patient        Manual Therapy:         mins  91489;  Therapeutic Exercise:    45     mins  90486;     Neuromuscular Brandon:        mins  53742;    Therapeutic Activity:          mins  70517;     Gait Training:           mins  69040;     Ultrasound:         mins  62037;    Electrical Stimulation:         mins  38760 ( );  Dry Needling          mins self-pay    Timed Treatment:   45   mins   Total Treatment:     60   mins    PT SIGNATURE: Dee Zhang PT   DATE TREATMENT INITIATED: 2/26/2024    Initial Certification  Certification Period: 5/26/2024  I certify that the therapy services are furnished while this patient is under my care.  The services outlined above are required by this patient, and will be reviewed every 90 days.     PHYSICIAN: Zane Matthews MD      DATE:     Please sign and return via fax to [phone].. Thank you, [facility].

## 2024-02-27 ENCOUNTER — OFFICE VISIT (OUTPATIENT)
Dept: FAMILY MEDICINE CLINIC | Facility: CLINIC | Age: 64
End: 2024-02-27
Payer: COMMERCIAL

## 2024-02-27 VITALS
HEART RATE: 98 BPM | OXYGEN SATURATION: 99 % | SYSTOLIC BLOOD PRESSURE: 134 MMHG | WEIGHT: 233.2 LBS | DIASTOLIC BLOOD PRESSURE: 76 MMHG | BODY MASS INDEX: 39.81 KG/M2 | RESPIRATION RATE: 14 BRPM | HEIGHT: 64 IN

## 2024-02-27 DIAGNOSIS — Z96.642 STATUS POST TOTAL REPLACEMENT OF LEFT HIP: ICD-10-CM

## 2024-02-27 DIAGNOSIS — E11.65 CONTROLLED TYPE 2 DIABETES MELLITUS WITH HYPERGLYCEMIA, WITHOUT LONG-TERM CURRENT USE OF INSULIN: Primary | ICD-10-CM

## 2024-02-27 PROCEDURE — 99213 OFFICE O/P EST LOW 20 MIN: CPT | Performed by: PHYSICIAN ASSISTANT

## 2024-02-27 NOTE — TELEPHONE ENCOUNTER
Called patient and wife answered the phone, patient was still asleep. Wife advised she will make patient aware we called so he can call back.     No [facility] paperwork has been received as of today.     HUB please transfer call to office.

## 2024-02-27 NOTE — PROGRESS NOTES
Subjective   Sebastian Liao is a 63 y.o. male  Diabetes (. A1C 6.9% Told to stop Ozempic while in the hospital and started metformin two days ago) and Hip Pain (Lt hip-had replacement Feb 22)      Diabetes  Pertinent negatives for hypoglycemia include no dizziness or headaches. Pertinent negatives for diabetes include no chest pain, no fatigue and no weakness.   Hip Pain       Patient is a pleasant 63-year-old black male who comes in for follow-up of type 2 diabetes patient had hip replacement on February 22 had to stop Ozempic before having surgery patient's blood sugar in the hospital was high at 280 was told to come to follow-up here.  Ozempic will was stopped he was placed on metformin blood sugar today in office is 142 A1c was 6.8 2 weeks ago      The following portions of the patient's history were reviewed and updated as appropriate: allergies, current medications, past social history and problem list    Review of Systems   Constitutional:  Negative for fatigue and unexpected weight change.   Respiratory:  Negative for cough, chest tightness and shortness of breath.    Cardiovascular:  Negative for chest pain, palpitations and leg swelling.   Gastrointestinal:  Negative for nausea.   Skin:  Negative for color change and rash.   Neurological:  Negative for dizziness, syncope, weakness and headaches.       Objective     Vitals:    02/27/24 0957   BP: 134/76   Pulse: 98   Resp: 14   SpO2: 99%       Physical Exam  Vitals and nursing note reviewed.   Constitutional:       General: He is not in acute distress.     Appearance: Normal appearance. He is well-developed. He is not ill-appearing, toxic-appearing or diaphoretic.   Neck:      Vascular: No carotid bruit or JVD.   Cardiovascular:      Rate and Rhythm: Normal rate and regular rhythm.      Pulses: Normal pulses.      Heart sounds: Normal heart sounds. No murmur heard.  Pulmonary:      Effort: Pulmonary effort is normal. No respiratory distress.       Breath sounds: Normal breath sounds.   Abdominal:      Palpations: Abdomen is soft.      Tenderness: There is no abdominal tenderness.   Skin:     General: Skin is warm and dry.   Neurological:      Mental Status: He is alert.         Assessment & Plan     Diagnoses and all orders for this visit:    1. Controlled type 2 diabetes mellitus with hyperglycemia, without long-term current use of insulin (Primary)    2. Status post total replacement of left hip    #1 blood sugar in office today is controlled with metformin he is to start the Ozempic in 2 weeks he was told to continue with his diet and rehab his hip.    2.  Follow-up in 1 month to recheck blood sugar after restarting Ozempic, restart at 0.25 weekly after a month and then we will move up to .5    I spent 15 minutes in patient care: Reviewing records prior to the visit, examining the patient, entering orders and documentation    Part of this note may be an electronic transcription/translation of spoken language to printed text using the Dragon Dictation System.

## 2024-02-29 ENCOUNTER — TREATMENT (OUTPATIENT)
Dept: PHYSICAL THERAPY | Facility: CLINIC | Age: 64
End: 2024-02-29
Payer: COMMERCIAL

## 2024-02-29 DIAGNOSIS — Z96.642 STATUS POST TOTAL REPLACEMENT OF LEFT HIP: ICD-10-CM

## 2024-02-29 DIAGNOSIS — M25.552 LEFT HIP PAIN: Primary | ICD-10-CM

## 2024-02-29 DIAGNOSIS — Z47.89 ORTHOPEDIC AFTERCARE: ICD-10-CM

## 2024-02-29 PROCEDURE — 97110 THERAPEUTIC EXERCISES: CPT | Performed by: PHYSICAL THERAPIST

## 2024-02-29 NOTE — PROGRESS NOTES
Physical Therapy Daily Treatment Note         [address]              [place] [address]    Patient: Sebastian Liao   : 1960  Diagnosis/ICD-10 Code:  Left hip pain [M25.552]  Referring practitioner: Zane Matthews MD  Date of Initial Visit: Type: THERAPY  Noted: 2024  Today's Date: 2024  Patient seen for 2 sessions         Sebastian Liao reports: having a lot of soreness and stiffness. Wearing compression hose.     Patient using front wheeled walker today.     Objective   See Exercise, Manual, and Modality Logs for complete treatment.       Assessment/Plan  Will progress hip strengthening as tolerated. Gait pattern improved with walker. Cues for standing up using hands on chair rather than pulling up from walker.   Progress per Plan of Care           Manual Therapy:         mins  19659;  Therapeutic Exercise:    53     mins  30023;     Neuromuscular Brandon:        mins  22665;    Therapeutic Activity:          mins  67203;     Gait Training:           mins  15343;     Ultrasound:          mins  74841;    Electrical Stimulation:         mins  27125 ( );  Dry Needling          mins self-pay    Timed Treatment:   53   mins   Total Treatment:     53   mins    Dee Zhang PT  Physical Therapist

## 2024-03-01 ENCOUNTER — TELEPHONE (OUTPATIENT)
Dept: ORTHOPEDIC SURGERY | Facility: CLINIC | Age: 64
End: 2024-03-01
Payer: COMMERCIAL

## 2024-03-01 RX ORDER — MELOXICAM 15 MG/1
TABLET ORAL
Qty: 14 TABLET | Refills: 0 | Status: SHIPPED | OUTPATIENT
Start: 2024-03-01

## 2024-03-01 NOTE — TELEPHONE ENCOUNTER
Provider: DR. RUBIO    Caller: ADRY VIDAL    Relationship to Patient: SELF    Pharmacy: [facility]    Phone Number: [phone]    Reason for Call: PATIENT CALLED STATING HE IS TAKING oxyCODONE (ROXICODONE) 5 MG immediate release tablet AND REGULAR TYLENOL BUT IT'S NOT DOING MUCH FOR HIS HIP PAIN. WANTS TO KNOW IF HE CAN BE GIVEN ANYTHING TO HELP EASE THE PAIN.    MODIFIED ANTERIOR TOTAL HIP ARTHROPLASTY - LEFT 02/22/24    REQUESTING A CALL BACK. PLEASE ADVISE.

## 2024-03-02 ENCOUNTER — APPOINTMENT (OUTPATIENT)
Dept: CT IMAGING | Facility: HOSPITAL | Age: 64
End: 2024-03-02
Payer: COMMERCIAL

## 2024-03-02 ENCOUNTER — HOSPITAL ENCOUNTER (EMERGENCY)
Facility: HOSPITAL | Age: 64
Discharge: HOME | End: 2024-03-02
Attending: EMERGENCY MEDICINE
Payer: COMMERCIAL

## 2024-03-02 VITALS
OXYGEN SATURATION: 99 % | BODY MASS INDEX: 39.15 KG/M2 | TEMPERATURE: 97.8 F | RESPIRATION RATE: 19 BRPM | HEART RATE: 103 BPM | SYSTOLIC BLOOD PRESSURE: 121 MMHG | WEIGHT: 235 LBS | DIASTOLIC BLOOD PRESSURE: 72 MMHG | HEIGHT: 65 IN

## 2024-03-02 DIAGNOSIS — R10.84 GENERALIZED ABDOMINAL PAIN: Primary | ICD-10-CM

## 2024-03-02 DIAGNOSIS — K59.03 DRUG-INDUCED CONSTIPATION: ICD-10-CM

## 2024-03-02 LAB
ALBUMIN SERPL-MCNC: 3.6 G/DL (ref 3.5–5.2)
ALBUMIN/GLOB SERPL: 1 G/DL
ALP SERPL-CCNC: 100 U/L (ref 39–117)
ALT SERPL W P-5'-P-CCNC: 21 U/L (ref 1–41)
ANION GAP SERPL CALCULATED.3IONS-SCNC: 10 MMOL/L (ref 5–15)
AST SERPL-CCNC: 26 U/L (ref 1–40)
BASOPHILS # BLD AUTO: 0.07 10*3/MM3 (ref 0–0.2)
BASOPHILS NFR BLD AUTO: 0.6 % (ref 0–1.5)
BILIRUB SERPL-MCNC: 0.3 MG/DL (ref 0–1.2)
BUN SERPL-MCNC: 16 MG/DL (ref 8–23)
BUN/CREAT SERPL: 15.4 (ref 7–25)
CALCIUM SPEC-SCNC: 8.9 MG/DL (ref 8.6–10.5)
CHLORIDE SERPL-SCNC: 95 MMOL/L (ref 98–107)
CO2 SERPL-SCNC: 26 MMOL/L (ref 22–29)
CREAT SERPL-MCNC: 1.04 MG/DL (ref 0.76–1.27)
DEPRECATED RDW RBC AUTO: 46.8 FL (ref 37–54)
EGFRCR SERPLBLD CKD-EPI 2021: 81.7 ML/MIN/1.73
EOSINOPHIL # BLD AUTO: 0.91 10*3/MM3 (ref 0–0.4)
EOSINOPHIL NFR BLD AUTO: 7.5 % (ref 0.3–6.2)
ERYTHROCYTE [DISTWIDTH] IN BLOOD BY AUTOMATED COUNT: 14.3 % (ref 12.3–15.4)
GLOBULIN UR ELPH-MCNC: 3.6 GM/DL
GLUCOSE SERPL-MCNC: 107 MG/DL (ref 65–99)
HCT VFR BLD AUTO: 38.1 % (ref 37.5–51)
HGB BLD-MCNC: 11.9 G/DL (ref 13–17.7)
IMM GRANULOCYTES # BLD AUTO: 0.2 10*3/MM3 (ref 0–0.05)
IMM GRANULOCYTES NFR BLD AUTO: 1.7 % (ref 0–0.5)
LIPASE SERPL-CCNC: 37 U/L (ref 13–60)
LYMPHOCYTES # BLD AUTO: 2.2 10*3/MM3 (ref 0.7–3.1)
LYMPHOCYTES NFR BLD AUTO: 18.2 % (ref 19.6–45.3)
MCH RBC QN AUTO: 28.1 PG (ref 26.6–33)
MCHC RBC AUTO-ENTMCNC: 31.2 G/DL (ref 31.5–35.7)
MCV RBC AUTO: 90.1 FL (ref 79–97)
MONOCYTES # BLD AUTO: 1.17 10*3/MM3 (ref 0.1–0.9)
MONOCYTES NFR BLD AUTO: 9.7 % (ref 5–12)
NEUTROPHILS NFR BLD AUTO: 62.3 % (ref 42.7–76)
NEUTROPHILS NFR BLD AUTO: 7.51 10*3/MM3 (ref 1.7–7)
NRBC BLD AUTO-RTO: 0 /100 WBC (ref 0–0.2)
PLATELET # BLD AUTO: 661 10*3/MM3 (ref 140–450)
PMV BLD AUTO: 9 FL (ref 6–12)
POTASSIUM SERPL-SCNC: 4.4 MMOL/L (ref 3.5–5.2)
PROT SERPL-MCNC: 7.2 G/DL (ref 6–8.5)
RBC # BLD AUTO: 4.23 10*6/MM3 (ref 4.14–5.8)
SODIUM SERPL-SCNC: 131 MMOL/L (ref 136–145)
WBC NRBC COR # BLD AUTO: 12.06 10*3/MM3 (ref 3.4–10.8)

## 2024-03-02 PROCEDURE — 80053 COMPREHEN METABOLIC PANEL: CPT | Performed by: EMERGENCY MEDICINE

## 2024-03-02 PROCEDURE — 25010000002 MORPHINE PER 10 MG: Performed by: EMERGENCY MEDICINE

## 2024-03-02 PROCEDURE — 99284 EMERGENCY DEPT VISIT MOD MDM: CPT

## 2024-03-02 PROCEDURE — 74176 CT ABD & PELVIS W/O CONTRAST: CPT

## 2024-03-02 PROCEDURE — 83690 ASSAY OF LIPASE: CPT | Performed by: EMERGENCY MEDICINE

## 2024-03-02 PROCEDURE — 96374 THER/PROPH/DIAG INJ IV PUSH: CPT

## 2024-03-02 PROCEDURE — 96375 TX/PRO/DX INJ NEW DRUG ADDON: CPT

## 2024-03-02 PROCEDURE — 36415 COLL VENOUS BLD VENIPUNCTURE: CPT

## 2024-03-02 PROCEDURE — 25010000002 ONDANSETRON PER 1 MG: Performed by: EMERGENCY MEDICINE

## 2024-03-02 PROCEDURE — 85025 COMPLETE CBC W/AUTO DIFF WBC: CPT | Performed by: EMERGENCY MEDICINE

## 2024-03-02 RX ORDER — SODIUM CHLORIDE 0.9 % (FLUSH) 0.9 %
10 SYRINGE (ML) INJECTION AS NEEDED
Status: DISCONTINUED | OUTPATIENT
Start: 2024-03-02 | End: 2024-03-02 | Stop reason: HOSPADM

## 2024-03-02 RX ORDER — MORPHINE SULFATE 4 MG/ML
4 INJECTION, SOLUTION INTRAMUSCULAR; INTRAVENOUS ONCE
Status: COMPLETED | OUTPATIENT
Start: 2024-03-02 | End: 2024-03-02

## 2024-03-02 RX ORDER — ONDANSETRON 2 MG/ML
4 INJECTION INTRAMUSCULAR; INTRAVENOUS ONCE
Status: COMPLETED | OUTPATIENT
Start: 2024-03-02 | End: 2024-03-02

## 2024-03-02 RX ADMIN — MORPHINE SULFATE 4 MG: 4 INJECTION, SOLUTION INTRAMUSCULAR; INTRAVENOUS at 05:56

## 2024-03-02 RX ADMIN — ONDANSETRON 4 MG: 2 INJECTION INTRAMUSCULAR; INTRAVENOUS at 05:55

## 2024-03-02 NOTE — ED PROVIDER NOTES
Subjective   History of Present Illness  Is a 63-year-old male with a history of diabetes presented to the emergency department with some abdominal discomfort.  Patient is had the symptoms for the last 3 days.  Is been complaining some burning in his epigastric region as well as some bloating in his abdomen.  Patient states that he does get this from time to time, normally resolves.  Patient has had some mild nausea, however denies any vomiting.  He states that he is passing gas without any difficulty.  He did have a recent hip surgery and has been taking pain medication for this.  Denies any fevers or chills.  No headache or change in vision.  No focal weakness.  No chest pain or shortness of breath.    History provided by:  Patient   used: No        Review of Systems   Constitutional:  Negative for chills and fever.   HENT:  Negative for congestion, ear pain and sore throat.    Eyes:  Negative for visual disturbance.   Respiratory:  Negative for shortness of breath.    Cardiovascular:  Negative for chest pain.   Gastrointestinal:  Positive for abdominal pain and nausea.   Genitourinary:  Negative for difficulty urinating.   Musculoskeletal:  Negative for arthralgias.   Skin:  Negative for rash.   Neurological:  Negative for dizziness, weakness and numbness.   Psychiatric/Behavioral:  Negative for agitation.        Past Medical History:   Diagnosis Date    Acute bronchitis     Arthritis     Asthma     Cervicalgia     COVID-19     CPAP (continuous positive airway pressure) dependence     Diabetes mellitus     Edema     GERD (gastroesophageal reflux disease)     HTN (hypertension)     Hyperlipidemia     IBS (irritable bowel syndrome)     IBS (irritable bowel syndrome)     Low testosterone     Prostate disease     Sleep apnea     Wears glasses        No Known Allergies    Past Surgical History:   Procedure Laterality Date    CARPAL TUNNEL RELEASE Right 12/12/2022    Procedure: CARPAL TUNNEL RELEASE  RIGHT;  Surgeon: Nj Bliss MD;  Location: [facility] OR;  Service: Neurosurgery;  Laterality: Right;    CERVICAL FUSION      Dr. Nickerson, . [place], [place]    COLONOSCOPY  2012    repeat in ten yrs    HIP SURGERY Right 2016    Dr. Schwartz    NECK SURGERY      cervical spine fusion    TONSILLECTOMY      TOTAL HIP ARTHROPLASTY Left 2024    Procedure: MODIFIED ANTERIOR TOTAL HIP ARTHROPLASTY - LEFT;  Surgeon: Zane Matthews MD;  Location: [facility] OR;  Service: Orthopedics;  Laterality: Left;       Family History   Problem Relation Age of Onset    Cancer Mother         Ovarian,     Stroke Father     Hypertension Father     Hypertension Sister     Diabetes Brother     Heart attack Brother     Hypertension Maternal Grandmother     Diabetes Maternal Grandmother        Social History     Socioeconomic History    Marital status:    Tobacco Use    Smoking status: Former     Current packs/day: 0.00     Average packs/day: 0.5 packs/day for 10.0 years (5.0 ttl pk-yrs)     Types: Cigarettes     Start date:      Quit date:      Years since quittin.1    Smokeless tobacco: Former     Types: Chew     Quit date: 1986   Vaping Use    Vaping status: Never Used   Substance and Sexual Activity    Alcohol use: Yes     Comment: 2 drinks per month    Drug use: Yes     Types: Marijuana     Comment: Quit in     Sexual activity: Defer           Objective   Physical Exam  Vitals and nursing note reviewed.   Constitutional:       General: He is not in acute distress.     Appearance: He is not ill-appearing or toxic-appearing.   HENT:      Mouth/Throat:      Pharynx: No posterior oropharyngeal erythema.   Eyes:      Extraocular Movements: Extraocular movements intact.      Pupils: Pupils are equal, round, and reactive to light.   Cardiovascular:      Rate and Rhythm: Normal rate and regular rhythm.   Pulmonary:      Effort: Pulmonary effort is normal. No respiratory distress.   Abdominal:       General: Abdomen is flat. There is no distension.      Palpations: There is no mass.      Tenderness: There is abdominal tenderness in the epigastric area. There is no guarding or rebound.   Musculoskeletal:         General: No deformity. Normal range of motion.   Neurological:      General: No focal deficit present.      Mental Status: He is alert.      Sensory: No sensory deficit.      Motor: No weakness.         Procedures           ED Course  ED Course as of 03/02/24 0642   Sat Mar 02, 2024   0525 BP: 153/80 [JK]   0525 Temp: 97.8 °F (36.6 °C) [JK]   0525 Temp src: Oral [JK]   0525 Heart Rate: 103 [JK]   0525 Resp: 19 [JK]   0525 SpO2: 99 %  Patient's repeat vitals, telemetry tracing, and pulse oximetry tracing were directly viewed and interpreted by myself.  Sinus tachycardia [JK]   0640 CBC & Differential(!) [JK]   0640 Lipase [JK]   0640 Comprehensive Metabolic Panel(!)  Laboratory studies were reviewed and interpreted directly by myself.  CBC was unremarkable, lipase normal, CMP normal [JK]   0640 CT Abdomen Pelvis Without Contrast  Imaging was directly visualized by myself, per my interpretations, CT abdomen pelvis showed left hip replacement, no abdominal pathology. [JK]   0640 On reevaluation, the patient is feeling much better.  Repeat abdominal examination is benign.  Is tolerating oral intake.  No evidence of acute abdomen or peritonitis.  Do believe he is having some constipation as distention likely from his pain medication.  We did have a long discussion about appropriate diet and use of laxatives which he already has prescribed to him.  We also evaluated the patient's wound because he was complaining of some saturated dressings.  We did thoroughly irrigate the area and placed sterile dressing.  Patient follow-up with his primary physician and orthopedics as previously prescribed.  Given strict return precautions.  Verbalized understanding. [JK]   0641 I had a discussion with the patient/family  regarding diagnosis, diagnostic results, treatment plan, and medications. The patient/family indicated understanding of these instructions. I spent adequate time at the bedside prior to discharge necessary to discuss the aftercare instructions, giving patient education, providing explanations of the results of our evaluations/findings, and my decision making to assure that the patient/family understand the plan of care. Time was allotted to answer questions at that time and throughout the ED course. Patient is required to maintain timely follow up, as discussed. I also discussed the potential for the development of an acute emergent condition requiring further evaluation, return to the ER, admission, or even surgical intervention.  I encouraged the patient to return to the emergency department immediately for any concerns, worsening symptoms, new complaints, or if symptoms persist and they are unable to seek follow-up in a timely fashion. The patient/family expressed understanding and agreement with this plan    Shared decision making:   After full review of the patient's clinical presentation, review of any work-up including but not limited to laboratory studies and radiology obtained, I had a discussion with the patient.  Treatment options were discussed as well as the risks, benefits and consequences.  I discussed all findings with the patient and family members if available.  During the discussion, treatment goals were understood by all as well as any misconceptions which were addressed with the patient.  Ample time was given for any questions they may have had.  They are in agreement with the treatment plan as well as final disposition. [JK]      ED Course User Index  [JK] Rudy Pacheco MD                                             Medical Decision Making  This is a 63-year-old male with a history of hypertension and GERD presenting to the emergency department with some epigastric discomfort.  The  patient symptoms seem consistent with dyspepsia.  However given his recent surgery I am concerned for possible bowel obstruction.  Patient has some minimal tenderness on examination, however there is no evidence of acute abdomen or peritonitis.  Patient is afebrile.  Nontoxic.  IV access will be established and the patient.  Workup initiated.      Differential diagnosis: Peptic ulcer disease, dyspepsia, GERD, pancreatitis, biliary colic, electrolyte abnormality, acute kidney injury, bowel obstruction      Amount and/or Complexity of Data Reviewed  External Data Reviewed: labs, radiology and notes.     Details: External laboratories, imaging as well as notes were reviewed personally by myself.  All relevant studies were used to guide decision making.     Date of previous record: 2/22/2024    Source of note: Admission record    Summary: Patient was admitted for left total hip replacement.  I did review basic laboratory studies on file as well as a previous chest x-ray.  Records reviewed    Labs: ordered. Decision-making details documented in ED Course.  Radiology: ordered and independent interpretation performed. Decision-making details documented in ED Course.    Risk  Prescription drug management.        Final diagnoses:   Generalized abdominal pain   Drug-induced constipation       ED Disposition  ED Disposition       ED Disposition   Discharge    Condition   Stable    Comment   --               Fransico Ngo, PA  [address]  [place] [id number]  [phone]    Call in 1 day           Medication List      No changes were made to your prescriptions during this visit.            Rudy Pacheco MD  03/02/24 0660

## 2024-03-05 ENCOUNTER — TREATMENT (OUTPATIENT)
Dept: PHYSICAL THERAPY | Facility: CLINIC | Age: 64
End: 2024-03-05
Payer: COMMERCIAL

## 2024-03-05 ENCOUNTER — TELEPHONE (OUTPATIENT)
Dept: ORTHOPEDIC SURGERY | Facility: CLINIC | Age: 64
End: 2024-03-05
Payer: COMMERCIAL

## 2024-03-05 DIAGNOSIS — M25.552 LEFT HIP PAIN: Primary | ICD-10-CM

## 2024-03-05 DIAGNOSIS — Z96.642 STATUS POST TOTAL REPLACEMENT OF LEFT HIP: ICD-10-CM

## 2024-03-05 DIAGNOSIS — Z47.89 ORTHOPEDIC AFTERCARE: ICD-10-CM

## 2024-03-05 PROCEDURE — 97110 THERAPEUTIC EXERCISES: CPT | Performed by: PHYSICAL THERAPIST

## 2024-03-05 NOTE — TELEPHONE ENCOUNTER
PLEASE CALL / LEAVE VMAIL NOTIFYING PATIENT WHEN PATIENT's LEFT HIP PHYSICAL THERAPY ORDER HAS BEEN FAXED WITH SURGERY INFO INCLUDING  [facility] ADMISSION & DISCHARGE DATE (HAD LEFT HIP SURGERY 02-22-24 BY DR RUBIO)     TO [employer] @ [phone]    PATIENT STATED IF APPROVED, HE MAY GET REIMBURSED FOR COST OF PHYSICAL THERAPY VISITS     PATIENT EXPECTING TO GO BACK TO WORK w/[employer] IN MAY     THANKS

## 2024-03-05 NOTE — PROGRESS NOTES
Physical Therapy Daily Treatment Note         [address] [facility] [address]              [place] [address]    Patient: Sebastian Liao   : 1960  Diagnosis/ICD-10 Code:  Left hip pain [M25.552]  Referring practitioner: Zane Matthews MD  Date of Initial Visit: Type: THERAPY  Noted: 2024  Today's Date: 3/5/2024  Patient seen for 3 sessions         Sebastian Liao reports: doing very well today but did have to go to hospital on Friday due to pain and wound leaking. They have him on different medicine which helped and changing bandage more frequently.         Objective   See Exercise, Manual, and Modality Logs for complete treatment.       Assessment/Plan  Added standing hip AROM today with good tolerance   Progress per Plan of Care           Manual Therapy:         mins  25783;  Therapeutic Exercise:    55     mins  78234;     Neuromuscular Brandon:        mins  00647;    Therapeutic Activity:          mins  37586;     Gait Training:           mins  75714;     Ultrasound:          mins  90487;    Electrical Stimulation:        mins  01467 ( );  Dry Needling          mins self-pay    Timed Treatment:   55   mins   Total Treatment:     55   mins    Dee Zhang PT  Physical Therapist

## 2024-03-06 NOTE — TELEPHONE ENCOUNTER
Faxed PT order including sx note to [facility] @ [phone].     Called patient to notify, there was no answer. Left vm

## 2024-03-07 ENCOUNTER — TREATMENT (OUTPATIENT)
Dept: PHYSICAL THERAPY | Facility: CLINIC | Age: 64
End: 2024-03-07
Payer: COMMERCIAL

## 2024-03-07 DIAGNOSIS — Z47.89 ORTHOPEDIC AFTERCARE: ICD-10-CM

## 2024-03-07 DIAGNOSIS — M25.552 LEFT HIP PAIN: Primary | ICD-10-CM

## 2024-03-07 DIAGNOSIS — Z96.642 STATUS POST TOTAL REPLACEMENT OF LEFT HIP: ICD-10-CM

## 2024-03-07 PROCEDURE — 97110 THERAPEUTIC EXERCISES: CPT | Performed by: PHYSICAL THERAPIST

## 2024-03-07 NOTE — PROGRESS NOTES
Physical Therapy Daily Treatment Note         [address] [facility] [address] [address]              [place] [address]    Patient: Sebastian Liao   : 1960  Diagnosis/ICD-10 Code:  Left hip pain [M25.552]  Referring practitioner: Zane Matthews MD  Date of Initial Visit: Type: THERAPY  Noted: 2024  Today's Date: 3/7/2024   Patient seen for 4 sessions         Sebastian Liao reports: moving around better but still has toothache like pain at night.         Objective   See Exercise, Manual, and Modality Logs for complete treatment.       Assessment/Plan  Progressed to gravity reduced squats. Gravity reduced abduction much improved today  Progress per Plan of Care           Manual Therapy:         mins  64978;  Therapeutic Exercise:    56     mins  21751;     Neuromuscular Brandon:        mins  40451;    Therapeutic Activity:          mins  09471;     Gait Training:           mins  91809;     Ultrasound:          mins  85853;    Electrical Stimulation:         mins  18074 ( );  Dry Needling          mins self-pay    Timed Treatment:   56   mins   Total Treatment:     56   mins    Dee Zhang PT  Physical Therapist

## 2024-03-08 ENCOUNTER — TELEPHONE (OUTPATIENT)
Dept: ORTHOPEDIC SURGERY | Facility: CLINIC | Age: 64
End: 2024-03-08
Payer: COMMERCIAL

## 2024-03-08 RX ORDER — OXYCODONE HYDROCHLORIDE 5 MG/1
5 TABLET ORAL EVERY 8 HOURS PRN
Qty: 20 TABLET | Refills: 0 | Status: SHIPPED | OUTPATIENT
Start: 2024-03-08

## 2024-03-08 NOTE — TELEPHONE ENCOUNTER
I called and let him know.  He had the wrong fax number.  He will have them re-send the paperwork. bert

## 2024-03-08 NOTE — TELEPHONE ENCOUNTER
Provider:  DR.MICHAEL RUBIO    Caller: ADRY VIDAL    Relationship to Patient: SELF    Pharmacy: [facility] [phone]    Phone Number: [phone]    Reason for Call:  1) PATIENT CHECKING TO SEE IF WE HAVE RECEIVED SHORT TERM DISABILITY PAPERWORK FROM [employer] AND [facility].    2) PATIENT HAS BEEN TAKING THE MOBIC PRESCRIBED AND IT HELPS DURING THE DAY BUT PATIENT IS WAKING UP AT NIGHT WITH THROBBING PAIN IN HIS WHOLE LEFT LEG DOWN TO HIS TOES. ASKING FOR SOMETHING TO HELP THE PAIN. PLEASE LET PATIENT KNOW IF SOMETHING IS CALLED TO HIS PHARMACY.    When was the patient last seen: 2/22/24

## 2024-03-12 ENCOUNTER — TELEPHONE (OUTPATIENT)
Dept: PHYSICAL THERAPY | Facility: CLINIC | Age: 64
End: 2024-03-12

## 2024-03-12 NOTE — TELEPHONE ENCOUNTER
Caller: Shani Liao    Relationship: Emergency Contact         What was the call regarding: NOT FEELING WELL TODAY STOMACH

## 2024-03-14 ENCOUNTER — TELEPHONE (OUTPATIENT)
Dept: ORTHOPEDIC SURGERY | Facility: CLINIC | Age: 64
End: 2024-03-14
Payer: COMMERCIAL

## 2024-03-14 ENCOUNTER — TREATMENT (OUTPATIENT)
Dept: PHYSICAL THERAPY | Facility: CLINIC | Age: 64
End: 2024-03-14
Payer: COMMERCIAL

## 2024-03-14 DIAGNOSIS — Z96.642 STATUS POST TOTAL REPLACEMENT OF LEFT HIP: ICD-10-CM

## 2024-03-14 DIAGNOSIS — Z47.89 ORTHOPEDIC AFTERCARE: ICD-10-CM

## 2024-03-14 DIAGNOSIS — M25.552 LEFT HIP PAIN: Primary | ICD-10-CM

## 2024-03-14 PROCEDURE — 97110 THERAPEUTIC EXERCISES: CPT | Performed by: PHYSICAL THERAPIST

## 2024-03-14 PROCEDURE — 97116 GAIT TRAINING THERAPY: CPT | Performed by: PHYSICAL THERAPIST

## 2024-03-14 NOTE — TELEPHONE ENCOUNTER
Provider: RAMIRO    Caller: ADRY VIDAL    Relationship to Patient: PATIENT    Pharmacy: NA    Phone Number: [phone]    Reason for Call: PATIENT CALLING TO CHECK THE STATUS OF PAPERWORK HE LEFT ON MONDAY    When was the patient last seen: 2.22.24

## 2024-03-14 NOTE — PROGRESS NOTES
Physical Therapy Daily Treatment Note         [address]              [place] [address]    Patient: Sebastian Liao   : 1960  Diagnosis/ICD-10 Code:  Left hip pain [M25.552]  Referring practitioner: Zane Matthews MD  Date of Initial Visit: Type: THERAPY  Noted: 2024  Today's Date: 3/14/2024  Patient seen for 5 sessions         Sebastian Liao reports: pain improving, moving around better.         Objective   Gait: walking with quad cane, cues for step length, gait pattern, heel-toe step.    See Exercise, Manual, and Modality Logs for complete treatment.       Assessment/Plan  Demonstrated great use of quad cane, can use his cane at home and walker outside of home, likely can step down to cane outside of home in a few days as he feels comfortable.   Progress per Plan of Care           Manual Therapy:         mins  64454;  Therapeutic Exercise:    56     mins  95180;     Neuromuscular Brandon:        mins  67383;    Therapeutic Activity:          mins  33470;     Gait Training:      10     mins  34892;     Ultrasound:          mins  93953;    Electrical Stimulation:         mins  36404 ( );  Dry Needling          mins self-pay    Timed Treatment:   66   mins   Total Treatment:     66   mins    Dee Zhang PT  Physical Therapist

## 2024-03-14 NOTE — TELEPHONE ENCOUNTER
Spoke with patient and provided update of FMLA. Advised we will fax once its complete as well as call patient to notify. Pt understood.

## 2024-03-18 ENCOUNTER — OFFICE VISIT (OUTPATIENT)
Dept: ORTHOPEDIC SURGERY | Facility: CLINIC | Age: 64
End: 2024-03-18
Payer: COMMERCIAL

## 2024-03-18 VITALS — TEMPERATURE: 97.3 F

## 2024-03-18 DIAGNOSIS — R11.0 NAUSEA: ICD-10-CM

## 2024-03-18 DIAGNOSIS — R06.02 SHORTNESS OF BREATH: ICD-10-CM

## 2024-03-18 DIAGNOSIS — Z96.642 STATUS POST TOTAL HIP REPLACEMENT, LEFT: Primary | ICD-10-CM

## 2024-03-18 PROCEDURE — 99024 POSTOP FOLLOW-UP VISIT: CPT | Performed by: PHYSICIAN ASSISTANT

## 2024-03-18 RX ORDER — PROMETHAZINE HYDROCHLORIDE 25 MG/1
25 TABLET ORAL EVERY 6 HOURS PRN
Qty: 60 TABLET | Refills: 3 | Status: SHIPPED | OUTPATIENT
Start: 2024-03-18

## 2024-03-18 RX ORDER — OXYCODONE HYDROCHLORIDE 5 MG/1
5 TABLET ORAL EVERY 12 HOURS PRN
Qty: 20 TABLET | Refills: 0 | Status: SHIPPED | OUTPATIENT
Start: 2024-03-18

## 2024-03-18 RX ORDER — FLUTICASONE PROPIONATE AND SALMETEROL 100; 50 UG/1; UG/1
1 POWDER RESPIRATORY (INHALATION)
Qty: 180 EACH | Refills: 3 | Status: SHIPPED | OUTPATIENT
Start: 2024-03-18

## 2024-03-18 NOTE — PROGRESS NOTES
[facility] Note        Subjective     Post-op (3 week S/P MODIFIED ANTERIOR TOTAL HIP ARTHROPLASTY - LEFT (DOS 02/22/2024))       HPI    Sebastian Liao is a 63 y.o. male.  Patient presents for their initial postop visit following left modified anterior NANCY performed on the above date by Dr. Matthews.    Pain scale: Reports 9/10 but states moving around loosens it up and his pain improves.  States 100% improved since presurgical level. Associated symptoms pain, stiffness. Pain with walking, sleeping, rising from a seated position.  Medication helps to ease the pain. Using cane to assist with ambulation. Attending OPPT.    Patient denies any fever, chills, night sweats or other constitutional symptoms.          Objective      Physical Exam  Temp 97.3 °F (36.3 °C)     There is no height or weight on file to calculate BMI.        Ortho Exam  Integument:   Left hip: Incision is healing well without redness, warmth, drainage or signs of infection.    Lower Extremity:   Left hip:    Tenderness:  Generalized pain typical following NANCY    Swelling:  None    Crepitus:  None    Range of motion:  External Rotation: 30°       Internal Rotation: 30°       Flexion:  100°       Extension:  0°    Deformities:  None  Functional testing: [facility]    No leg length discrepancy      Imaging Reviewed:  Ordered left hip plain films.  Imaging read/interpreted by Dr. Matthews.    Left Hip Radiographs  Indication: status-post left total hip arthroplasty  Views: low AP pelvis and lateral of the left hip     Comparison: no change compared to prior study, 2/22/2024     Findings:   The components are well aligned, with no signs of loosening or failure.       Assessment:  1. Status post total hip replacement, left        Plan:  Status post left modified anterior NANCY, stable.  Reviewed imaging with patient.  Continue with outpatient PT.  Requested refill of narcotic pain medication, provided by Dr. Matthews.  Recommend  OTC NSAIDS/pain medication as needed.  Continue with ASA as directed for DVT prophylaxis.  No dental procedures until minimum of 3 months out from surgery.  Patient will require indefinite antibiotic prophylaxis before dental procedures.  Follow up with Dr. Matthews in 6 weeks for repeat evaluation.  No imaging needed.  Questions and concerns answered.      Torri Escalona PA-C  03/18/24  11:14 EDT      Dictated Utilizing Dragon Dictation.

## 2024-03-18 NOTE — TELEPHONE ENCOUNTER
Pt called asking for a refill of his Advair Diskus. Sent 90 day supply to [facility] retail pharmacy.

## 2024-03-19 ENCOUNTER — TREATMENT (OUTPATIENT)
Dept: PHYSICAL THERAPY | Facility: CLINIC | Age: 64
End: 2024-03-19
Payer: COMMERCIAL

## 2024-03-19 DIAGNOSIS — M25.552 LEFT HIP PAIN: Primary | ICD-10-CM

## 2024-03-19 DIAGNOSIS — Z47.89 ORTHOPEDIC AFTERCARE: ICD-10-CM

## 2024-03-19 DIAGNOSIS — Z96.642 STATUS POST TOTAL REPLACEMENT OF LEFT HIP: ICD-10-CM

## 2024-03-19 PROCEDURE — 97110 THERAPEUTIC EXERCISES: CPT | Performed by: PHYSICAL THERAPIST

## 2024-03-19 PROCEDURE — 97116 GAIT TRAINING THERAPY: CPT | Performed by: PHYSICAL THERAPIST

## 2024-03-19 RX ORDER — MELOXICAM 15 MG/1
TABLET ORAL
Qty: 30 TABLET | Refills: 0 | Status: SHIPPED | OUTPATIENT
Start: 2024-03-19

## 2024-03-19 NOTE — TELEPHONE ENCOUNTER
If that is helping him he may continue with the medication.  I placed a refill for an additional month.

## 2024-03-19 NOTE — PROGRESS NOTES
Physical Therapy Daily Treatment Note         [address]              [place] [address]    Patient: Sebastian Liao   : 1960  Diagnosis/ICD-10 Code:  Left hip pain [M25.552]  Referring practitioner: Zane Matthews MD  Date of Initial Visit: Type: THERAPY  Noted: 2024  Today's Date: 3/19/2024  Patient seen for 6 sessions         Sebastian Liao reports: hip is sore from use but overall moving better are more.        Objective   See Exercise, Manual, and Modality Logs for complete treatment.       Assessment/Plan  Will continue progressing strengthening as tolerated.   Progress per Plan of Care           Manual Therapy:         mins  98430;  Therapeutic Exercise:    56     mins  18260;     Neuromuscular Brandon:        mins  30428;    Therapeutic Activity:          mins  84568;     Gait Training:      10     mins  35908;     Ultrasound:          mins  48517;    Electrical Stimulation:         mins  63090 ( );  Dry Needling          mins self-pay    Timed Treatment:   66   mins   Total Treatment:     66   mins    Dee Zhang PT  Physical Therapist

## 2024-03-19 NOTE — TELEPHONE ENCOUNTER
Torri-please advise whether pt needs to continue on Meloxicam 3.5 weeks post-operatively. Thank you!    Mila MAHAN CMA ([facility]), ROT

## 2024-03-20 ENCOUNTER — TELEPHONE (OUTPATIENT)
Dept: ORTHOPEDIC SURGERY | Facility: CLINIC | Age: 64
End: 2024-03-20
Payer: COMMERCIAL

## 2024-03-20 NOTE — TELEPHONE ENCOUNTER
Called and spoke to pt. Let patient know that his forms were faxed to [employer] and [employer]. Confirmed fax number. Patient verbalized understanding and was appreciative of care.

## 2024-03-20 NOTE — TELEPHONE ENCOUNTER
PATIENT CALLED TO OBTAIN A CONFIRMATION NUMBER OR A CONFIRMATION FAX IN CASE [employer] CLAIM THAT THEY DID NOT RECEIVE HIS PAPERWORK.

## 2024-03-20 NOTE — TELEPHONE ENCOUNTER
Spoke with patient, advised there was no confirmation number however there was a confirmation sheet that reflects date and time that fax was sent. Advised it was scanned into [employer] under media patient can access. Patient understood.

## 2024-03-20 NOTE — TELEPHONE ENCOUNTER
Provider: RAMIRO    Caller: PATIENT    Phone Number: [phone]    Reason for Call: PATIENT STATES HE SPOKE WITH [facility] AND THEY SAID THAT THE PAPERWORK THEY SENT TO OFFICE HASN'T BEEN RECEIVED. HE STATED THEY ARE ASKING FOR A DIAGNOSIS CODE, THE DATE OF HIS ADMISSION AND DISCHARGE FROM THE HOSPITAL, AND THE START AND END DATES FOR PHYSICAL THERAPY. [employer]'S FAX NUMBER [phone]. PATIENT WOULD LIKE A CALL BACK ONCE THIS HAS BEEN FAXED.

## 2024-03-20 NOTE — TELEPHONE ENCOUNTER
LVM with pt with Torri's message; Advised he call back with any further questions or concerns.    Mila MAHAN CMA ([facility]), ROT

## 2024-03-21 ENCOUNTER — TREATMENT (OUTPATIENT)
Dept: PHYSICAL THERAPY | Facility: CLINIC | Age: 64
End: 2024-03-21
Payer: COMMERCIAL

## 2024-03-21 DIAGNOSIS — M25.552 LEFT HIP PAIN: Primary | ICD-10-CM

## 2024-03-21 DIAGNOSIS — Z96.642 STATUS POST TOTAL REPLACEMENT OF LEFT HIP: ICD-10-CM

## 2024-03-21 DIAGNOSIS — Z47.89 ORTHOPEDIC AFTERCARE: ICD-10-CM

## 2024-03-21 PROCEDURE — 97014 ELECTRIC STIMULATION THERAPY: CPT | Performed by: PHYSICAL THERAPIST

## 2024-03-21 PROCEDURE — 97110 THERAPEUTIC EXERCISES: CPT | Performed by: PHYSICAL THERAPIST

## 2024-03-21 PROCEDURE — 97116 GAIT TRAINING THERAPY: CPT | Performed by: PHYSICAL THERAPIST

## 2024-03-26 ENCOUNTER — TREATMENT (OUTPATIENT)
Dept: PHYSICAL THERAPY | Facility: CLINIC | Age: 64
End: 2024-03-26
Payer: COMMERCIAL

## 2024-03-26 DIAGNOSIS — M25.552 LEFT HIP PAIN: Primary | ICD-10-CM

## 2024-03-26 DIAGNOSIS — Z47.89 ORTHOPEDIC AFTERCARE: ICD-10-CM

## 2024-03-26 DIAGNOSIS — Z96.642 STATUS POST TOTAL REPLACEMENT OF LEFT HIP: ICD-10-CM

## 2024-03-26 PROCEDURE — 97014 ELECTRIC STIMULATION THERAPY: CPT | Performed by: PHYSICAL THERAPIST

## 2024-03-26 PROCEDURE — 97110 THERAPEUTIC EXERCISES: CPT | Performed by: PHYSICAL THERAPIST

## 2024-03-26 PROCEDURE — 97116 GAIT TRAINING THERAPY: CPT | Performed by: PHYSICAL THERAPIST

## 2024-03-26 NOTE — PROGRESS NOTES
Physical Therapy Daily Treatment Note         [address] [facility] [address] [facility] [address]              [place] [address]    Patient: Sebastian Liao   : 1960  Diagnosis/ICD-10 Code:  Left hip pain [M25.552]  Referring practitioner: Zane Matthews MD  Date of Initial Visit: Type: THERAPY  Noted: 2024  Today's Date: 3/26/2024  Patient seen for 8 sessions         Sebastian Liao reports: using one cane now. Still stiff and sore but doing more within the same soreness.         Objective   See Exercise, Manual, and Modality Logs for complete treatment.       Assessment/Plan  Still struggling with SLR, unable to hold against gravity.   Progress per Plan of Care           Manual Therapy:         mins  89859;  Therapeutic Exercise:    45     mins  42810;     Neuromuscular Brandon:        mins  10133;    Therapeutic Activity:          mins  45375;     Gait Training:        10  mins  19637;     Ultrasound:          mins  75951;    Electrical Stimulation:    10     mins  71447 ( );  Dry Needling          mins self-pay    Timed Treatment:   55   mins   Total Treatment:     65   mins    Dee Zhang PT  Physical Therapist

## 2024-03-28 ENCOUNTER — TREATMENT (OUTPATIENT)
Dept: PHYSICAL THERAPY | Facility: CLINIC | Age: 64
End: 2024-03-28
Payer: COMMERCIAL

## 2024-03-28 DIAGNOSIS — M25.552 LEFT HIP PAIN: Primary | ICD-10-CM

## 2024-03-28 DIAGNOSIS — Z47.89 ORTHOPEDIC AFTERCARE: ICD-10-CM

## 2024-03-28 DIAGNOSIS — Z96.642 STATUS POST TOTAL REPLACEMENT OF LEFT HIP: ICD-10-CM

## 2024-03-28 PROCEDURE — 97110 THERAPEUTIC EXERCISES: CPT | Performed by: PHYSICAL THERAPIST

## 2024-03-28 PROCEDURE — 97116 GAIT TRAINING THERAPY: CPT | Performed by: PHYSICAL THERAPIST

## 2024-04-02 ENCOUNTER — TREATMENT (OUTPATIENT)
Dept: PHYSICAL THERAPY | Facility: CLINIC | Age: 64
End: 2024-04-02
Payer: COMMERCIAL

## 2024-04-02 DIAGNOSIS — M25.552 LEFT HIP PAIN: Primary | ICD-10-CM

## 2024-04-02 DIAGNOSIS — Z47.89 ORTHOPEDIC AFTERCARE: ICD-10-CM

## 2024-04-02 DIAGNOSIS — Z96.642 STATUS POST TOTAL REPLACEMENT OF LEFT HIP: ICD-10-CM

## 2024-04-02 PROCEDURE — 97110 THERAPEUTIC EXERCISES: CPT | Performed by: PHYSICAL THERAPIST

## 2024-04-02 PROCEDURE — 97116 GAIT TRAINING THERAPY: CPT | Performed by: PHYSICAL THERAPIST

## 2024-04-05 NOTE — PROGRESS NOTES
Physical Therapy Daily Treatment Note         [address] [facility] [address]              [place] [address]    Patient: Sebastian Liao   : 1960  Diagnosis/ICD-10 Code:  Left hip pain [M25.552]  Referring practitioner: Zane Matthews MD  Date of Initial Visit: Type: THERAPY  Noted: 2024  Today's Date: 2024  Patient seen for 9 sessions         Sebastian Liao reports: doing better lifting leg.         Objective   See Exercise, Manual, and Modality Logs for complete treatment.       Assessment/Plan  Able to actively flex hip against gravity.   Progress per Plan of Care           Manual Therapy:         mins  94752;  Therapeutic Exercise:    45     mins  85048;     Neuromuscular Brandon:        mins  75714;    Therapeutic Activity:          mins  87803;     Gait Training:      10     mins  12557;     Ultrasound:          mins  50016;    Electrical Stimulation:         mins  99369 ( );  Dry Needling          mins self-pay    Timed Treatment:   55   mins   Total Treatment:     55   mins    Dee Zhang PT  Physical Therapist

## 2024-04-09 ENCOUNTER — TREATMENT (OUTPATIENT)
Dept: PHYSICAL THERAPY | Facility: CLINIC | Age: 64
End: 2024-04-09
Payer: COMMERCIAL

## 2024-04-09 DIAGNOSIS — Z96.642 STATUS POST TOTAL REPLACEMENT OF LEFT HIP: ICD-10-CM

## 2024-04-09 DIAGNOSIS — M25.552 LEFT HIP PAIN: Primary | ICD-10-CM

## 2024-04-09 DIAGNOSIS — Z47.89 ORTHOPEDIC AFTERCARE: ICD-10-CM

## 2024-04-09 PROCEDURE — 97014 ELECTRIC STIMULATION THERAPY: CPT | Performed by: PHYSICAL THERAPIST

## 2024-04-09 PROCEDURE — 97116 GAIT TRAINING THERAPY: CPT | Performed by: PHYSICAL THERAPIST

## 2024-04-09 PROCEDURE — 97110 THERAPEUTIC EXERCISES: CPT | Performed by: PHYSICAL THERAPIST

## 2024-04-10 NOTE — PROGRESS NOTES
Physical Therapy Progress Note         [address] [facility] [address]              [place] [address]    Patient: Sebastian Liao   : 1960  Diagnosis/ICD-10 Code:  Left hip pain [M25.552]  Referring practitioner: Zane Matthews MD  Date of Initial Visit: Type: THERAPY  Noted: 2024  Today's Date: 4/10/2024  Patient seen for 10 sessions         Sebastian Liao reports: left hip still feels heavy but can tolerate more walking.         Objective          Strength/Myotome Testing     Left Hip   Planes of Motion   Flexion: 4-  Extension: 4-  Abduction: 4+      Passive left hip flexion 105 deg     Gait: patient using single point cane now. Mild left circumduction.       See Exercise, Manual, and Modality Logs for complete treatment.       Assessment/Plan  Patient is progressing well left quad muscle was very weak post surgery and is improving but still needs strengthening.     Progress toward previous goals: Partially Met    Short Term Goals (4 weeks):  1. Patient will be independent with home exercise program.   MET   2. Patient will demonstrate improved hip flexion actively to 100 degrees.   MET   3. Patient will demonstrate heel toe gait pattern for at least 50 feet using LRAD.   MET      Long Term Goals (12 weeks):  1. Patient will be able to walk at least 20 minutes using LRAD. Progressing   2. Patient will demonstrate functional squat to sit and stand from a regular chair height without use of hands. Progressing   3. Patient will be able to ascend and descend at least 14 steps using LRAD.  Progressing     Progress per Plan of Care  2x/week for 4 weeks.          Manual Therapy:         mins  46196;  Therapeutic Exercise:    45     mins  66523;     Neuromuscular Brandon:        mins  96415;    Therapeutic Activity:          mins  37491;     Gait Training:      10     mins  39069;     Ultrasound:          mins  00743;    Electrical Stimulation:         mins  06373 ( );  Dry Needling          mins  self-pay    Timed Treatment:   55   mins   Total Treatment:     55   mins    Dee Zhang, PT  Physical Therapist

## 2024-04-11 ENCOUNTER — TREATMENT (OUTPATIENT)
Dept: PHYSICAL THERAPY | Facility: CLINIC | Age: 64
End: 2024-04-11
Payer: COMMERCIAL

## 2024-04-11 DIAGNOSIS — Z96.642 STATUS POST TOTAL REPLACEMENT OF LEFT HIP: ICD-10-CM

## 2024-04-11 DIAGNOSIS — Z47.89 ORTHOPEDIC AFTERCARE: ICD-10-CM

## 2024-04-11 DIAGNOSIS — M25.552 LEFT HIP PAIN: Primary | ICD-10-CM

## 2024-04-11 PROCEDURE — 97116 GAIT TRAINING THERAPY: CPT | Performed by: PHYSICAL THERAPIST

## 2024-04-11 PROCEDURE — 97110 THERAPEUTIC EXERCISES: CPT | Performed by: PHYSICAL THERAPIST

## 2024-04-11 PROCEDURE — 97014 ELECTRIC STIMULATION THERAPY: CPT | Performed by: PHYSICAL THERAPIST

## 2024-04-11 NOTE — PROGRESS NOTES
Physical Therapy Daily Treatment Note         [address]              [place] [address]    Patient: Sebastian Liao   : 1960  Diagnosis/ICD-10 Code:  Left hip pain [M25.552]  Referring practitioner: Zane Matthews MD  Date of Initial Visit: Type: THERAPY  Noted: 2024  Today's Date: 2024  Patient seen for 12 sessions         Sebastian Liao reports: estim really helps the upper thigh muscle to relax. Has started using tennis ball to massage muscle.         Objective   See Exercise, Manual, and Modality Logs for complete treatment.       Assessment/Plan  Progressing to more functional strength and activity to mimic work tasks   Progress per Plan of Care           Manual Therapy:         mins  74823;  Therapeutic Exercise:    45     mins  94290;     Neuromuscular rBandon:        mins  65161;    Therapeutic Activity:          mins  66568;     Gait Training:      10     mins  41365;     Ultrasound:          mins  58530;    Electrical Stimulation:    10     mins  15997 ( );  Dry Needling          mins self-pay    Timed Treatment:  55    mins   Total Treatment:     65   mins    Dee Zhang PT  Physical Therapist

## 2024-04-16 ENCOUNTER — TREATMENT (OUTPATIENT)
Dept: PHYSICAL THERAPY | Facility: CLINIC | Age: 64
End: 2024-04-16
Payer: COMMERCIAL

## 2024-04-16 DIAGNOSIS — M25.552 LEFT HIP PAIN: Primary | ICD-10-CM

## 2024-04-16 DIAGNOSIS — Z96.642 STATUS POST TOTAL REPLACEMENT OF LEFT HIP: ICD-10-CM

## 2024-04-16 DIAGNOSIS — Z47.89 ORTHOPEDIC AFTERCARE: ICD-10-CM

## 2024-04-16 PROCEDURE — 97110 THERAPEUTIC EXERCISES: CPT | Performed by: PHYSICAL THERAPIST

## 2024-04-16 PROCEDURE — 97116 GAIT TRAINING THERAPY: CPT | Performed by: PHYSICAL THERAPIST

## 2024-04-16 PROCEDURE — 97014 ELECTRIC STIMULATION THERAPY: CPT | Performed by: PHYSICAL THERAPIST

## 2024-04-16 NOTE — PROGRESS NOTES
Physical Therapy Daily Treatment Note         [address]              [place] [address]    Patient: Sebastian Liao   : 1960  Diagnosis/ICD-10 Code:  Left hip pain [M25.552]  Referring practitioner: Zane Matthews MD  Date of Initial Visit: Type: THERAPY  Noted: 2024  Today's Date: 2024  Patient seen for 11 sessions         Sebastian Liao reports: walking more but upper thigh is very sore.         Objective   See Exercise, Manual, and Modality Logs for complete treatment.       Assessment/Plan  Upper quad hypertonicity. Discussed self massage using a tennis ball.   Progress per Plan of Care           Manual Therapy:         mins  40828;  Therapeutic Exercise:    45     mins  72539;     Neuromuscular Brandon:        mins  90359;    Therapeutic Activity:          mins  75828;     Gait Training:      10     mins  95309;     Ultrasound:          mins  54077;    Electrical Stimulation:     10    mins  98112 ( );  Dry Needling          mins self-pay    Timed Treatment:  55    mins   Total Treatment:     65   mins    Dee Zhang PT  Physical Therapist

## 2024-04-17 DIAGNOSIS — M51.36 DDD (DEGENERATIVE DISC DISEASE), LUMBAR: ICD-10-CM

## 2024-04-17 RX ORDER — CYCLOBENZAPRINE HCL 10 MG
10 TABLET ORAL 3 TIMES DAILY PRN
Qty: 90 TABLET | Refills: 3 | Status: SHIPPED | OUTPATIENT
Start: 2024-04-17

## 2024-04-18 ENCOUNTER — TREATMENT (OUTPATIENT)
Dept: PHYSICAL THERAPY | Facility: CLINIC | Age: 64
End: 2024-04-18
Payer: COMMERCIAL

## 2024-04-18 DIAGNOSIS — Z47.89 ORTHOPEDIC AFTERCARE: ICD-10-CM

## 2024-04-18 DIAGNOSIS — Z96.642 STATUS POST TOTAL REPLACEMENT OF LEFT HIP: ICD-10-CM

## 2024-04-18 DIAGNOSIS — M25.552 LEFT HIP PAIN: Primary | ICD-10-CM

## 2024-04-18 PROCEDURE — 97116 GAIT TRAINING THERAPY: CPT | Performed by: PHYSICAL THERAPIST

## 2024-04-18 PROCEDURE — 97110 THERAPEUTIC EXERCISES: CPT | Performed by: PHYSICAL THERAPIST

## 2024-04-18 PROCEDURE — 97014 ELECTRIC STIMULATION THERAPY: CPT | Performed by: PHYSICAL THERAPIST

## 2024-04-18 NOTE — PROGRESS NOTES
Physical Therapy Daily Treatment Note         [address] [facility] [address]              [place] [address]    Patient: Sebastian Liao   : 1960  Diagnosis/ICD-10 Code:  Left hip pain [M25.552]  Referring practitioner: Zane Matthews MD  Date of Initial Visit: Type: THERAPY  Noted: 2024  Today's Date: 2024  Patient seen for 14 sessions         Sebastian Liao reports: feels wobbly when not using cane.         Objective   Gait no AD: mild left hip circumduction and lateral lean of right leg during left swing phase.     See Exercise, Manual, and Modality Logs for complete treatment.       Assessment/Plan  Trying to wean off of cane by the beginning of May for hopeful return to work. Will continue focus on hip strengthening particularly of hip flexors >90 deg and lateral hip muscles.   Progress per Plan of Care and Progress strengthening /stabilization /functional activity           Manual Therapy:         mins  13198;  Therapeutic Exercise:    45     mins  57917;     Neuromuscular Brandon:        mins  68551;    Therapeutic Activity:          mins  25820;     Gait Training:      10     mins  04917;     Ultrasound:          mins  11704;    Electrical Stimulation:    10     mins  34659 ( );  Dry Needling          mins self-pay    Timed Treatment:   55   mins   Total Treatment:     65   mins    Dee Zhang PT  Physical Therapist

## 2024-04-19 NOTE — PROGRESS NOTES
Physical Therapy Daily Treatment Note         [address] [facility] [address]              [place] [address]    Patient: Sebastian Liao   : 1960  Diagnosis/ICD-10 Code:  Left hip pain [M25.552]  Referring practitioner: Zane Matthews MD  Date of Initial Visit: Type: THERAPY  Noted: 2024  Today's Date: 2024  Patient seen for 13 sessions         Sebastian Liao reports: no new complaints         Objective   See Exercise, Manual, and Modality Logs for complete treatment.       Assessment/Plan  Will continue strengthening, weaning off of cane.   Progress per Plan of Care           Manual Therapy:         mins  21756;  Therapeutic Exercise:    45     mins  47534;     Neuromuscular Brandon:        mins  91998;    Therapeutic Activity:          mins  26196;     Gait Training:      10     mins  17856;     Ultrasound:          mins  78081;    Electrical Stimulation:    10     mins  18923 ( );  Dry Needling          mins self-pay    Timed Treatment:   55   mins   Total Treatment:     65   mins    Dee Zhang PT  Physical Therapist

## 2024-04-22 ENCOUNTER — OFFICE VISIT (OUTPATIENT)
Dept: FAMILY MEDICINE CLINIC | Facility: CLINIC | Age: 64
End: 2024-04-22
Payer: COMMERCIAL

## 2024-04-22 VITALS
BODY MASS INDEX: 38.49 KG/M2 | SYSTOLIC BLOOD PRESSURE: 126 MMHG | WEIGHT: 231 LBS | DIASTOLIC BLOOD PRESSURE: 74 MMHG | OXYGEN SATURATION: 99 % | HEART RATE: 96 BPM | HEIGHT: 65 IN

## 2024-04-22 DIAGNOSIS — E11.649 TYPE 2 DIABETES MELLITUS WITH HYPOGLYCEMIA WITHOUT COMA, WITHOUT LONG-TERM CURRENT USE OF INSULIN: Primary | ICD-10-CM

## 2024-04-22 LAB
EXPIRATION DATE: ABNORMAL
HBA1C MFR BLD: 5.9 % (ref 4.5–5.7)
Lab: ABNORMAL

## 2024-04-22 PROCEDURE — 99213 OFFICE O/P EST LOW 20 MIN: CPT | Performed by: PHYSICIAN ASSISTANT

## 2024-04-22 PROCEDURE — 83036 HEMOGLOBIN GLYCOSYLATED A1C: CPT | Performed by: PHYSICIAN ASSISTANT

## 2024-04-22 NOTE — PROGRESS NOTES
Subjective   Sebastian Liao is a 64 y.o. male  Diabetes (F/U. A1C 5.9% Currently taking Ozempic and metformin (unsure of strength))      Diabetes  Pertinent negatives for hypoglycemia include no dizziness or headaches. Pertinent negatives for diabetes include no chest pain, no fatigue and no weakness.   Patient is a pleasant 64-year-old white male who comes in for follow-up type 2 diabetes patient is doing well on Ozempic and stopped metformin overall feeling better comes in to increase dose of Ozempic meds working well    The following portions of the patient's history were reviewed and updated as appropriate: allergies, current medications, past social history and problem list    Review of Systems   Constitutional:  Negative for fatigue and unexpected weight change.   Respiratory:  Negative for cough, chest tightness and shortness of breath.    Cardiovascular:  Negative for chest pain, palpitations and leg swelling.   Gastrointestinal:  Negative for nausea.   Skin:  Negative for color change and rash.   Neurological:  Negative for dizziness, syncope, weakness and headaches.       Objective     Vitals:    04/22/24 0900   BP: 126/74   Pulse: 96   SpO2: 99%       Physical Exam  Vitals and nursing note reviewed.   Constitutional:       General: He is not in acute distress.     Appearance: Normal appearance. He is well-developed. He is not ill-appearing, toxic-appearing or diaphoretic.   Neck:      Vascular: No carotid bruit or JVD.   Cardiovascular:      Rate and Rhythm: Normal rate and regular rhythm.      Pulses: Normal pulses.      Heart sounds: Normal heart sounds. No murmur heard.  Pulmonary:      Effort: Pulmonary effort is normal. No respiratory distress.      Breath sounds: Normal breath sounds.   Abdominal:      Palpations: Abdomen is soft.      Tenderness: There is no abdominal tenderness.   Skin:     General: Skin is warm and dry.   Neurological:      Mental Status: He is alert.         Assessment & Plan      Diagnoses and all orders for this visit:    1. Type 2 diabetes mellitus with hypoglycemia without coma, without long-term current use of insulin (Primary)  -     Semaglutide, 1 MG/DOSE, (OZEMPIC) 4 MG/3ML solution pen-injector; Inject 1 mg under the skin into the appropriate area as directed 1 (One) Time Per Week.  Dispense: 3 mL; Refill: 11  -     POC Glycosylated Hemoglobin (Hb A1C)     I spent 15 minutes in patient care: Reviewing records prior to the visit, examining the patient, entering orders and documentation    Part of this note may be an electronic transcription/translation of spoken language to printed text using the Dragon Dictation System.

## 2024-04-23 ENCOUNTER — TREATMENT (OUTPATIENT)
Dept: PHYSICAL THERAPY | Facility: CLINIC | Age: 64
End: 2024-04-23
Payer: COMMERCIAL

## 2024-04-23 DIAGNOSIS — M25.552 LEFT HIP PAIN: Primary | ICD-10-CM

## 2024-04-23 DIAGNOSIS — Z96.642 STATUS POST TOTAL REPLACEMENT OF LEFT HIP: ICD-10-CM

## 2024-04-23 DIAGNOSIS — Z47.89 ORTHOPEDIC AFTERCARE: ICD-10-CM

## 2024-04-23 PROCEDURE — 97112 NEUROMUSCULAR REEDUCATION: CPT | Performed by: PHYSICAL THERAPIST

## 2024-04-23 PROCEDURE — 97110 THERAPEUTIC EXERCISES: CPT | Performed by: PHYSICAL THERAPIST

## 2024-04-23 PROCEDURE — 97116 GAIT TRAINING THERAPY: CPT | Performed by: PHYSICAL THERAPIST

## 2024-04-23 PROCEDURE — 97014 ELECTRIC STIMULATION THERAPY: CPT | Performed by: PHYSICAL THERAPIST

## 2024-04-23 PROCEDURE — 97530 THERAPEUTIC ACTIVITIES: CPT | Performed by: PHYSICAL THERAPIST

## 2024-04-24 ENCOUNTER — TELEPHONE (OUTPATIENT)
Dept: FAMILY MEDICINE CLINIC | Facility: CLINIC | Age: 64
End: 2024-04-24
Payer: COMMERCIAL

## 2024-04-24 RX ORDER — TERBINAFINE HYDROCHLORIDE 250 MG/1
250 TABLET ORAL DAILY
Qty: 30 TABLET | Refills: 2 | Status: SHIPPED | OUTPATIENT
Start: 2024-04-24

## 2024-04-24 NOTE — TELEPHONE ENCOUNTER
Caller: Sebastian Liao    Relationship: Self    Best call back number: [phone]     What medication are you requesting: SOMETHING FOR A TOENAIL FUNGUS    What are your current symptoms: TOENAIL FUNGUS FLARE UP    How long have you been experiencing symptoms: YEARS    Have you had these symptoms before:    [x] Yes  [] No    Have you been treated for these symptoms before:   [x] Yes  [] No    If a prescription is needed, what is your preferred pharmacy and phone number: [facility]     Additional notes:PLEASE CALL PATIENT ONCE THE REQUEST HAS BEEN SENT TO THE PHARMACY

## 2024-04-30 ENCOUNTER — TREATMENT (OUTPATIENT)
Dept: PHYSICAL THERAPY | Facility: CLINIC | Age: 64
End: 2024-04-30
Payer: COMMERCIAL

## 2024-04-30 DIAGNOSIS — M25.552 LEFT HIP PAIN: Primary | ICD-10-CM

## 2024-04-30 DIAGNOSIS — Z96.642 STATUS POST TOTAL REPLACEMENT OF LEFT HIP: ICD-10-CM

## 2024-04-30 PROCEDURE — 97530 THERAPEUTIC ACTIVITIES: CPT | Performed by: PHYSICAL THERAPIST

## 2024-04-30 PROCEDURE — 97112 NEUROMUSCULAR REEDUCATION: CPT | Performed by: PHYSICAL THERAPIST

## 2024-04-30 PROCEDURE — 97014 ELECTRIC STIMULATION THERAPY: CPT | Performed by: PHYSICAL THERAPIST

## 2024-04-30 PROCEDURE — 97110 THERAPEUTIC EXERCISES: CPT | Performed by: PHYSICAL THERAPIST

## 2024-04-30 NOTE — PROGRESS NOTES
Physical Therapy Daily Treatment Note  [address] [facility], [address]  [place] [address]    Patient: Sebastian Liao   : 1960  Referring practitioner: Zane Matthews MD  Date of Initial Visit: Type: THERAPY  Noted: 2024  Today's Date: 2024  Patient seen for 16 sessions       Visit Diagnoses:    ICD-10-CM ICD-9-CM   1. Left hip pain  M25.552 719.45   2. Status post total replacement of left hip  Z96.642 V43.64       Visit #: 16    Subjective   Have f/u with Dr. Matthews tomorrow;   Trying to walk without the cane, a little sore    Objective   See Exercise, Manual, and Modality Logs for complete treatment    Ambulates well with quad cane, mild antalgia without cane     Recumbent bike for strength x 10 minutes     Resisted walking 10#, 13#     Assessment:   Difficulty with abd strength exercises; needs frequent verbal cues for correct exercise technique    Treatment considerations for next visit:  Advance strength/endurance as tolerated     Timed:   Manual Therapy:         mins  20925;     Therapeutic Exercise:    15     mins  29338;     Neuromuscular Brandon:    15    mins  93546;    Therapeutic Activity:     15     mins  65221;     Gait Training:           mins  68487;     Ultrasound:          mins  00056;    Ionto                                   mins   05645  Self Care                            mins   56098  Canalith Repos         mins   51821    Un-Timed:  Electrical Stimulation:    10     mins  64597 ( );  Dry Needling          mins self-pay  Traction          mins 72354      Timed Treatment:   45   mins   Total Treatment:     55   mins    GERARD Maldonado, PT  [place] License: [id number]

## 2024-05-01 ENCOUNTER — OFFICE VISIT (OUTPATIENT)
Dept: ORTHOPEDIC SURGERY | Facility: CLINIC | Age: 64
End: 2024-05-01
Payer: COMMERCIAL

## 2024-05-01 DIAGNOSIS — Z96.642 STATUS POST TOTAL HIP REPLACEMENT, LEFT: Primary | ICD-10-CM

## 2024-05-01 DIAGNOSIS — Z47.89 ORTHOPEDIC AFTERCARE: ICD-10-CM

## 2024-05-01 PROCEDURE — 99024 POSTOP FOLLOW-UP VISIT: CPT | Performed by: ORTHOPAEDIC SURGERY

## 2024-05-01 NOTE — PROGRESS NOTES
[facility]    Subjective     Chief Complaint   Patient presents with    Follow-up     6 week follow up - 9 weeks S/P Modified Anterior Total Hip Arthroplasty - Left (DOS: 2/224/24)        HPI    It has been 6  week(s) since Mr. Liao's last visit. He returns to clinic today for postoperative follow-up of left hip arthroplasty. The issue has been ongoing for 9 week(s). He rates his pain a 5/10 on the pain scale. Previous/current treatments: cane/walker, physical therapy, and weight loss. Current symptoms: same as prior visit. The pain is worse with walking, standing, sitting, and any movement of the joint; resting and assistive device (cane/walker) improve the pain. Overall, he is doing better.  90% improvement compared to his preoperative symptoms.  Fully ambulatory without external aids.    I have reviewed the following portions of the patient's history and agree with: History of Present Illness and Review of Systems    Patient Active Problem List   Diagnosis    Testicular hypofunction    Essential hypertension, benign    Type 2 diabetes mellitus with hyperglycemia, without long-term current use of insulin    Hyperlipidemia    Obesity, Class III, BMI 40-49.9 (morbid obesity)    Dyspnea on exertion    History of COVID-19 pneumonia (10/2020)    Former smoker (None x 35 years)    GERD    RONIT (obstructive sleep apnea) - severe    DDD (degenerative disc disease), cervical    Seasonal allergic rhinitis    Screen for colon cancer    Carpal tunnel syndrome of right wrist    Degenerative arthritis of hip    BPH (benign prostatic hyperplasia)    Status post total replacement of left hip     Past Medical History:   Diagnosis Date    Acute bronchitis     Arthritis     Asthma     Cervicalgia     COVID-19     CPAP (continuous positive airway pressure) dependence     Diabetes mellitus     Edema     GERD (gastroesophageal reflux disease)     HTN (hypertension)     Hyperlipidemia     IBS (irritable  bowel syndrome)     IBS (irritable bowel syndrome)     Low testosterone     Prostate disease     Sleep apnea     Wears glasses       Past Surgical History:   Procedure Laterality Date    CARPAL TUNNEL RELEASE Right 2022    Procedure: CARPAL TUNNEL RELEASE RIGHT;  Surgeon: Nj Bliss MD;  Location: [facility] OR;  Service: Neurosurgery;  Laterality: Right;    CERVICAL FUSION      Dr. Nickerson, . [place], [place]    COLONOSCOPY  2012    repeat in ten yrs    HIP SURGERY Right 2016    Dr. Schwartz    NECK SURGERY      cervical spine fusion    TONSILLECTOMY      TOTAL HIP ARTHROPLASTY Left 2024    Procedure: MODIFIED ANTERIOR TOTAL HIP ARTHROPLASTY - LEFT;  Surgeon: Zane Matthews MD;  Location:  [facility];  Service: Orthopedics;  Laterality: Left;      Family History   Problem Relation Age of Onset    Cancer Mother         Ovarian,     Stroke Father     Hypertension Father     Hypertension Sister     Diabetes Brother     Heart attack Brother     Hypertension Maternal Grandmother     Diabetes Maternal Grandmother      Social History     Socioeconomic History    Marital status:    Tobacco Use    Smoking status: Former     Current packs/day: 0.00     Average packs/day: 0.5 packs/day for 10.0 years (5.0 ttl pk-yrs)     Types: Cigarettes     Start date:      Quit date:      Years since quittin.3    Smokeless tobacco: Former     Types: Chew     Quit date: 1986   Vaping Use    Vaping status: Never Used   Substance and Sexual Activity    Alcohol use: Yes     Comment: 2 drinks per month    Drug use: Yes     Types: Marijuana     Comment: Quit in     Sexual activity: Defer      Current Outpatient Medications on File Prior to Visit   Medication Sig Dispense Refill    albuterol sulfate  (90 Base) MCG/ACT inhaler Inhale 2 puffs Every 4 (Four) Hours As Needed. 8.5 g 0    alfuzosin (UROXATRAL) 10 MG 24 hr tablet Take 1 tablet by mouth Daily. 90 tablet 3    amLODIPine (NORVASC)  10 MG tablet Take 1 tablet by mouth Daily. 90 tablet 3    aspirin (ASPIR) 81 MG EC tablet Take 1 tablet by mouth 2 (Two) Times a Day. 60 tablet 0    carisoprodol (Soma) 350 MG tablet Take 1 tablet by mouth 2 (Two) Times a Day As Needed for Muscle Spasms.      cevimeline (Evoxac) 30 MG capsule Take 1 capsule by mouth 3 (Three) Times a Day As Needed. 90 capsule 12    cyclobenzaprine (FLEXERIL) 10 MG tablet Take 1 tablet by mouth up to 3 Times a Day As Needed for Muscle Spasms. 90 tablet 3    dicyclomine (BENTYL) 20 MG tablet Take 1 tablet by mouth Every 6 (Six) Hours. 180 tablet 3    fluticasone (FLONASE) 50 MCG/ACT nasal spray Use 2 sprays in each nostril as directed by provider Daily. 16 g 3    Fluticasone-Salmeterol (Advair Diskus) 100-50 MCG/ACT DISKUS Inhale 1 puff 2 (Two) Times a Day. 180 each 3    loratadine (CLARITIN) 10 MG tablet Take 1 tablet by mouth Daily. 30 tablet 10    meloxicam (MOBIC) 15 MG tablet TAKE 1 TABLET BY MOUTH DAILY WITH FOOD AS NEEDED 30 tablet 0    montelukast (SINGULAIR) 10 MG tablet Take 1 tablet by mouth Every Night. 90 tablet 3    naloxone (NARCAN) 4 MG/0.1ML nasal spray Call 911. Don't prime. [place] in 1 nostril for overdose. Repeat in 2-3 minutes in other nostril if no or minimal breathing/responsiveness. 2 each 0    olopatadine (PATANOL) 0.1 % ophthalmic solution Administer 1 drop to both eyes 2 (Two) Times a Day.      omeprazole (priLOSEC) 40 MG capsule Take 1 capsule by mouth Daily. 90 capsule 3    ondansetron ODT (ZOFRAN-ODT) 4 MG disintegrating tablet Place 1 tablet on the tongue Every 6 (Six) Hours As Needed for Nausea or Vomiting. 30 tablet 0    oxyCODONE (ROXICODONE) 5 MG immediate release tablet Take 1 tablet by mouth Every 12 (Twelve) Hours As Needed for Moderate Pain. 20 tablet 0    promethazine (PHENERGAN) 25 MG tablet Take 1 tablet by mouth Every 6 (Six) Hours As Needed for nausea and vomiting 60 tablet 3    rosuvastatin (CRESTOR) 10 MG tablet Take 1 tablet by mouth  Daily. 90 tablet 3    Semaglutide, 1 MG/DOSE, (OZEMPIC) 4 MG/3ML solution pen-injector Inject 1 mg under the skin into the appropriate area as directed 1 (One) Time Per Week. 3 mL 11    Semaglutide,0.25 or 0.5MG/DOS, (Ozempic, 0.25 or 0.5 MG/DOSE,) 2 MG/3ML solution pen-injector Inject 0.5 mg under the skin into the appropriate area as directed 1 (One) Time Per Week. 9 mL 0    sildenafil (VIAGRA) 100 MG tablet Take 1 tablet by mouth Daily As Needed for Erectile Dysfunction.      silodosin (RAPAFLO) 8 MG capsule capsule Take 1 capsule by mouth Daily. 90 capsule 3    tadalafil (CIALIS) 20 MG tablet Take 1 tablet by mouth Daily As Needed for Erectile Dysfunction.      terbinafine (LamISIL) 250 MG tablet Take 1 tablet by mouth Daily. 30 tablet 2    Testosterone (Testopel) 75 MG implant pellet Testopel 75 mg implant pellet       No current facility-administered medications on file prior to visit.      No Known Allergies     Review of Systems   Constitutional:  Negative for activity change, appetite change, chills, diaphoresis, fatigue, fever and unexpected weight change.   HENT:  Negative for congestion, dental problem, drooling, ear discharge, ear pain, facial swelling, hearing loss, mouth sores, nosebleeds, postnasal drip, rhinorrhea, sinus pressure, sneezing, sore throat, tinnitus, trouble swallowing and voice change.    Eyes:  Negative for photophobia, pain, discharge, redness, itching and visual disturbance.   Respiratory:  Negative for apnea, cough, choking, chest tightness, shortness of breath, wheezing and stridor.    Cardiovascular:  Negative for chest pain, palpitations and leg swelling.   Gastrointestinal:  Negative for abdominal distention, abdominal pain, anal bleeding, blood in stool, constipation, diarrhea, nausea, rectal pain and vomiting.   Endocrine: Negative for cold intolerance, heat intolerance, polydipsia, polyphagia and polyuria.   Genitourinary:  Negative for decreased urine volume, difficulty  urinating, dysuria, enuresis, flank pain, frequency, genital sores, hematuria and urgency.   Musculoskeletal:  Positive for arthralgias. Negative for back pain, gait problem, joint swelling, myalgias, neck pain and neck stiffness.   Skin:  Negative for color change, pallor, rash and wound.   Allergic/Immunologic: Negative for environmental allergies, food allergies and immunocompromised state.   Neurological:  Negative for dizziness, tremors, seizures, syncope, facial asymmetry, speech difficulty, weakness, light-headedness, numbness and headaches.   Hematological:  Negative for adenopathy. Does not bruise/bleed easily.   Psychiatric/Behavioral:  Negative for agitation, behavioral problems, confusion, decreased concentration, dysphoric mood, hallucinations, self-injury, sleep disturbance and suicidal ideas. The patient is not nervous/anxious and is not hyperactive.         Objective      Physical Exam  There were no vitals taken for this visit.    There is no height or weight on file to calculate BMI.    General:   Mental Status:  Alert   Appearance: Cooperative, in no acute distress   Build and Nutrition: Obese by BMI male   Orientation: Alert and oriented to person, place and time   Posture: Normal   Gait: Nonantalgic    Integument:   Left hip: Wound is well-healed with no signs of infection    Lower Extremity:   Left Hip:    Tenderness:  None    Swelling:  None    Crepitus:  None    Range of motion:  External Rotation: 30°       Internal Rotation: 30°       Flexion:  100°       Extension:  0°    Deformities:  None    No leg length discrepancy      Imaging/Studies  Imaging Results (Last 24 Hours)       ** No results found for the last 24 hours. **          No new imaging today.    Assessment and Plan     Diagnoses and all orders for this visit:    1. Status post total hip replacement, left (Primary)    2. Orthopedic aftercare        1. Status post total hip replacement, left    2. Orthopedic aftercare         Reviewed my findings with the patient.  His left total hip arthroplasty is functioning well and he is pleased with results.  I will see him back in 10 months, which will be a 1 year checkup with x-rays.  I will see him back sooner for any problems.    Return in about 10 months (around 3/1/2025) for recheck with x-rays.      Zane Matthews MD  05/01/24  10:49 EDT    Dictated Utilizing Dragon Dictation

## 2024-05-02 ENCOUNTER — TREATMENT (OUTPATIENT)
Dept: PHYSICAL THERAPY | Facility: CLINIC | Age: 64
End: 2024-05-02
Payer: COMMERCIAL

## 2024-05-02 DIAGNOSIS — M25.552 LEFT HIP PAIN: Primary | ICD-10-CM

## 2024-05-02 DIAGNOSIS — Z47.89 ORTHOPEDIC AFTERCARE: ICD-10-CM

## 2024-05-02 DIAGNOSIS — Z96.642 STATUS POST TOTAL REPLACEMENT OF LEFT HIP: ICD-10-CM

## 2024-05-02 PROCEDURE — 97112 NEUROMUSCULAR REEDUCATION: CPT | Performed by: PHYSICAL THERAPIST

## 2024-05-02 PROCEDURE — 97014 ELECTRIC STIMULATION THERAPY: CPT | Performed by: PHYSICAL THERAPIST

## 2024-05-02 PROCEDURE — 97116 GAIT TRAINING THERAPY: CPT | Performed by: PHYSICAL THERAPIST

## 2024-05-02 PROCEDURE — 97110 THERAPEUTIC EXERCISES: CPT | Performed by: PHYSICAL THERAPIST

## 2024-05-02 PROCEDURE — 97530 THERAPEUTIC ACTIVITIES: CPT | Performed by: PHYSICAL THERAPIST

## 2024-05-02 NOTE — PROGRESS NOTES
Physical Therapy Daily Treatment Note  [address], [address]  [place] [address]    Patient: Sebastian Liao   : 1960  Referring practitioner: Zane Matthews MD  Date of Initial Visit: Type: THERAPY  Noted: 2024  Today's Date: 2024  Patient seen for 17 sessions       Visit Diagnoses:    ICD-10-CM ICD-9-CM   1. Left hip pain  M25.552 719.45   2. Status post total replacement of left hip  Z96.642 V43.64   3. Orthopedic aftercare  Z47.89 V54.9       Visit #: 17    Subjective   Feeling good; saw Dr. Matthews and he released me back to work with no restrictions    Objective   See Exercise, Manual, and Modality Logs for complete treatment    Ambulates with lateral shuffle; no pain c/o with gait     Assessment/Plan  Patient would benefit from PT to improve strength and endurance for RTW    Treatment considerations for next visit:  Advance as tolerated    Timed:   Manual Therapy:         mins  97668;     Therapeutic Exercise:    15     mins  74776;     Neuromuscular Brandon:    15    mins  45873;    Therapeutic Activity:     15     mins  69821;     Gait Training:      10     mins  36016;     Ultrasound:          mins  62075;    Ionto                                   mins   64899  Self Care                            mins   87404  Canalith Repos         mins   47457    Un-Timed:  Electrical Stimulation:    10     mins  56769 ( );  Dry Needling          mins self-pay  Traction          mins 99909      Timed Treatment:   55   mins   Total Treatment:     65   mins    GERARD Maldonado, PT  [place] License: [id number]

## 2024-05-03 DIAGNOSIS — J30.9 ALLERGIC RHINITIS, UNSPECIFIED SEASONALITY, UNSPECIFIED TRIGGER: ICD-10-CM

## 2024-05-03 RX ORDER — FLUTICASONE PROPIONATE 50 MCG
2 SPRAY, SUSPENSION (ML) NASAL DAILY
Qty: 16 G | Refills: 3 | OUTPATIENT
Start: 2024-05-03

## 2024-05-10 ENCOUNTER — TELEPHONE (OUTPATIENT)
Dept: ORTHOPEDIC SURGERY | Facility: CLINIC | Age: 64
End: 2024-05-10
Payer: COMMERCIAL

## 2024-05-17 DIAGNOSIS — R11.0 NAUSEA: ICD-10-CM

## 2024-05-17 DIAGNOSIS — K58.9 IRRITABLE BOWEL SYNDROME WITHOUT DIARRHEA: ICD-10-CM

## 2024-05-17 RX ORDER — PROMETHAZINE HYDROCHLORIDE 25 MG/1
25 TABLET ORAL EVERY 6 HOURS PRN
Qty: 60 TABLET | Refills: 3 | Status: SHIPPED | OUTPATIENT
Start: 2024-05-17

## 2024-05-17 RX ORDER — DICYCLOMINE HCL 20 MG
20 TABLET ORAL EVERY 6 HOURS
Qty: 180 TABLET | Refills: 3 | Status: SHIPPED | OUTPATIENT
Start: 2024-05-17

## 2024-05-20 ENCOUNTER — TELEPHONE (OUTPATIENT)
Dept: FAMILY MEDICINE CLINIC | Facility: CLINIC | Age: 64
End: 2024-05-20

## 2024-05-20 RX ORDER — GENTAMICIN SULFATE 3 MG/ML
2 SOLUTION/ DROPS OPHTHALMIC EVERY 4 HOURS
Qty: 5 ML | Refills: 1 | Status: SHIPPED | OUTPATIENT
Start: 2024-05-20

## 2024-05-20 NOTE — TELEPHONE ENCOUNTER
Caller: Sebastian Liao    Relationship: Self    Best call back number: [phone]     What medication are you requesting: SOMETHING FOR MATTED EYE IN THE MORNING    What are your current symptoms: POSSIBLE EYE INFECTION     How long have you been experiencing symptoms: 2 WEEKS    Have you had these symptoms before:    [x] Yes  [] No    Have you been treated for these symptoms before:   [x] Yes  [] No    If a prescription is needed, what is your preferred pharmacy and phone number:  [facility]    Additional notes: PATIENT WOULD LIKE TO SEE IF SOMETHING  COULD BE CALLED IN FOR MATTED EYES IN THE MORNING.

## 2024-05-23 ENCOUNTER — TELEPHONE (OUTPATIENT)
Dept: ORTHOPEDIC SURGERY | Facility: CLINIC | Age: 64
End: 2024-05-23
Payer: COMMERCIAL

## 2024-05-23 NOTE — TELEPHONE ENCOUNTER
Caller: ADRY   Relationship to Patient: SELF  Phone Number: [phone] (home)     Reason for Call:   PATIENT CALLING ASKING TO HAVE HIS FMLA PAPERWORK REFAXED STATES WE RECEIVED IT ON 05/10/24

## 2024-05-31 ENCOUNTER — OFFICE VISIT (OUTPATIENT)
Dept: PULMONOLOGY | Facility: CLINIC | Age: 64
End: 2024-05-31
Payer: COMMERCIAL

## 2024-05-31 VITALS
RESPIRATION RATE: 18 BRPM | TEMPERATURE: 98.6 F | WEIGHT: 238 LBS | DIASTOLIC BLOOD PRESSURE: 70 MMHG | BODY MASS INDEX: 39.65 KG/M2 | OXYGEN SATURATION: 98 % | HEIGHT: 65 IN | SYSTOLIC BLOOD PRESSURE: 136 MMHG | HEART RATE: 82 BPM

## 2024-05-31 DIAGNOSIS — Z87.891 FORMER SMOKER: ICD-10-CM

## 2024-05-31 DIAGNOSIS — R06.09 DYSPNEA ON EXERTION: Primary | ICD-10-CM

## 2024-05-31 DIAGNOSIS — G47.33 OSA (OBSTRUCTIVE SLEEP APNEA): ICD-10-CM

## 2024-05-31 DIAGNOSIS — E66.9 OBESITY, CLASS II, BMI 35-39.9: ICD-10-CM

## 2024-05-31 DIAGNOSIS — R06.02 SHORTNESS OF BREATH: ICD-10-CM

## 2024-05-31 DIAGNOSIS — Z86.16 HISTORY OF COVID-19: ICD-10-CM

## 2024-05-31 DIAGNOSIS — K21.9 CHRONIC GERD: ICD-10-CM

## 2024-05-31 PROBLEM — E66.812 OBESITY, CLASS II, BMI 35-39.9: Status: ACTIVE | Noted: 2024-05-31

## 2024-05-31 RX ORDER — FLUTICASONE PROPIONATE AND SALMETEROL 100; 50 UG/1; UG/1
1 POWDER RESPIRATORY (INHALATION)
Qty: 180 EACH | Refills: 3 | Status: SHIPPED | OUTPATIENT
Start: 2024-05-31

## 2024-05-31 RX ORDER — ALBUTEROL SULFATE 90 UG/1
2 AEROSOL, METERED RESPIRATORY (INHALATION) EVERY 4 HOURS PRN
Qty: 54 G | Refills: 3 | Status: SHIPPED | OUTPATIENT
Start: 2024-05-31

## 2024-05-31 NOTE — PROGRESS NOTES
PULMONARY  NOTE    Chief Complaint     Dyspnea on exertion, chronic fatigue, history of COVID-19, class II obesity, severe obstructive sleep apnea, allergic rhinitis    History of Present Illness     64-year-old male returns today for follow-up  I last saw him 11/29/2023    He has obstructive sleep apnea and uses CPAP on a nightly basis  He uses it regularly and feels that he benefits from    He has a remote smoking history, none for 35 years    He carries a diagnosis of asthma and is on Advair with albuterol  No exacerbation of symptoms    Since I last saw him he underwent a left total hip replacement and has slowly recovered from it  Has been able to go back to work    Patient Active Problem List   Diagnosis    Testicular hypofunction    Essential hypertension, benign    Type 2 diabetes mellitus with hyperglycemia, without long-term current use of insulin    Hyperlipidemia    Dyspnea on exertion    History of COVID-19 pneumonia (10/2020)    Former smoker (None x 35 years)    GERD    RONIT (obstructive sleep apnea) - severe    DDD (degenerative disc disease), cervical    Seasonal allergic rhinitis    Screen for colon cancer    Carpal tunnel syndrome of right wrist    Degenerative arthritis of hip    BPH (benign prostatic hyperplasia)    Status post total replacement of left hip    Obesity, Class II, BMI 35-39.9      No Known Allergies    Current Outpatient Medications:     albuterol sulfate  (90 Base) MCG/ACT inhaler, Inhale 2 puffs Every 4 (Four) Hours As Needed for wheezing., Disp: 8.5 g, Rfl: 0    alfuzosin (UROXATRAL) 10 MG 24 hr tablet, Take 1 tablet by mouth Daily., Disp: 90 tablet, Rfl: 3    amLODIPine (NORVASC) 10 MG tablet, Take 1 tablet by mouth Daily., Disp: 90 tablet, Rfl: 3    aspirin (ASPIR) 81 MG EC tablet, Take 1 tablet by mouth 2 (Two) Times a Day., Disp: 60 tablet, Rfl: 0    carisoprodol (Soma) 350 MG tablet, Take 1 tablet by mouth 2 (Two) Times a Day As Needed for Muscle Spasms., Disp: ,  Rfl:     cevimeline (Evoxac) 30 MG capsule, Take 1 capsule by mouth 3 (Three) Times a Day As Needed., Disp: 90 capsule, Rfl: 12    cyclobenzaprine (FLEXERIL) 10 MG tablet, Take 1 tablet by mouth up to 3 Times a Day As Needed for Muscle Spasms., Disp: 90 tablet, Rfl: 3    dicyclomine (BENTYL) 20 MG tablet, Take 1 tablet by mouth Every 6 (Six) Hours., Disp: 180 tablet, Rfl: 3    fluticasone (FLONASE) 50 MCG/ACT nasal spray, Use 2 sprays in each nostril as directed by provider Daily., Disp: 16 g, Rfl: 3    Fluticasone-Salmeterol (Advair Diskus) 100-50 MCG/ACT DISKUS, Inhale 1 puff 2 (Two) Times a Day., Disp: 180 each, Rfl: 3    gentamicin (GARAMYCIN) 0.3 % ophthalmic solution, Administer 2 drops to both eyes Every 4 (Four) Hours., Disp: 5 mL, Rfl: 1    loratadine (CLARITIN) 10 MG tablet, Take 1 tablet by mouth Daily., Disp: 30 tablet, Rfl: 10    meloxicam (MOBIC) 15 MG tablet, TAKE 1 TABLET BY MOUTH DAILY WITH FOOD AS NEEDED, Disp: 30 tablet, Rfl: 0    montelukast (SINGULAIR) 10 MG tablet, Take 1 tablet by mouth Every Night., Disp: 90 tablet, Rfl: 3    naloxone (NARCAN) 4 MG/0.1ML nasal spray, Call 911. Don't prime. [place] in 1 nostril for overdose. Repeat in 2-3 minutes in other nostril if no or minimal breathing/responsiveness., Disp: 2 each, Rfl: 0    olopatadine (PATANOL) 0.1 % ophthalmic solution, Administer 1 drop to both eyes 2 (Two) Times a Day., Disp: , Rfl:     omeprazole (priLOSEC) 40 MG capsule, Take 1 capsule by mouth Daily., Disp: 90 capsule, Rfl: 3    ondansetron ODT (ZOFRAN-ODT) 4 MG disintegrating tablet, Place 1 tablet on the tongue Every 6 (Six) Hours As Needed for Nausea or Vomiting., Disp: 30 tablet, Rfl: 0    oxyCODONE (ROXICODONE) 5 MG immediate release tablet, Take 1 tablet by mouth Every 12 (Twelve) Hours As Needed for Moderate Pain., Disp: 20 tablet, Rfl: 0    promethazine (PHENERGAN) 25 MG tablet, Take 1 tablet by mouth Every 6 (Six) Hours As Needed for nausea and vomiting, Disp: 60 tablet,  Rfl: 3    rosuvastatin (CRESTOR) 10 MG tablet, Take 1 tablet by mouth Daily., Disp: 90 tablet, Rfl: 3    Semaglutide, 1 MG/DOSE, (OZEMPIC) 4 MG/3ML solution pen-injector, Inject 1 mg under the skin into the appropriate area as directed 1 (One) Time Per Week., Disp: 3 mL, Rfl: 11    Semaglutide,0.25 or 0.5MG/DOS, (Ozempic, 0.25 or 0.5 MG/DOSE,) 2 MG/3ML solution pen-injector, Inject 0.5 mg under the skin into the appropriate area as directed 1 (One) Time Per Week., Disp: 9 mL, Rfl: 0    sildenafil (VIAGRA) 100 MG tablet, Take 1 tablet by mouth Daily As Needed for Erectile Dysfunction., Disp: , Rfl:     silodosin (RAPAFLO) 8 MG capsule capsule, Take 1 capsule by mouth Daily., Disp: 90 capsule, Rfl: 3    tadalafil (CIALIS) 20 MG tablet, Take 1 tablet by mouth Daily As Needed for Erectile Dysfunction., Disp: , Rfl:     terbinafine (LamISIL) 250 MG tablet, Take 1 tablet by mouth Daily., Disp: 30 tablet, Rfl: 2    Testosterone (Testopel) 75 MG implant pellet, Testopel 75 mg implant pellet, Disp: , Rfl:   MEDICATION LIST AND ALLERGIES REVIEWED.    Family History   Problem Relation Age of Onset    Cancer Mother         Ovarian,     Stroke Father     Hypertension Father     Hypertension Sister     Diabetes Brother     Heart attack Brother     Hypertension Maternal Grandmother     Diabetes Maternal Grandmother      Social History     Tobacco Use    Smoking status: Former     Current packs/day: 0.00     Average packs/day: 0.5 packs/day for 10.0 years (5.0 ttl pk-yrs)     Types: Cigarettes     Start date:      Quit date:      Years since quittin.4     Passive exposure: Past    Smokeless tobacco: Former     Types: Chew     Quit date: 1986   Vaping Use    Vaping status: Never Used   Substance Use Topics    Alcohol use: Yes     Comment: 2 drinks per month    Drug use: Yes     Types: Marijuana     Comment: Quit in      Social History     Social History Narrative    Left hand dominant, , Exercises daily.  "    Former smoker, has not smoked for 35 years     FAMILY AND SOCIAL HISTORY REVIEWED.    Review of Systems  IF PRESENT REFER TO SCANNED ROS SHEET FROM SAME DATE  OTHERWISE ROS OBTAINED AND NON-CONTRIBUTORY OVER HPI.    /70   Pulse 82   Temp 98.6 °F (37 °C)   Resp 18   Ht 165.1 cm (65\")   Wt 108 kg (238 lb)   SpO2 98% Comment: room air at resting  BMI 39.61 kg/m²   Physical Exam  Vitals and nursing note reviewed.   Constitutional:       General: He is not in acute distress.     Appearance: He is well-developed. He is not diaphoretic.   HENT:      Head: Normocephalic and atraumatic.   Neck:      Thyroid: No thyromegaly.   Cardiovascular:      Rate and Rhythm: Normal rate and regular rhythm.      Heart sounds: Murmur heard.   Pulmonary:      Effort: Pulmonary effort is normal.      Breath sounds: Normal breath sounds. No stridor.   Musculoskeletal:      Comments: Pitting ankle edema bilaterally   Lymphadenopathy:      Cervical: No cervical adenopathy.      Upper Body:      Right upper body: No supraclavicular or epitrochlear adenopathy.      Left upper body: No supraclavicular or epitrochlear adenopathy.   Skin:     General: Skin is warm and dry.   Neurological:      Mental Status: He is alert and oriented to person, place, and time.   Psychiatric:         Behavior: Behavior normal.         Results     Sleep apnea data reviewed  Compliance is excellent, effective AHI is 1 with low pressure requirements and acceptable leak    PFTs reveal no airway obstruction, mild restriction, and a normal diffusion capacity    Immunization History   Administered Date(s) Administered    ABRYSVO (RSV, 60+ or pregnant women 32-36 wks) 11/04/2023    COVID-19 (PFIZER) BIVALENT 12+YRS 01/04/2023    COVID-19 (PFIZER) Purple Cap Monovalent 01/09/2021, 02/01/2021, 10/04/2021    COVID-19 F23 (MODERNA) 12YRS+ (SPIKEVAX) 11/04/2023    Flu Vaccine Intradermal Quad 18-64YR 10/05/2022    Flu Vaccine Quad PF 6-35MO 10/02/2021    Flu " Vaccine Quad PF >36MO 10/02/2021    Flublok 18+yrs 10/02/2021    Fluzone (or Fluarix & Flulaval for VFC) >6mos 10/12/2020, 09/27/2023    Hep B, Unspecified 02/05/1996    Hepatitis A 11/19/2018, 05/20/2019    Hepatitis B Adult/Adolescent IM 02/05/1996    Pneumococcal Conjugate 13-Valent (PCV13) 11/07/2016    Pneumococcal Conjugate 20-Valent (PCV20) 11/10/2022    Pneumococcal, Unspecified 04/25/2017    Rabies 12/05/2014, 12/08/2014, 12/19/2014, 12/19/2014    Rabies IM 2 12/19/2014    Tetanus 04/25/2017     Problem List       ICD-10-CM ICD-9-CM   1. Dyspnea on exertion  R06.09 786.09   2. Former smoker (None x 35 years)  Z87.891 V15.82   3. GERD  K21.9 530.81   4. History of COVID-19 pneumonia (10/2020)  Z86.16 V12.09   5. Obesity, Class II, BMI 35-39.9  E66.9 278.00   6. RONIT (obstructive sleep apnea) - severe  G47.33 327.23       Discussion     We reviewed his test results  CPAP appears effective and compliance is excellent    PFTs are stable    We will refill his Advair and albuterol    Have encouraged efforts at regular exercise and weight loss  Hopefully now that he is recovering from his hip surgery and get more activity    I will plan to see him back in 6 months or earlier if there are any problems in the meantime    Moderate level of Medical Decision Making complexity based on 2 or more chronic stable illnesses and an independent review of test results and/or prescription drug management.    Dontae Manzo MD  Note electronically signed    CC: Fransico Ngo PA

## 2024-05-31 NOTE — TELEPHONE ENCOUNTER
No paperwork received on my end. Called patient to confirm. Left vm advising only paperwork we have is from 3/12/24

## 2024-06-08 DIAGNOSIS — J30.2 SEASONAL ALLERGIC RHINITIS: ICD-10-CM

## 2024-06-08 RX ORDER — LORATADINE 10 MG/1
10 TABLET ORAL DAILY
Qty: 30 TABLET | Refills: 10 | Status: CANCELLED | OUTPATIENT
Start: 2024-06-08

## 2024-06-16 DIAGNOSIS — J30.2 SEASONAL ALLERGIC RHINITIS: ICD-10-CM

## 2024-06-17 RX ORDER — LORATADINE 10 MG/1
10 TABLET ORAL DAILY
Qty: 90 TABLET | Refills: 3 | Status: SHIPPED | OUTPATIENT
Start: 2024-06-17

## 2024-07-22 ENCOUNTER — OFFICE VISIT (OUTPATIENT)
Dept: FAMILY MEDICINE CLINIC | Facility: CLINIC | Age: 64
End: 2024-07-22
Payer: COMMERCIAL

## 2024-07-22 VITALS
HEIGHT: 65 IN | HEART RATE: 89 BPM | OXYGEN SATURATION: 98 % | BODY MASS INDEX: 38.65 KG/M2 | RESPIRATION RATE: 16 BRPM | SYSTOLIC BLOOD PRESSURE: 124 MMHG | DIASTOLIC BLOOD PRESSURE: 70 MMHG | TEMPERATURE: 98 F | WEIGHT: 232 LBS

## 2024-07-22 DIAGNOSIS — J30.9 ALLERGIC RHINITIS, UNSPECIFIED SEASONALITY, UNSPECIFIED TRIGGER: ICD-10-CM

## 2024-07-22 DIAGNOSIS — E11.65 TYPE 2 DIABETES MELLITUS WITH HYPERGLYCEMIA, WITHOUT LONG-TERM CURRENT USE OF INSULIN: Primary | ICD-10-CM

## 2024-07-22 LAB
EXPIRATION DATE: ABNORMAL
HBA1C MFR BLD: 6.5 % (ref 4.5–5.7)
Lab: ABNORMAL

## 2024-07-22 PROCEDURE — 99213 OFFICE O/P EST LOW 20 MIN: CPT | Performed by: PHYSICIAN ASSISTANT

## 2024-07-22 PROCEDURE — 83036 HEMOGLOBIN GLYCOSYLATED A1C: CPT | Performed by: PHYSICIAN ASSISTANT

## 2024-07-22 RX ORDER — SEMAGLUTIDE 2.68 MG/ML
2 INJECTION, SOLUTION SUBCUTANEOUS WEEKLY
Qty: 3 ML | Refills: 6 | Status: SHIPPED | OUTPATIENT
Start: 2024-07-22 | End: 2024-07-22 | Stop reason: SDUPTHER

## 2024-07-22 RX ORDER — CEFDINIR 300 MG/1
300 CAPSULE ORAL 2 TIMES DAILY
Qty: 20 CAPSULE | Refills: 0 | Status: SHIPPED | OUTPATIENT
Start: 2024-07-22

## 2024-07-22 RX ORDER — SEMAGLUTIDE 2.68 MG/ML
2 INJECTION, SOLUTION SUBCUTANEOUS WEEKLY
Qty: 3 ML | Refills: 6 | Status: SHIPPED | OUTPATIENT
Start: 2024-07-22 | End: 2024-07-22

## 2024-07-22 RX ORDER — FLUTICASONE PROPIONATE 50 MCG
2 SPRAY, SUSPENSION (ML) NASAL DAILY
Qty: 16 G | Refills: 3 | Status: SHIPPED | OUTPATIENT
Start: 2024-07-22

## 2024-07-22 NOTE — PROGRESS NOTES
Subjective   Sebastian Liao is a 64 y.o. male  Diabetes (A1C 6.5% Due for Ozempic to be increased. ), Allergies (RF Flonase ), and Nasal Congestion (PND, runny nose, scratchy throat x2 weeks. Takes Claritin, Singulair, and uses Flonase. )      Diabetes  Pertinent negatives for hypoglycemia include no dizziness or headaches. Pertinent negatives for diabetes include no chest pain, no fatigue and no weakness.   Allergies  Associated symptoms include a sore throat. Pertinent negatives include no chest pain, coughing, fatigue, headaches, nausea, rash or weakness.     History of Present Illness    Patient is a pleasant 64-year-old male who comes in for sinus pressure congestion blowing green-yellow drainage from nose symptoms x 3 days patient has mild sore throat frontal headache no nausea no vomiting    Patient also comes in follow-up type 2 diabetes A1c 6.5 takes Ozempic 1 mg needs to increase to 2 mg no shortness of breath no chest pain  The following portions of the patient's history were reviewed and updated as appropriate: allergies, current medications, past social history and problem list    Review of Systems   Constitutional:  Negative for fatigue and unexpected weight change.   HENT:  Positive for postnasal drip, sinus pain, sneezing and sore throat.    Respiratory:  Negative for cough, chest tightness and shortness of breath.    Cardiovascular:  Negative for chest pain, palpitations and leg swelling.   Gastrointestinal:  Negative for nausea.   Skin:  Negative for color change and rash.   Neurological:  Negative for dizziness, syncope, weakness and headaches.       Objective     Vitals:    07/22/24 0858   BP: 124/70   Pulse: 89   Resp: 16   Temp: 98 °F (36.7 °C)   SpO2: 98%       Physical Exam  Vitals and nursing note reviewed.   Constitutional:       General: He is not in acute distress.     Appearance: Normal appearance. He is well-developed. He is not ill-appearing, toxic-appearing or diaphoretic.   HENT:       Nose: Congestion present.      Left Sinus: Maxillary sinus tenderness present.   Neck:      Thyroid: No thyromegaly.      Vascular: No JVD.   Cardiovascular:      Rate and Rhythm: Normal rate and regular rhythm.      Pulses: Normal pulses.      Heart sounds: Normal heart sounds. No murmur heard.  Pulmonary:      Effort: Pulmonary effort is normal.      Breath sounds: Normal breath sounds.   Abdominal:      Palpations: Abdomen is soft. There is no mass.      Tenderness: There is no abdominal tenderness.   Musculoskeletal:      Cervical back: Neck supple.   Lymphadenopathy:      Cervical: No cervical adenopathy.   Skin:     General: Skin is warm and dry.   Neurological:      Mental Status: He is alert.      Sensory: No sensory deficit.       Physical Exam      Assessment & Plan   Assessment & Plan      Diagnoses and all orders for this visit:    1. Type 2 diabetes mellitus with hyperglycemia, without long-term current use of insulin (Primary)  -     POC Glycosylated Hemoglobin (Hb A1C)  -     cefdinir (OMNICEF) 300 MG capsule; Take 1 capsule by mouth 2 (Two) Times a Day.  Dispense: 20 capsule; Refill: 0  -     Semaglutide, 2 MG/DOSE, (OZEMPIC) 8 MG/3ML solution pen-injector; Inject 2 mg under the skin into the appropriate area as directed 1 (One) Time Per Week.  Dispense: 3 mL; Refill: 11    2. Allergic rhinitis, unspecified seasonality, unspecified trigger  -     fluticasone (FLONASE) 50 MCG/ACT nasal spray; Use 2 sprays in each nostril as directed by provider Daily.  Dispense: 16 g; Refill: 3    Other orders  -     Discontinue: Semaglutide, 2 MG/DOSE, (Ozempic, 2 MG/DOSE,) 8 MG/3ML solution pen-injector; Inject 2 mg under the skin into the appropriate area as directed 1 (One) Time Per Week.  Dispense: 3 mL; Refill: 6  -     Discontinue: Semaglutide, 2 MG/DOSE, (Ozempic, 2 MG/DOSE,) 8 MG/3ML solution pen-injector; Inject 2 mg under the skin into the appropriate area as directed 1 (One) Time Per Week.  Dispense:  3 mL; Refill: 6     I spent 15 minutes in patient care: Reviewing records prior to the visit, examining the patient, entering orders and documentation    Part of this note may be an electronic transcription/translation of spoken language to printed text using the Dragon Dictation System.       Patient or patient representative verbalized consent for the use of Ambient Listening during the visit with  BRANDIN Stanton for chart documentation. 7/22/2024  09:24 EDT

## 2024-08-15 DIAGNOSIS — M51.36 DDD (DEGENERATIVE DISC DISEASE), LUMBAR: ICD-10-CM

## 2024-08-15 RX ORDER — CYCLOBENZAPRINE HCL 10 MG
10 TABLET ORAL 3 TIMES DAILY PRN
Qty: 90 TABLET | Refills: 3 | Status: SHIPPED | OUTPATIENT
Start: 2024-08-15

## 2024-10-14 DIAGNOSIS — I10 ESSENTIAL HYPERTENSION, BENIGN: ICD-10-CM

## 2024-10-14 RX ORDER — AMLODIPINE BESYLATE 10 MG/1
10 TABLET ORAL DAILY
Qty: 90 TABLET | Refills: 3 | Status: SHIPPED | OUTPATIENT
Start: 2024-10-14

## 2024-10-22 ENCOUNTER — OFFICE VISIT (OUTPATIENT)
Dept: FAMILY MEDICINE CLINIC | Facility: CLINIC | Age: 64
End: 2024-10-22
Payer: COMMERCIAL

## 2024-10-22 VITALS
HEIGHT: 65 IN | OXYGEN SATURATION: 98 % | BODY MASS INDEX: 38.69 KG/M2 | WEIGHT: 232.2 LBS | SYSTOLIC BLOOD PRESSURE: 118 MMHG | RESPIRATION RATE: 14 BRPM | DIASTOLIC BLOOD PRESSURE: 72 MMHG | HEART RATE: 83 BPM

## 2024-10-22 DIAGNOSIS — E11.65 TYPE 2 DIABETES MELLITUS WITH HYPERGLYCEMIA, WITHOUT LONG-TERM CURRENT USE OF INSULIN: Primary | ICD-10-CM

## 2024-10-22 DIAGNOSIS — M51.369 DDD (DEGENERATIVE DISC DISEASE), LUMBAR: ICD-10-CM

## 2024-10-22 DIAGNOSIS — N52.9 ERECTILE DYSFUNCTION, UNSPECIFIED ERECTILE DYSFUNCTION TYPE: ICD-10-CM

## 2024-10-22 DIAGNOSIS — E78.49 OTHER HYPERLIPIDEMIA: ICD-10-CM

## 2024-10-22 DIAGNOSIS — K21.9 GASTROESOPHAGEAL REFLUX DISEASE WITHOUT ESOPHAGITIS: ICD-10-CM

## 2024-10-22 DIAGNOSIS — E78.5 HYPERLIPIDEMIA, UNSPECIFIED HYPERLIPIDEMIA TYPE: ICD-10-CM

## 2024-10-22 LAB
EXPIRATION DATE: ABNORMAL
HBA1C MFR BLD: 6 % (ref 4.5–5.7)
Lab: ABNORMAL

## 2024-10-22 PROCEDURE — 99214 OFFICE O/P EST MOD 30 MIN: CPT | Performed by: PHYSICIAN ASSISTANT

## 2024-10-22 PROCEDURE — 83036 HEMOGLOBIN GLYCOSYLATED A1C: CPT | Performed by: PHYSICIAN ASSISTANT

## 2024-10-22 RX ORDER — ROSUVASTATIN CALCIUM 10 MG/1
10 TABLET, COATED ORAL DAILY
Qty: 90 TABLET | Refills: 3 | Status: SHIPPED | OUTPATIENT
Start: 2024-10-22

## 2024-10-22 RX ORDER — SILDENAFIL 100 MG/1
100 TABLET, FILM COATED ORAL DAILY PRN
Qty: 30 TABLET | Refills: 1 | Status: SHIPPED | OUTPATIENT
Start: 2024-10-22

## 2024-10-22 RX ORDER — OMEPRAZOLE 40 MG/1
40 CAPSULE, DELAYED RELEASE ORAL DAILY
Qty: 90 CAPSULE | Refills: 3 | Status: SHIPPED | OUTPATIENT
Start: 2024-10-22

## 2024-10-22 RX ORDER — CYCLOBENZAPRINE HCL 10 MG
10 TABLET ORAL 3 TIMES DAILY PRN
Qty: 90 TABLET | Refills: 3 | Status: SHIPPED | OUTPATIENT
Start: 2024-10-22

## 2024-10-22 NOTE — PROGRESS NOTES
Subjective   Sebastian Liao is a 64 y.o. male  Diabetes (F/U. A1C 6.0%), GERD (RF omeprazole), Hyperlipidemia (RF Crestor), Nausea (RF Zofran), Back Pain (RF Flexeril ), and Erectile Dysfunction (Wants to discuss starting Cialis and Viagra again)      Diabetes  Pertinent negatives for hypoglycemia include no dizziness or headaches. Pertinent negatives for diabetes include no chest pain, no fatigue, no polydipsia, no polyphagia, no polyuria and no weakness.   Hyperlipidemia  Pertinent negatives include no chest pain or shortness of breath.   Nausea  Associated symptoms include nausea. Pertinent negatives include no chest pain, coughing, diaphoresis, fatigue, headaches, numbness, rash, vomiting or weakness.   Back Pain  Pertinent negatives include no chest pain, headaches, numbness or weakness.   Erectile Dysfunction      History of Present Illness  The patient is a pleasant 64-year-old male who comes in for multiple medical complaints.    He has type 2 diabetes, which is well-controlled with his current medication. He reports that his diabetes medication is effective in managing his blood sugar levels.    He needs a refill of Crestor for hyperlipidemia and a refill of Flexeril for chronic low back pain due to degenerative disc disease. Additionally, he requires a refill of omeprazole for GERD.    He has recently experienced new onset erectile dysfunction (ED) and is seeking a prescription for Viagra to address this issue. He has not been on any medication for ED for some time. He mentions that his wife, who is 65 years old, is still sexually active, and he wishes to keep up with her. He recalls that he was previously prescribed 100 mg of Viagra, with 30 tablets and 4 or 5 refills. He requests that the prescription be sent to [facility] pharmacy.    He also expresses a desire to lose weight and drop below 200 pounds. He notes a lack of appetite, often only consuming a bowl of cereal in the morning.    The following  portions of the patient's history were reviewed and updated as appropriate: allergies, current medications, past social history and problem list    Review of Systems   Constitutional:  Negative for appetite change, diaphoresis, fatigue and unexpected weight change.   Eyes:  Negative for visual disturbance.   Respiratory:  Negative for cough, chest tightness and shortness of breath.    Cardiovascular:  Negative for chest pain, palpitations and leg swelling.   Gastrointestinal:  Positive for nausea. Negative for diarrhea and vomiting.   Endocrine: Negative for polydipsia, polyphagia and polyuria.   Musculoskeletal:  Positive for back pain.   Skin:  Negative for color change and rash.   Neurological:  Negative for dizziness, syncope, weakness, light-headedness, numbness and headaches.       Objective     Vitals:    10/22/24 0857   BP: 118/72   Pulse: 83   Resp: 14   SpO2: 98%       Physical Exam  Physical Exam      Assessment & Plan   Assessment & Plan  1. Type 2 Diabetes Mellitus.  His A1c level is 6.0, indicating effective control of his diabetes. He should continue his current medication regimen, including Ozempic, which should also aid in weight loss. He is advised to monitor his caloric intake using an erik, ensuring that approximately 80% of his calories are derived from protein and 20% to 30% from carbohydrates. This dietary adjustment, along with maintaining a daily caloric intake around 2000 calories, should facilitate weight loss. He is also advised to replace his breakfast supplement with a protein shake, which is more beneficial for weight loss than cereal.    2. Hyperlipidemia.  A refill for Crestor will be provided.    3. Chronic Low Back Pain.  A refill for Flexeril will be provided.    4. Gastroesophageal Reflux Disease (GERD).  A refill for omeprazole 40 mg will be provided.    5. Erectile Dysfunction.  A prescription for Viagra 100 mg will be sent to [facility] pharmacy. He is advised to monitor his  caloric intake using an erik, ensuring that approximately 80% of his calories are derived from protein and 20% to 30% from carbohydrates. This dietary adjustment, along with maintaining a daily caloric intake around 2000 calories, should facilitate weight loss. He is also advised to replace his breakfast supplement with a protein shake, which is more beneficial for weight loss than cereal.      Diagnoses and all orders for this visit:    1. Type 2 diabetes mellitus with hyperglycemia, without long-term current use of insulin (Primary)  -     POC Glycosylated Hemoglobin (Hb A1C)    2. Hyperlipidemia, unspecified hyperlipidemia type    3. Other male erectile dysfunction    4. Gastroesophageal reflux disease without esophagitis  -     omeprazole (priLOSEC) 40 MG capsule; Take 1 capsule by mouth Daily.  Dispense: 90 capsule; Refill: 3    5. DDD (degenerative disc disease), lumbar  -     cyclobenzaprine (FLEXERIL) 10 MG tablet; Take 1 tablet by mouth up to 3 Times a Day As Needed for Muscle Spasms.  Dispense: 90 tablet; Refill: 3    6. Other hyperlipidemia  -     rosuvastatin (CRESTOR) 10 MG tablet; Take 1 tablet by mouth Daily.  Dispense: 90 tablet; Refill: 3     I spent 15 minutes in patient care: Reviewing records prior to the visit, examining the patient, entering orders and documentation    Part of this note may be an electronic transcription/translation of spoken language to printed text using the Dragon Dictation System.       Patient or patient representative verbalized consent for the use of Ambient Listening during the visit with  BRANDIN Stanton for chart documentation. 10/22/2024  09:39 EDT

## 2024-11-13 DIAGNOSIS — R11.0 NAUSEA: ICD-10-CM

## 2024-11-13 RX ORDER — PROMETHAZINE HYDROCHLORIDE 25 MG/1
25 TABLET ORAL EVERY 6 HOURS PRN
Qty: 60 TABLET | Refills: 3 | Status: SHIPPED | OUTPATIENT
Start: 2024-11-13

## 2024-11-15 NOTE — PROGRESS NOTES
[facility] Pulmonary Follow up    CHIEF COMPLAINT    RONIT    HISTORY OF PRESENT ILLNESS    Sebastian Liao is a 61 y.o.male here today for follow-up of his RONIT.  He was last seen in the office by Dr. Manzo in June.  He denies any respiratory illnesses or exacerbations since his last appointment.    He did  his CPAP machine yesterday and is hopeful to start it this weekend.  He continues to have some daytime somnolence, fatigue and on shortness sleep.    He continues to use Advair 100.  His insurance would not cover Breo.  He denies any difficulties with his inhaler.  He has albuterol rescue inhaler to use as needed but does not use it that regularly.    He continues to have shortness of breath occasionally.  He does take frequent breaks to recover.  He states that his shortness of breath has improved since his last appointment.    He continues to have some nausea from his COVID-19 virus.  He does take medication as needed for this.    He denies fever, chills, sputum production, hemoptysis, night sweats, weight loss, chest pain or palpitations.  He denies any lower extremity edema or calf tenderness.  He denies any sinus or allergy symptoms.  He denies reflux symptoms.  He does take Pepcid regularly.    He is up-to-date on his current vaccinations.    Patient Active Problem List   Diagnosis   • Testicular hypofunction   • Essential hypertension, benign   • Diabetes type 2, controlled (CMS/[facility])   • Hyperlipidemia   • Obesity, Class III, BMI 40-49.9 (morbid obesity) (CMS/HCC)   • Dyspnea on exertion   • History of COVID-19 pneumonia (10/2020)   • Former smoker (None x 35 years)   • GERD   • RONIT (obstructive sleep apnea) - severe       No Known Allergies    Current Outpatient Medications:   •  albuterol sulfate  (90 Base) MCG/ACT inhaler, Inhale 2 puffs Every 4 (Four) Hours As Needed for wheezing, Disp: 8.5 g, Rfl: 5  •  alfuzosin (UROXATRAL) 10 MG 24 hr tablet, Take 1 tablet by mouth Daily., Disp: 90  tablet, Rfl: 3  •  alfuzosin (UROXATRAL) 10 MG 24 hr tablet, Take 1 tablet by mouth Daily., Disp: 90 tablet, Rfl: 3  •  amLODIPine (NORVASC) 10 MG tablet, Take 1 tablet by mouth Daily., Disp: 90 tablet, Rfl: 3  •  benzonatate (Tessalon Perles) 100 MG capsule, Take 1 capsule by mouth 2 (Two) Times a Day., Disp: 60 capsule, Rfl: 3  •  carisoprodol (Soma) 350 MG tablet, Take 1 tablet by mouth 2 (two) times a day., Disp: 60 tablet, Rfl: 1  •  cyclobenzaprine (FLEXERIL) 10 MG tablet, Take 1 tablet by mouth 2 (Two) Times a Day As Needed for Muscle Spasms., Disp: 90 tablet, Rfl: 3  •  dicyclomine (BENTYL) 20 MG tablet, Take 1 tablet by mouth Every 6 (Six) Hours., Disp: 12 tablet, Rfl: 0  •  famotidine (PEPCID) 20 MG tablet, Take 1 tablet by mouth 2 (two) times a day., Disp: 180 tablet, Rfl: 3  •  fluticasone (Flonase) 50 MCG/ACT nasal spray, Use 2 sprays in each nostril as directed by provider Daily., Disp: 16 g, Rfl: 3  •  fluticasone-salmeterol (Advair Diskus) 100-50 MCG/DOSE DISKUS, Inhale 1 puff 2 (Two) Times a Day., Disp: 60 each, Rfl: 6  •  glyBURIDE-metFORMIN (GLUCOVANCE) 5-500 MG per tablet, Take 1 tablet by mouth 2 (Two) Times a Day With Meals., Disp: 180 tablet, Rfl: 2  •  HYDROcodone-acetaminophen (NORCO)  MG per tablet, Take 1 tablet by mouth 4 (Four) Times a Day., Disp: 120 tablet, Rfl: 0  •  loratadine (CLARITIN) 10 MG tablet, Take 1 tablet by mouth Daily., Disp: 30 tablet, Rfl: 10  •  olopatadine (PATANOL) 0.1 % ophthalmic solution, , Disp: , Rfl:   •  omeprazole (priLOSEC) 40 MG capsule, Take 1 capsule by mouth Daily., Disp: 90 capsule, Rfl: 3  •  promethazine (PHENERGAN) 25 MG tablet, Take 1 tablet by mouth Every 6 (Six) Hours As Needed for nausea and vomitting, Disp: 60 tablet, Rfl: 1  •  rosuvastatin (CRESTOR) 10 MG tablet, Take 1 tablet by mouth Daily., Disp: 90 tablet, Rfl: 3  •  sildenafil (VIAGRA) 100 MG tablet, Take 1 tablet by mouth As Needed as directed, Disp: 30 tablet, Rfl: 6  MEDICATION  "LIST AND ALLERGIES REVIEWED.    Social History     Tobacco Use   • Smoking status: Former Smoker     Packs/day: 1.00     Years: 10.00     Pack years: 10.00     Types: Cigarettes     Quit date:      Years since quittin.6   • Smokeless tobacco: Never Used   Substance Use Topics   • Alcohol use: Yes     Comment: occasional   • Drug use: No       FAMILY AND SOCIAL HISTORY REVIEWED.    Review of Systems   Constitutional: Negative for activity change, appetite change, fatigue, fever and unexpected weight change.   HENT: Negative for congestion, postnasal drip, rhinorrhea, sinus pressure, sore throat and voice change.    Eyes: Negative for visual disturbance.   Respiratory: Positive for shortness of breath. Negative for cough, chest tightness and wheezing.    Cardiovascular: Negative for chest pain, palpitations and leg swelling.   Gastrointestinal: Negative for abdominal distention, abdominal pain, nausea and vomiting.   Endocrine: Negative for cold intolerance and heat intolerance.   Genitourinary: Negative for difficulty urinating and urgency.   Musculoskeletal: Negative for arthralgias, back pain and neck pain.   Skin: Negative for color change and pallor.   Allergic/Immunologic: Negative for environmental allergies and food allergies.   Neurological: Negative for dizziness, syncope, weakness and light-headedness.   Hematological: Negative for adenopathy. Does not bruise/bleed easily.   Psychiatric/Behavioral: Negative for agitation and behavioral problems.   .    /76 (BP Location: Right arm, Patient Position: Sitting, Cuff Size: Adult)   Pulse 88   Temp 98.2 °F (36.8 °C)   Resp 18   Ht 165.1 cm (65\")   Wt 113 kg (249 lb)   SpO2 99% Comment: room air at rest  BMI 41.44 kg/m²     Immunization History   Administered Date(s) Administered   • COVID-19 (PFIZER) 2021, 2021   • Flulaval/Fluarix/Fluzone Quad 10/12/2020   • Hep B, Unspecified 1996   • Hepatitis A 2018, 2019 "   • Pneumococcal Conjugate 13-Valent (PCV13) 11/07/2016   • Pneumococcal, Unspecified 04/25/2017   • Rabies 12/05/2014, 12/08/2014, 12/19/2014, 12/19/2014   • Tetanus 04/25/2017       Physical Exam  Vitals and nursing note reviewed.   Constitutional:       Appearance: He is well-developed. He is not diaphoretic.   HENT:      Head: Normocephalic and atraumatic.   Eyes:      Pupils: Pupils are equal, round, and reactive to light.   Neck:      Thyroid: No thyromegaly.   Cardiovascular:      Rate and Rhythm: Normal rate and regular rhythm.      Heart sounds: Normal heart sounds. No murmur heard.   No friction rub. No gallop.    Pulmonary:      Effort: Pulmonary effort is normal. No respiratory distress.      Breath sounds: Normal breath sounds. No wheezing or rales.   Chest:      Chest wall: No tenderness.   Abdominal:      General: Bowel sounds are normal.      Palpations: Abdomen is soft.      Tenderness: There is no abdominal tenderness.   Musculoskeletal:         General: No swelling. Normal range of motion.      Cervical back: Normal range of motion and neck supple.   Lymphadenopathy:      Cervical: No cervical adenopathy.   Skin:     General: Skin is warm and dry.      Capillary Refill: Capillary refill takes less than 2 seconds.   Neurological:      Mental Status: He is alert and oriented to person, place, and time.   Psychiatric:         Mood and Affect: Mood normal.         Behavior: Behavior normal.           RESULTS      PROBLEM LIST    Problem List Items Addressed This Visit        Cardiac and Vasculature    Dyspnea on exertion - Primary       Gastrointestinal Abdominal     GERD       Sleep    RONIT (obstructive sleep apnea) - severe       Other    History of COVID-19 pneumonia (10/2020)            DISCUSSION    Mr. Liao was here for follow-up of his dyspnea.  He seems to be doing fairly well from pulmonary standpoint.  He has felt that his shortness of breath has improved since his initial  appointment.    He is going to start his CPAP hopefully this weekend and I did encourage him to wear this a minimum of 4 hours every night.  He will need a follow-up in 31 to 90 days for follow-up of his CPAP.    He will continue Advair 100 for his dyspnea.  I did encourage him to continue using his albuterol as needed for shortness of breath.    He will follow-up in October/November with Dr. Manzo or sooner if his symptoms worsen.  He will call with any additional concerns or questions.    Level of service justified based on 35 minutes spent in patient care on this date of service including, but not limited to: preparing to see the patient, obtaining and/or reviewing history, performing medically appropriate examination, ordering tests/medicine/procedures, independently interpreting results, documenting clinical information in EHR, and counseling/education of patient/family/caregiver. (Level 4 30-39 minutes; Level 5 40-54 minutes)      JULIO Rothman  08/06/202110:14 EDT  Electronically signed     Please note that portions of this note were completed with a voice recognition program. Efforts were made to edit the dictations, but occasionally words are mistranscribed.      CC: Fransico Ngo PA   I have personally seen and examined the patient. I have collaborated with and supervised the

## 2024-11-27 ENCOUNTER — OFFICE VISIT (OUTPATIENT)
Dept: PULMONOLOGY | Facility: CLINIC | Age: 64
End: 2024-11-27
Payer: COMMERCIAL

## 2024-11-27 VITALS
BODY MASS INDEX: 38.49 KG/M2 | HEART RATE: 93 BPM | SYSTOLIC BLOOD PRESSURE: 138 MMHG | OXYGEN SATURATION: 97 % | WEIGHT: 231 LBS | HEIGHT: 65 IN | DIASTOLIC BLOOD PRESSURE: 82 MMHG

## 2024-11-27 DIAGNOSIS — J30.1 SEASONAL ALLERGIC RHINITIS DUE TO POLLEN: ICD-10-CM

## 2024-11-27 DIAGNOSIS — G47.33 OSA (OBSTRUCTIVE SLEEP APNEA): Primary | ICD-10-CM

## 2024-11-27 DIAGNOSIS — Z86.16 HISTORY OF COVID-19: ICD-10-CM

## 2024-11-27 DIAGNOSIS — Z87.891 FORMER SMOKER: ICD-10-CM

## 2024-11-27 NOTE — PROGRESS NOTES
PULMONARY  NOTE    Chief Complaint     Dyspnea on exertion, history of COVID-19, history of asthma, severe obstructive sleep apnea    History of Present Illness     64-year-old male returns today for follow-up  I last saw him 5/31/2024    He has a history of C. Drake obstructive sleep apnea  He uses CPAP on a nightly basis  He feels that he benefits from it    Remote smoker, none for 35 years    He has a diagnosis of asthma  He is on Advair with albuterol  No recent acute exacerbation    Since I last saw him he is continue to work on her regular walking program and efforts at weight loss which have been successful    Patient Active Problem List   Diagnosis    Testicular hypofunction    Essential hypertension, benign    Type 2 diabetes mellitus with hyperglycemia, without long-term current use of insulin    Hyperlipidemia    Dyspnea on exertion    History of COVID-19 pneumonia (10/2020)    Former smoker (None x 35 years)    GERD    RONIT (obstructive sleep apnea) - severe    DDD (degenerative disc disease), cervical    Seasonal allergic rhinitis    Screen for colon cancer    Carpal tunnel syndrome of right wrist    Degenerative arthritis of hip    BPH (benign prostatic hyperplasia)    Status post total replacement of left hip    Obesity, Class II, BMI 35-39.9      No Known Allergies    Current Outpatient Medications:     albuterol sulfate  (90 Base) MCG/ACT inhaler, Inhale 2 puffs Every 4 (Four) Hours As Needed for wheezing., Disp: 54 g, Rfl: 3    alfuzosin (UROXATRAL) 10 MG 24 hr tablet, Take 1 tablet by mouth Daily., Disp: 90 tablet, Rfl: 3    amLODIPine (NORVASC) 10 MG tablet, Take 1 tablet by mouth Daily., Disp: 90 tablet, Rfl: 3    cevimeline (Evoxac) 30 MG capsule, Take 1 capsule by mouth 3 (Three) Times a Day As Needed., Disp: 90 capsule, Rfl: 12    cyclobenzaprine (FLEXERIL) 10 MG tablet, Take 1 tablet by mouth up to 3 Times a Day As Needed for Muscle Spasms., Disp: 90 tablet, Rfl: 3    dicyclomine  (BENTYL) 20 MG tablet, Take 1 tablet by mouth Every 6 (Six) Hours., Disp: 180 tablet, Rfl: 3    fluticasone (FLONASE) 50 MCG/ACT nasal spray, Use 2 sprays in each nostril as directed by provider Daily., Disp: 16 g, Rfl: 3    Fluticasone-Salmeterol (Advair Diskus) 100-50 MCG/ACT DISKUS, Inhale 1 puff 2 (Two) Times a Day., Disp: 180 each, Rfl: 3    gentamicin (GARAMYCIN) 0.3 % ophthalmic solution, Administer 2 drops to both eyes Every 4 (Four) Hours., Disp: 5 mL, Rfl: 1    loratadine (CLARITIN) 10 MG tablet, Take 1 tablet by mouth Daily., Disp: 90 tablet, Rfl: 3    meloxicam (MOBIC) 15 MG tablet, TAKE 1 TABLET BY MOUTH DAILY WITH FOOD AS NEEDED, Disp: 30 tablet, Rfl: 0    montelukast (SINGULAIR) 10 MG tablet, Take 1 tablet by mouth Every Night., Disp: 90 tablet, Rfl: 3    naloxone (NARCAN) 4 MG/0.1ML nasal spray, Call 911. Don't prime. [place] in 1 nostril for overdose. Repeat in 2-3 minutes in other nostril if no or minimal breathing/responsiveness., Disp: 2 each, Rfl: 0    olopatadine (PATANOL) 0.1 % ophthalmic solution, Administer 1 drop to both eyes 2 (Two) Times a Day., Disp: , Rfl:     omeprazole (priLOSEC) 40 MG capsule, Take 1 capsule by mouth Daily., Disp: 90 capsule, Rfl: 3    ondansetron ODT (ZOFRAN-ODT) 4 MG disintegrating tablet, Place 1 tablet on the tongue Every 6 (Six) Hours As Needed for Nausea or Vomiting., Disp: 30 tablet, Rfl: 0    promethazine (PHENERGAN) 25 MG tablet, Take 1 tablet by mouth Every 6 (Six) Hours As Needed for nausea and vomiting, Disp: 60 tablet, Rfl: 3    rosuvastatin (CRESTOR) 10 MG tablet, Take 1 tablet by mouth Daily., Disp: 90 tablet, Rfl: 3    Semaglutide, 2 MG/DOSE, (OZEMPIC) 8 MG/3ML solution pen-injector, Inject 2 mg under the skin into the appropriate area as directed 1 (One) Time Per Week., Disp: 3 mL, Rfl: 11    sildenafil (VIAGRA) 100 MG tablet, Take 1 tablet by mouth Daily As Needed for Erectile Dysfunction., Disp: , Rfl:     tadalafil (CIALIS) 20 MG tablet, Take 1  tablet by mouth Daily As Needed for Erectile Dysfunction., Disp: , Rfl:     aspirin (ASPIR) 81 MG EC tablet, Take 1 tablet by mouth 2 (Two) Times a Day. (Patient not taking: Reported on 2024), Disp: 60 tablet, Rfl: 0    sildenafil (Viagra) 100 MG tablet, Take 1 tablet by mouth Daily As Needed for Erectile Dysfunction. (Patient not taking: Reported on 2024), Disp: 30 tablet, Rfl: 1    silodosin (RAPAFLO) 8 MG capsule capsule, Take 1 capsule by mouth Daily. (Patient not taking: Reported on 2024), Disp: 90 capsule, Rfl: 3    terbinafine (LamISIL) 250 MG tablet, Take 1 tablet by mouth Daily. (Patient not taking: Reported on 2024), Disp: 30 tablet, Rfl: 2    Testosterone (Testopel) 75 MG implant pellet, Testopel 75 mg implant pellet (Patient not taking: Reported on 2024), Disp: , Rfl:   MEDICATION LIST AND ALLERGIES REVIEWED.    Family History   Problem Relation Age of Onset    Cancer Mother         Ovarian,     Stroke Father     Hypertension Father     Hypertension Sister     Diabetes Brother     Heart attack Brother     Hypertension Maternal Grandmother     Diabetes Maternal Grandmother      Social History     Tobacco Use    Smoking status: Former     Current packs/day: 0.00     Average packs/day: 0.5 packs/day for 10.0 years (5.0 ttl pk-yrs)     Types: Cigarettes     Start date:      Quit date:      Years since quittin.9     Passive exposure: Past    Smokeless tobacco: Former     Types: Chew     Quit date: 1986   Vaping Use    Vaping status: Never Used   Substance Use Topics    Alcohol use: Yes     Comment: 2 drinks per month    Drug use: Yes     Types: Marijuana     Comment: Quit in      Social History     Social History Narrative    Left hand dominant, , Exercises daily.     Former smoker, has not smoked for 35 years     FAMILY AND SOCIAL HISTORY REVIEWED.    Review of Systems  IF PRESENT REFER TO SCANNED ROS SHEET FROM SAME DATE  OTHERWISE ROS OBTAINED AND  "NON-CONTRIBUTORY OVER HPI.    /82   Pulse 93   Ht 165.1 cm (65\")   Wt 105 kg (231 lb)   SpO2 97%   BMI 38.44 kg/m²   Physical Exam  Vitals and nursing note reviewed.   Constitutional:       General: He is not in acute distress.     Appearance: He is well-developed. He is not diaphoretic.   HENT:      Head: Normocephalic and atraumatic.   Neck:      Thyroid: No thyromegaly.   Cardiovascular:      Rate and Rhythm: Normal rate and regular rhythm.      Heart sounds: Murmur heard.   Pulmonary:      Effort: Pulmonary effort is normal.      Breath sounds: Normal breath sounds. No stridor.   Lymphadenopathy:      Cervical: No cervical adenopathy.      Upper Body:      Right upper body: No supraclavicular or epitrochlear adenopathy.      Left upper body: No supraclavicular or epitrochlear adenopathy.   Skin:     General: Skin is warm and dry.   Neurological:      Mental Status: He is alert and oriented to person, place, and time.   Psychiatric:         Behavior: Behavior normal.         Results     Immunization History   Administered Date(s) Administered    ABRYSVO (RSV, 60+ or pregnant women 32-36 wks) 11/04/2023    COVID-19 (MODERNA) 12YRS+ (SPIKEVAX) 11/04/2023    COVID-19 (PFIZER) 12YRS+ (COMIRNATY) 10/19/2024    COVID-19 (PFIZER) BIVALENT 12+YRS 01/04/2023    COVID-19 (PFIZER) Purple Cap Monovalent 01/09/2021, 02/01/2021, 10/04/2021    Flu Vaccine Intradermal Quad 18-64YR 10/05/2022    Flu Vaccine Quad PF 6-35MO 10/02/2021    Flu Vaccine Quad PF >36MO 10/02/2021    Flublok 18+yrs 10/02/2021    Fluzone (or Fluarix & Flulaval for VFC) >6mos 10/12/2020, 09/27/2023    Hep B, Unspecified 02/05/1996    Hepatitis A 11/19/2018, 05/20/2019    Hepatitis B Adult/Adolescent IM 02/05/1996    Pneumococcal Conjugate 13-Valent (PCV13) 11/07/2016    Pneumococcal Conjugate 20-Valent (PCV20) 11/10/2022    Pneumococcal, Unspecified 04/25/2017    Rabies 12/05/2014, 12/08/2014, 12/19/2014, 12/19/2014    Rabies IM 2 12/19/2014    " Tetanus 04/25/2017     Problem List       ICD-10-CM ICD-9-CM   1. RONIT (obstructive sleep apnea) - severe  G47.33 327.23   2. Seasonal allergic rhinitis due to pollen  J30.1 477.0   3. History of COVID-19 pneumonia (10/2020)  Z86.16 V12.09   4. Former smoker (None x 35 years)  Z87.891 V15.82       Discussion     Doing well from a pulmonary standpoint  Shortness of breath has improved with weight loss    I have encouraged his efforts at weight loss and a regular exercise program    He has a history of asthma and will remain on Advair with albuterol as needed    We reviewed his CPAP data  Compliance is excellent and effective AHI is less than 1    I will see him back in a year or earlier if there are any problems in the meantime    Moderate level of Medical Decision Making complexity based on 2 or more chronic stable illnesses and an independent review of test results and/or prescription drug management.    Dontae Manzo MD  Note electronically signed    CC: Fransico Ngo PA

## 2025-01-15 RX ORDER — SILDENAFIL 100 MG/1
100 TABLET, FILM COATED ORAL DAILY PRN
Qty: 30 TABLET | Refills: 1 | Status: SHIPPED | OUTPATIENT
Start: 2025-01-15

## 2025-02-03 ENCOUNTER — LAB (OUTPATIENT)
Dept: FAMILY MEDICINE CLINIC | Facility: CLINIC | Age: 65
End: 2025-02-03
Payer: COMMERCIAL

## 2025-02-03 ENCOUNTER — LAB (OUTPATIENT)
Dept: LAB | Facility: HOSPITAL | Age: 65
End: 2025-02-03
Payer: COMMERCIAL

## 2025-02-03 ENCOUNTER — OFFICE VISIT (OUTPATIENT)
Dept: FAMILY MEDICINE CLINIC | Facility: CLINIC | Age: 65
End: 2025-02-03
Payer: COMMERCIAL

## 2025-02-03 VITALS
OXYGEN SATURATION: 98 % | TEMPERATURE: 98 F | BODY MASS INDEX: 37.82 KG/M2 | WEIGHT: 227 LBS | RESPIRATION RATE: 14 BRPM | HEART RATE: 90 BPM | HEIGHT: 65 IN | SYSTOLIC BLOOD PRESSURE: 132 MMHG | DIASTOLIC BLOOD PRESSURE: 78 MMHG

## 2025-02-03 DIAGNOSIS — K58.9 IRRITABLE BOWEL SYNDROME WITHOUT DIARRHEA: ICD-10-CM

## 2025-02-03 DIAGNOSIS — E11.65 TYPE 2 DIABETES MELLITUS WITH HYPERGLYCEMIA, WITHOUT LONG-TERM CURRENT USE OF INSULIN: ICD-10-CM

## 2025-02-03 DIAGNOSIS — Z12.5 SPECIAL SCREENING FOR MALIGNANT NEOPLASM OF PROSTATE: ICD-10-CM

## 2025-02-03 DIAGNOSIS — M51.369 DDD (DEGENERATIVE DISC DISEASE), LUMBAR: ICD-10-CM

## 2025-02-03 DIAGNOSIS — Z00.00 ROUTINE GENERAL MEDICAL EXAMINATION AT A HEALTH CARE FACILITY: Primary | ICD-10-CM

## 2025-02-03 DIAGNOSIS — Z00.00 ROUTINE GENERAL MEDICAL EXAMINATION AT A HEALTH CARE FACILITY: ICD-10-CM

## 2025-02-03 LAB
ALBUMIN SERPL-MCNC: 4.1 G/DL (ref 3.5–5.2)
ALBUMIN/GLOB SERPL: 1.1 G/DL
ALP SERPL-CCNC: 93 U/L (ref 39–117)
ALT SERPL W P-5'-P-CCNC: 16 U/L (ref 1–41)
ANION GAP SERPL CALCULATED.3IONS-SCNC: 11.2 MMOL/L (ref 5–15)
AST SERPL-CCNC: 32 U/L (ref 1–40)
BILIRUB SERPL-MCNC: 0.4 MG/DL (ref 0–1.2)
BUN SERPL-MCNC: 11 MG/DL (ref 8–23)
BUN/CREAT SERPL: 8.5 (ref 7–25)
CALCIUM SPEC-SCNC: 9.3 MG/DL (ref 8.6–10.5)
CHLORIDE SERPL-SCNC: 100 MMOL/L (ref 98–107)
CHOLEST SERPL-MCNC: 142 MG/DL (ref 0–200)
CO2 SERPL-SCNC: 26.8 MMOL/L (ref 22–29)
CREAT SERPL-MCNC: 1.3 MG/DL (ref 0.76–1.27)
DEPRECATED RDW RBC AUTO: 42.4 FL (ref 37–54)
EGFRCR SERPLBLD CKD-EPI 2021: 62.1 ML/MIN/1.73
ERYTHROCYTE [DISTWIDTH] IN BLOOD BY AUTOMATED COUNT: 13.7 % (ref 12.3–15.4)
EXPIRATION DATE: ABNORMAL
GLOBULIN UR ELPH-MCNC: 3.8 GM/DL
GLUCOSE SERPL-MCNC: 94 MG/DL (ref 65–99)
HBA1C MFR BLD: 6.2 % (ref 4.5–5.7)
HCT VFR BLD AUTO: 45.2 % (ref 37.5–51)
HDLC SERPL-MCNC: 48 MG/DL (ref 40–60)
HGB BLD-MCNC: 15.3 G/DL (ref 13–17.7)
LDLC SERPL CALC-MCNC: 76 MG/DL (ref 0–100)
LDLC/HDLC SERPL: 1.56 {RATIO}
Lab: ABNORMAL
MCH RBC QN AUTO: 29 PG (ref 26.6–33)
MCHC RBC AUTO-ENTMCNC: 33.8 G/DL (ref 31.5–35.7)
MCV RBC AUTO: 85.8 FL (ref 79–97)
PLATELET # BLD AUTO: 358 10*3/MM3 (ref 140–450)
PMV BLD AUTO: 9.5 FL (ref 6–12)
POC CREATININE URINE: 26.5
POC MICROALBUMIN URINE: 80
POTASSIUM SERPL-SCNC: 4 MMOL/L (ref 3.5–5.2)
PROT SERPL-MCNC: 7.9 G/DL (ref 6–8.5)
PSA SERPL-MCNC: 1.9 NG/ML (ref 0–4)
RBC # BLD AUTO: 5.27 10*6/MM3 (ref 4.14–5.8)
SODIUM SERPL-SCNC: 138 MMOL/L (ref 136–145)
TRIGL SERPL-MCNC: 96 MG/DL (ref 0–150)
TSH SERPL DL<=0.05 MIU/L-ACNC: 1.4 UIU/ML (ref 0.27–4.2)
VLDLC SERPL-MCNC: 18 MG/DL (ref 5–40)
WBC NRBC COR # BLD AUTO: 7.16 10*3/MM3 (ref 3.4–10.8)

## 2025-02-03 PROCEDURE — 99396 PREV VISIT EST AGE 40-64: CPT | Performed by: PHYSICIAN ASSISTANT

## 2025-02-03 PROCEDURE — 82044 UR ALBUMIN SEMIQUANTITATIVE: CPT | Performed by: PHYSICIAN ASSISTANT

## 2025-02-03 PROCEDURE — G0103 PSA SCREENING: HCPCS

## 2025-02-03 PROCEDURE — 80050 GENERAL HEALTH PANEL: CPT

## 2025-02-03 PROCEDURE — 80061 LIPID PANEL: CPT

## 2025-02-03 PROCEDURE — 93000 ELECTROCARDIOGRAM COMPLETE: CPT | Performed by: PHYSICIAN ASSISTANT

## 2025-02-03 PROCEDURE — 83036 HEMOGLOBIN GLYCOSYLATED A1C: CPT | Performed by: PHYSICIAN ASSISTANT

## 2025-02-03 PROCEDURE — 36415 COLL VENOUS BLD VENIPUNCTURE: CPT

## 2025-02-03 RX ORDER — DICYCLOMINE HCL 20 MG
20 TABLET ORAL EVERY 6 HOURS
Qty: 180 TABLET | Refills: 3 | Status: SHIPPED | OUTPATIENT
Start: 2025-02-03

## 2025-02-03 RX ORDER — CYCLOBENZAPRINE HCL 10 MG
10 TABLET ORAL 3 TIMES DAILY PRN
Qty: 90 TABLET | Refills: 3 | Status: SHIPPED | OUTPATIENT
Start: 2025-02-03

## 2025-02-03 RX ORDER — SILDENAFIL 100 MG/1
100 TABLET, FILM COATED ORAL DAILY PRN
Qty: 90 TABLET | Refills: 0 | Status: SHIPPED | OUTPATIENT
Start: 2025-02-03

## 2025-02-03 NOTE — PROGRESS NOTES
Subjective   Sebastian Liao is a 64 y.o. male  Annual Exam (Fasting for labs. ), Diabetes (F/U. A1C 6.2%), Back Pain (RF Flexeril ), and Irritable Bowel Syndrome (RF Bentyl )      History of Present Illness  History of Present Illness    Patient is a pleasant 62-year-old male who comes in for preventive medical examination overall doing well is type II diabetic is watching his diet and exercise has lost 4 pounds since last visit.  The following portions of the patient's history were reviewed and updated as appropriate: allergies, current medications, past social history and problem list    Review of Systems   Constitutional: Negative.    HENT: Negative.     Eyes: Negative.    Respiratory: Negative.     Cardiovascular: Negative.    Gastrointestinal: Negative.    Endocrine: Negative.    Genitourinary: Negative.    Musculoskeletal: Negative.    Skin: Negative.    Allergic/Immunologic: Negative.    Neurological: Negative.    Hematological: Negative.    Psychiatric/Behavioral: Negative.     All other systems reviewed and are negative.      Objective     Vitals:    02/03/25 0946   BP: 132/78   Pulse: 90   Resp: 14   Temp: 98 °F (36.7 °C)   SpO2: 98%       Physical Exam  Vitals and nursing note reviewed.   Constitutional:       General: He is not in acute distress.     Appearance: Normal appearance. He is well-developed. He is not ill-appearing, toxic-appearing or diaphoretic.   HENT:      Head: Normocephalic and atraumatic.      Right Ear: External ear normal.      Left Ear: External ear normal.   Eyes:      Conjunctiva/sclera: Conjunctivae normal.      Pupils: Pupils are equal, round, and reactive to light.   Neck:      Thyroid: No thyromegaly.      Vascular: No carotid bruit.   Cardiovascular:      Rate and Rhythm: Regular rhythm.      Pulses: Normal pulses.      Heart sounds: Normal heart sounds. No murmur heard.  Pulmonary:      Effort: Pulmonary effort is normal. No respiratory distress.   Abdominal:      General:  Bowel sounds are normal.      Palpations: Abdomen is soft. There is no mass.      Tenderness: There is no abdominal tenderness.   Musculoskeletal:         General: No swelling. Normal range of motion.      Cervical back: Normal range of motion and neck supple.   Lymphadenopathy:      Cervical: No cervical adenopathy.   Skin:     General: Skin is warm and dry.      Findings: No lesion or rash.   Neurological:      Mental Status: He is alert and oriented to person, place, and time.      Cranial Nerves: No cranial nerve deficit.      Sensory: No sensory deficit.      Motor: No weakness.      Coordination: Coordination normal.      Gait: Gait normal.      Deep Tendon Reflexes: Reflexes are normal and symmetric.   Psychiatric:         Mood and Affect: Mood normal.         Behavior: Behavior normal.         Thought Content: Thought content normal.         Judgment: Judgment normal.       ECG 12 Lead    Date/Time: 2/3/2025 11:07 AM  Performed by: Fransico Ngo PA    Authorized by: Fransico Ngo PA  Comparison: compared with previous ECG from 1/22/2024  Similar to previous ECG  Rhythm: sinus rhythm  Rate: normal  QRS axis: normal    Clinical impression: normal ECG and non-specific ECG         Physical Exam      Assessment & Plan   Assessment & Plan      Diagnoses and all orders for this visit:    1. Routine general medical examination at a health care facility [Z00.00] (Primary)  -     POCT microalbumin  -     POC Glycosylated Hemoglobin (Hb A1C)  -     Cancel: Comprehensive Metabolic Panel; Future  -     Cancel: Lipid Panel; Future  -     Cancel: TSH; Future  -     CBC & Differential; Future  -     Comprehensive metabolic panel; Future  -     Lipid Panel; Future  -     CBC (No Diff); Future  -     TSH; Future    2. Irritable bowel syndrome without diarrhea  -     dicyclomine (BENTYL) 20 MG tablet; Take 1 tablet by mouth Every 6 (Six) Hours.  Dispense: 180 tablet; Refill: 3    3. Special screening for  malignant neoplasm of prostate  -     Cancel: PSA Screen; Future  -     PSA Screen; Future    4. DDD (degenerative disc disease), lumbar  -     cyclobenzaprine (FLEXERIL) 10 MG tablet; Take 1 tablet by mouth up to 3 Times a Day As Needed for Muscle Spasms.  Dispense: 90 tablet; Refill: 3    5. Type 2 diabetes mellitus with hyperglycemia, without long-term current use of insulin  -     POCT microalbumin  -     POC Glycosylated Hemoglobin (Hb A1C)    Other orders  -     sildenafil (Viagra) 100 MG tablet; Take 1 tablet by mouth Daily As Needed for Erectile Dysfunction.  Dispense: 90 tablet; Refill: 0     Preventive medicine discussed, diet, exercise, healthy living discussed at length.  Discussed nutrition, physical activity, healthy weight, injury prevention, misuse of tobacco, alcohol and drugs, dental health, mental health, immunizations, screening    Part of this note may be an electronic transcription/translation of spoken language to printed text using the Dragon Dictation System.       Patient or patient representative verbalized consent for the use of Ambient Listening during the visit with  BRANDIN Stanton for chart documentation. 2/3/2025  10:14 EST

## 2025-02-11 DIAGNOSIS — R11.0 NAUSEA: ICD-10-CM

## 2025-02-11 RX ORDER — PROMETHAZINE HYDROCHLORIDE 25 MG/1
25 TABLET ORAL EVERY 6 HOURS PRN
Qty: 60 TABLET | Refills: 3 | Status: SHIPPED | OUTPATIENT
Start: 2025-02-11

## 2025-03-03 ENCOUNTER — OFFICE VISIT (OUTPATIENT)
Dept: ORTHOPEDIC SURGERY | Facility: CLINIC | Age: 65
End: 2025-03-03
Payer: COMMERCIAL

## 2025-03-03 VITALS
BODY MASS INDEX: 37.82 KG/M2 | SYSTOLIC BLOOD PRESSURE: 140 MMHG | WEIGHT: 227 LBS | HEIGHT: 65 IN | DIASTOLIC BLOOD PRESSURE: 82 MMHG

## 2025-03-03 DIAGNOSIS — Z96.642 STATUS POST TOTAL HIP REPLACEMENT, LEFT: Primary | ICD-10-CM

## 2025-03-03 DIAGNOSIS — Z09 POSTOPERATIVE EXAMINATION: ICD-10-CM

## 2025-03-03 PROCEDURE — 99212 OFFICE O/P EST SF 10 MIN: CPT | Performed by: ORTHOPAEDIC SURGERY

## 2025-03-03 NOTE — PROGRESS NOTES
[facility]    Subjective     Chief Complaint   Patient presents with    Follow-up     10 month follow up---1 year S/P Modified Anterior Total Hip Arthroplasty - Left (DOS: 2/224/24        HPI    It has been 10  month(s) since Mr. Liao's last visit. He returns to clinic today for follow-up of left hip arthroplasty. The issue has been ongoing for 1 year(s). He rates his pain a 6/10 on the pain scale. Previous/current treatments: NSAIDS and physical therapy. Current symptoms: pain. The pain is worse with walking and rising from seated position; pain medication and/or NSAID improve the pain. Overall, he is doing better.  100% improvement compared to his preoperative symptoms.  Fully ambulatory without external aids.      I have reviewed the following portions of the patient's history and agree with: History of Present Illness and Review of Systems    Patient Active Problem List   Diagnosis    Testicular hypofunction    Essential hypertension, benign    Type 2 diabetes mellitus with hyperglycemia, without long-term current use of insulin    Hyperlipidemia    Dyspnea on exertion    History of COVID-19 pneumonia (10/2020)    Former smoker (None x 35 years)    GERD    RONIT (obstructive sleep apnea) - severe    DDD (degenerative disc disease), cervical    Seasonal allergic rhinitis    Screen for colon cancer    Carpal tunnel syndrome of right wrist    Degenerative arthritis of hip    BPH (benign prostatic hyperplasia)    Status post total replacement of left hip    Obesity, Class II, BMI 35-39.9     Past Medical History:   Diagnosis Date    Acute bronchitis     Arthritis     Asthma     Cervicalgia     COVID-19     CPAP (continuous positive airway pressure) dependence     Diabetes mellitus     Edema     GERD (gastroesophageal reflux disease)     HTN (hypertension)     Hyperlipidemia     IBS (irritable bowel syndrome)     IBS (irritable bowel syndrome)     Low testosterone     Prostate disease      Sleep apnea     Wears glasses       Past Surgical History:   Procedure Laterality Date    CARPAL TUNNEL RELEASE Right 2022    Procedure: CARPAL TUNNEL RELEASE RIGHT;  Surgeon: Nj Bliss MD;  Location: [facility];  Service: Neurosurgery;  Laterality: Right;    CERVICAL FUSION      Dr. Nickerson, . [place], [place]    COLONOSCOPY  2012    repeat in ten yrs    HIP SURGERY Right 2016    Dr. Schwartz    NECK SURGERY      cervical spine fusion    TONSILLECTOMY      TOTAL HIP ARTHROPLASTY Left 2024    Procedure: MODIFIED ANTERIOR TOTAL HIP ARTHROPLASTY - LEFT;  Surgeon: Zane Matthews MD;  Location: [facility] OR;  Service: Orthopedics;  Laterality: Left;      Family History   Problem Relation Age of Onset    Cancer Mother         Ovarian,     Stroke Father     Hypertension Father     Hypertension Sister     Diabetes Brother     Heart attack Brother     Hypertension Maternal Grandmother     Diabetes Maternal Grandmother      Social History     Socioeconomic History    Marital status:    Tobacco Use    Smoking status: Former     Current packs/day: 0.00     Average packs/day: 0.5 packs/day for 10.0 years (5.0 ttl pk-yrs)     Types: Cigarettes     Start date:      Quit date:      Years since quittin.1     Passive exposure: Past    Smokeless tobacco: Former     Types: Chew     Quit date: 1986   Vaping Use    Vaping status: Never Used   Substance and Sexual Activity    Alcohol use: Yes     Comment: 2 drinks per month    Drug use: Yes     Types: Marijuana     Comment: Quit in     Sexual activity: Defer      Current Outpatient Medications on File Prior to Visit   Medication Sig Dispense Refill    albuterol sulfate  (90 Base) MCG/ACT inhaler Inhale 2 puffs Every 4 (Four) Hours As Needed for wheezing. 54 g 3    alfuzosin (UROXATRAL) 10 MG 24 hr tablet Take 1 tablet by mouth Daily. 90 tablet 3    amLODIPine (NORVASC) 10 MG tablet Take 1 tablet by mouth Daily. 90 tablet 3     aspirin (ASPIR) 81 MG EC tablet Take 1 tablet by mouth 2 (Two) Times a Day. 60 tablet 0    cevimeline (EVOXAC) 30 MG capsule Take 1 capsule by mouth 3 (Three) Times a Day As Needed. 90 capsule 12    cyclobenzaprine (FLEXERIL) 10 MG tablet Take 1 tablet by mouth up to 3 Times a Day As Needed for Muscle Spasms. 90 tablet 3    dicyclomine (BENTYL) 20 MG tablet Take 1 tablet by mouth Every 6 (Six) Hours. 180 tablet 3    fluticasone (FLONASE) 50 MCG/ACT nasal spray Use 2 sprays in each nostril as directed by provider Daily. 16 g 3    Fluticasone-Salmeterol (Advair Diskus) 100-50 MCG/ACT DISKUS Inhale 1 puff 2 (Two) Times a Day. 180 each 3    gentamicin (GARAMYCIN) 0.3 % ophthalmic solution Administer 2 drops to both eyes Every 4 (Four) Hours. 5 mL 1    loratadine (CLARITIN) 10 MG tablet Take 1 tablet by mouth Daily. 90 tablet 3    meloxicam (MOBIC) 15 MG tablet TAKE 1 TABLET BY MOUTH DAILY WITH FOOD AS NEEDED 30 tablet 0    montelukast (SINGULAIR) 10 MG tablet Take 1 tablet by mouth Every Night. 90 tablet 3    naloxone (NARCAN) 4 MG/0.1ML nasal spray Call 911. Don't prime. [place] in 1 nostril for overdose. Repeat in 2-3 minutes in other nostril if no or minimal breathing/responsiveness. 2 each 0    olopatadine (PATANOL) 0.1 % ophthalmic solution Administer 1 drop to both eyes 2 (Two) Times a Day.      omeprazole (priLOSEC) 40 MG capsule Take 1 capsule by mouth Daily. 90 capsule 3    ondansetron ODT (ZOFRAN-ODT) 4 MG disintegrating tablet Place 1 tablet on the tongue Every 6 (Six) Hours As Needed for Nausea or Vomiting. 30 tablet 0    promethazine (PHENERGAN) 25 MG tablet Take 1 tablet by mouth Every 6 (Six) Hours As Needed for nausea and vomiting 60 tablet 3    rosuvastatin (CRESTOR) 10 MG tablet Take 1 tablet by mouth Daily. 90 tablet 3    Semaglutide, 2 MG/DOSE, (OZEMPIC) 8 MG/3ML solution pen-injector Inject 2 mg under the skin into the appropriate area as directed 1 (One) Time Per Week. 3 mL 11    sildenafil  (VIAGRA) 100 MG tablet TAKE 1 TABLET BY MOUTH DAILY AS NEEDED FOR ERECTILE DYSFUNCTION 30 tablet 1    sildenafil (Viagra) 100 MG tablet Take 1 tablet by mouth Daily As Needed for Erectile Dysfunction. 90 tablet 0    silodosin (RAPAFLO) 8 MG capsule capsule Take 1 capsule by mouth Daily. 90 capsule 3    terbinafine (LamISIL) 250 MG tablet Take 1 tablet by mouth Daily. 30 tablet 2    Testosterone (Testopel) 75 MG implant pellet        No current facility-administered medications on file prior to visit.      No Known Allergies     Review of Systems   Constitutional:  Negative for activity change, appetite change, chills, diaphoresis, fatigue, fever and unexpected weight change.   HENT:  Negative for congestion, dental problem, drooling, ear discharge, ear pain, facial swelling, hearing loss, mouth sores, nosebleeds, postnasal drip, rhinorrhea, sinus pressure, sneezing, sore throat, tinnitus, trouble swallowing and voice change.    Eyes:  Negative for photophobia, pain, discharge, redness, itching and visual disturbance.   Respiratory:  Negative for apnea, cough, choking, chest tightness, shortness of breath, wheezing and stridor.    Cardiovascular:  Negative for chest pain, palpitations and leg swelling.   Gastrointestinal:  Negative for abdominal distention, abdominal pain, anal bleeding, blood in stool, constipation, diarrhea, nausea, rectal pain and vomiting.   Endocrine: Negative for cold intolerance, heat intolerance, polydipsia, polyphagia and polyuria.   Genitourinary:  Negative for decreased urine volume, difficulty urinating, dysuria, enuresis, flank pain, frequency, genital sores, hematuria and urgency.   Musculoskeletal:  Positive for arthralgias. Negative for back pain, gait problem, joint swelling, myalgias, neck pain and neck stiffness.   Skin:  Negative for color change, pallor, rash and wound.   Allergic/Immunologic: Negative for environmental allergies, food allergies and immunocompromised state.  "  Neurological:  Negative for dizziness, tremors, seizures, syncope, facial asymmetry, speech difficulty, weakness, light-headedness, numbness and headaches.   Hematological:  Negative for adenopathy. Does not bruise/bleed easily.   Psychiatric/Behavioral:  Negative for agitation, behavioral problems, confusion, decreased concentration, dysphoric mood, hallucinations, self-injury, sleep disturbance and suicidal ideas. The patient is not nervous/anxious and is not hyperactive.         Objective      Physical Exam  /82   Ht 165.1 cm (65\")   Wt 103 kg (227 lb)   BMI 37.77 kg/m²     Body mass index is 37.77 kg/m².         General:   Mental Status:  Alert   Appearance: Cooperative, in no acute distress   Build and Nutrition: Obese by BMI male   Orientation: Alert and oriented to person, place and time   Posture: Normal   Gait: Nonantalgic/normal    Integument:   Left hip: Wound is well-healed with no signs of infection    Lower Extremity:   Left Hip:    Tenderness:  None    Swelling:  None    Crepitus:  None    Range of motion:  External Rotation: 30°       Internal Rotation: 30°       Flexion:  100°       Extension:  0°    Deformities:  None  Functional testing: Negative [facility]    No leg length discrepancy      Imaging/Studies  Imaging Results (Last 24 Hours)       Procedure Component Value Units Date/Time    XR Hip With or Without Pelvis 2 - 3 View Left [[id number]] Resulted: 03/03/25 0955     Updated: 03/03/25 0955    Narrative:      Left Hip Radiographs  Indication: status-post left total hip arthroplasty  Views: low AP pelvis and lateral of the left hip    Comparison: no change compared to prior study, 2/22/2024 and 3/18/2024    Findings:   The components are well aligned, with no signs of loosening or failure.                 Assessment and Plan     Diagnoses and all orders for this visit:    1. Status post total hip replacement, left (Primary)  -     XR Hip With or Without Pelvis 2 - 3 View Left    2. " Postoperative examination        1. Status post total hip replacement, left    2. Postoperative examination          I reviewed my findings with the patient.  His left total hip arthroplasty is functioning well.  I will see him back in 4 years for what will be a 5-year checkup with x-rays.  I will see him back sooner for any problems.    Return in about 4 years (around 3/3/2029) for recheck with x-rays.      Zane Matthews MD  03/03/25  10:03 EST    Dictated Utilizing Dragon Dictation

## 2025-03-03 NOTE — PROGRESS NOTES
"Orthopaedic Clinic Note: Hip Established Patient    Chief Complaint   Patient presents with    Follow-up     10 month follow up---1 year S/P Modified Anterior Total Hip Arthroplasty - Left (DOS: 2/224/24        HPI    It has been 10  week(s) since Mr. Liao's last visit. He returns to clinic today for left hip arthoplasty. He rates his pain a 6/10 on the pain scale and is currently taking Motrin/Ibuprofen/Meloxicam/Naproxen/Diclofenac and Tylenol for pain. He is ambulating with no assistive device. He has completed therapy.  He denies fevers, chills, or constitutional symptoms. Overall, he is doing better. ***    I have reviewed the following portions of the patient's history:{Jovi HPI:08695::\"History of Present Illness\"}    Past Medical History:   Diagnosis Date    Acute bronchitis     Arthritis     Asthma     Cervicalgia     COVID-19     CPAP (continuous positive airway pressure) dependence     Diabetes mellitus     Edema     GERD (gastroesophageal reflux disease)     HTN (hypertension)     Hyperlipidemia     IBS (irritable bowel syndrome)     IBS (irritable bowel syndrome)     Low testosterone     Prostate disease     Sleep apnea     Wears glasses       Past Surgical History:   Procedure Laterality Date    CARPAL TUNNEL RELEASE Right 12/12/2022    Procedure: CARPAL TUNNEL RELEASE RIGHT;  Surgeon: Nj Bliss MD;  Location:  [facility];  Service: Neurosurgery;  Laterality: Right;    CERVICAL FUSION  1978    Dr. Nickerson, . [place], [place]    COLONOSCOPY  07/2012    repeat in ten yrs    HIP SURGERY Right 01/26/2016    Dr. Schwartz    NECK SURGERY      cervical spine fusion    TONSILLECTOMY      TOTAL HIP ARTHROPLASTY Left 2/22/2024    Procedure: MODIFIED ANTERIOR TOTAL HIP ARTHROPLASTY - LEFT;  Surgeon: Zane Matthews MD;  Location:  [facility];  Service: Orthopedics;  Laterality: Left;      Family History   Problem Relation Age of Onset    Cancer Mother         Ovarian,     Stroke Father     Hypertension " Father     Hypertension Sister     Diabetes Brother     Heart attack Brother     Hypertension Maternal Grandmother     Diabetes Maternal Grandmother      Social History     Socioeconomic History    Marital status:    Tobacco Use    Smoking status: Former     Current packs/day: 0.00     Average packs/day: 0.5 packs/day for 10.0 years (5.0 ttl pk-yrs)     Types: Cigarettes     Start date:      Quit date:      Years since quittin.1     Passive exposure: Past    Smokeless tobacco: Former     Types: Chew     Quit date: 1986   Vaping Use    Vaping status: Never Used   Substance and Sexual Activity    Alcohol use: Yes     Comment: 2 drinks per month    Drug use: Yes     Types: Marijuana     Comment: Quit in     Sexual activity: Defer      Current Outpatient Medications on File Prior to Visit   Medication Sig Dispense Refill    albuterol sulfate  (90 Base) MCG/ACT inhaler Inhale 2 puffs Every 4 (Four) Hours As Needed for wheezing. 54 g 3    alfuzosin (UROXATRAL) 10 MG 24 hr tablet Take 1 tablet by mouth Daily. 90 tablet 3    amLODIPine (NORVASC) 10 MG tablet Take 1 tablet by mouth Daily. 90 tablet 3    aspirin (ASPIR) 81 MG EC tablet Take 1 tablet by mouth 2 (Two) Times a Day. 60 tablet 0    cevimeline (EVOXAC) 30 MG capsule Take 1 capsule by mouth 3 (Three) Times a Day As Needed. 90 capsule 12    cyclobenzaprine (FLEXERIL) 10 MG tablet Take 1 tablet by mouth up to 3 Times a Day As Needed for Muscle Spasms. 90 tablet 3    dicyclomine (BENTYL) 20 MG tablet Take 1 tablet by mouth Every 6 (Six) Hours. 180 tablet 3    fluticasone (FLONASE) 50 MCG/ACT nasal spray Use 2 sprays in each nostril as directed by provider Daily. 16 g 3    Fluticasone-Salmeterol (Advair Diskus) 100-50 MCG/ACT DISKUS Inhale 1 puff 2 (Two) Times a Day. 180 each 3    gentamicin (GARAMYCIN) 0.3 % ophthalmic solution Administer 2 drops to both eyes Every 4 (Four) Hours. 5 mL 1    loratadine (CLARITIN) 10 MG tablet Take 1  tablet by mouth Daily. 90 tablet 3    meloxicam (MOBIC) 15 MG tablet TAKE 1 TABLET BY MOUTH DAILY WITH FOOD AS NEEDED 30 tablet 0    montelukast (SINGULAIR) 10 MG tablet Take 1 tablet by mouth Every Night. 90 tablet 3    naloxone (NARCAN) 4 MG/0.1ML nasal spray Call 911. Don't prime. [place] in 1 nostril for overdose. Repeat in 2-3 minutes in other nostril if no or minimal breathing/responsiveness. 2 each 0    olopatadine (PATANOL) 0.1 % ophthalmic solution Administer 1 drop to both eyes 2 (Two) Times a Day.      omeprazole (priLOSEC) 40 MG capsule Take 1 capsule by mouth Daily. 90 capsule 3    ondansetron ODT (ZOFRAN-ODT) 4 MG disintegrating tablet Place 1 tablet on the tongue Every 6 (Six) Hours As Needed for Nausea or Vomiting. 30 tablet 0    promethazine (PHENERGAN) 25 MG tablet Take 1 tablet by mouth Every 6 (Six) Hours As Needed for nausea and vomiting 60 tablet 3    rosuvastatin (CRESTOR) 10 MG tablet Take 1 tablet by mouth Daily. 90 tablet 3    Semaglutide, 2 MG/DOSE, (OZEMPIC) 8 MG/3ML solution pen-injector Inject 2 mg under the skin into the appropriate area as directed 1 (One) Time Per Week. 3 mL 11    sildenafil (VIAGRA) 100 MG tablet TAKE 1 TABLET BY MOUTH DAILY AS NEEDED FOR ERECTILE DYSFUNCTION 30 tablet 1    sildenafil (Viagra) 100 MG tablet Take 1 tablet by mouth Daily As Needed for Erectile Dysfunction. 90 tablet 0    silodosin (RAPAFLO) 8 MG capsule capsule Take 1 capsule by mouth Daily. 90 capsule 3    terbinafine (LamISIL) 250 MG tablet Take 1 tablet by mouth Daily. 30 tablet 2    Testosterone (Testopel) 75 MG implant pellet        No current facility-administered medications on file prior to visit.      No Known Allergies     Review of Systems   Constitutional:  Negative for activity change, appetite change, chills, diaphoresis, fatigue, fever and unexpected weight change.   HENT:  Negative for congestion, dental problem, drooling, ear discharge, ear pain, facial swelling, hearing loss, mouth  "sores, nosebleeds, postnasal drip, rhinorrhea, sinus pressure, sneezing, sore throat, tinnitus, trouble swallowing and voice change.    Eyes:  Negative for photophobia, pain, discharge, redness, itching and visual disturbance.   Respiratory:  Negative for apnea, cough, choking, chest tightness, shortness of breath, wheezing and stridor.    Cardiovascular:  Negative for chest pain, palpitations and leg swelling.   Gastrointestinal:  Negative for abdominal distention, abdominal pain, anal bleeding, blood in stool, constipation, diarrhea, nausea, rectal pain and vomiting.   Endocrine: Negative for cold intolerance, heat intolerance, polydipsia, polyphagia and polyuria.   Genitourinary:  Negative for decreased urine volume, difficulty urinating, dysuria, enuresis, flank pain, frequency, genital sores, hematuria and urgency.   Musculoskeletal:  Positive for arthralgias. Negative for back pain, gait problem, joint swelling, myalgias, neck pain and neck stiffness.   Skin:  Negative for color change, pallor, rash and wound.   Allergic/Immunologic: Negative for environmental allergies, food allergies and immunocompromised state.   Neurological:  Negative for dizziness, tremors, seizures, syncope, facial asymmetry, speech difficulty, weakness, light-headedness, numbness and headaches.   Hematological:  Negative for adenopathy. Does not bruise/bleed easily.   Psychiatric/Behavioral:  Negative for agitation, behavioral problems, confusion, decreased concentration, dysphoric mood, hallucinations, self-injury, sleep disturbance and suicidal ideas. The patient is not nervous/anxious and is not hyperactive.       The patient's Review of Systems was personally reviewed and confirmed as accurate.    Physical Exam  There were no vitals taken for this visit.    There is no height or weight on file to calculate BMI.    GENERAL APPEARANCE: {General Appearance:43689::\"awake, alert, oriented, in no acute distress\",\"well developed, well " "nourished\"}  LUNGS:  breathing nonlabored  EXTREMITIES: no clubbing, cyanosis  PERIPHERAL PULSES: palpable dorsalis pedis and posterior tibial pulses bilaterally.    GAIT:  {Gait:97621}            Hip Exam:  {Right/Left/Bilateral:27543}    RANGE OF MOTION:  EXTENSION/FLEXION:  {Flex/Ext:50521::\"normal (0-110 degrees)\"}  IR (at 90 degrees of flexion):  {IR:11767}  ER (at 90 degrees of flexion):  {ER:77327}  PAIN WITH HIP MOTION:  {Yes/No (No selected):37819::\"no\"}  PAIN WITH LOGROLL:  {Yes/No (No selected):98710::\"no\"}     [place] TEST: {POSITIVE/NEGATIVE:31696}    STRENGTH:  ABDUCTOR:  {0-5:58520::\"5\"}/5  ADDUCTOR:  {0-5:59031::\"5\"}/5  HIP FLEXION:  {0-5:41359::\"5\"}/5    GREATER TROCHANTER BURSAL PAIN:  {Yes/No:37639}    SENSATION TO LIGHT TOUCH:  DEEP PERONEAL/SUPERFICIAL PERONEAL/SURAL/SAPHENOUS/TIBIAL:   {Sensation:31479::\"intact\"}    EDEMA:  {Yes/No (No selected):34219::\"no\"}  ERYTHEMA:  {Yes/No (No selected):23211::\"no\"}  WOUNDS/INCISIONS:   {Yes/No:22342}  _________________________________________________________________  _________________________________________________________________    RADIOGRAPHIC FINDINGS:   Indication: ***    Comparison: {Jovi XR comparison:02569}    AP pelvis: Right: {Jovi xr findings hip:66782};Left: {Jovi xr findings hip:97636}      Assessment/Plan:   Diagnosis Plan   1. Status post total hip replacement, left  XR Hip With or Without Pelvis 2 - 3 View Left        ***    Tez Etienne MA  03/03/25  09:35 EST  "

## 2025-03-07 ASSESSMENT — HOOS JR
HOOS JR SCORE: 65.99
HOOS JR SCORE: 8

## 2025-03-27 DIAGNOSIS — J30.2 SEASONAL ALLERGIC RHINITIS: ICD-10-CM

## 2025-03-27 RX ORDER — LORATADINE 10 MG/1
10 TABLET ORAL DAILY
Qty: 90 TABLET | Refills: 3 | Status: SHIPPED | OUTPATIENT
Start: 2025-03-27

## 2025-04-11 DIAGNOSIS — J30.9 ALLERGIC RHINITIS, UNSPECIFIED SEASONALITY, UNSPECIFIED TRIGGER: ICD-10-CM

## 2025-04-11 RX ORDER — FLUTICASONE PROPIONATE 50 MCG
2 SPRAY, SUSPENSION (ML) NASAL DAILY
Qty: 16 G | Refills: 3 | Status: SHIPPED | OUTPATIENT
Start: 2025-04-11

## 2025-04-11 NOTE — TELEPHONE ENCOUNTER
Last office visit 02-   Next office visit not on file and no follow up instructions given     Rx Refill Note  Requested Prescriptions     Pending Prescriptions Disp Refills    fluticasone (FLONASE) 50 MCG/ACT nasal spray 16 g 3     Sig: Use 2 sprays in each nostril as directed by provider Daily.      Last office visit with prescribing clinician: 2/3/2025   Last telemedicine visit with prescribing clinician: Visit date not found   Next office visit with prescribing clinician: Visit date not found                         Would you like a call back once the refill request has been completed: [] Yes [] No    If the office needs to give you a call back, can they leave a voicemail: [] Yes [] No    Sharla Cano MA  04/11/25, 15:04 EDT

## 2025-05-27 RX ORDER — SILDENAFIL 100 MG/1
100 TABLET, FILM COATED ORAL DAILY PRN
Qty: 30 TABLET | Refills: 1 | Status: SHIPPED | OUTPATIENT
Start: 2025-05-27

## 2025-05-27 NOTE — TELEPHONE ENCOUNTER
Caller: Sebastian Liao    Relationship: Self    Best call back number: [phone]     Requested Prescriptions:   Requested Prescriptions     Pending Prescriptions Disp Refills    sildenafil (VIAGRA) 100 MG tablet 30 tablet 1     Sig: Take 1 tablet by mouth Daily As Needed for Erectile Dysfunction.        Pharmacy where request should be sent: [facility] [id number] [address] [facility] [phone] [facility] [phone] FX     Last office visit with prescribing clinician: 2/3/2025   Last telemedicine visit with prescribing clinician: Visit date not found   Next office visit with prescribing clinician: Visit date not found     Additional details provided by patient: OUT OF MEDICATION    Does the patient have less than a 3 day supply:  [x] Yes  [] No    Would you like a call back once the refill request has been completed: [] Yes [x] No    If the office needs to give you a call back, can they leave a voicemail: [] Yes [x] No    Melissa Ratliff Rep   05/27/25 11:41 EDT

## 2025-06-02 DIAGNOSIS — R06.02 SHORTNESS OF BREATH: ICD-10-CM

## 2025-06-02 DIAGNOSIS — E11.65 TYPE 2 DIABETES MELLITUS WITH HYPERGLYCEMIA, WITHOUT LONG-TERM CURRENT USE OF INSULIN: ICD-10-CM

## 2025-06-02 RX ORDER — SEMAGLUTIDE 2.68 MG/ML
2 INJECTION, SOLUTION SUBCUTANEOUS WEEKLY
Qty: 3 ML | Refills: 11 | Status: SHIPPED | OUTPATIENT
Start: 2025-06-02

## 2025-06-02 RX ORDER — FLUTICASONE PROPIONATE AND SALMETEROL 100; 50 UG/1; UG/1
1 POWDER RESPIRATORY (INHALATION)
Qty: 180 EACH | Refills: 3 | Status: SHIPPED | OUTPATIENT
Start: 2025-06-02

## 2025-08-25 DIAGNOSIS — Z12.11 COLON CANCER SCREENING: Primary | ICD-10-CM

## 2025-08-25 RX ORDER — SILDENAFIL 100 MG/1
100 TABLET, FILM COATED ORAL DAILY PRN
Qty: 30 TABLET | Refills: 1 | Status: SHIPPED | OUTPATIENT
Start: 2025-08-25

## (undated) DEVICE — NDL HYPO ECLPS SFTY 25G 1 1/2IN

## (undated) DEVICE — DRP C/ARM XRAY MOBL W/STRAPS 41X64IN

## (undated) DEVICE — DISPOSABLE BIPOLAR FORCEPS 5 1/2" (14CM) SEMKIN, 0.7MM TIP AND 12 FT. (3.6M) CABLE: Brand: KIRWAN

## (undated) DEVICE — GLV SURG SIGNATURE TOUCH PF LTX 7.5 STRL BX/50

## (undated) DEVICE — GLV SURG PREMIERPRO MIC LTX PF SZ8.5 BRN

## (undated) DEVICE — Device

## (undated) DEVICE — TP CLTH MEDIPORE H XSOFT ADH 3IN 10YD

## (undated) DEVICE — TRAP FLD MINIVAC MEGADYNE 100ML

## (undated) DEVICE — PK HIP TOTL UNIV 10

## (undated) DEVICE — SUT VIC 2/0  CT1 CR8 18IN VCP839D

## (undated) DEVICE — DRP SURG 3/4 REINF 53X77IN STRL

## (undated) DEVICE — BNDG ELAS CO-FLEX SLF ADHR 6IN 5YD LF STRL

## (undated) DEVICE — SYS CLS SKIN PREMIERPRO EXOFINFUSION 22CM

## (undated) DEVICE — SPNG GZ WOVN 4X4IN 12PLY 10/BX STRL

## (undated) DEVICE — DRSNG TELFA PAD NONADH STR 1S 3X8IN

## (undated) DEVICE — GLV SURG SENSICARE W/ALOE PF LF 9 STRL

## (undated) DEVICE — STCKNT STRL 4X48 IN

## (undated) DEVICE — 3M™ MEDIPORE™ H SOFT CLOTH SURGICAL TAPE, 2863, 3 IN X 10 YD, 12/CASE: Brand: 3M™ MEDIPORE™

## (undated) DEVICE — BLD SAG SYSTEM6 25X1.27X90MM

## (undated) DEVICE — DRP PCH INST HLDR 7X11

## (undated) DEVICE — DRP C/ARM 41X40IN

## (undated) DEVICE — SYR CONTRL LUERLOK 10CC

## (undated) DEVICE — BLANKT WARM UPPR/BDY ARM/OUT 57X196CM

## (undated) DEVICE — SUT VIC 1 CT1 CR8 18IN UD VCP841D

## (undated) DEVICE — DRP SURG TOP 102X53IN STRL

## (undated) DEVICE — TP SILK DURAPORE 3IN

## (undated) DEVICE — ELECTRD BLD EZ CLN STD 4IN

## (undated) DEVICE — PAD GRND E/S MEGADYNE MONOPLR 2/PLT W/CORD A/ DISP

## (undated) DEVICE — TBG PENCL TELESCP MEGADYNE SMOKE EVAC 10FT

## (undated) DEVICE — UNDERGLV SURG BIOGEL INDICAT PI SZ8 BLU

## (undated) DEVICE — TOWL STRL OR PREWSH COTN 17X27IN BLU DISP STRL PK/4

## (undated) DEVICE — STCKNT IMPERV 12IN STRL

## (undated) DEVICE — PK EXTREM UPPR 10